# Patient Record
Sex: MALE | Race: WHITE | NOT HISPANIC OR LATINO | Employment: OTHER | URBAN - NONMETROPOLITAN AREA
[De-identification: names, ages, dates, MRNs, and addresses within clinical notes are randomized per-mention and may not be internally consistent; named-entity substitution may affect disease eponyms.]

---

## 2017-01-13 RX ORDER — SERTRALINE HYDROCHLORIDE 25 MG/1
TABLET, FILM COATED ORAL
Qty: 30 TABLET | Refills: 10 | Status: SHIPPED | OUTPATIENT
Start: 2017-01-13 | End: 2017-05-22 | Stop reason: SDUPTHER

## 2017-01-18 RX ORDER — TRAZODONE HYDROCHLORIDE 50 MG/1
TABLET ORAL
Qty: 30 TABLET | Refills: 0 | Status: SHIPPED | OUTPATIENT
Start: 2017-01-18 | End: 2017-03-29 | Stop reason: SDUPTHER

## 2017-03-14 ENCOUNTER — OFFICE VISIT (OUTPATIENT)
Dept: INTERNAL MEDICINE | Facility: CLINIC | Age: 77
End: 2017-03-14

## 2017-03-14 VITALS
RESPIRATION RATE: 14 BRPM | WEIGHT: 206 LBS | BODY MASS INDEX: 30.51 KG/M2 | SYSTOLIC BLOOD PRESSURE: 118 MMHG | DIASTOLIC BLOOD PRESSURE: 70 MMHG | HEIGHT: 69 IN | OXYGEN SATURATION: 91 % | HEART RATE: 68 BPM | TEMPERATURE: 99.1 F

## 2017-03-14 DIAGNOSIS — J06.9 ACUTE URI: Primary | ICD-10-CM

## 2017-03-14 PROCEDURE — 99213 OFFICE O/P EST LOW 20 MIN: CPT | Performed by: INTERNAL MEDICINE

## 2017-03-14 RX ORDER — AZITHROMYCIN 250 MG/1
TABLET, FILM COATED ORAL
Qty: 6 TABLET | Refills: 0 | Status: SHIPPED | OUTPATIENT
Start: 2017-03-14 | End: 2017-05-15

## 2017-03-14 NOTE — PROGRESS NOTES
Subjective   Jairo Mercedes is a 76 y.o. male.     Chief Complaint   Patient presents with   • Nasal Congestion     runny nose    • Generalized Body Aches       URI    This is a new problem. The current episode started in the past 7 days. The problem has been unchanged. There has been no fever. Associated symptoms include congestion, coughing, joint pain, rhinorrhea, sinus pain, sneezing and a sore throat. Pertinent negatives include no chest pain, headaches or wheezing. He has tried NSAIDs and acetaminophen for the symptoms. The treatment provided no relief.          Current Outpatient Prescriptions:   •  ADVAIR DISKUS 250-50 MCG/DOSE DISKUS, INHALE ONE PUFF BY MOUTH EVERY 12 HOURS, Disp: 1 each, Rfl: 4  •  albuterol (PROAIR HFA) 108 (90 BASE) MCG/ACT inhaler, INHALE 1 TO 2 PUFFS EVERY 4 TO 6 HOURS AS NEEDED., Disp: , Rfl:   •  aspirin 81 MG tablet, Take  by mouth., Disp: , Rfl:   •  atorvastatin (LIPITOR) 40 MG tablet, TAKE ONE TABLET BY MOUTH EVERY NIGHT AT BEDTIME, Disp: 30 tablet, Rfl: 5  •  DHA-EPA-Vitamin E (OMEGA-3 COMPLEX PO), Take  by mouth., Disp: , Rfl:   •  esomeprazole (NexIUM) 40 MG capsule, Take 1 capsule by mouth daily., Disp: , Rfl:   •  fluticasone (FLONASE) 50 MCG/ACT nasal spray, , Disp: , Rfl:   •  FLUZONE HIGH-DOSE 0.5 ML suspension prefilled syringe injection, , Disp: , Rfl:   •  HYDROcodone-homatropine (HYCODAN) 5-1.5 MG/5ML syrup, Take 5 mL by mouth Every 6 (Six) Hours As Needed for cough., Disp: 120 mL, Rfl: 0  •  isosorbide mononitrate (IMDUR) 30 MG 24 hr tablet, TAKE ONE TABLET BY MOUTH EVERY DAY AS DIRECTED, Disp: 90 tablet, Rfl: 2  •  meclizine (ANTIVERT) 25 MG tablet, Take 1 tablet by mouth 2 (two) times a day., Disp: 180 tablet, Rfl: 3  •  metoprolol tartrate (LOPRESSOR) 25 MG tablet, TAKE ONE TABLET BY MOUTH TWICE A DAY, Disp: 60 tablet, Rfl: 5  •  nitroglycerin (NITROSTAT) 0.4 MG SL tablet, Place  under the tongue., Disp: , Rfl:   •  sertraline (ZOLOFT) 25 MG tablet, TAKE ONE  TABLET BY MOUTH DAILY, Disp: 30 tablet, Rfl: 10  •  SPIRIVA HANDIHALER 18 MCG per inhalation capsule, INHALE THE ENTIRE CONTENTS OF 1 CAPSULE ONCE A DAY USING HANDIHALER DEVICE, Disp: 90 capsule, Rfl: 3  •  TRAVEL SICKNESS 25 MG chewable tablet chewable tablet, CHEW ONE TABLET BY MOUTH THREE TIMES A DAY AS NEEDED, Disp: 270 tablet, Rfl: 3  •  traZODone (DESYREL) 50 MG tablet, TAKE ONE-HALF TO ONE TABLET BY MOUTH EVERY NIGHT AT BEDTIME AS NEEDED, Disp: 30 tablet, Rfl: 0  •  azithromycin (ZITHROMAX) 250 MG tablet, Take 2 tablets the first day, then 1 tablet daily for 4 days., Disp: 6 tablet, Rfl: 0    The following portions of the patient's history were reviewed and updated as appropriate: allergies, current medications, past family history, past medical history, past social history, past surgical history and problem list.    Review of Systems   Constitutional: Negative.    HENT: Positive for congestion, rhinorrhea, sneezing and sore throat.    Respiratory: Positive for cough. Negative for wheezing.    Cardiovascular: Negative.  Negative for chest pain.   Gastrointestinal: Negative.    Musculoskeletal: Positive for joint pain.   Skin: Negative.    Neurological: Negative for headaches.   Psychiatric/Behavioral: Negative.        Objective   Physical Exam   Constitutional: He is oriented to person, place, and time. He appears well-developed and well-nourished.   HENT:   Head: Normocephalic.   Tm bulge   Neck: Neck supple.   Cardiovascular: Normal rate, regular rhythm and normal heart sounds.    Pulmonary/Chest: Effort normal and breath sounds normal.   Abdominal: Soft. Bowel sounds are normal.   Neurological: He is alert and oriented to person, place, and time.   Psychiatric: He has a normal mood and affect. His behavior is normal.       All tests have been reviewed.    Assessment/Plan   Diagnoses and all orders for this visit:    Acute URI    Other orders  -     azithromycin (ZITHROMAX) 250 MG tablet; Take 2 tablets the  first day, then 1 tablet daily for 4 days.           zyrtec and advil and mucinex,call if no better

## 2017-03-28 RX ORDER — ATORVASTATIN CALCIUM 40 MG/1
TABLET, FILM COATED ORAL
Qty: 30 TABLET | Refills: 4 | Status: SHIPPED | OUTPATIENT
Start: 2017-03-28 | End: 2017-08-23 | Stop reason: SDUPTHER

## 2017-03-29 RX ORDER — TRAZODONE HYDROCHLORIDE 50 MG/1
TABLET ORAL
Qty: 30 TABLET | Refills: 0 | Status: SHIPPED | OUTPATIENT
Start: 2017-03-29 | End: 2017-06-07 | Stop reason: SDUPTHER

## 2017-04-24 RX ORDER — ALBUTEROL SULFATE 90 UG/1
AEROSOL, METERED RESPIRATORY (INHALATION)
Qty: 1 INHALER | Refills: 2 | Status: SHIPPED | OUTPATIENT
Start: 2017-04-24 | End: 2018-07-27 | Stop reason: ALTCHOICE

## 2017-04-26 RX ORDER — ISOSORBIDE MONONITRATE 30 MG/1
TABLET, EXTENDED RELEASE ORAL
Qty: 90 TABLET | Refills: 1 | Status: SHIPPED | OUTPATIENT
Start: 2017-04-26 | End: 2017-10-24 | Stop reason: SDUPTHER

## 2017-05-15 ENCOUNTER — LAB (OUTPATIENT)
Dept: LAB | Facility: HOSPITAL | Age: 77
End: 2017-05-15

## 2017-05-15 ENCOUNTER — OFFICE VISIT (OUTPATIENT)
Dept: INTERNAL MEDICINE | Facility: CLINIC | Age: 77
End: 2017-05-15

## 2017-05-15 VITALS
OXYGEN SATURATION: 94 % | HEIGHT: 69 IN | SYSTOLIC BLOOD PRESSURE: 120 MMHG | DIASTOLIC BLOOD PRESSURE: 78 MMHG | BODY MASS INDEX: 29.62 KG/M2 | TEMPERATURE: 98.3 F | WEIGHT: 200 LBS | RESPIRATION RATE: 14 BRPM | HEART RATE: 104 BPM

## 2017-05-15 DIAGNOSIS — J06.9 ACUTE URI: Primary | ICD-10-CM

## 2017-05-15 DIAGNOSIS — D50.0 BLOOD LOSS ANEMIA: ICD-10-CM

## 2017-05-15 LAB
BASOPHILS # BLD AUTO: 0.03 10*3/MM3 (ref 0–0.2)
BASOPHILS NFR BLD AUTO: 0.2 % (ref 0–1)
DEPRECATED RDW RBC AUTO: 52.4 FL (ref 37–54)
EOSINOPHIL # BLD AUTO: 0.18 10*3/MM3 (ref 0.1–0.3)
EOSINOPHIL NFR BLD AUTO: 1 % (ref 0–3)
ERYTHROCYTE [DISTWIDTH] IN BLOOD BY AUTOMATED COUNT: 15.6 % (ref 11.3–14.5)
FERRITIN SERPL-MCNC: 130 NG/ML (ref 22–322)
FOLATE SERPL-MCNC: 8.83 NG/ML (ref 3.2–20)
HCT VFR BLD AUTO: 44.3 % (ref 38.9–50.9)
HGB BLD-MCNC: 14.2 G/DL (ref 13.1–17.5)
IMM GRANULOCYTES # BLD: 0.07 10*3/MM3 (ref 0–0.03)
IMM GRANULOCYTES NFR BLD: 0.4 % (ref 0–0.6)
IRON 24H UR-MRATE: 19 MCG/DL (ref 50–175)
IRON SATN MFR SERPL: 6 % (ref 20–50)
LYMPHOCYTES # BLD AUTO: 3.73 10*3/MM3 (ref 0.6–4.8)
LYMPHOCYTES NFR BLD AUTO: 20.9 % (ref 24–44)
MCH RBC QN AUTO: 29.2 PG (ref 27–31)
MCHC RBC AUTO-ENTMCNC: 32.1 G/DL (ref 32–36)
MCV RBC AUTO: 91.2 FL (ref 80–99)
MONOCYTES # BLD AUTO: 1.82 10*3/MM3 (ref 0–1)
MONOCYTES NFR BLD AUTO: 10.2 % (ref 0–12)
NEUTROPHILS # BLD AUTO: 12.04 10*3/MM3 (ref 1.5–8.3)
NEUTROPHILS NFR BLD AUTO: 67.3 % (ref 41–71)
PLATELET # BLD AUTO: 431 10*3/MM3 (ref 150–450)
PMV BLD AUTO: 10.9 FL (ref 6–12)
RBC # BLD AUTO: 4.86 10*6/MM3 (ref 4.2–5.76)
TIBC SERPL-MCNC: 300 MCG/DL (ref 250–450)
VIT B12 BLD-MCNC: 446 PG/ML (ref 211–911)
WBC NRBC COR # BLD: 17.87 10*3/MM3 (ref 3.5–10.8)

## 2017-05-15 PROCEDURE — 83550 IRON BINDING TEST: CPT | Performed by: INTERNAL MEDICINE

## 2017-05-15 PROCEDURE — 36415 COLL VENOUS BLD VENIPUNCTURE: CPT

## 2017-05-15 PROCEDURE — 82607 VITAMIN B-12: CPT | Performed by: INTERNAL MEDICINE

## 2017-05-15 PROCEDURE — 99213 OFFICE O/P EST LOW 20 MIN: CPT | Performed by: INTERNAL MEDICINE

## 2017-05-15 PROCEDURE — 82728 ASSAY OF FERRITIN: CPT | Performed by: INTERNAL MEDICINE

## 2017-05-15 PROCEDURE — 85025 COMPLETE CBC W/AUTO DIFF WBC: CPT | Performed by: INTERNAL MEDICINE

## 2017-05-15 PROCEDURE — 83540 ASSAY OF IRON: CPT | Performed by: INTERNAL MEDICINE

## 2017-05-15 PROCEDURE — 82746 ASSAY OF FOLIC ACID SERUM: CPT | Performed by: INTERNAL MEDICINE

## 2017-05-15 RX ORDER — LEVOFLOXACIN 500 MG/1
500 TABLET, FILM COATED ORAL DAILY
Qty: 7 TABLET | Refills: 0 | Status: SHIPPED | OUTPATIENT
Start: 2017-05-15 | End: 2017-12-08

## 2017-05-15 RX ORDER — BENZONATATE 200 MG/1
200 CAPSULE ORAL 3 TIMES DAILY PRN
Qty: 40 CAPSULE | Refills: 0 | Status: SHIPPED | OUTPATIENT
Start: 2017-05-15 | End: 2017-06-09

## 2017-05-17 ENCOUNTER — OFFICE VISIT (OUTPATIENT)
Dept: ONCOLOGY | Facility: CLINIC | Age: 77
End: 2017-05-17

## 2017-05-17 VITALS
RESPIRATION RATE: 13 BRPM | HEART RATE: 65 BPM | SYSTOLIC BLOOD PRESSURE: 164 MMHG | BODY MASS INDEX: 30.13 KG/M2 | WEIGHT: 204 LBS | DIASTOLIC BLOOD PRESSURE: 67 MMHG | TEMPERATURE: 97 F

## 2017-05-17 DIAGNOSIS — E61.1 IRON DEFICIENCY: ICD-10-CM

## 2017-05-17 PROCEDURE — 99214 OFFICE O/P EST MOD 30 MIN: CPT | Performed by: NURSE PRACTITIONER

## 2017-05-17 RX ORDER — FAMOTIDINE 10 MG/ML
20 INJECTION, SOLUTION INTRAVENOUS ONCE
Status: CANCELLED | OUTPATIENT
Start: 2017-05-25

## 2017-05-17 RX ORDER — DIPHENHYDRAMINE HCL 25 MG
25 CAPSULE ORAL ONCE
Status: CANCELLED | OUTPATIENT
Start: 2017-06-03

## 2017-05-17 RX ORDER — DIPHENHYDRAMINE HCL 25 MG
25 CAPSULE ORAL ONCE
Status: CANCELLED | OUTPATIENT
Start: 2017-05-25

## 2017-05-17 RX ORDER — SODIUM CHLORIDE 9 MG/ML
250 INJECTION, SOLUTION INTRAVENOUS ONCE
Status: CANCELLED | OUTPATIENT
Start: 2017-05-25

## 2017-05-17 RX ORDER — FAMOTIDINE 10 MG/ML
20 INJECTION, SOLUTION INTRAVENOUS ONCE
Status: CANCELLED | OUTPATIENT
Start: 2017-06-03

## 2017-05-17 RX ORDER — SODIUM CHLORIDE 9 MG/ML
250 INJECTION, SOLUTION INTRAVENOUS ONCE
Status: CANCELLED | OUTPATIENT
Start: 2017-06-03

## 2017-05-22 RX ORDER — SERTRALINE HYDROCHLORIDE 25 MG/1
25 TABLET, FILM COATED ORAL DAILY
Qty: 90 TABLET | Refills: 1 | Status: SHIPPED | OUTPATIENT
Start: 2017-05-22 | End: 2017-11-13 | Stop reason: SDUPTHER

## 2017-05-26 ENCOUNTER — HOSPITAL ENCOUNTER (OUTPATIENT)
Dept: INFUSION THERAPY | Facility: HOSPITAL | Age: 77
End: 2017-05-26

## 2017-05-26 RX ORDER — ESOMEPRAZOLE MAGNESIUM 40 MG/1
40 CAPSULE, DELAYED RELEASE ORAL DAILY
Qty: 30 CAPSULE | Refills: 5 | Status: SHIPPED | OUTPATIENT
Start: 2017-05-26 | End: 2018-01-18 | Stop reason: SDUPTHER

## 2017-06-02 ENCOUNTER — INFUSION (OUTPATIENT)
Dept: ONCOLOGY | Facility: HOSPITAL | Age: 77
End: 2017-06-02

## 2017-06-02 VITALS
DIASTOLIC BLOOD PRESSURE: 59 MMHG | RESPIRATION RATE: 16 BRPM | TEMPERATURE: 97.8 F | SYSTOLIC BLOOD PRESSURE: 109 MMHG | HEART RATE: 51 BPM

## 2017-06-02 DIAGNOSIS — E61.1 IRON DEFICIENCY: Primary | ICD-10-CM

## 2017-06-02 PROCEDURE — 96365 THER/PROPH/DIAG IV INF INIT: CPT

## 2017-06-02 PROCEDURE — 96375 TX/PRO/DX INJ NEW DRUG ADDON: CPT

## 2017-06-02 PROCEDURE — 63710000001 DIPHENHYDRAMINE PER 50 MG: Performed by: NURSE PRACTITIONER

## 2017-06-02 PROCEDURE — 96374 THER/PROPH/DIAG INJ IV PUSH: CPT

## 2017-06-02 PROCEDURE — 25010000002 FERUMOXYTOL 510 MG/17ML SOLUTION 510 MG VIAL: Performed by: NURSE PRACTITIONER

## 2017-06-02 RX ORDER — DIPHENHYDRAMINE HCL 25 MG
25 CAPSULE ORAL ONCE
Status: COMPLETED | OUTPATIENT
Start: 2017-06-02 | End: 2017-06-02

## 2017-06-02 RX ORDER — FAMOTIDINE 10 MG/ML
20 INJECTION, SOLUTION INTRAVENOUS ONCE
Status: COMPLETED | OUTPATIENT
Start: 2017-06-02 | End: 2017-06-02

## 2017-06-02 RX ORDER — SODIUM CHLORIDE 9 MG/ML
250 INJECTION, SOLUTION INTRAVENOUS ONCE
Status: COMPLETED | OUTPATIENT
Start: 2017-06-02 | End: 2017-06-02

## 2017-06-02 RX ADMIN — FAMOTIDINE 20 MG: 10 INJECTION, SOLUTION INTRAVENOUS at 09:08

## 2017-06-02 RX ADMIN — FERUMOXYTOL 510 MG: 510 INJECTION INTRAVENOUS at 09:12

## 2017-06-02 RX ADMIN — DIPHENHYDRAMINE HYDROCHLORIDE 25 MG: 25 CAPSULE ORAL at 09:08

## 2017-06-02 RX ADMIN — SODIUM CHLORIDE 250 ML: 9 INJECTION, SOLUTION INTRAVENOUS at 09:12

## 2017-06-07 ENCOUNTER — APPOINTMENT (OUTPATIENT)
Dept: LAB | Facility: HOSPITAL | Age: 77
End: 2017-06-07

## 2017-06-07 RX ORDER — TRAZODONE HYDROCHLORIDE 50 MG/1
TABLET ORAL
Qty: 30 TABLET | Refills: 0 | Status: SHIPPED | OUTPATIENT
Start: 2017-06-07 | End: 2017-10-13 | Stop reason: SDUPTHER

## 2017-06-08 LAB
25(OH)D3+25(OH)D2 SERPL-MCNC: 32.8 NG/ML (ref 30–100)
ALBUMIN SERPL-MCNC: 3.9 G/DL (ref 3.5–4.8)
ALBUMIN/GLOB SERPL: 1.5 {RATIO} (ref 1.2–2.2)
ALP SERPL-CCNC: 146 IU/L (ref 39–117)
ALT SERPL-CCNC: 23 IU/L (ref 0–44)
APPEARANCE UR: CLEAR
AST SERPL-CCNC: 25 IU/L (ref 0–40)
BASOPHILS # BLD AUTO: 0 X10E3/UL (ref 0–0.2)
BASOPHILS NFR BLD AUTO: 0 %
BILIRUB SERPL-MCNC: 0.4 MG/DL (ref 0–1.2)
BILIRUB UR QL STRIP: NEGATIVE
BUN SERPL-MCNC: 18 MG/DL (ref 8–27)
BUN/CREAT SERPL: 18 (ref 10–24)
CALCIUM SERPL-MCNC: 9.5 MG/DL (ref 8.6–10.2)
CHLORIDE SERPL-SCNC: 103 MMOL/L (ref 96–106)
CHOLEST SERPL-MCNC: 121 MG/DL (ref 100–199)
CK SERPL-CCNC: 83 U/L (ref 24–204)
CO2 SERPL-SCNC: 19 MMOL/L (ref 18–29)
COLOR UR: YELLOW
CREAT SERPL-MCNC: 1.02 MG/DL (ref 0.76–1.27)
EOSINOPHIL # BLD AUTO: 0.5 X10E3/UL (ref 0–0.4)
EOSINOPHIL NFR BLD AUTO: 4 %
ERYTHROCYTE [DISTWIDTH] IN BLOOD BY AUTOMATED COUNT: 15 % (ref 12.3–15.4)
FERRITIN SERPL-MCNC: 617 NG/ML (ref 30–400)
GLOBULIN SER CALC-MCNC: 2.6 G/DL (ref 1.5–4.5)
GLUCOSE SERPL-MCNC: 106 MG/DL (ref 65–99)
GLUCOSE UR QL: NEGATIVE
HCT VFR BLD AUTO: 41 % (ref 37.5–51)
HDLC SERPL-MCNC: 27 MG/DL
HGB BLD-MCNC: 13.8 G/DL (ref 12.6–17.7)
HGB UR QL STRIP: NEGATIVE
IMM GRANULOCYTES # BLD: 0 X10E3/UL (ref 0–0.1)
IMM GRANULOCYTES NFR BLD: 0 %
IRON SATN MFR SERPL: 31 % (ref 15–55)
IRON SERPL-MCNC: 79 UG/DL (ref 38–169)
KETONES UR QL STRIP: NEGATIVE
LDLC SERPL CALC-MCNC: 61 MG/DL (ref 0–99)
LEUKOCYTE ESTERASE UR QL STRIP: NEGATIVE
LYMPHOCYTES # BLD AUTO: 3.2 X10E3/UL (ref 0.7–3.1)
LYMPHOCYTES NFR BLD AUTO: 23 %
MCH RBC QN AUTO: 28.8 PG (ref 26.6–33)
MCHC RBC AUTO-ENTMCNC: 33.7 G/DL (ref 31.5–35.7)
MCV RBC AUTO: 86 FL (ref 79–97)
MICRO URNS: NORMAL
MONOCYTES # BLD AUTO: 1.4 X10E3/UL (ref 0.1–0.9)
MONOCYTES NFR BLD AUTO: 10 %
NEUTROPHILS # BLD AUTO: 8.7 X10E3/UL (ref 1.4–7)
NEUTROPHILS NFR BLD AUTO: 63 %
NITRITE UR QL STRIP: NEGATIVE
PH UR STRIP: 6 [PH] (ref 5–7.5)
PLATELET # BLD AUTO: 581 X10E3/UL (ref 150–379)
POTASSIUM SERPL-SCNC: 4.7 MMOL/L (ref 3.5–5.2)
PROT SERPL-MCNC: 6.5 G/DL (ref 6–8.5)
PROT UR QL STRIP: NEGATIVE
PSA SERPL-MCNC: 0.6 NG/ML (ref 0–4)
RBC # BLD AUTO: 4.79 X10E6/UL (ref 4.14–5.8)
SODIUM SERPL-SCNC: 142 MMOL/L (ref 134–144)
SP GR UR: 1.01 (ref 1–1.03)
TIBC SERPL-MCNC: 252 UG/DL (ref 250–450)
TRIGL SERPL-MCNC: 164 MG/DL (ref 0–149)
TSH SERPL DL<=0.005 MIU/L-ACNC: 1.72 UIU/ML (ref 0.45–4.5)
UIBC SERPL-MCNC: 173 UG/DL (ref 111–343)
UROBILINOGEN UR STRIP-MCNC: 1 MG/DL (ref 0.2–1)
VLDLC SERPL CALC-MCNC: 33 MG/DL (ref 5–40)
WBC # BLD AUTO: 13.9 X10E3/UL (ref 3.4–10.8)

## 2017-06-09 ENCOUNTER — OFFICE VISIT (OUTPATIENT)
Dept: INTERNAL MEDICINE | Facility: CLINIC | Age: 77
End: 2017-06-09

## 2017-06-09 ENCOUNTER — HOSPITAL ENCOUNTER (OUTPATIENT)
Dept: INFUSION THERAPY | Facility: HOSPITAL | Age: 77
Setting detail: INFUSION SERIES
Discharge: HOME | End: 2017-06-09

## 2017-06-09 VITALS
SYSTOLIC BLOOD PRESSURE: 112 MMHG | RESPIRATION RATE: 18 BRPM | HEART RATE: 57 BPM | DIASTOLIC BLOOD PRESSURE: 61 MMHG | HEIGHT: 69 IN | OXYGEN SATURATION: 93 % | TEMPERATURE: 99.1 F | WEIGHT: 200 LBS | BODY MASS INDEX: 29.62 KG/M2

## 2017-06-09 VITALS
WEIGHT: 200 LBS | SYSTOLIC BLOOD PRESSURE: 90 MMHG | OXYGEN SATURATION: 93 % | HEIGHT: 69 IN | DIASTOLIC BLOOD PRESSURE: 50 MMHG | BODY MASS INDEX: 29.62 KG/M2 | HEART RATE: 64 BPM | RESPIRATION RATE: 14 BRPM | TEMPERATURE: 98.6 F

## 2017-06-09 DIAGNOSIS — E61.1 IRON DEFICIENCY: ICD-10-CM

## 2017-06-09 DIAGNOSIS — N17.9 ACUTE RENAL FAILURE, UNSPECIFIED ACUTE RENAL FAILURE TYPE (HCC): ICD-10-CM

## 2017-06-09 DIAGNOSIS — E78.5 HYPERLIPIDEMIA, UNSPECIFIED HYPERLIPIDEMIA TYPE: ICD-10-CM

## 2017-06-09 DIAGNOSIS — E11.8 TYPE 2 DIABETES MELLITUS WITH COMPLICATION, WITHOUT LONG-TERM CURRENT USE OF INSULIN (HCC): ICD-10-CM

## 2017-06-09 DIAGNOSIS — E61.1 IRON DEFICIENCY: Primary | ICD-10-CM

## 2017-06-09 DIAGNOSIS — I25.10 CORONARY ARTERIOSCLEROSIS IN NATIVE ARTERY: Primary | ICD-10-CM

## 2017-06-09 DIAGNOSIS — J06.9 ACUTE URI: ICD-10-CM

## 2017-06-09 DIAGNOSIS — I10 BENIGN ESSENTIAL HYPERTENSION: ICD-10-CM

## 2017-06-09 DIAGNOSIS — M19.90 ARTHRITIS: ICD-10-CM

## 2017-06-09 DIAGNOSIS — K21.9 GASTROESOPHAGEAL REFLUX DISEASE WITHOUT ESOPHAGITIS: ICD-10-CM

## 2017-06-09 DIAGNOSIS — J43.8 OTHER EMPHYSEMA (HCC): ICD-10-CM

## 2017-06-09 DIAGNOSIS — D50.0 IRON DEFICIENCY ANEMIA DUE TO CHRONIC BLOOD LOSS: ICD-10-CM

## 2017-06-09 PROCEDURE — 63710000001 DIPHENHYDRAMINE PER 50 MG

## 2017-06-09 PROCEDURE — 25010000002 FERUMOXYTOL 510 MG/17ML SOLUTION 510 MG VIAL

## 2017-06-09 PROCEDURE — 99214 OFFICE O/P EST MOD 30 MIN: CPT | Performed by: INTERNAL MEDICINE

## 2017-06-09 RX ORDER — DIPHENHYDRAMINE HCL 25 MG
25 CAPSULE ORAL ONCE
Status: DISCONTINUED | OUTPATIENT
Start: 2017-06-09 | End: 2017-06-09 | Stop reason: HOSPADM

## 2017-06-09 RX ORDER — FAMOTIDINE 10 MG/ML
20 INJECTION, SOLUTION INTRAVENOUS ONCE
Status: DISCONTINUED | OUTPATIENT
Start: 2017-06-09 | End: 2017-06-09 | Stop reason: HOSPADM

## 2017-06-09 RX ORDER — DIPHENHYDRAMINE HCL 25 MG
25 CAPSULE ORAL ONCE
OUTPATIENT
Start: 2017-06-09

## 2017-06-09 RX ORDER — SODIUM CHLORIDE 9 MG/ML
250 INJECTION, SOLUTION INTRAVENOUS ONCE
OUTPATIENT
Start: 2017-06-09

## 2017-06-09 RX ORDER — SODIUM CHLORIDE 9 MG/ML
250 INJECTION, SOLUTION INTRAVENOUS ONCE
Status: DISCONTINUED | OUTPATIENT
Start: 2017-06-09 | End: 2017-06-09 | Stop reason: HOSPADM

## 2017-06-09 RX ORDER — DIPHENHYDRAMINE HCL 25 MG
25 CAPSULE ORAL ONCE
Status: DISCONTINUED | OUTPATIENT
Start: 2017-06-09 | End: 2017-06-11 | Stop reason: HOSPADM

## 2017-06-09 RX ADMIN — DIPHENHYDRAMINE HYDROCHLORIDE 25 MG: 25 CAPSULE ORAL at 13:15

## 2017-06-09 RX ADMIN — FAMOTIDINE 20 MG: 10 INJECTION, SOLUTION INTRAVENOUS at 13:15

## 2017-06-09 RX ADMIN — FERUMOXYTOL 510 MG: 510 INJECTION INTRAVENOUS at 13:45

## 2017-06-09 RX ADMIN — SODIUM CHLORIDE 250 ML: 9 INJECTION, SOLUTION INTRAVENOUS at 13:45

## 2017-06-09 NOTE — ADDENDUM NOTE
Encounter addended by: Ruby Graham [facility] on: 6/9/2017  4:22 PM<BR>     Actions taken: Diagnosis association updated,  activity accessed

## 2017-06-09 NOTE — CODE DOCUMENTATION
Unable to access the mar  To document the meds pepcid 20mg given at 1335 iv push pre med and benadryl 25mg po given at 1330 pre med

## 2017-06-09 NOTE — PROGRESS NOTES
Subjective   Jairo Mercedes is a 76 y.o. male.     No chief complaint on file.      History of Present Illness   Patient here for follow-up.  Acute URI improved denies any significant coughing.  CBC showed a white blood cell elevated platelets elevated ferritin elevated.  Patient does have iron deficiency on iron supplement now.  Hyperlipidemia stable medication.  Prediabetes stable and now.  COPD stable medication.  Aneurysm screening test that showed a 3 cm aneurysm.  GERD stable medication.  Patient had a history of a splenectomy.  Weight is still high.    Current Outpatient Prescriptions:   •  atorvastatin (LIPITOR) 40 MG tablet, TAKE ONE TABLET BY MOUTH EVERY NIGHT AT BEDTIME, Disp: 30 tablet, Rfl: 4  •  esomeprazole (nexIUM) 40 MG capsule, Take 1 capsule by mouth Daily., Disp: 30 capsule, Rfl: 5  •  fluticasone (FLONASE) 50 MCG/ACT nasal spray, , Disp: , Rfl:   •  fluticasone-salmeterol (ADVAIR DISKUS) 250-50 MCG/DOSE DISKUS, Inhale 1 puff 2 (Two) Times a Day., Disp: 3 each, Rfl: 1  •  HYDROcodone-homatropine (HYCODAN) 5-1.5 MG/5ML syrup, Take 5 mL by mouth Every 6 (Six) Hours As Needed for cough., Disp: 120 mL, Rfl: 0  •  isosorbide mononitrate (IMDUR) 30 MG 24 hr tablet, TAKE ONE TABLET BY MOUTH DAILY AS DIRECTED, Disp: 90 tablet, Rfl: 1  •  levoFLOXacin (LEVAQUIN) 500 MG tablet, Take 1 tablet by mouth Daily., Disp: 7 tablet, Rfl: 0  •  metoprolol tartrate (LOPRESSOR) 25 MG tablet, Take 1 tablet by mouth 2 (Two) Times a Day., Disp: 180 tablet, Rfl: 1  •  nitroglycerin (NITROSTAT) 0.4 MG SL tablet, Place  under the tongue., Disp: , Rfl:   •  sertraline (ZOLOFT) 25 MG tablet, Take 1 tablet by mouth Daily., Disp: 90 tablet, Rfl: 1  •  tiotropium (SPIRIVA HANDIHALER) 18 MCG per inhalation capsule, Place 1 capsule into inhaler and inhale Daily., Disp: 90 capsule, Rfl: 3  •  TRAVEL SICKNESS 25 MG chewable tablet chewable tablet, CHEW ONE TABLET BY MOUTH THREE TIMES A DAY AS NEEDED, Disp: 270 tablet, Rfl: 3  •   traZODone (DESYREL) 50 MG tablet, TAKE 1/2 TO 1 TABLET BY MOUTH AT BEDTIME AS NEEDED, Disp: 30 tablet, Rfl: 0  •  VENTOLIN  (90 BASE) MCG/ACT inhaler, INHALE ONE TO TWO PUFFS BY MOUTH EVERY 4 TO 6 HOURS AS NEEDED, Disp: 1 inhaler, Rfl: 2    The following portions of the patient's history were reviewed and updated as appropriate: allergies, current medications, past family history, past medical history, past social history, past surgical history and problem list.    Review of Systems   Constitutional: Negative.    Respiratory: Negative.    Cardiovascular: Negative.    Gastrointestinal: Negative.    Musculoskeletal: Negative.    Skin: Negative.    Neurological: Negative.    Psychiatric/Behavioral: Negative.        Objective   Physical Exam   Constitutional: He is oriented to person, place, and time. He appears well-nourished.   Neck: Neck supple.   Cardiovascular: Normal rate, regular rhythm and normal heart sounds.    Pulmonary/Chest: Effort normal and breath sounds normal.   Abdominal: Bowel sounds are normal.   Neurological: He is alert and oriented to person, place, and time.   Skin: Skin is warm.   Psychiatric: He has a normal mood and affect.       All tests have been reviewed.    Assessment/Plan   There are no diagnoses linked to this encounter.            #1: Adenopathy, intra-abdominal. biopsy, reactive changes only follow up : Hematology/oncology. history of ITP s/p splenectomy  #2: copd, continue medicine  #3: fatty liver, abnormal liver enzymes, s/p cholecystectomy. history of gall stone, follow up with GI, liver enzymes worsened after Lipitor 80 mg. Asked patient to cut down Lipitor 40 mg daily, discontinue acetaminophen, weight loss. repeat liver enzymes AST 42 continue to watch alkaline phosphatase is elevated performed isoenzymes.  #4: Coronary heart disease,blockage unable to stent. chronic stable angina.continue medicine prn NTG,metoprolol 25 bid again since patient has angina with exertion,  no more. Cath nl per pt, f/u cardio  #5: Prediabetes. watch A1c 6.1  #6: Hyperlipidemia, cut down Lipitor from 80 mg to 40 mg bedtime liver enzymes improved   #7: Mild chronic anxiety, continue medicine  #8: GERD continue medicine,   Leukocytosis thrombocytosis lymphocytosis monocytosis history of splenectomy for possible thrombocytopenia. continue to watch  right thumb tendonitis,XR severe arthritis.no help after heat, voltaren gel, splint, rest. steroid shot in the past no help, see PT and rheum  hemorroid continue medicine   Overweight encouraged patient to have good diet  renal cyst 3.3 cm repeat next 4/2014, MRI November 2014 showed a 2 cm.  low iron still on s/p iron transfusion  insomia continue trazodone  Anemia resolved  vitD low vitD3 2000u daily  s/p pneumonia shot, prevnar 10/27, Td to HD , zostavax done  aaa screen 3cm repeat 12/2017  Colon need report from dr gutierrez ?2011, normal per patient  BPH continue PSA   6 mo  we will repeat a CBC in 3 weeks            Office Visit on 12/9/2016              Detailed Report

## 2017-06-12 NOTE — ADDENDUM NOTE
Encounter addended by: Ruby Santos RN on: 6/12/2017 11:23 AM<BR>     Actions taken: MAR administration accepted

## 2017-06-16 ENCOUNTER — RESULTS ENCOUNTER (OUTPATIENT)
Dept: INTERNAL MEDICINE | Facility: CLINIC | Age: 77
End: 2017-06-16

## 2017-06-16 DIAGNOSIS — J06.9 ACUTE URI: ICD-10-CM

## 2017-06-16 DIAGNOSIS — M19.90 ARTHRITIS: ICD-10-CM

## 2017-07-17 RX ORDER — MECLIZINE HCL 25 MG/1
TABLET, CHEWABLE ORAL
Qty: 180 TABLET | Refills: 0 | Status: SHIPPED | OUTPATIENT
Start: 2017-07-17 | End: 2018-04-12

## 2017-07-21 LAB
25(OH)D3+25(OH)D2 SERPL-MCNC: 35.3 NG/ML (ref 30–100)
ALBUMIN SERPL-MCNC: 4.2 G/DL (ref 3.5–4.8)
ALBUMIN/GLOB SERPL: 1.3 {RATIO} (ref 1.2–2.2)
ALP SERPL-CCNC: 147 IU/L (ref 39–117)
ALT SERPL-CCNC: 20 IU/L (ref 0–44)
APPEARANCE UR: CLEAR
AST SERPL-CCNC: 22 IU/L (ref 0–40)
BASOPHILS # BLD AUTO: 0.1 X10E3/UL (ref 0–0.2)
BASOPHILS NFR BLD AUTO: 1 %
BILIRUB SERPL-MCNC: 0.5 MG/DL (ref 0–1.2)
BILIRUB UR QL STRIP: NEGATIVE
BUN SERPL-MCNC: 20 MG/DL (ref 8–27)
BUN/CREAT SERPL: 22 (ref 10–24)
CALCIUM SERPL-MCNC: 9.4 MG/DL (ref 8.6–10.2)
CHLORIDE SERPL-SCNC: 100 MMOL/L (ref 96–106)
CHOLEST SERPL-MCNC: 131 MG/DL (ref 100–199)
CK SERPL-CCNC: 80 U/L (ref 24–204)
CO2 SERPL-SCNC: 19 MMOL/L (ref 18–29)
COLOR UR: YELLOW
CREAT SERPL-MCNC: 0.92 MG/DL (ref 0.76–1.27)
EOSINOPHIL # BLD AUTO: 1.1 X10E3/UL (ref 0–0.4)
EOSINOPHIL NFR BLD AUTO: 8 %
ERYTHROCYTE [DISTWIDTH] IN BLOOD BY AUTOMATED COUNT: 16.9 % (ref 12.3–15.4)
FERRITIN SERPL-MCNC: 632 NG/ML (ref 30–400)
GLOBULIN SER CALC-MCNC: 3.3 G/DL (ref 1.5–4.5)
GLUCOSE SERPL-MCNC: 107 MG/DL (ref 65–99)
GLUCOSE UR QL: NEGATIVE
HCT VFR BLD AUTO: 42.2 % (ref 37.5–51)
HDLC SERPL-MCNC: 33 MG/DL
HGB BLD-MCNC: 14.3 G/DL (ref 12.6–17.7)
HGB UR QL STRIP: NEGATIVE
IMM GRANULOCYTES # BLD: 0 X10E3/UL (ref 0–0.1)
IMM GRANULOCYTES NFR BLD: 0 %
IRON SATN MFR SERPL: 22 % (ref 15–55)
IRON SERPL-MCNC: 53 UG/DL (ref 38–169)
KETONES UR QL STRIP: NEGATIVE
LDLC SERPL CALC-MCNC: 55 MG/DL (ref 0–99)
LEUKOCYTE ESTERASE UR QL STRIP: NEGATIVE
LYMPHOCYTES # BLD AUTO: 3.5 X10E3/UL (ref 0.7–3.1)
LYMPHOCYTES NFR BLD AUTO: 24 %
MCH RBC QN AUTO: 29.2 PG (ref 26.6–33)
MCHC RBC AUTO-ENTMCNC: 33.9 G/DL (ref 31.5–35.7)
MCV RBC AUTO: 86 FL (ref 79–97)
MICRO URNS: NORMAL
MONOCYTES # BLD AUTO: 1.2 X10E3/UL (ref 0.1–0.9)
MONOCYTES NFR BLD AUTO: 8 %
NEUTROPHILS # BLD AUTO: 8.7 X10E3/UL (ref 1.4–7)
NEUTROPHILS NFR BLD AUTO: 59 %
NITRITE UR QL STRIP: NEGATIVE
PH UR STRIP: 6 [PH] (ref 5–7.5)
PLATELET # BLD AUTO: 463 X10E3/UL (ref 150–379)
POTASSIUM SERPL-SCNC: 4.8 MMOL/L (ref 3.5–5.2)
PROT SERPL-MCNC: 7.5 G/DL (ref 6–8.5)
PROT UR QL STRIP: NEGATIVE
PSA SERPL-MCNC: 0.6 NG/ML (ref 0–4)
RBC # BLD AUTO: 4.89 X10E6/UL (ref 4.14–5.8)
SODIUM SERPL-SCNC: 138 MMOL/L (ref 134–144)
SP GR UR: 1.01 (ref 1–1.03)
TIBC SERPL-MCNC: 237 UG/DL (ref 250–450)
TRIGL SERPL-MCNC: 216 MG/DL (ref 0–149)
TSH SERPL DL<=0.005 MIU/L-ACNC: 1.57 UIU/ML (ref 0.45–4.5)
UIBC SERPL-MCNC: 184 UG/DL (ref 111–343)
UROBILINOGEN UR STRIP-MCNC: 0.2 MG/DL (ref 0.2–1)
VLDLC SERPL CALC-MCNC: 43 MG/DL (ref 5–40)
WBC # BLD AUTO: 14.7 X10E3/UL (ref 3.4–10.8)

## 2017-08-23 RX ORDER — ATORVASTATIN CALCIUM 40 MG/1
TABLET, FILM COATED ORAL
Qty: 30 TABLET | Refills: 1 | Status: SHIPPED | OUTPATIENT
Start: 2017-08-23 | End: 2017-09-25 | Stop reason: SDUPTHER

## 2017-09-14 RX ORDER — BENZONATATE 200 MG/1
CAPSULE ORAL
Qty: 40 CAPSULE | Refills: 0 | Status: SHIPPED | OUTPATIENT
Start: 2017-09-14 | End: 2017-11-13 | Stop reason: SDUPTHER

## 2017-09-26 RX ORDER — ATORVASTATIN CALCIUM 40 MG/1
TABLET, FILM COATED ORAL
Qty: 30 TABLET | Refills: 3 | Status: SHIPPED | OUTPATIENT
Start: 2017-09-26 | End: 2018-01-18 | Stop reason: SDUPTHER

## 2017-10-13 RX ORDER — TRAZODONE HYDROCHLORIDE 50 MG/1
TABLET ORAL
Qty: 90 TABLET | Refills: 1 | Status: SHIPPED | OUTPATIENT
Start: 2017-10-13 | End: 2018-02-01 | Stop reason: SDUPTHER

## 2017-10-24 RX ORDER — ISOSORBIDE MONONITRATE 30 MG/1
TABLET, EXTENDED RELEASE ORAL
Qty: 90 TABLET | Refills: 1 | Status: ON HOLD | OUTPATIENT
Start: 2017-10-24 | End: 2018-04-16 | Stop reason: SDUPTHER

## 2017-11-13 RX ORDER — BENZONATATE 200 MG/1
CAPSULE ORAL
Qty: 40 CAPSULE | Refills: 3 | Status: SHIPPED | OUTPATIENT
Start: 2017-11-13 | End: 2017-12-08

## 2017-11-13 RX ORDER — SERTRALINE HYDROCHLORIDE 25 MG/1
TABLET, FILM COATED ORAL
Qty: 90 TABLET | Refills: 3 | Status: SHIPPED | OUTPATIENT
Start: 2017-11-13 | End: 2019-01-22 | Stop reason: SDUPTHER

## 2017-11-15 ENCOUNTER — RESULTS ENCOUNTER (OUTPATIENT)
Dept: ONCOLOGY | Facility: CLINIC | Age: 77
End: 2017-11-15

## 2017-11-15 DIAGNOSIS — E61.1 IRON DEFICIENCY: ICD-10-CM

## 2017-12-04 LAB
BASOPHILS # BLD AUTO: 0.06 10*3/MM3 (ref 0–0.2)
BASOPHILS NFR BLD AUTO: 0.3 % (ref 0–2.5)
EOSINOPHIL # BLD AUTO: 0.29 10*3/MM3 (ref 0–0.7)
EOSINOPHIL NFR BLD AUTO: 1.6 % (ref 0–7)
ERYTHROCYTE [DISTWIDTH] IN BLOOD BY AUTOMATED COUNT: 14.4 % (ref 11.5–14.5)
FERRITIN SERPL-MCNC: 510 NG/ML (ref 17.9–464)
HCT VFR BLD AUTO: 42.1 % (ref 42–52)
HGB BLD-MCNC: 13.2 G/DL (ref 14–18)
IMM GRANULOCYTES # BLD: 0.15 10*3/MM3 (ref 0–0.06)
IMM GRANULOCYTES NFR BLD: 0.8 % (ref 0–0.6)
IRON SATN MFR SERPL: 20 % (ref 11–46)
IRON SERPL-MCNC: 46 MCG/DL (ref 37–181)
LYMPHOCYTES # BLD AUTO: 2.94 10*3/MM3 (ref 0.6–3.4)
LYMPHOCYTES NFR BLD AUTO: 16.3 % (ref 10–50)
MCH RBC QN AUTO: 28.6 PG (ref 27–31)
MCHC RBC AUTO-ENTMCNC: 31.4 G/DL (ref 30–37)
MCV RBC AUTO: 91.1 FL (ref 80–94)
MONOCYTES # BLD AUTO: 1.57 10*3/MM3 (ref 0–0.9)
MONOCYTES NFR BLD AUTO: 8.7 % (ref 0–12)
NEUTROPHILS # BLD AUTO: 13.02 10*3/MM3 (ref 2–6.9)
NEUTROPHILS NFR BLD AUTO: 72.3 % (ref 37–80)
NRBC BLD AUTO-RTO: 0 /100 WBC (ref 0–0)
PLATELET # BLD AUTO: 605 10*3/MM3 (ref 130–400)
RBC # BLD AUTO: 4.62 10*6/MM3 (ref 4.7–6.1)
TIBC SERPL-MCNC: 226 MCG/DL (ref 261–497)
UIBC SERPL-MCNC: 180 MCG/DL
WBC # BLD AUTO: 18.03 10*3/MM3 (ref 4.8–10.8)

## 2017-12-08 ENCOUNTER — OFFICE VISIT (OUTPATIENT)
Dept: INTERNAL MEDICINE | Facility: CLINIC | Age: 77
End: 2017-12-08

## 2017-12-08 VITALS
DIASTOLIC BLOOD PRESSURE: 60 MMHG | HEART RATE: 64 BPM | RESPIRATION RATE: 14 BRPM | OXYGEN SATURATION: 93 % | HEIGHT: 69 IN | SYSTOLIC BLOOD PRESSURE: 100 MMHG | BODY MASS INDEX: 27.4 KG/M2 | WEIGHT: 185 LBS | TEMPERATURE: 99.2 F

## 2017-12-08 DIAGNOSIS — J43.8 OTHER EMPHYSEMA (HCC): ICD-10-CM

## 2017-12-08 DIAGNOSIS — K21.9 GASTROESOPHAGEAL REFLUX DISEASE WITHOUT ESOPHAGITIS: ICD-10-CM

## 2017-12-08 DIAGNOSIS — I71.40 ABDOMINAL AORTIC ANEURYSM (AAA) WITHOUT RUPTURE (HCC): ICD-10-CM

## 2017-12-08 DIAGNOSIS — I10 BENIGN ESSENTIAL HYPERTENSION: ICD-10-CM

## 2017-12-08 DIAGNOSIS — E11.8 TYPE 2 DIABETES MELLITUS WITH COMPLICATION, WITHOUT LONG-TERM CURRENT USE OF INSULIN (HCC): ICD-10-CM

## 2017-12-08 DIAGNOSIS — I71.40 ABDOMINAL AORTIC ANEURYSM (AAA) WITHOUT RUPTURE (HCC): Primary | ICD-10-CM

## 2017-12-08 DIAGNOSIS — E78.5 HYPERLIPIDEMIA, UNSPECIFIED HYPERLIPIDEMIA TYPE: ICD-10-CM

## 2017-12-08 DIAGNOSIS — I25.10 CORONARY ARTERIOSCLEROSIS IN NATIVE ARTERY: Primary | ICD-10-CM

## 2017-12-08 DIAGNOSIS — D75.839 THROMBOCYTHEMIA: ICD-10-CM

## 2017-12-08 PROCEDURE — 99214 OFFICE O/P EST MOD 30 MIN: CPT | Performed by: INTERNAL MEDICINE

## 2017-12-08 RX ORDER — AMOXICILLIN AND CLAVULANATE POTASSIUM 875; 125 MG/1; MG/1
1 TABLET, FILM COATED ORAL 2 TIMES DAILY
Qty: 14 TABLET | Refills: 0 | OUTPATIENT
Start: 2017-12-08 | End: 2018-03-03

## 2017-12-08 NOTE — PROGRESS NOTES
Subjective   Jairo Mercedes is a 77 y.o. male.     Chief Complaint   Patient presents with   • Follow-up   • URI       History of Present Illness   Patient here for follow-up.  Hyperlipidemia on medication need to blood tests.  Prediabetes need to blood tests.  Patient lost 15 pound on purpose.  Leukocytosis thrombocytosis slightly worse she is going to see blood doctor.  RN stable and now.  Aneurysm need a follow-up.  She also complains sinus congestion runny nose coughing yellow drainage sputum no fever chills no short of breath no wheezing.    Current Outpatient Prescriptions:   •  ADVAIR DISKUS 250-50 MCG/DOSE DISKUS, INHALE ONE PUFF TWICE DAILY, Disp: 180 each, Rfl: 1  •  atorvastatin (LIPITOR) 40 MG tablet, TAKE ONE TABLET BY MOUTH AT BEDTIME, Disp: 30 tablet, Rfl: 3  •  esomeprazole (nexIUM) 40 MG capsule, Take 1 capsule by mouth Daily., Disp: 30 capsule, Rfl: 5  •  fluticasone (FLONASE) 50 MCG/ACT nasal spray, , Disp: , Rfl:   •  HYDROcodone-homatropine (HYCODAN) 5-1.5 MG/5ML syrup, Take 5 mL by mouth Every 6 (Six) Hours As Needed for cough., Disp: 120 mL, Rfl: 0  •  isosorbide mononitrate (IMDUR) 30 MG 24 hr tablet, TAKE ONE TABLET BY MOUTH EVERY DAY AS DIRECTED, Disp: 90 tablet, Rfl: 1  •  METHYLPREDNISOLONE PO, Take 4 mg by mouth., Disp: , Rfl:   •  metoprolol tartrate (LOPRESSOR) 25 MG tablet, TAKE ONE TABLET BY MOUTH TWICE DAILY, Disp: 60 tablet, Rfl: 3  •  nitroglycerin (NITROSTAT) 0.4 MG SL tablet, Place  under the tongue., Disp: , Rfl:   •  sertraline (ZOLOFT) 25 MG tablet, TAKE ONE TABLET BY MOUTH EVERY DAY, Disp: 90 tablet, Rfl: 3  •  tiotropium (SPIRIVA HANDIHALER) 18 MCG per inhalation capsule, Place 1 capsule into inhaler and inhale Daily., Disp: 90 capsule, Rfl: 3  •  TRAVEL SICKNESS 25 MG chewable tablet chewable tablet, CHEW ONE TABLET BY MOUTH TWICE DAILY, Disp: 180 tablet, Rfl: 0  •  traZODone (DESYREL) 50 MG tablet, TAKE 1/2 TO 1 TABLET BY MOUTH AT BEDTIME AS NEEDED, Disp: 90 tablet, Rfl:  1  •  VENTOLIN  (90 BASE) MCG/ACT inhaler, INHALE ONE TO TWO PUFFS BY MOUTH EVERY 4 TO 6 HOURS AS NEEDED, Disp: 1 inhaler, Rfl: 2    The following portions of the patient's history were reviewed and updated as appropriate: allergies, current medications, past family history, past medical history, past social history, past surgical history and problem list.    Review of Systems   Constitutional: Negative.    HENT: Positive for congestion, postnasal drip, rhinorrhea, sinus pain and sinus pressure.    Respiratory: Negative.    Cardiovascular: Negative.    Gastrointestinal: Negative.    Musculoskeletal: Negative.    Skin: Negative.    Neurological: Negative.    Psychiatric/Behavioral: Negative.        Objective   Physical Exam   Constitutional: He is oriented to person, place, and time. He appears well-nourished.   HENT:   Head: Normocephalic and atraumatic.   TM bulge   Eyes: Conjunctivae are normal. Pupils are equal, round, and reactive to light.   Neck: Neck supple.   Cardiovascular: Normal rate, regular rhythm and normal heart sounds.    Pulmonary/Chest: Effort normal and breath sounds normal.   Abdominal: Bowel sounds are normal.   Neurological: He is alert and oriented to person, place, and time.   Skin: Skin is warm.   Psychiatric: He has a normal mood and affect.       All tests have been reviewed.    Assessment/Plan   There are no diagnoses linked to this encounter.          #1: Adenopathy, intra-abdominal. biopsy, reactive changes only follow up : Hematology/oncology. history of ITP s/p splenectomy, higher WBC and plts  #2: copd, continue medicine  #3: fatty liver, abnormal liver enzymes, s/p cholecystectomy. history of gall stone, follow up with GI, liver enzymes worsened after Lipitor 80 mg. Asked patient to cut down Lipitor 40 mg daily, discontinue acetaminophen, weight loss. repeat liver enzymes AST 42 continue to watch alkaline phosphatase is elevated performed isoenzymes.  #4: Coronary heart  disease,blockage unable to stent. chronic stable angina, none now.continue medicine prn NTG,metoprolol 25 bid, f/u cardio  #5: Prediabetes.do lab  #6: Hyperlipidemia, cut down Lipitor from 80 mg to 40 mg bedtime liver enzymes improved do lab  #7: Mild chronic anxiety, continue medicine  #8: GERD continue medicine,   Leukocytosis thrombocytosis lymphocytosis monocytosis history of splenectomy for possible thrombocytopenia. continue to watch  right thumb tendonitis,XR severe arthritis.no help after heat, voltaren gel, splint, rest. steroid shot in the past no help, see PT and rheum  hemorroid continue medicine   Overweight encouraged patient to have good diet  renal cyst 3.3 cm repeat next 4/2014, MRI November 2014 showed a 2 cm.  low iron still on s/p iron transfusion,   insomia continue trazodone  vitD low vitD3 2000u daily  s/p pneumonia shot, prevnar 10/27, Td to HD , zostavax done  aaa screen 3cm repeat now--  Colon need report from dr gutierrez ?2011, normal per patient  BPH continue PSA   URI bronchitis start hycodan and augmentin, mucinex, zyrtec and tyelnol  6 mo

## 2017-12-13 ENCOUNTER — OFFICE VISIT (OUTPATIENT)
Dept: ONCOLOGY | Facility: CLINIC | Age: 77
End: 2017-12-13

## 2017-12-13 VITALS
SYSTOLIC BLOOD PRESSURE: 134 MMHG | HEART RATE: 64 BPM | TEMPERATURE: 97.4 F | BODY MASS INDEX: 28.21 KG/M2 | DIASTOLIC BLOOD PRESSURE: 64 MMHG | RESPIRATION RATE: 18 BRPM | WEIGHT: 191 LBS

## 2017-12-13 DIAGNOSIS — E61.1 IRON DEFICIENCY: Primary | ICD-10-CM

## 2017-12-13 PROCEDURE — 99213 OFFICE O/P EST LOW 20 MIN: CPT | Performed by: INTERNAL MEDICINE

## 2017-12-13 NOTE — PROGRESS NOTES
DATE OF VISIT: 12/13/2017    REASON FOR VISIT: Followup for iron def anemia     HISTORY OF PRESENT ILLNESS: The patient is a very pleasant 77 y.o. male with past medical history significant for Iron def anemia with thrombocythemia. The patient is here today for scheduled follow up visit.    SUBJECTIVE: The patient has been doing fairly well. He was able to tolerate  prior treatments without any serious side effects. He denied any fever or  chills, no night sweats, denied any headaches.  He did feel better after    REVIEW OF SYSTEMS: All the other systems are reviewed by me and negative  except what is mentioned in HPI and subjective.     PAST MEDICAL HISTORY/SOCIAL HISTORY/FAMILY HISTORY: Unchanged from my prior documentation.      Current Outpatient Prescriptions:   •  ADVAIR DISKUS 250-50 MCG/DOSE DISKUS, INHALE ONE PUFF TWICE DAILY, Disp: 180 each, Rfl: 1  •  amoxicillin-clavulanate (AUGMENTIN) 875-125 MG per tablet, Take 1 tablet by mouth 2 (Two) Times a Day., Disp: 14 tablet, Rfl: 0  •  atorvastatin (LIPITOR) 40 MG tablet, TAKE ONE TABLET BY MOUTH AT BEDTIME, Disp: 30 tablet, Rfl: 3  •  esomeprazole (nexIUM) 40 MG capsule, Take 1 capsule by mouth Daily., Disp: 30 capsule, Rfl: 5  •  fluticasone (FLONASE) 50 MCG/ACT nasal spray, , Disp: , Rfl:   •  HYDROcodone-homatropine (HYCODAN) 5-1.5 MG/5ML syrup, Take 5 mL by mouth Every 6 (Six) Hours As Needed for Cough., Disp: 120 mL, Rfl: 0  •  isosorbide mononitrate (IMDUR) 30 MG 24 hr tablet, TAKE ONE TABLET BY MOUTH EVERY DAY AS DIRECTED, Disp: 90 tablet, Rfl: 1  •  METHYLPREDNISOLONE PO, Take 4 mg by mouth., Disp: , Rfl:   •  metoprolol tartrate (LOPRESSOR) 25 MG tablet, TAKE ONE TABLET BY MOUTH TWICE DAILY, Disp: 60 tablet, Rfl: 3  •  nitroglycerin (NITROSTAT) 0.4 MG SL tablet, Place  under the tongue., Disp: , Rfl:   •  sertraline (ZOLOFT) 25 MG tablet, TAKE ONE TABLET BY MOUTH EVERY DAY, Disp: 90 tablet, Rfl: 3  •  tiotropium (SPIRIVA HANDIHALER) 18 MCG per  inhalation capsule, Place 1 capsule into inhaler and inhale Daily., Disp: 90 capsule, Rfl: 3  •  TRAVEL SICKNESS 25 MG chewable tablet chewable tablet, CHEW ONE TABLET BY MOUTH TWICE DAILY, Disp: 180 tablet, Rfl: 0  •  traZODone (DESYREL) 50 MG tablet, TAKE 1/2 TO 1 TABLET BY MOUTH AT BEDTIME AS NEEDED, Disp: 90 tablet, Rfl: 1  •  VENTOLIN  (90 BASE) MCG/ACT inhaler, INHALE ONE TO TWO PUFFS BY MOUTH EVERY 4 TO 6 HOURS AS NEEDED, Disp: 1 inhaler, Rfl: 2    PHYSICAL EXAMINATION:   /64  Pulse 64  Temp 97.4 °F (36.3 °C) (Temporal Artery )   Resp 18  Wt 86.6 kg (191 lb)  BMI 28.21 kg/m2  ECOG1  GENERAL: Age appropriate. No acute distress.   HEENT: Head atraumatic, normocephalic.   NECK: Supple. No JVD. No lymphadenopathy.   LUNGS: Clear to auscultation bilaterally. No wheezing. No rhonchi.   HEART: Regular rate and rhythm. S1, S2, no murmurs.   ABDOMEN: Soft, nontender, nondistended. Bowel sounds positive. No  hepatosplenomegaly.   EXTREMITIES: No clubbing, cyanosis, or edema.   SKIN: No rashes. No purpura.   NEUROLOGIC: Awake and oriented x3. Strength 5 out of 5 in all muscle groups.     Orders Only on 12/04/2017   Component Date Value Ref Range Status   • TIBC 12/04/2017 226* 261 - 497 mcg/dL Final   • UIBC 12/04/2017 180  mcg/dL Final   • Iron 12/04/2017 46  37 - 181 mcg/dL Final   • Iron Saturation 12/04/2017 20  11 - 46 % Final   • Ferritin 12/04/2017 510.00* 17.90 - 464.00 ng/mL Final        No results found.    ASSESSMENT: The patient is a very pleasant 77 y.o. male  with Iron deficiency anemia    PROBLEM LIST:  1. 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 Thrombocythemia  3. Leukocytosis  4. S/P splenectomy   5. HTN    PLAN:  1. We reviewed and discussed his CBC and iron studies.  His lab work is currently stable.  2. Follow up in 1 year with a repeated CBC and iron studies.  3. He will continue Imdur for his blood pressure. If it remains elevated he will notify his PCP.         Scribed for Yocasta Harris MD by Betsy Hopper RN. 12/13/2017  3:23 PM  Yocasta RICHEY MD, personally performed the services described in this documentation as scribed by the above named individual in my presence, and it is both accurate and complete.  12/13/2017  3:23 PM  Yocasta Harris MD  12/13/2017   Yocasta RICHEY MD, personally performed the services described in this documentation as scribed by the above named individual in my presence, and it is both accurate and complete.  12/13/2017  3:23 PM

## 2017-12-13 NOTE — PROGRESS NOTES
DATE OF VISIT: 12/13/2017    REASON FOR VISIT: Followup for iron def anemia     HISTORY OF PRESENT ILLNESS: The patient is a very pleasant 77 y.o. male with past medical history significant for Iron def anemia with thrombocythemia. The patient is here today for scheduled follow up visit.    SUBJECTIVE: The patient has been doing fairly well. He is having some fatigue with outside activities. He was able to tolerate prior treatments without any serious side effects. He denied any fever or chills, no night sweats, denied any headaches.  He did feel better after    REVIEW OF SYSTEMS: All the other systems are reviewed by me and negative  except what is mentioned in HPI and subjective.     PAST MEDICAL HISTORY/SOCIAL HISTORY/FAMILY HISTORY: Unchanged from my prior documentation.      Current Outpatient Prescriptions:   •  ADVAIR DISKUS 250-50 MCG/DOSE DISKUS, INHALE ONE PUFF TWICE DAILY, Disp: 180 each, Rfl: 1  •  amoxicillin-clavulanate (AUGMENTIN) 875-125 MG per tablet, Take 1 tablet by mouth 2 (Two) Times a Day., Disp: 14 tablet, Rfl: 0  •  atorvastatin (LIPITOR) 40 MG tablet, TAKE ONE TABLET BY MOUTH AT BEDTIME, Disp: 30 tablet, Rfl: 3  •  esomeprazole (nexIUM) 40 MG capsule, Take 1 capsule by mouth Daily., Disp: 30 capsule, Rfl: 5  •  fluticasone (FLONASE) 50 MCG/ACT nasal spray, , Disp: , Rfl:   •  HYDROcodone-homatropine (HYCODAN) 5-1.5 MG/5ML syrup, Take 5 mL by mouth Every 6 (Six) Hours As Needed for Cough., Disp: 120 mL, Rfl: 0  •  isosorbide mononitrate (IMDUR) 30 MG 24 hr tablet, TAKE ONE TABLET BY MOUTH EVERY DAY AS DIRECTED, Disp: 90 tablet, Rfl: 1  •  METHYLPREDNISOLONE PO, Take 4 mg by mouth., Disp: , Rfl:   •  metoprolol tartrate (LOPRESSOR) 25 MG tablet, TAKE ONE TABLET BY MOUTH TWICE DAILY, Disp: 60 tablet, Rfl: 3  •  nitroglycerin (NITROSTAT) 0.4 MG SL tablet, Place  under the tongue., Disp: , Rfl:   •  sertraline (ZOLOFT) 25 MG tablet, TAKE ONE TABLET BY MOUTH EVERY DAY, Disp: 90 tablet, Rfl: 3  •   tiotropium (SPIRIVA HANDIHALER) 18 MCG per inhalation capsule, Place 1 capsule into inhaler and inhale Daily., Disp: 90 capsule, Rfl: 3  •  TRAVEL SICKNESS 25 MG chewable tablet chewable tablet, CHEW ONE TABLET BY MOUTH TWICE DAILY, Disp: 180 tablet, Rfl: 0  •  traZODone (DESYREL) 50 MG tablet, TAKE 1/2 TO 1 TABLET BY MOUTH AT BEDTIME AS NEEDED, Disp: 90 tablet, Rfl: 1  •  VENTOLIN  (90 BASE) MCG/ACT inhaler, INHALE ONE TO TWO PUFFS BY MOUTH EVERY 4 TO 6 HOURS AS NEEDED, Disp: 1 inhaler, Rfl: 2    PHYSICAL EXAMINATION:   /64  Pulse 64  Temp 97.4 °F (36.3 °C) (Temporal Artery )   Resp 18  Wt 86.6 kg (191 lb)  BMI 28.21 kg/m2  ECOG1  GENERAL: Age appropriate. No acute distress.   HEENT: Head atraumatic, normocephalic.   NECK: Supple. No JVD. No lymphadenopathy.   LUNGS: Clear to auscultation bilaterally. No wheezing. No rhonchi.   HEART: Regular rate and rhythm. S1, S2, no murmurs.   ABDOMEN: Soft, nontender, nondistended. Bowel sounds positive. No  hepatosplenomegaly.   EXTREMITIES: No clubbing, cyanosis, or edema.   SKIN: No rashes. No purpura.   NEUROLOGIC: Awake and oriented x3. Strength 5 out of 5 in all muscle groups.     Orders Only on 12/04/2017   Component Date Value Ref Range Status   • TIBC 12/04/2017 226* 261 - 497 mcg/dL Final   • UIBC 12/04/2017 180  mcg/dL Final   • Iron 12/04/2017 46  37 - 181 mcg/dL Final   • Iron Saturation 12/04/2017 20  11 - 46 % Final   • Ferritin 12/04/2017 510.00* 17.90 - 464.00 ng/mL Final        No results found.    ASSESSMENT: The patient is a very pleasant 77 y.o. male  with Iron deficiency anemia    PROBLEM LIST:  1. 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 Thrombocythemia  3. Leukocytosis  4. S/P splenectomy   5. HTN    PLAN:  1. We reviewed and discussed his CBC and iron studies.  CBC is currently stable.  His ferritin level is high  and Iron saturation is normal.   2. Follow up in 6 month with a repeated CBC and iron studies.  3.  Patient had a total of 4 colonoscopies in the past. All done by Dr. Davis. I reminded him he will need to have a follow up colonoscopy since he has not had one since discussing this at our last visit.  He would like to discuss this further with his PCP Dr. Cardenas.  4. He will continue Imdur for his blood pressure. If it remains elevated he will notify his PCP.        Yocasta Harris M.D.  12/13/2017     Scribed for Yocasta Harris MD by Betsy RINALDI. 12/13/2017  2:08 PM

## 2017-12-15 ENCOUNTER — HOSPITAL ENCOUNTER (OUTPATIENT)
Dept: ULTRASOUND IMAGING | Facility: HOSPITAL | Age: 77
Discharge: HOME | End: 2017-12-15
Attending: INTERNAL MEDICINE | Admitting: INTERNAL MEDICINE

## 2017-12-15 DIAGNOSIS — I71.40 ABDOMINAL AORTIC ANEURYSM (AAA) WITHOUT RUPTURE (HCC): ICD-10-CM

## 2017-12-15 LAB
ALBUMIN SERPL-MCNC: 4 G/DL (ref 3.5–5)
ALBUMIN/GLOB SERPL: 1.3 G/DL (ref 1–2)
ALP SERPL-CCNC: 148 U/L (ref 38–126)
ALT SERPL-CCNC: 41 U/L (ref 13–69)
AST SERPL-CCNC: 36 U/L (ref 15–46)
BILIRUB SERPL-MCNC: 0.7 MG/DL (ref 0.2–1.3)
BUN SERPL-MCNC: 24 MG/DL (ref 7–20)
BUN/CREAT SERPL: 26.7 (ref 6.3–21.9)
CALCIUM SERPL-MCNC: 10.1 MG/DL (ref 8.4–10.2)
CHLORIDE SERPL-SCNC: 103 MMOL/L (ref 98–107)
CHOLEST SERPL-MCNC: 127 MG/DL (ref 0–199)
CK SERPL-CCNC: 63 U/L (ref 30–170)
CO2 SERPL-SCNC: 24 MMOL/L (ref 26–30)
CREAT SERPL-MCNC: 0.9 MG/DL (ref 0.6–1.3)
GFR SERPLBLD CREATININE-BSD FMLA CKD-EPI: 82 ML/MIN/1.73
GFR SERPLBLD CREATININE-BSD FMLA CKD-EPI: 99 ML/MIN/1.73
GLOBULIN SER CALC-MCNC: 3.2 GM/DL
GLUCOSE SERPL-MCNC: 97 MG/DL (ref 74–98)
HBA1C MFR BLD: 6.5 %
HDLC SERPL-MCNC: 32 MG/DL (ref 40–60)
LDLC SERPL CALC-MCNC: 71 MG/DL (ref 0–99)
POTASSIUM SERPL-SCNC: 5.1 MMOL/L (ref 3.5–5.1)
PROT SERPL-MCNC: 7.2 G/DL (ref 6.3–8.2)
SODIUM SERPL-SCNC: 141 MMOL/L (ref 137–145)
TRIGL SERPL-MCNC: 120 MG/DL
VLDLC SERPL CALC-MCNC: 24 MG/DL

## 2017-12-15 PROCEDURE — 76775 US EXAM ABDO BACK WALL LIM: CPT

## 2018-01-09 RX ORDER — MECLIZINE HYDROCHLORIDE 25 MG/1
TABLET ORAL
Qty: 180 TABLET | Refills: 3 | Status: SHIPPED | OUTPATIENT
Start: 2018-01-09 | End: 2018-07-27 | Stop reason: ALTCHOICE

## 2018-01-18 RX ORDER — ESOMEPRAZOLE MAGNESIUM 40 MG/1
CAPSULE, DELAYED RELEASE ORAL
Qty: 30 CAPSULE | Refills: 5 | Status: SHIPPED | OUTPATIENT
Start: 2018-01-18 | End: 2018-03-19 | Stop reason: SDUPTHER

## 2018-01-18 RX ORDER — EMOLLIENT 1 MG/G
CREAM TOPICAL
Qty: 15 G | Refills: 2 | Status: SHIPPED | OUTPATIENT
Start: 2018-01-18 | End: 2018-07-27 | Stop reason: ALTCHOICE

## 2018-01-18 RX ORDER — ATORVASTATIN CALCIUM 40 MG/1
TABLET, FILM COATED ORAL
Qty: 30 TABLET | Refills: 5 | Status: SHIPPED | OUTPATIENT
Start: 2018-01-18 | End: 2018-03-19 | Stop reason: SDUPTHER

## 2018-02-01 RX ORDER — TRAZODONE HYDROCHLORIDE 50 MG/1
TABLET ORAL
Qty: 90 TABLET | Refills: 1 | Status: SHIPPED | OUTPATIENT
Start: 2018-02-01 | End: 2018-07-27 | Stop reason: ALTCHOICE

## 2018-03-02 ENCOUNTER — HOSPITAL ENCOUNTER (OUTPATIENT)
Dept: GENERAL RADIOLOGY | Facility: HOSPITAL | Age: 78
Discharge: HOME | End: 2018-03-02
Admitting: INTERNAL MEDICINE

## 2018-03-02 ENCOUNTER — TRANSCRIBE ORDERS (OUTPATIENT)
Dept: GENERAL RADIOLOGY | Facility: HOSPITAL | Age: 78
End: 2018-03-02

## 2018-03-02 DIAGNOSIS — J43.9 PULMONARY EMPHYSEMA, UNSPECIFIED EMPHYSEMA TYPE (HCC): Primary | ICD-10-CM

## 2018-03-02 DIAGNOSIS — J44.9 CHRONIC OBSTRUCTIVE PULMONARY DISEASE, UNSPECIFIED COPD TYPE (HCC): ICD-10-CM

## 2018-03-02 PROCEDURE — 71046 X-RAY EXAM CHEST 2 VIEWS: CPT

## 2018-03-12 RX ORDER — TIOTROPIUM BROMIDE 18 UG/1
CAPSULE ORAL; RESPIRATORY (INHALATION)
Qty: 90 CAPSULE | Refills: 1 | Status: SHIPPED | OUTPATIENT
Start: 2018-03-12 | End: 2019-03-21 | Stop reason: SDUPTHER

## 2018-03-19 RX ORDER — ESOMEPRAZOLE MAGNESIUM 40 MG/1
CAPSULE, DELAYED RELEASE ORAL
Qty: 30 CAPSULE | Refills: 5 | Status: SHIPPED | OUTPATIENT
Start: 2018-03-19 | End: 2018-12-03 | Stop reason: SDUPTHER

## 2018-03-19 RX ORDER — ATORVASTATIN CALCIUM 40 MG/1
TABLET, FILM COATED ORAL
Qty: 30 TABLET | Refills: 5 | Status: SHIPPED | OUTPATIENT
Start: 2018-03-19 | End: 2018-07-27 | Stop reason: ALTCHOICE

## 2018-04-04 ENCOUNTER — TRANSCRIBE ORDERS (OUTPATIENT)
Dept: CT IMAGING | Facility: HOSPITAL | Age: 78
End: 2018-04-04

## 2018-04-04 DIAGNOSIS — R91.8 PULMONARY INFILTRATE IN RIGHT LUNG ON CHEST X-RAY: Primary | ICD-10-CM

## 2018-04-06 ENCOUNTER — HOSPITAL ENCOUNTER (OUTPATIENT)
Dept: CT IMAGING | Facility: HOSPITAL | Age: 78
Discharge: HOME | End: 2018-04-06
Attending: INTERNAL MEDICINE | Admitting: INTERNAL MEDICINE

## 2018-04-06 DIAGNOSIS — R91.8 PULMONARY INFILTRATE IN RIGHT LUNG ON CHEST X-RAY: ICD-10-CM

## 2018-04-06 PROCEDURE — 71250 CT THORAX DX C-: CPT

## 2018-04-12 RX ORDER — MELATONIN
1000 DAILY
COMMUNITY
End: 2018-07-27 | Stop reason: ALTCHOICE

## 2018-04-12 RX ORDER — ACETAMINOPHEN 500 MG
500-1000 TABLET ORAL EVERY 6 HOURS PRN
COMMUNITY
End: 2018-07-27 | Stop reason: ALTCHOICE

## 2018-04-16 ENCOUNTER — HOSPITAL ENCOUNTER (OUTPATIENT)
Facility: HOSPITAL | Age: 78
Setting detail: HOSPITAL OUTPATIENT SURGERY
Discharge: HOME | End: 2018-04-16
Attending: INTERNAL MEDICINE | Admitting: INTERNAL MEDICINE

## 2018-04-16 ENCOUNTER — ANESTHESIA EVENT (OUTPATIENT)
Dept: PERIOP | Facility: HOSPITAL | Age: 78
End: 2018-04-16

## 2018-04-16 ENCOUNTER — APPOINTMENT (OUTPATIENT)
Dept: GENERAL RADIOLOGY | Facility: HOSPITAL | Age: 78
End: 2018-04-16

## 2018-04-16 ENCOUNTER — PREP FOR SURGERY (OUTPATIENT)
Dept: OTHER | Facility: HOSPITAL | Age: 78
End: 2018-04-16

## 2018-04-16 ENCOUNTER — ANESTHESIA (OUTPATIENT)
Dept: PERIOP | Facility: HOSPITAL | Age: 78
End: 2018-04-16

## 2018-04-16 VITALS
RESPIRATION RATE: 16 BRPM | HEART RATE: 87 BPM | TEMPERATURE: 97.8 F | DIASTOLIC BLOOD PRESSURE: 69 MMHG | HEIGHT: 69 IN | BODY MASS INDEX: 27.4 KG/M2 | SYSTOLIC BLOOD PRESSURE: 109 MMHG | WEIGHT: 185 LBS | OXYGEN SATURATION: 90 %

## 2018-04-16 DIAGNOSIS — R91.8 PULMONARY INFILTRATE PRESENT ON COMPUTED TOMOGRAPHY: Primary | ICD-10-CM

## 2018-04-16 DIAGNOSIS — R91.8 PULMONARY INFILTRATE PRESENT ON COMPUTED TOMOGRAPHY: ICD-10-CM

## 2018-04-16 DIAGNOSIS — R06.9 BREATHING PROBLEM: ICD-10-CM

## 2018-04-16 LAB
ALBUMIN SERPL-MCNC: 4.4 G/DL (ref 3.5–5)
ALBUMIN/GLOB SERPL: 1.2 G/DL (ref 1–2)
ALP SERPL-CCNC: 177 U/L (ref 38–126)
ALT SERPL W P-5'-P-CCNC: 38 U/L (ref 13–69)
ANION GAP SERPL CALCULATED.3IONS-SCNC: 21.3 MMOL/L (ref 10–20)
APTT PPP: 28.4 SECONDS (ref 25–36)
AST SERPL-CCNC: 27 U/L (ref 15–46)
BILIRUB SERPL-MCNC: 0.5 MG/DL (ref 0.2–1.3)
BUN BLD-MCNC: 24 MG/DL (ref 7–20)
BUN/CREAT SERPL: 24 (ref 6.3–21.9)
CALCIUM SPEC-SCNC: 9.6 MG/DL (ref 8.4–10.2)
CHLORIDE SERPL-SCNC: 106 MMOL/L (ref 98–107)
CO2 SERPL-SCNC: 22 MMOL/L (ref 26–30)
CREAT BLD-MCNC: 1 MG/DL (ref 0.6–1.3)
DEPRECATED RDW RBC AUTO: 50 FL (ref 37–54)
ERYTHROCYTE [DISTWIDTH] IN BLOOD BY AUTOMATED COUNT: 15.4 % (ref 11.5–14.5)
GFR SERPL CREATININE-BSD FRML MDRD: 73 ML/MIN/1.73
GLOBULIN UR ELPH-MCNC: 3.7 GM/DL
GLUCOSE BLD-MCNC: 107 MG/DL (ref 74–98)
HCT VFR BLD AUTO: 44.9 % (ref 42–52)
HGB BLD-MCNC: 14.7 G/DL (ref 14–18)
INR PPP: 1.16 (ref 0.9–1.1)
MCH RBC QN AUTO: 29.1 PG (ref 27–31)
MCHC RBC AUTO-ENTMCNC: 32.7 G/DL (ref 30–37)
MCV RBC AUTO: 88.9 FL (ref 80–94)
PLATELET # BLD AUTO: 532 10*3/MM3 (ref 130–400)
PMV BLD AUTO: 9.5 FL (ref 6–12)
POTASSIUM BLD-SCNC: 4.3 MMOL/L (ref 3.5–5.1)
PROT SERPL-MCNC: 8.1 G/DL (ref 6.3–8.2)
PROTHROMBIN TIME: 12.9 SECONDS (ref 9.3–12.1)
RBC # BLD AUTO: 5.05 10*6/MM3 (ref 4.7–6.1)
SODIUM BLD-SCNC: 145 MMOL/L (ref 137–145)
WBC NRBC COR # BLD: 16.19 10*3/MM3 (ref 4.8–10.8)

## 2018-04-16 PROCEDURE — 25010000002 ONDANSETRON PER 1 MG: Performed by: NURSE ANESTHETIST, CERTIFIED REGISTERED

## 2018-04-16 PROCEDURE — 25010000002 MIDAZOLAM PER 1 MG: Performed by: NURSE ANESTHETIST, CERTIFIED REGISTERED

## 2018-04-16 PROCEDURE — 71045 X-RAY EXAM CHEST 1 VIEW: CPT

## 2018-04-16 PROCEDURE — 94799 UNLISTED PULMONARY SVC/PX: CPT

## 2018-04-16 PROCEDURE — 85610 PROTHROMBIN TIME: CPT | Performed by: INTERNAL MEDICINE

## 2018-04-16 PROCEDURE — 87206 SMEAR FLUORESCENT/ACID STAI: CPT | Performed by: INTERNAL MEDICINE

## 2018-04-16 PROCEDURE — 87070 CULTURE OTHR SPECIMN AEROBIC: CPT | Performed by: INTERNAL MEDICINE

## 2018-04-16 PROCEDURE — 87116 MYCOBACTERIA CULTURE: CPT | Performed by: INTERNAL MEDICINE

## 2018-04-16 PROCEDURE — 85027 COMPLETE CBC AUTOMATED: CPT | Performed by: INTERNAL MEDICINE

## 2018-04-16 PROCEDURE — 25010000002 PROPOFOL 1000 MG/ML EMULSION: Performed by: NURSE ANESTHETIST, CERTIFIED REGISTERED

## 2018-04-16 PROCEDURE — 85730 THROMBOPLASTIN TIME PARTIAL: CPT | Performed by: INTERNAL MEDICINE

## 2018-04-16 PROCEDURE — 87102 FUNGUS ISOLATION CULTURE: CPT | Performed by: INTERNAL MEDICINE

## 2018-04-16 PROCEDURE — 94640 AIRWAY INHALATION TREATMENT: CPT

## 2018-04-16 PROCEDURE — 25010000002 PROPOFOL 10 MG/ML EMULSION: Performed by: NURSE ANESTHETIST, CERTIFIED REGISTERED

## 2018-04-16 PROCEDURE — 87205 SMEAR GRAM STAIN: CPT | Performed by: INTERNAL MEDICINE

## 2018-04-16 PROCEDURE — 80053 COMPREHEN METABOLIC PANEL: CPT | Performed by: INTERNAL MEDICINE

## 2018-04-16 RX ORDER — LIDOCAINE HYDROCHLORIDE 40 MG/ML
SOLUTION TOPICAL ONCE
Status: DISCONTINUED | OUTPATIENT
Start: 2018-04-16 | End: 2018-04-16 | Stop reason: HOSPADM

## 2018-04-16 RX ORDER — ONDANSETRON 2 MG/ML
INJECTION INTRAMUSCULAR; INTRAVENOUS AS NEEDED
Status: DISCONTINUED | OUTPATIENT
Start: 2018-04-16 | End: 2018-04-16 | Stop reason: SURG

## 2018-04-16 RX ORDER — GLYCOPYRROLATE 0.2 MG/ML
0.1 INJECTION INTRAMUSCULAR; INTRAVENOUS ONCE
Status: COMPLETED | OUTPATIENT
Start: 2018-04-16 | End: 2018-04-16

## 2018-04-16 RX ORDER — LIDOCAINE HYDROCHLORIDE 20 MG/ML
INJECTION, SOLUTION INFILTRATION; PERINEURAL
Status: DISCONTINUED
Start: 2018-04-16 | End: 2018-04-16 | Stop reason: HOSPADM

## 2018-04-16 RX ORDER — MIDAZOLAM HYDROCHLORIDE 1 MG/ML
INJECTION INTRAMUSCULAR; INTRAVENOUS AS NEEDED
Status: DISCONTINUED | OUTPATIENT
Start: 2018-04-16 | End: 2018-04-16 | Stop reason: SURG

## 2018-04-16 RX ORDER — LIDOCAINE HYDROCHLORIDE 40 MG/ML
SOLUTION TOPICAL
Status: DISCONTINUED
Start: 2018-04-16 | End: 2018-04-16 | Stop reason: HOSPADM

## 2018-04-16 RX ORDER — ALBUTEROL SULFATE 2.5 MG/3ML
2.5 SOLUTION RESPIRATORY (INHALATION)
Status: CANCELLED | OUTPATIENT
Start: 2018-04-16

## 2018-04-16 RX ORDER — LIDOCAINE HYDROCHLORIDE 10 MG/ML
INJECTION, SOLUTION INFILTRATION; PERINEURAL
Status: DISCONTINUED
Start: 2018-04-16 | End: 2018-04-16 | Stop reason: HOSPADM

## 2018-04-16 RX ORDER — KETAMINE HYDROCHLORIDE 50 MG/ML
INJECTION, SOLUTION, CONCENTRATE INTRAMUSCULAR; INTRAVENOUS AS NEEDED
Status: DISCONTINUED | OUTPATIENT
Start: 2018-04-16 | End: 2018-04-16 | Stop reason: SURG

## 2018-04-16 RX ORDER — LIDOCAINE HYDROCHLORIDE 20 MG/ML
INJECTION, SOLUTION INFILTRATION; PERINEURAL AS NEEDED
Status: DISCONTINUED | OUTPATIENT
Start: 2018-04-16 | End: 2018-04-16 | Stop reason: HOSPADM

## 2018-04-16 RX ORDER — GLYCOPYRROLATE 0.2 MG/ML
INJECTION INTRAMUSCULAR; INTRAVENOUS AS NEEDED
Status: DISCONTINUED | OUTPATIENT
Start: 2018-04-16 | End: 2018-04-16 | Stop reason: SURG

## 2018-04-16 RX ORDER — ISOSORBIDE MONONITRATE 30 MG/1
TABLET, EXTENDED RELEASE ORAL
Qty: 90 TABLET | Refills: 0 | Status: SHIPPED | OUTPATIENT
Start: 2018-04-16 | End: 2018-10-15 | Stop reason: SDUPTHER

## 2018-04-16 RX ORDER — SODIUM CHLORIDE 9 MG/ML
75 INJECTION, SOLUTION INTRAVENOUS CONTINUOUS
Status: DISCONTINUED | OUTPATIENT
Start: 2018-04-16 | End: 2018-04-16 | Stop reason: HOSPADM

## 2018-04-16 RX ORDER — PROPOFOL 10 MG/ML
VIAL (ML) INTRAVENOUS AS NEEDED
Status: DISCONTINUED | OUTPATIENT
Start: 2018-04-16 | End: 2018-04-16 | Stop reason: SURG

## 2018-04-16 RX ORDER — MAGNESIUM HYDROXIDE 1200 MG/15ML
LIQUID ORAL AS NEEDED
Status: DISCONTINUED | OUTPATIENT
Start: 2018-04-16 | End: 2018-04-16 | Stop reason: HOSPADM

## 2018-04-16 RX ORDER — SODIUM CHLORIDE 9 MG/ML
75 INJECTION, SOLUTION INTRAVENOUS CONTINUOUS
Status: CANCELLED | OUTPATIENT
Start: 2018-04-16

## 2018-04-16 RX ORDER — ALBUTEROL SULFATE 2.5 MG/3ML
2.5 SOLUTION RESPIRATORY (INHALATION)
Status: COMPLETED | OUTPATIENT
Start: 2018-04-16 | End: 2018-04-16

## 2018-04-16 RX ORDER — LIDOCAINE HYDROCHLORIDE 40 MG/ML
SOLUTION TOPICAL ONCE
Status: CANCELLED | OUTPATIENT
Start: 2018-04-16 | End: 2018-04-16

## 2018-04-16 RX ORDER — GLYCOPYRROLATE 0.2 MG/ML
0.1 INJECTION INTRAMUSCULAR; INTRAVENOUS ONCE
Status: CANCELLED | OUTPATIENT
Start: 2018-04-16

## 2018-04-16 RX ADMIN — SODIUM CHLORIDE: 9 INJECTION, SOLUTION INTRAVENOUS at 13:41

## 2018-04-16 RX ADMIN — MIDAZOLAM HYDROCHLORIDE 1 MG: 1 INJECTION, SOLUTION INTRAMUSCULAR; INTRAVENOUS at 13:42

## 2018-04-16 RX ADMIN — PROPOFOL 100 MCG/KG/MIN: 10 INJECTION, EMULSION INTRAVENOUS at 13:40

## 2018-04-16 RX ADMIN — ALBUTEROL SULFATE 2.5 MG: 2.5 SOLUTION RESPIRATORY (INHALATION) at 13:15

## 2018-04-16 RX ADMIN — ONDANSETRON 4 MG: 2 INJECTION INTRAMUSCULAR; INTRAVENOUS at 13:40

## 2018-04-16 RX ADMIN — KETAMINE HYDROCHLORIDE 25 MG: 50 INJECTION, SOLUTION INTRAMUSCULAR; INTRAVENOUS at 13:40

## 2018-04-16 RX ADMIN — PROPOFOL 40 MG: 10 INJECTION, EMULSION INTRAVENOUS at 13:40

## 2018-04-16 RX ADMIN — SODIUM CHLORIDE 75 ML/HR: 9 INJECTION, SOLUTION INTRAVENOUS at 10:56

## 2018-04-16 RX ADMIN — GLYCOPYRROLATE 0.1 MG: 0.2 INJECTION, SOLUTION INTRAMUSCULAR; INTRAVENOUS at 13:28

## 2018-04-16 RX ADMIN — GLYCOPYRROLATE 0.2 MG: 0.2 INJECTION, SOLUTION INTRAMUSCULAR; INTRAVENOUS at 13:41

## 2018-04-16 NOTE — BRIEF OP NOTE
BRONCHOSCOPY  Progress Note    Jairo Mercedes  4/16/2018    Pre-op Diagnosis:   Persistent right lower lobe posterior basal segment infiltrate and lateral basal segment rounded atelectasis with persistent dysphonia.  Postoperative diagnosis:  No endobronchial lesions noted with small amount of mucoid secretions noted which was easily suctioned.  Bronchial lavage was done.  Extrabronchial pulmonary infiltrate with atelectasis is noted in the right lower lobe posterior basal segment as well as costophrenic angle blunting indicating small amount of associated atelectasis and pleural effusion.               Procedure:BRONCHOSCOPY DIAGNOSTIC WITH WASHINGS, BRUSHINGS AND BIOPSIES    Surgeon(s):  Morgan Frias MD    Anesthesia: Monitor Anesthesia Care IV sedation anesthesia with a mask and continuous oximetry and end-tidal CO2 monitoring was done..    Staff:   Circulator: Yumiko Minor RN  Scrub Person: Sid Chris    Estimated Blood Loss: 10 mL    Urine Voided: 0 mL    Specimens:                ID Type Source Tests Collected by Time   1 : RIGHT LOWER LOBE WASHINGS Lavage Lung, Right Lower Lobe GRAM STAIN, AFB STAIN, AFB CULTURE, RESPIRATORY CULTURE Yumiko Minor RN 4/16/2018 1401   2 : RIGHT LOWER LOBE BRUSHINGS IN INNOCULATED SALINE Brushing Lung, Right Lower Lobe GRAM STAIN, AFB STAIN, AFB CULTURE, RESPIRATORY CULTURE Yumiko Minor RN 4/16/2018 1406   A : RIGHT LOWER LOBE WASHINGS IN CYTOLOGY FIXATIVE Lavage Lung, Right Lower Lobe NON-GYNECOLOGIC CYTOLOGY Yumiko Minor RN 4/16/2018 1405   B : RIGHT LOWER LOBE BRUSHINGS IN CYTOLOGY FIXATIVE Brushing Lung, Right Lower Lobe NON-GYNECOLOGIC CYTOLOGY Yumiko Minor RN 4/16/2018 1407   C : RIGHT LOWER LOBE BIOPSIES Tissue Lung, Right Lower Lobe TISSUE PATHOLOGY EXAM Yumiko Minor RN 4/16/2018 1408         Drains:None      Findings: Normal endobronchial anatomy except small amount of mucoid secretion.  Fluoroscopy-guided transbronchial lung biopsy  done from the right lower lobe posterior basal segment and lateral basal segment.    Complications: None immediately small amount of bleeding noted which stopped within the procedure with no active bleeding noted.  Postprocedure chest x-ray pending.    Morgan Frias MD     Date: 4/16/2018  Time: 2:19 PM

## 2018-04-16 NOTE — DISCHARGE INSTRUCTIONS
BRONCHOSCOPY AFTER CARE:    WHAT TO EXPECT AFTER THE PROCEDURE  It is normal to have these symptoms 24-48 hours following your procedure.  * increased cough  * low grade fever  * sore throat or hoarse voice  * small streaks of blood in your thick spit(sputum)     HOME CARE INSTRUCTIONS  * Do not eat or drink anything for two hours (or as instructed by your physician) after your procedure. Then for the first 4 hours cool liquids as tolerated. If you try to eat or drink before the medicine wears off, food or drink could go into your lungs or you could burn your self.  After the numbness is gone and your cough and gag reflexes have returned, you may eat soft food and drink room temperature liquids slowly.  * The day after the procedure, you can go back to your normal diet.  * You may resume normal activities.  * You make take tylenol 650 mg for low grade temperature.  * Keep all follow up visits as directed by your health care provider.    SEED IMMEDIATE MEDICAL CARE IF:  * You experience increasing shortness of breath.  * You become light-headed or faint.  * You have chest pain.  * You cough up more than a small amount of blood( a cup in 6 hours)  * The amount of blood you cough up increases.    MAKE SURE YOU:  * Understand these instructions.  * Will watch your condition.  * Will get help right away if you are not doing well (if unable to contact your provider, return to the ED)  * No driving , no alcohol, and no major decisions for 24 hours.  * Avoid cigarettes for at least 24 hours.  Plan to stop smoking!      Please follow all post op instructions and follow up appointment time from your physician's office included in your discharge packet    No pushing, pulling, tugging,  heavy lifting, or strenuous activity.  No major decision making, driving, or drinking alcoholic beverages for 24 hours. ( due to the medications you have  received)  Always use good hand hygiene/washing techniques.  NO driving while taking pain  medications.    To assist you in voiding:  Drink plenty of fluids  Listen to running water while attempting to void.    If you are unable to urinate and you have an uncomfortable urge to void or it has been   6 hours since you were discharged, return to the Emergency Room

## 2018-04-16 NOTE — ANESTHESIA PREPROCEDURE EVALUATION
Anesthesia Evaluation     Patient summary reviewed and Nursing notes reviewed   no history of anesthetic complications:  NPO Solid Status: > 8 hours  NPO Liquid Status: > 8 hours           Airway   Mallampati: II  Neck ROM: full  No difficulty expected  Dental      Pulmonary    (+) a smoker ( 105 pack years, quit 1999) Former, COPD moderate, asthma, recent URI resolved,   Cardiovascular   Exercise tolerance: good (4-7 METS)    Patient on routine beta blocker and Beta blocker given within 24 hours of surgery  Rhythm: regular  Rate: normal    (+) hypertension, CAD, hyperlipidemia,       Neuro/Psych  (+) psychiatric history Anxiety and Depression,     GI/Hepatic/Renal/Endo    (+)  GERD,  renal disease ARF, diabetes mellitus (borderline, takes no meds),     Musculoskeletal     Abdominal    Substance History - negative use     OB/GYN negative ob/gyn ROS         Other   (+) arthritis   history of cancer remission    ROS/Med Hx Other: cxray New right lower lobe pulmonary inflammatory process and  right pleural effusion when compared with 05/25/2016.                Anesthesia Plan    ASA 3     MAC   (Pt told that intravenous sedation will be used as the primary anesthetic along with local anesthesia if necessary. Every effort will be made to make sure the patient is comfortable.     The patient was told they may or may not have recall for the procedure. It was further explained that if the MAC was not adequate that a general anesthetic with either an LMA or endotracheal tube would be required.   Will proceed with the plan of care.)  intravenous induction   Anesthetic plan and risks discussed with patient.    Plan discussed with CRNA.

## 2018-04-16 NOTE — ANESTHESIA POSTPROCEDURE EVALUATION
/109/Patient: Jairo Mercedes    Procedure Summary     Date:  04/16/18 Room / Location:  [facility] /  [facility]    Anesthesia Start:  1341 Anesthesia Stop:  1419    Procedure:  BRONCHOSCOPY DIAGNOSTIC WITH WASHINGS, BRUSHINGS AND BIOPSIES (N/A Bronchus) Diagnosis:      Surgeon:  Morgan Frias MD Provider:  Donald Weaver CRNA    Anesthesia Type:  MAC ASA Status:  3          Anesthesia Type: MAC  Last vitals  BP   109/69   Temp   97.8   Pulse   87   Resp 14   SpO2 91%     Post Anesthesia Care and Evaluation    Patient location during evaluation: bedside  Patient participation: complete - patient participated  Level of consciousness: awake and alert  Pain score: 0  Pain management: adequate  Airway patency: patent  Anesthetic complications: No anesthetic complications  PONV Status: none  Cardiovascular status: acceptable  Respiratory status: acceptable  Hydration status: acceptable

## 2018-04-16 NOTE — CONSULTS
Consults    Patient Care Team:  Maicol Cardenas MD as PCP - General (Internal Medicine)  Maicol Cardenas MD as PCP - Family Medicine   Primary pulmonologist: Morgan Frias M.D.        Chief complaint: Persistent hoarseness and persistent right lower lobe posterior basal segment infiltrate associated with small pleural effusion.  Cough persists with no significant sputum.  Dyspnea remain same    Subjective     History of Present Illness  77 years old  male who has long-standing history of COPD status post evidence of pulmonary rehabilitation with improved shortness of air.  He also had left-sided spontaneous pneumothorax several years ago requiring thoracoscopy, pleurodesis and no recurrence since then.  In end of December he was admitted in hospital for right lower lobe pneumonia associated with febrile illness with cough and purulent sputum without hemoptysis nor pleuritic chest pains.  No history of trauma.Patient was seen by me on January 18 as a follow-up since that time we have been following up with a chest x-ray showing still persistence of infiltrate in the right lower lobe.  Most recent CT scan of chest dated 4/6/18 showing persistence of infiltrate in the right lower lobe with some rounded atelectasis associated with this small pleural effusion in the right lower lobe posterior basal and lateral basal segment.  This is clearly seen on a chest x-ray as well.  Left lung appears to be unremarkable.  Patient continues to describe dysphonia much more than his usual voice that is somewhat raspy and hoarse.  He denies any current fever chills or night sweats nor any pleuritic chest pains.  Review of Systems   Patient is alert and oriented ×3.  Cranial O's and speech are normal.  Patient denies any headaches or diplopia.  He denies any dysphagia.  He does have history of GERD.  His dyspnea on exertion as well as cough persists so also dysphonia and at times family has difficulty in understanding what he stays  because of his voice change.  He denies any palpitations nor chest pains.  No history of dizziness nor syncope.  He denies any abdominal pains nausea vomiting or diarrhea.  He has described no recent weight loss and has good appetite.  He denies any lower extremity edema nor any symptoms of pains or swelling.  He has evidence of persistent infiltrate right lower lobe so also combine pulmonary emphysema and fibrosis with left lower lobe post thoracotomy pleurodesis scar.  He developed some fibrotic changes in November 2015 following a bout of pneumonia and adult respiratory distress syndrome at that time.  His spontaneous tension pneumothorax was in July 2015.  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  He also has history of mild iron deficiency anemia in past but currently his hemoglobin is good.  Past Medical History:   Diagnosis Date   • Abdominal pain    • Acute sinusitis    • Anemia    • Anxiety    • Arthritis    • Borderline diabetes     REPORTS HE TAKES NO MEDICATION.    • COPD (chronic obstructive pulmonary disease)    • Emphysema lung    • Emphysema of lung    • Emphysema of lung    • History of ankle fracture     LEFT    • History of echocardiogram     REPORTS HAS HAD 2. DOES NOT REPORT ANY HX OF CHEST PAIN.   • History of pneumonia    • [place] (hard of hearing)     WEARS HEARING AIDS   • Melanoma     2013   • Wears glasses    ,   Past Surgical History:   Procedure Laterality Date   • ALEX HOLE FOR SUBDURAL HEMATOMA     • CARDIAC CATHETERIZATION      REPORTS APPROXIMATELY 3 YEARS AGO AND REPORTS NO STENTS WERE PLACED   • CHOLECYSTECTOMY     • COLONOSCOPY     • ENDOSCOPY     • LIVER BIOPSY     • LYMPH NODE BIOPSY     • MOLE REMOVAL  2005    MELANOMA ON BACK   • SPLENECTOMY     • THORACOTOMY  07/28/2015    Left thoracotomy and stapling of pulmonary blebs. Mechanical pleurodesis.   • TONSILLECTOMY     • WISDOM TOOTH EXTRACTION     ,   Family History   Problem Relation Age of  Onset   • Alzheimer's disease Mother    • Heart disease Father    • Cancer Other    ,   Social History   Substance Use Topics   • Smoking status: Former Smoker     Packs/day: 2.50     Years: 42.00     Types: Cigarettes     Quit date: 1999   • Smokeless tobacco: Never Used   • Alcohol use No   ,   Prescriptions Prior to Admission   Medication Sig Dispense Refill Last Dose   • acetaminophen (TYLENOL) 500 MG tablet Take 500-1,000 mg by mouth Every 6 (Six) Hours As Needed for Mild Pain  or Headache.   4/15/2018 at 1200   • ADVAIR DISKUS 250-50 MCG/DOSE DISKUS INHALE ONE PUFF TWICE DAILY 180 each 1 4/15/2018 at 2000   • atorvastatin (LIPITOR) 40 MG tablet TAKE ONE TABLET BY MOUTH AT BEDTIME 30 tablet 5 4/15/2018 at 2000   • benzonatate (TESSALON) 200 MG capsule Take 1 capsule by mouth 3 (Three) Times a Day As Needed for Cough. 20 capsule 1 4/15/2018 at 2000   • cholecalciferol (VITAMIN D3) 1000 units tablet Take 1,000 Units by mouth Daily.   4/15/2018 at 0800   • esomeprazole (nexIUM) 40 MG capsule TAKE ONE CAPSULE BY MOUTH EVERY DAY 30 capsule 5 4/15/2018 at 0800   • HYDROcodone-homatropine (HYCODAN) 5-1.5 MG/5ML syrup Take 5 mL by mouth Every 6 (Six) Hours As Needed for Cough. 120 mL 0 Past Week at Unknown time   • meclizine (ANTIVERT) 25 MG tablet TAKE ONE TABLET BY MOUTH TWICE DAILY 180 tablet 3 4/15/2018 at 2000   • metoprolol tartrate (LOPRESSOR) 25 MG tablet TAKE ONE TABLET BY MOUTH TWICE DAILY 60 tablet 3 4/15/2018 at 2000   • Prednicarbate 0.1 % cream APPLY SPARINGLY TO AFFECTED AREAS TWO TIMES A DAY (Patient taking differently: Apply 1 application topically 2 (Two) Times a Day. APPLY SPARINGLY TO AFFECTED AREAS TWO TIMES A DAY) 15 g 2 Past Week at Unknown time   • sertraline (ZOLOFT) 25 MG tablet TAKE ONE TABLET BY MOUTH EVERY DAY 90 tablet 3 4/15/2018 at 0800   • SPIRIVA HANDIHALER 18 MCG per inhalation capsule INHALE THE ENTIRE CONTENTS OF 1 CAPSULE ONCE A DAY USING HANDIHALER DEVICE 90 capsule 1 4/15/2018 at  0800   • traZODone (DESYREL) 50 MG tablet TAKE 1/2 TO 1 TABLET BY MOUTH AT BEDTIME AS NEEDED 90 tablet 1 4/15/2018 at 2000   • VENTOLIN  (90 BASE) MCG/ACT inhaler INHALE ONE TO TWO PUFFS BY MOUTH EVERY 4 TO 6 HOURS AS NEEDED 1 inhaler 2 4/15/2018 at 0800   • nitroglycerin (NITROSTAT) 0.4 MG SL tablet Place 0.4 mg under the tongue Every 5 (Five) Minutes As Needed for Chest Pain.   Unknown at Unknown time   , Scheduled Meds:    albuterol 2.5 mg Nebulization 30 Min Pre-Op   glycopyrrolate 0.1 mg Intravenous Once   lidocaine  Nebulization Once   lidocaine      lidocaine      lidocaine      , Continuous Infusions:    sodium chloride 75 mL/hr Last Rate: 75 mL/hr (04/16/18 1056)   , PRN Meds:   and Allergies:  Other    Objective      Vital Signs  Temp:  [97.7 °F (36.5 °C)] 97.7 °F (36.5 °C)  Heart Rate:  [56] 56  Resp:  [18] 18  BP: (161)/(60) 161/60    Physical Exam  Patient is alert oriented ×3.  Cranial nose and speech normal.  No nystagmus.  Nasal passages and mucosa was normal and patent.  Oral cavity reveals no mucosal lesions with own teeth low-lying uvula but normal size MS 3 oropharynx but no erythema nor exudates noted.  Neck exam: Neck veins flat carotid upstrokes normal.  Trachea is central no thyromegaly.  Thoracic exam: Left infraclavicular clavicular area systolic bruit noted from subclavian atherosclerosis.  Decreased breath sounds in the right lower chest posteriorly with no rubs noted.  No crackles no localized wheezes in the right lower lung field.  Both lungs otherwise clear breath sounds.  Cardiovascular system exam: Regular rhythm no murmurs or gallops appreciated.  Left upper chest infraclavicular systolic bruit as described above.  Abdominal exam: No organomegaly, no ascites nor any abdominal tenderness.  Extremities: No clubbing no peripheral edema with peripheral pulses well palpable.  Results Review:    I reviewed the patient's new clinical results.  I reviewed the patient's new imaging  results and agree with the interpretation.  I reviewed the patient's other test results and agree with the interpretation        Assessment/Plan     Active Problems:    * No active hospital problems. *      Assessment & Plan  1.  Persistent infiltrate in right lower lobe posterior basal segment associated with small pleural effusion with rounded atelectasis also noted.  No occluding mass nor any mediastinal lymphadenopathy as review of CT scan of chest done by me from 4/6/18.  2.  Persistent dysphonia which is slightly worse than usual which needs to be evaluated by endoscopy as well.  3.  Combined pulmonary emphysema and fibrosis.  4.  History of GERD but no recent complaints or symptoms.  5.  Essential hypertension under good control with drug therapy.  6.  Arteriosclerotic heart disease but stable disease with no recent angina nor any signs of congestive heart failure.  7.  Mild leukocytosis possibly post splenectomy with no current signs of sepsis nor ongoing bronchopulmonary infection however that needs to be ruled out as well.  Plan and suggest:  1.  Bronchoscopy with upper airway exam and larynx exam with bronchial washing brushing and transbronchial lung biopsy from the right lower lobe posterior basal segment will be done for cultures as well as for histopathology and cytology.  I have discussed this options with his office visit with me on 4/7/18 but also today he was educated about the procedure itself.  2.  Follow-up will be arranged after getting the results and plan of care.  I discussed the patients findings and my recommendations with patient and family    Morgan Frias MD  04/16/18  1:06 PM

## 2018-04-16 NOTE — NURSING NOTE
Spoke with MD regarding patients CXR results. MD states that patient can now drink and may be discharged if tolerates fluids.

## 2018-04-18 LAB
BACTERIA SPEC RESP CULT: NORMAL
BACTERIA SPEC RESP CULT: NORMAL
GRAM STN SPEC: NORMAL

## 2018-04-20 LAB
BACTERIA SPEC RESP CULT: NO GROWTH
GRAM STN SPEC: NORMAL
LAB AP CASE REPORT: NORMAL
LAB AP CASE REPORT: NORMAL
LAB AP DIAGNOSIS COMMENT: NORMAL
Lab: NORMAL
Lab: NORMAL
PATH REPORT.FINAL DX SPEC: NORMAL

## 2018-04-25 ENCOUNTER — TRANSCRIBE ORDERS (OUTPATIENT)
Dept: ADMINISTRATIVE | Facility: HOSPITAL | Age: 78
End: 2018-04-25

## 2018-04-25 DIAGNOSIS — R49.8 NEUROLOGIC VOICE DISORDER: Primary | ICD-10-CM

## 2018-05-02 ENCOUNTER — HOSPITAL ENCOUNTER (OUTPATIENT)
Dept: CT IMAGING | Facility: HOSPITAL | Age: 78
Discharge: HOME | End: 2018-05-02
Attending: OTOLARYNGOLOGY | Admitting: OTOLARYNGOLOGY

## 2018-05-02 ENCOUNTER — HOSPITAL ENCOUNTER (OUTPATIENT)
Dept: CT IMAGING | Facility: HOSPITAL | Age: 78
Discharge: HOME | End: 2018-05-02
Attending: OTOLARYNGOLOGY

## 2018-05-02 DIAGNOSIS — R49.8 NEUROLOGIC VOICE DISORDER: ICD-10-CM

## 2018-05-02 PROCEDURE — 70491 CT SOFT TISSUE NECK W/DYE: CPT

## 2018-05-02 PROCEDURE — 70470 CT HEAD/BRAIN W/O & W/DYE: CPT

## 2018-05-02 PROCEDURE — 25010000002 IOPAMIDOL 61 % SOLUTION: Performed by: OTOLARYNGOLOGY

## 2018-05-02 RX ADMIN — IOPAMIDOL 50 ML: 612 INJECTION, SOLUTION INTRAVENOUS at 11:15

## 2018-05-02 RX ADMIN — IOPAMIDOL 100 ML: 612 INJECTION, SOLUTION INTRAVENOUS at 11:15

## 2018-05-16 LAB
BACTERIA SPEC AEROBE CULT: NORMAL
FUNGUS SPEC CULT: NORMAL
FUNGUS SPEC CULT: NORMAL
FUNGUS SPEC FUNGUS STN: NORMAL

## 2018-05-30 LAB
ACID FAST STN SPEC: NEGATIVE
ACID FAST STN SPEC: NEGATIVE
BACTERIA SPEC AEROBE CULT: NEGATIVE
BACTERIA SPEC AEROBE CULT: NEGATIVE
SPECIMEN PREPARATION: NORMAL
SPECIMEN PREPARATION: NORMAL

## 2018-06-01 ENCOUNTER — TRANSCRIBE ORDERS (OUTPATIENT)
Dept: CARDIOLOGY | Facility: HOSPITAL | Age: 78
End: 2018-06-01

## 2018-06-01 ENCOUNTER — HOSPITAL ENCOUNTER (OUTPATIENT)
Dept: CARDIOLOGY | Facility: HOSPITAL | Age: 78
Discharge: HOME | End: 2018-06-01
Attending: OTOLARYNGOLOGY | Admitting: OTOLARYNGOLOGY

## 2018-06-01 DIAGNOSIS — Z01.812 PRE-OPERATIVE LABORATORY EXAMINATION: Primary | ICD-10-CM

## 2018-06-01 PROCEDURE — 93005 ELECTROCARDIOGRAM TRACING: CPT

## 2018-06-01 PROCEDURE — 93010 ELECTROCARDIOGRAM REPORT: CPT | Performed by: INTERNAL MEDICINE

## 2018-06-15 ENCOUNTER — OFFICE VISIT (OUTPATIENT)
Dept: INTERNAL MEDICINE | Facility: CLINIC | Age: 78
End: 2018-06-15

## 2018-06-15 VITALS
HEIGHT: 69 IN | HEART RATE: 58 BPM | OXYGEN SATURATION: 94 % | WEIGHT: 189 LBS | BODY MASS INDEX: 27.99 KG/M2 | SYSTOLIC BLOOD PRESSURE: 120 MMHG | TEMPERATURE: 98 F | DIASTOLIC BLOOD PRESSURE: 64 MMHG | RESPIRATION RATE: 14 BRPM

## 2018-06-15 DIAGNOSIS — K21.9 GASTROESOPHAGEAL REFLUX DISEASE WITHOUT ESOPHAGITIS: ICD-10-CM

## 2018-06-15 DIAGNOSIS — E11.8 TYPE 2 DIABETES MELLITUS WITH COMPLICATION, WITHOUT LONG-TERM CURRENT USE OF INSULIN (HCC): ICD-10-CM

## 2018-06-15 DIAGNOSIS — E78.5 HYPERLIPIDEMIA, UNSPECIFIED HYPERLIPIDEMIA TYPE: ICD-10-CM

## 2018-06-15 DIAGNOSIS — D50.0 IRON DEFICIENCY ANEMIA DUE TO CHRONIC BLOOD LOSS: ICD-10-CM

## 2018-06-15 DIAGNOSIS — H81.10 BENIGN PAROXYSMAL POSITIONAL VERTIGO, UNSPECIFIED LATERALITY: ICD-10-CM

## 2018-06-15 DIAGNOSIS — I10 BENIGN ESSENTIAL HYPERTENSION: ICD-10-CM

## 2018-06-15 DIAGNOSIS — J43.8 OTHER EMPHYSEMA (HCC): ICD-10-CM

## 2018-06-15 DIAGNOSIS — N28.1 RENAL CYST: Primary | ICD-10-CM

## 2018-06-15 DIAGNOSIS — I25.10 CORONARY ARTERIOSCLEROSIS IN NATIVE ARTERY: ICD-10-CM

## 2018-06-15 DIAGNOSIS — E55.9 VITAMIN D DEFICIENCY: ICD-10-CM

## 2018-06-15 DIAGNOSIS — N17.9 ACUTE RENAL FAILURE, UNSPECIFIED ACUTE RENAL FAILURE TYPE (HCC): ICD-10-CM

## 2018-06-15 DIAGNOSIS — G47.09 OTHER INSOMNIA: ICD-10-CM

## 2018-06-15 DIAGNOSIS — Z12.11 COLON CANCER SCREENING: ICD-10-CM

## 2018-06-15 PROBLEM — J06.9 ACUTE URI: Status: RESOLVED | Noted: 2017-03-14 | Resolved: 2018-06-15

## 2018-06-15 PROCEDURE — 99214 OFFICE O/P EST MOD 30 MIN: CPT | Performed by: INTERNAL MEDICINE

## 2018-06-15 NOTE — PROGRESS NOTES
Subjective   Jairo Mercedes is a 77 y.o. male.     Chief Complaint   Patient presents with   • Follow-up     6 month follow up        History of Present Illness   Patient here for follow-up.  Recently vocal cord paralysis is treated by ENT doctor.  Voice still hoarse but improved.  Pre-diabetes last A1c was 6.5.  Hyperlipidemia on medication stable.  Need to blood tests.  GERD stable medication.  Leukocytosis and thrombocytosis is still.  Aneurysm follow up showed a note significant improvement increasing.  Renal cyst in the past and needs to be followed up.  CT scan of the chest that showed 2 lung nodules patient is seeing lung doctor.  Colon cancer screening test is due.    Current Outpatient Prescriptions:   •  acetaminophen (TYLENOL) 500 MG tablet, Take 500-1,000 mg by mouth Every 6 (Six) Hours As Needed for Mild Pain  or Headache., Disp: , Rfl:   •  ADVAIR DISKUS 250-50 MCG/DOSE DISKUS, INHALE ONE PUFF TWICE DAILY, Disp: 1 each, Rfl: 3  •  atorvastatin (LIPITOR) 40 MG tablet, TAKE ONE TABLET BY MOUTH AT BEDTIME, Disp: 30 tablet, Rfl: 5  •  cholecalciferol (VITAMIN D3) 1000 units tablet, Take 1,000 Units by mouth Daily., Disp: , Rfl:   •  esomeprazole (nexIUM) 40 MG capsule, TAKE ONE CAPSULE BY MOUTH EVERY DAY, Disp: 30 capsule, Rfl: 5  •  isosorbide mononitrate (IMDUR) 30 MG 24 hr tablet, TAKE ONE TABLET BY MOUTH EVERY DAY AS DIRECTED, Disp: 90 tablet, Rfl: 0  •  meclizine (ANTIVERT) 25 MG tablet, TAKE ONE TABLET BY MOUTH TWICE DAILY, Disp: 180 tablet, Rfl: 3  •  metoprolol tartrate (LOPRESSOR) 25 MG tablet, TAKE ONE TABLET BY MOUTH TWICE DAILY, Disp: 60 tablet, Rfl: 5  •  nitroglycerin (NITROSTAT) 0.4 MG SL tablet, Place 0.4 mg under the tongue Every 5 (Five) Minutes As Needed for Chest Pain., Disp: , Rfl:   •  Prednicarbate 0.1 % cream, APPLY SPARINGLY TO AFFECTED AREAS TWO TIMES A DAY (Patient taking differently: Apply 1 application topically 2 (Two) Times a Day. APPLY SPARINGLY TO AFFECTED AREAS TWO TIMES A  DAY), Disp: 15 g, Rfl: 2  •  sertraline (ZOLOFT) 25 MG tablet, TAKE ONE TABLET BY MOUTH EVERY DAY, Disp: 90 tablet, Rfl: 3  •  SPIRIVA HANDIHALER 18 MCG per inhalation capsule, INHALE THE ENTIRE CONTENTS OF 1 CAPSULE ONCE A DAY USING HANDIHALER DEVICE, Disp: 90 capsule, Rfl: 1  •  traZODone (DESYREL) 50 MG tablet, TAKE 1/2 TO 1 TABLET BY MOUTH AT BEDTIME AS NEEDED, Disp: 90 tablet, Rfl: 1  •  VENTOLIN  (90 BASE) MCG/ACT inhaler, INHALE ONE TO TWO PUFFS BY MOUTH EVERY 4 TO 6 HOURS AS NEEDED, Disp: 1 inhaler, Rfl: 2    The following portions of the patient's history were reviewed and updated as appropriate: allergies, current medications, past family history, past medical history, past social history, past surgical history and problem list.    Review of Systems   Constitutional: Negative.    Respiratory: Negative.    Cardiovascular: Negative.    Gastrointestinal: Negative.    Musculoskeletal: Negative.    Skin: Negative.    Neurological: Negative.    Psychiatric/Behavioral: Negative.        Objective   Physical Exam   Constitutional: He is oriented to person, place, and time. He appears well-nourished.   Neck: Neck supple.   Cardiovascular: Normal rate, regular rhythm and normal heart sounds.    Pulmonary/Chest: Effort normal and breath sounds normal.   Abdominal: Bowel sounds are normal.   Neurological: He is alert and oriented to person, place, and time.   Skin: Skin is warm.   Psychiatric: He has a normal mood and affect.       All tests have been reviewed.    Assessment/Plan   There are no diagnoses linked to this encounter.           #1: Adenopathy, intra-abdominal. biopsy, reactive changes only follow up : Hematology/oncology. history of ITP s/p splenectomy, higher WBC and plts  #2: copd, continue medicine  #3: fatty liver, abnormal liver enzymes, s/p cholecystectomy. history of gall stone, follow up with GI, liver enzymes worsened after Lipitor 80 mg. Asked patient to cut down Lipitor 40 mg daily,  discontinue acetaminophen, weight loss.alkaline phosphatase is elevated performed isoenzymes.  #4: Coronary heart disease,blockage unable to stent. chronic stable angina, none now.continue medicine prn NTG,metoprolol 25 bid, f/u cardio  #5: Prediabetes.last a1c6.5  #6: Hyperlipidemia, cut down Lipitor from 80 mg to 40 mg do lab  #7: Mild chronic anxiety, continue medicine  #8: GERD continue medicine,   Leukocytosis thrombocytosis lymphocytosis monocytosis history of splenectomy for possible thrombocytopenia. continue to watch  Vocal cord paralysis follow up with ENT  right thumb tendonitis,resolved after tx. XR severe arthritis.  hemorroid continue medicine   Overweight encouraged patient to have good diet  renal cyst 3.3 cm, MRI November 2014 showed a 2 cm. Repeat   low iron still on s/p iron transfusion,   insomia continue trazodone  vitD low vitD3 2000u daily  s/p pneumonia shot, prevnar 10/27, Td to HD , zostavax done, shingrix informed  aaa screen 3cm repeated 12/2017 smaller now  Lung nodule, repeat in 7/2018by lung doctor.   Colon need report from dr gutierrez ?2011, normal per patient. cologuard.  BPH continue PSA   3 week after labs

## 2018-06-19 ENCOUNTER — HOSPITAL ENCOUNTER (OUTPATIENT)
Dept: ULTRASOUND IMAGING | Facility: HOSPITAL | Age: 78
Discharge: HOME | End: 2018-06-19
Admitting: INTERNAL MEDICINE

## 2018-06-19 PROCEDURE — 76775 US EXAM ABDO BACK WALL LIM: CPT

## 2018-07-05 ENCOUNTER — HOSPITAL ENCOUNTER (OUTPATIENT)
Dept: GENERAL RADIOLOGY | Facility: HOSPITAL | Age: 78
Discharge: HOME | End: 2018-07-05
Attending: INTERNAL MEDICINE | Admitting: INTERNAL MEDICINE

## 2018-07-05 ENCOUNTER — TRANSCRIBE ORDERS (OUTPATIENT)
Dept: GENERAL RADIOLOGY | Facility: HOSPITAL | Age: 78
End: 2018-07-05

## 2018-07-05 DIAGNOSIS — J18.9 PNEUMONIA DUE TO INFECTIOUS ORGANISM, UNSPECIFIED LATERALITY, UNSPECIFIED PART OF LUNG: Primary | ICD-10-CM

## 2018-07-05 DIAGNOSIS — J18.9 PNEUMONIA DUE TO INFECTIOUS ORGANISM, UNSPECIFIED LATERALITY, UNSPECIFIED PART OF LUNG: ICD-10-CM

## 2018-07-05 PROCEDURE — 71046 X-RAY EXAM CHEST 2 VIEWS: CPT

## 2018-07-10 LAB
25(OH)D3+25(OH)D2 SERPL-MCNC: 50.2 NG/ML
ALBUMIN SERPL-MCNC: 3.9 G/DL (ref 3.5–5)
ALBUMIN/GLOB SERPL: 1.3 G/DL (ref 1–2)
ALP SERPL-CCNC: 162 U/L (ref 38–126)
ALT SERPL-CCNC: 31 U/L (ref 13–69)
AST SERPL-CCNC: 28 U/L (ref 15–46)
BASOPHILS # BLD AUTO: 0.07 10*3/MM3 (ref 0–0.2)
BASOPHILS NFR BLD AUTO: 0.5 % (ref 0–2.5)
BILIRUB SERPL-MCNC: 0.6 MG/DL (ref 0.2–1.3)
BUN SERPL-MCNC: 24 MG/DL (ref 7–20)
BUN/CREAT SERPL: 26.7 (ref 6.3–21.9)
CALCIUM SERPL-MCNC: 9.6 MG/DL (ref 8.4–10.2)
CHLORIDE SERPL-SCNC: 106 MMOL/L (ref 98–107)
CHOLEST SERPL-MCNC: 130 MG/DL (ref 0–199)
CK SERPL-CCNC: 83 U/L (ref 30–170)
CO2 SERPL-SCNC: 24 MMOL/L (ref 26–30)
CREAT SERPL-MCNC: 0.9 MG/DL (ref 0.6–1.3)
EOSINOPHIL # BLD AUTO: 0.34 10*3/MM3 (ref 0–0.7)
EOSINOPHIL NFR BLD AUTO: 2.6 % (ref 0–7)
ERYTHROCYTE [DISTWIDTH] IN BLOOD BY AUTOMATED COUNT: 16.4 % (ref 11.5–14.5)
FERRITIN SERPL-MCNC: 199 NG/ML (ref 17.9–464)
GLOBULIN SER CALC-MCNC: 2.9 GM/DL
GLUCOSE SERPL-MCNC: 99 MG/DL (ref 74–98)
HBA1C MFR BLD: 6.5 %
HCT VFR BLD AUTO: 41.9 % (ref 42–52)
HDLC SERPL-MCNC: 31 MG/DL (ref 40–60)
HGB BLD-MCNC: 13.4 G/DL (ref 14–18)
IMM GRANULOCYTES # BLD: 0.09 10*3/MM3 (ref 0–0.06)
IMM GRANULOCYTES NFR BLD: 0.7 % (ref 0–0.6)
IRON SATN MFR SERPL: 23 % (ref 11–46)
IRON SERPL-MCNC: 66 MCG/DL (ref 37–181)
LDLC SERPL CALC-MCNC: 67 MG/DL (ref 0–99)
LYMPHOCYTES # BLD AUTO: 3.28 10*3/MM3 (ref 0.6–3.4)
LYMPHOCYTES NFR BLD AUTO: 25.3 % (ref 10–50)
MCH RBC QN AUTO: 28.7 PG (ref 27–31)
MCHC RBC AUTO-ENTMCNC: 32 G/DL (ref 30–37)
MCV RBC AUTO: 89.7 FL (ref 80–94)
MONOCYTES # BLD AUTO: 1.39 10*3/MM3 (ref 0–0.9)
MONOCYTES NFR BLD AUTO: 10.7 % (ref 0–12)
NEUTROPHILS # BLD AUTO: 7.82 10*3/MM3 (ref 2–6.9)
NEUTROPHILS NFR BLD AUTO: 60.2 % (ref 37–80)
NRBC BLD AUTO-RTO: 0 /100 WBC (ref 0–0)
PLATELET # BLD AUTO: 485 10*3/MM3 (ref 130–400)
POTASSIUM SERPL-SCNC: 4.8 MMOL/L (ref 3.5–5.1)
PROT SERPL-MCNC: 6.8 G/DL (ref 6.3–8.2)
RBC # BLD AUTO: 4.67 10*6/MM3 (ref 4.7–6.1)
SODIUM SERPL-SCNC: 141 MMOL/L (ref 137–145)
TIBC SERPL-MCNC: 284 MCG/DL (ref 261–497)
TRIGL SERPL-MCNC: 158 MG/DL
UIBC SERPL-MCNC: 218 MCG/DL
VLDLC SERPL CALC-MCNC: 31.6 MG/DL
WBC # BLD AUTO: 12.99 10*3/MM3 (ref 4.8–10.8)

## 2018-07-11 ENCOUNTER — OFFICE VISIT (OUTPATIENT)
Dept: INTERNAL MEDICINE | Facility: CLINIC | Age: 78
End: 2018-07-11

## 2018-07-11 VITALS
RESPIRATION RATE: 14 BRPM | TEMPERATURE: 98.2 F | SYSTOLIC BLOOD PRESSURE: 110 MMHG | BODY MASS INDEX: 27.85 KG/M2 | WEIGHT: 188 LBS | HEART RATE: 60 BPM | DIASTOLIC BLOOD PRESSURE: 56 MMHG | OXYGEN SATURATION: 92 % | HEIGHT: 69 IN

## 2018-07-11 DIAGNOSIS — Z12.11 COLON CANCER SCREENING: Primary | ICD-10-CM

## 2018-07-11 PROCEDURE — 99214 OFFICE O/P EST MOD 30 MIN: CPT | Performed by: INTERNAL MEDICINE

## 2018-07-11 NOTE — PROGRESS NOTES
Subjective   Jairo Mercedes is a 77 y.o. male.     No chief complaint on file.      History of Present Illness   Patient here for follow-up of.  A1c again showed a 6.5.  Hyperlipidemia stable medication.  COPD stable.  Renal ultrasound showed the no cysts.  The GERD stable medication.  Weight is still elevated.  Lung nodule showed only a poor pleural effusion.  Stool tested positive for genetic changes for colon cancer or precancerous lesion.    Current Outpatient Prescriptions:   •  acetaminophen (TYLENOL) 500 MG tablet, Take 500-1,000 mg by mouth Every 6 (Six) Hours As Needed for Mild Pain  or Headache., Disp: , Rfl:   •  ADVAIR DISKUS 250-50 MCG/DOSE DISKUS, INHALE ONE PUFF TWICE DAILY, Disp: 1 each, Rfl: 3  •  atorvastatin (LIPITOR) 40 MG tablet, TAKE ONE TABLET BY MOUTH AT BEDTIME, Disp: 30 tablet, Rfl: 5  •  cholecalciferol (VITAMIN D3) 1000 units tablet, Take 1,000 Units by mouth Daily., Disp: , Rfl:   •  esomeprazole (nexIUM) 40 MG capsule, TAKE ONE CAPSULE BY MOUTH EVERY DAY, Disp: 30 capsule, Rfl: 5  •  isosorbide mononitrate (IMDUR) 30 MG 24 hr tablet, TAKE ONE TABLET BY MOUTH EVERY DAY AS DIRECTED, Disp: 90 tablet, Rfl: 0  •  meclizine (ANTIVERT) 25 MG tablet, TAKE ONE TABLET BY MOUTH TWICE DAILY, Disp: 180 tablet, Rfl: 3  •  metoprolol tartrate (LOPRESSOR) 25 MG tablet, TAKE ONE TABLET BY MOUTH TWICE DAILY, Disp: 60 tablet, Rfl: 5  •  nitroglycerin (NITROSTAT) 0.4 MG SL tablet, Place 0.4 mg under the tongue Every 5 (Five) Minutes As Needed for Chest Pain., Disp: , Rfl:   •  Prednicarbate 0.1 % cream, APPLY SPARINGLY TO AFFECTED AREAS TWO TIMES A DAY (Patient taking differently: Apply 1 application topically 2 (Two) Times a Day. APPLY SPARINGLY TO AFFECTED AREAS TWO TIMES A DAY), Disp: 15 g, Rfl: 2  •  sertraline (ZOLOFT) 25 MG tablet, TAKE ONE TABLET BY MOUTH EVERY DAY, Disp: 90 tablet, Rfl: 3  •  SPIRIVA HANDIHALER 18 MCG per inhalation capsule, INHALE THE ENTIRE CONTENTS OF 1 CAPSULE ONCE A DAY USING  HANDIHALER DEVICE, Disp: 90 capsule, Rfl: 1  •  traZODone (DESYREL) 50 MG tablet, TAKE 1/2 TO 1 TABLET BY MOUTH AT BEDTIME AS NEEDED, Disp: 90 tablet, Rfl: 1  •  VENTOLIN  (90 BASE) MCG/ACT inhaler, INHALE ONE TO TWO PUFFS BY MOUTH EVERY 4 TO 6 HOURS AS NEEDED, Disp: 1 inhaler, Rfl: 2    The following portions of the patient's history were reviewed and updated as appropriate: allergies, current medications, past family history, past medical history, past social history, past surgical history and problem list.    Review of Systems   Constitutional: Negative.    Respiratory: Negative.    Cardiovascular: Negative.    Gastrointestinal: Negative.    Musculoskeletal: Negative.    Skin: Negative.    Neurological: Negative.    Psychiatric/Behavioral: Negative.        Objective   Physical Exam   Constitutional: He is oriented to person, place, and time. He appears well-nourished.   Neck: Neck supple.   Cardiovascular: Normal rate, regular rhythm and normal heart sounds.    Pulmonary/Chest: Effort normal and breath sounds normal.   Abdominal: Bowel sounds are normal.   Neurological: He is alert and oriented to person, place, and time.   Skin: Skin is warm.   Psychiatric: He has a normal mood and affect.       All tests have been reviewed.    Assessment/Plan   There are no diagnoses linked to this encounter.          #1: Adenopathy, intra-abdominal. biopsy, reactive changes only follow up : Hematology/oncology. history of ITP s/p splenectomy, higher WBC and plts, now RBC low , watch--  #2: copd, continue medicine  #3: fatty liver, abnormal liver enzymes, s/p cholecystectomy. history of gall stone, follow up with GI, liver enzymes worsened after Lipitor 80 mg. Asked patient to cut down Lipitor 40 mg daily, discontinue acetaminophen, weight loss.alkaline phosphatase is elevated performed isoenzymes.  #4: Coronary heart disease,blockage unable to stent. chronic stable angina, none now.continue medicine prn NTG,metoprolol  25 bid, f/u cardio  #5: Prediabetes.last a1c6.5 x2, patient is diabetic, watch for now hold med, patient refuse meds--  #6: Hyperlipidemia, cut down Lipitor from 80 mg to 40 mg  #7: Mild chronic anxiety, continue medicine  #8: GERD continue medicine,   Leukocytosis thrombocytosis lymphocytosis monocytosis history of splenectomy for possible thrombocytopenia. continue to watch  Vocal cord paralysis follow up with ENT  right thumb tendonitis,resolved after tx. XR severe arthritis.  hemorroid continue medicine   Overweight encouraged patient to have good diet  renal cyst 3.3 cm, MRI November 2014 showed a 2 cm. Repeat normal  low iron normal  s/p iron transfusion, --  insomia continue trazodone  vitD low vitD3 2000u daily  s/p pneumonia shot, prevnar 10/27, Td to HD , zostavax done, shingrix informed  aaa screen 3cm repeated 12/2017 smaller now--  Lung nodule, repeat in 7/2018 showed locular effusion, by lung doctor. --  Colon need report from dr gutierrez ?2011, normal per patient. Cologuard+ refer to [facility] for scopy  BPH continue PSA       6 mo

## 2018-07-24 DIAGNOSIS — Z12.11 COLON CANCER SCREENING: ICD-10-CM

## 2018-07-27 ENCOUNTER — OFFICE VISIT (OUTPATIENT)
Dept: SURGERY | Facility: CLINIC | Age: 78
End: 2018-07-27

## 2018-07-27 VITALS
RESPIRATION RATE: 18 BRPM | SYSTOLIC BLOOD PRESSURE: 120 MMHG | HEIGHT: 69 IN | DIASTOLIC BLOOD PRESSURE: 66 MMHG | TEMPERATURE: 98 F | HEART RATE: 84 BPM | WEIGHT: 192 LBS | BODY MASS INDEX: 28.44 KG/M2 | OXYGEN SATURATION: 94 %

## 2018-07-27 DIAGNOSIS — R19.5 HEME POSITIVE STOOL: Primary | ICD-10-CM

## 2018-07-27 DIAGNOSIS — K21.00 GASTROESOPHAGEAL REFLUX DISEASE WITH ESOPHAGITIS: ICD-10-CM

## 2018-07-27 PROCEDURE — 99203 OFFICE O/P NEW LOW 30 MIN: CPT | Performed by: SURGERY

## 2018-07-27 RX ORDER — POLYETHYLENE GLYCOL 3350 17 G/17G
255 POWDER, FOR SOLUTION ORAL ONCE
Qty: 15 PACKET | Refills: 0 | Status: SHIPPED | OUTPATIENT
Start: 2018-07-27 | End: 2018-07-27

## 2018-07-27 RX ORDER — BISACODYL 5 MG/1
TABLET, DELAYED RELEASE ORAL
Qty: 4 TABLET | Refills: 0 | Status: SHIPPED | OUTPATIENT
Start: 2018-07-27 | End: 2018-11-27

## 2018-07-27 RX ORDER — CHLORAL HYDRATE 500 MG
1000 CAPSULE ORAL
COMMUNITY

## 2018-07-27 NOTE — PROGRESS NOTES
Patient: Jairo Mercedes    YOB: 1940    Date: 07/27/2018    Primary Care Provider: Maicol Cardenas MD    Chief Complaint   Patient presents with   • Colon Cancer Screening       Subjective .     History of present illness:  Pt has a history of emphysema, he also has a history of colon polyps, I saw the patient in the office today as a consultation for evaluation for a screening colonoscopy.  Pt denies change in bowel habits including dark colored stool and/or visible rectal bleeding, pt also denies family history of colon cancer.  Pt stated that his last colonoscopy was performed @ 10 years ago, he did have benign colon polyps removed at that time.Patient had an EGD 2 months ago that was unremarkable except for mild esophagitis.    The following portions of the patient's history were reviewed and updated as appropriate: allergies, current medications, past family history, past medical history, past social history, past surgical history and problem list.      Review of Systems   Constitutional: Negative for chills, fever and unexpected weight change.   HENT: Negative for trouble swallowing and voice change.    Eyes: Negative for visual disturbance.   Respiratory: Negative for apnea, cough, chest tightness, shortness of breath and wheezing.    Cardiovascular: Negative for chest pain, palpitations and leg swelling.   Gastrointestinal: Negative for abdominal distention, abdominal pain, anal bleeding, blood in stool, constipation, diarrhea, nausea, rectal pain and vomiting.   Endocrine: Negative for cold intolerance and heat intolerance.   Genitourinary: Negative for difficulty urinating, dysuria, flank pain, scrotal swelling and testicular pain.   Musculoskeletal: Negative for back pain, gait problem and joint swelling.   Skin: Negative for color change, rash and wound.   Neurological: Negative for dizziness, syncope, speech difficulty, weakness, numbness and headaches.   Hematological: Negative for  adenopathy. Does not bruise/bleed easily.   Psychiatric/Behavioral: Negative for confusion. The patient is not nervous/anxious.        History:  Past Medical History:   Diagnosis Date   • Abdominal pain    • Acute sinusitis    • Allergic N/A   • Anemia    • Anxiety    • Arthritis    • Asthma N/A   • Borderline diabetes     REPORTS HE TAKES NO MEDICATION.    • COPD (chronic obstructive pulmonary disease) (CMS/HCC)    • Coronary artery disease N/A    Some Blockage   • Emphysema lung (CMS/HCC)    • Emphysema lung (CMS/HCC)    • Emphysema of lung (CMS/HCC)    • Emphysema of lung (CMS/HCC)    • History of ankle fracture     LEFT    • History of echocardiogram     REPORTS HAS HAD 2. DOES NOT REPORT ANY HX OF CHEST PAIN.   • History of pneumonia    • [place] (hard of hearing)     WEARS HEARING AIDS   • Melanoma (CMS/HCC)     2013   • Pneumonia 2015   • Wears glasses        Past Surgical History:   Procedure Laterality Date   • BRAIN SURGERY  1999   • BRONCHOSCOPY N/A 4/16/2018    Procedure: BRONCHOSCOPY DIAGNOSTIC WITH WASHINGS, BRUSHINGS AND BIOPSIES;  Surgeon: Morgan Frias MD;  Location: [facility];  Service: Pulmonary   • ALEX HOLE FOR SUBDURAL HEMATOMA     • CARDIAC CATHETERIZATION      REPORTS APPROXIMATELY 3 YEARS AGO AND REPORTS NO STENTS WERE PLACED   • CHOLECYSTECTOMY     • COLONOSCOPY     • ENDOSCOPY     • LIVER BIOPSY     • LYMPH NODE BIOPSY     • MOLE REMOVAL  2005    MELANOMA ON BACK   • SPLENECTOMY     • THORACOTOMY  07/28/2015    Left thoracotomy and stapling of pulmonary blebs. Mechanical pleurodesis.   • TONSILLECTOMY     • WISDOM TOOTH EXTRACTION         Family History   Problem Relation Age of Onset   • Alzheimer's disease Mother    • Arthritis Mother    • Heart disease Father    • Hyperlipidemia Father    • Cancer Other    • Cancer Maternal Grandmother        Social History   Substance Use Topics   • Smoking status: Former Smoker     Packs/day: 3.00     Years: 42.00     Types: Cigarettes     Start date:  "1/1/1957     Quit date: 1999   • Smokeless tobacco: Never Used   • Alcohol use No       Allergies:  Allergies   Allergen Reactions   • Other Rash     REPORTS ALLERGY TO \"PARABENS\" - REPORTS THEY CAUSE HIM TO BREAK OUT        Medications:    Current Outpatient Prescriptions:   •  ADVAIR DISKUS 250-50 MCG/DOSE DISKUS, INHALE ONE PUFF TWICE DAILY, Disp: 1 each, Rfl: 3  •  esomeprazole (nexIUM) 40 MG capsule, TAKE ONE CAPSULE BY MOUTH EVERY DAY, Disp: 30 capsule, Rfl: 5  •  isosorbide mononitrate (IMDUR) 30 MG 24 hr tablet, TAKE ONE TABLET BY MOUTH EVERY DAY AS DIRECTED, Disp: 90 tablet, Rfl: 0  •  metoprolol tartrate (LOPRESSOR) 25 MG tablet, TAKE ONE TABLET BY MOUTH TWICE DAILY, Disp: 60 tablet, Rfl: 5  •  nitroglycerin (NITROSTAT) 0.4 MG SL tablet, Place 0.4 mg under the tongue Every 5 (Five) Minutes As Needed for Chest Pain., Disp: , Rfl:   •  Omega-3 Fatty Acids (FISH OIL) 1000 MG capsule capsule, Take  by mouth Daily With Breakfast., Disp: , Rfl:   •  sertraline (ZOLOFT) 25 MG tablet, TAKE ONE TABLET BY MOUTH EVERY DAY, Disp: 90 tablet, Rfl: 3  •  SPIRIVA HANDIHALER 18 MCG per inhalation capsule, INHALE THE ENTIRE CONTENTS OF 1 CAPSULE ONCE A DAY USING HANDIHALER DEVICE, Disp: 90 capsule, Rfl: 1    Objective     Vital Signs:   Vitals:    07/27/18 1337   BP: 120/66   Pulse: 84   Resp: 18   Temp: 98 °F (36.7 °C)   TempSrc: Temporal Artery    SpO2: 94%   Weight: 87.1 kg (192 lb)   Height: 175.3 cm (69\")       Physical Exam:   General Appearance:    Alert, cooperative, in no acute distress   Head:    Normocephalic, without obvious abnormality, atraumatic   Eyes:            Lids and lashes normal, conjunctivae and sclerae normal, no   icterus, no pallor, corneas clear, PERRL   Ears:    Ears appear intact with no abnormalities noted   Throat:   No oral lesions, no thrush, oral mucosa moist   Neck:   No adenopathy, supple, trachea midline, no thyromegaly,  no JVD   Lungs:     Clear to auscultation,respirations regular, " even and                  unlabored    Heart:    Regular rhythm and normal rate, normal S1 and S2, no            murmur   Abdomen:     no masses, no organomegaly, soft non-tender, non-distended, no guarding, there is no evidence of tenderness   Extremities:   Moves all extremities well, no edema, no cyanosis, no             redness   Pulses:   Pulses palpable and equal bilaterally   Skin:   No bleeding, bruising or rash   Lymph nodes:   No palpable adenopathy   Neurologic:   Cranial nerves 2 - 12 grossly intact, sensation intact      Results Review:   I reviewed the patient's new clinical results.    Review of Systems was reviewed and confirmed as accurate today.    Assessment/Plan :    1. Heme positive stool    2. Gastroesophageal reflux disease with esophagitis        I recommend a colonoscopy for further evaluation. The procedure was explained as well as the risks which include but are not limited to bleeding, infection, perforation, abdominal pain etc. The patient understands these risks and the procedure and wishes to proceed.     Electronically signed by Sara Reynoso MD  07/27/18 1:42 P      Portions of this note have been scribed for Sara Reynoso MD by Wilda De Leon. 7/27/2018  2:00 PM

## 2018-07-30 PROBLEM — R19.5 HEME POSITIVE STOOL: Status: ACTIVE | Noted: 2018-07-30

## 2018-07-30 RX ORDER — MECLIZINE HYDROCHLORIDE 25 MG/1
25 TABLET ORAL 2 TIMES DAILY
COMMUNITY
End: 2019-03-11

## 2018-07-30 RX ORDER — ASPIRIN 81 MG/1
81 TABLET ORAL DAILY
COMMUNITY

## 2018-07-30 RX ORDER — MELATONIN
1000 2 TIMES DAILY
COMMUNITY
End: 2020-05-20

## 2018-08-01 ENCOUNTER — TELEPHONE (OUTPATIENT)
Dept: SURGERY | Facility: CLINIC | Age: 78
End: 2018-08-01

## 2018-08-02 ENCOUNTER — ANESTHESIA EVENT (OUTPATIENT)
Dept: GASTROENTEROLOGY | Facility: HOSPITAL | Age: 78
End: 2018-08-02

## 2018-08-02 ENCOUNTER — HOSPITAL ENCOUNTER (OUTPATIENT)
Facility: HOSPITAL | Age: 78
Setting detail: HOSPITAL OUTPATIENT SURGERY
Discharge: HOME | End: 2018-08-02
Attending: SURGERY | Admitting: SURGERY

## 2018-08-02 ENCOUNTER — ANESTHESIA (OUTPATIENT)
Dept: GASTROENTEROLOGY | Facility: HOSPITAL | Age: 78
End: 2018-08-02

## 2018-08-02 VITALS
WEIGHT: 189 LBS | HEART RATE: 49 BPM | HEIGHT: 69 IN | TEMPERATURE: 97.2 F | RESPIRATION RATE: 16 BRPM | OXYGEN SATURATION: 94 % | BODY MASS INDEX: 27.99 KG/M2 | SYSTOLIC BLOOD PRESSURE: 148 MMHG | DIASTOLIC BLOOD PRESSURE: 62 MMHG

## 2018-08-02 DIAGNOSIS — R19.5 HEME POSITIVE STOOL: ICD-10-CM

## 2018-08-02 PROCEDURE — 25010000002 PROPOFOL 1000 MG/100ML EMULSION: Performed by: NURSE ANESTHETIST, CERTIFIED REGISTERED

## 2018-08-02 RX ORDER — PROPOFOL 10 MG/ML
INJECTION, EMULSION INTRAVENOUS AS NEEDED
Status: DISCONTINUED | OUTPATIENT
Start: 2018-08-02 | End: 2018-08-02 | Stop reason: SURG

## 2018-08-02 RX ORDER — SODIUM CHLORIDE, SODIUM LACTATE, POTASSIUM CHLORIDE, CALCIUM CHLORIDE 600; 310; 30; 20 MG/100ML; MG/100ML; MG/100ML; MG/100ML
1000 INJECTION, SOLUTION INTRAVENOUS CONTINUOUS
Status: DISCONTINUED | OUTPATIENT
Start: 2018-08-02 | End: 2018-08-02 | Stop reason: HOSPADM

## 2018-08-02 RX ORDER — ONDANSETRON 2 MG/ML
4 INJECTION INTRAMUSCULAR; INTRAVENOUS ONCE AS NEEDED
Status: DISCONTINUED | OUTPATIENT
Start: 2018-08-02 | End: 2018-08-02 | Stop reason: HOSPADM

## 2018-08-02 RX ADMIN — SODIUM CHLORIDE, POTASSIUM CHLORIDE, SODIUM LACTATE AND CALCIUM CHLORIDE 1000 ML: 600; 310; 30; 20 INJECTION, SOLUTION INTRAVENOUS at 06:26

## 2018-08-02 RX ADMIN — PROPOFOL 50 MG: 10 INJECTION, EMULSION INTRAVENOUS at 07:08

## 2018-08-02 RX ADMIN — PROPOFOL 70 MG: 10 INJECTION, EMULSION INTRAVENOUS at 07:00

## 2018-08-02 RX ADMIN — PROPOFOL 50 MG: 10 INJECTION, EMULSION INTRAVENOUS at 07:05

## 2018-08-02 RX ADMIN — PROPOFOL 30 MG: 10 INJECTION, EMULSION INTRAVENOUS at 07:02

## 2018-08-02 RX ADMIN — LIDOCAINE HYDROCHLORIDE 60 MG: 20 INJECTION, SOLUTION INTRAVENOUS at 07:00

## 2018-08-02 NOTE — H&P (VIEW-ONLY)
Patient: Jairo Mercedes    YOB: 1940    Date: 07/27/2018    Primary Care Provider: Maicol Cardenas MD    Chief Complaint   Patient presents with   • Colon Cancer Screening       Subjective .     History of present illness:  Pt has a history of emphysema, he also has a history of colon polyps, I saw the patient in the office today as a consultation for evaluation for a screening colonoscopy.  Pt denies change in bowel habits including dark colored stool and/or visible rectal bleeding, pt also denies family history of colon cancer.  Pt stated that his last colonoscopy was performed @ 10 years ago, he did have benign colon polyps removed at that time.Patient had an EGD 2 months ago that was unremarkable except for mild esophagitis.    The following portions of the patient's history were reviewed and updated as appropriate: allergies, current medications, past family history, past medical history, past social history, past surgical history and problem list.      Review of Systems   Constitutional: Negative for chills, fever and unexpected weight change.   HENT: Negative for trouble swallowing and voice change.    Eyes: Negative for visual disturbance.   Respiratory: Negative for apnea, cough, chest tightness, shortness of breath and wheezing.    Cardiovascular: Negative for chest pain, palpitations and leg swelling.   Gastrointestinal: Negative for abdominal distention, abdominal pain, anal bleeding, blood in stool, constipation, diarrhea, nausea, rectal pain and vomiting.   Endocrine: Negative for cold intolerance and heat intolerance.   Genitourinary: Negative for difficulty urinating, dysuria, flank pain, scrotal swelling and testicular pain.   Musculoskeletal: Negative for back pain, gait problem and joint swelling.   Skin: Negative for color change, rash and wound.   Neurological: Negative for dizziness, syncope, speech difficulty, weakness, numbness and headaches.   Hematological: Negative for  adenopathy. Does not bruise/bleed easily.   Psychiatric/Behavioral: Negative for confusion. The patient is not nervous/anxious.        History:  Past Medical History:   Diagnosis Date   • Abdominal pain    • Acute sinusitis    • Allergic N/A   • Anemia    • Anxiety    • Arthritis    • Asthma N/A   • Borderline diabetes     REPORTS HE TAKES NO MEDICATION.    • COPD (chronic obstructive pulmonary disease) (CMS/HCC)    • Coronary artery disease N/A    Some Blockage   • Emphysema lung (CMS/HCC)    • Emphysema lung (CMS/HCC)    • Emphysema of lung (CMS/HCC)    • Emphysema of lung (CMS/HCC)    • History of ankle fracture     LEFT    • History of echocardiogram     REPORTS HAS HAD 2. DOES NOT REPORT ANY HX OF CHEST PAIN.   • History of pneumonia    • [place] (hard of hearing)     WEARS HEARING AIDS   • Melanoma (CMS/HCC)     2013   • Pneumonia 2015   • Wears glasses        Past Surgical History:   Procedure Laterality Date   • BRAIN SURGERY  1999   • BRONCHOSCOPY N/A 4/16/2018    Procedure: BRONCHOSCOPY DIAGNOSTIC WITH WASHINGS, BRUSHINGS AND BIOPSIES;  Surgeon: Morgan Frias MD;  Location: [facility];  Service: Pulmonary   • ALEX HOLE FOR SUBDURAL HEMATOMA     • CARDIAC CATHETERIZATION      REPORTS APPROXIMATELY 3 YEARS AGO AND REPORTS NO STENTS WERE PLACED   • CHOLECYSTECTOMY     • COLONOSCOPY     • ENDOSCOPY     • LIVER BIOPSY     • LYMPH NODE BIOPSY     • MOLE REMOVAL  2005    MELANOMA ON BACK   • SPLENECTOMY     • THORACOTOMY  07/28/2015    Left thoracotomy and stapling of pulmonary blebs. Mechanical pleurodesis.   • TONSILLECTOMY     • WISDOM TOOTH EXTRACTION         Family History   Problem Relation Age of Onset   • Alzheimer's disease Mother    • Arthritis Mother    • Heart disease Father    • Hyperlipidemia Father    • Cancer Other    • Cancer Maternal Grandmother        Social History   Substance Use Topics   • Smoking status: Former Smoker     Packs/day: 3.00     Years: 42.00     Types: Cigarettes     Start date:  "1/1/1957     Quit date: 1999   • Smokeless tobacco: Never Used   • Alcohol use No       Allergies:  Allergies   Allergen Reactions   • Other Rash     REPORTS ALLERGY TO \"PARABENS\" - REPORTS THEY CAUSE HIM TO BREAK OUT        Medications:    Current Outpatient Prescriptions:   •  ADVAIR DISKUS 250-50 MCG/DOSE DISKUS, INHALE ONE PUFF TWICE DAILY, Disp: 1 each, Rfl: 3  •  esomeprazole (nexIUM) 40 MG capsule, TAKE ONE CAPSULE BY MOUTH EVERY DAY, Disp: 30 capsule, Rfl: 5  •  isosorbide mononitrate (IMDUR) 30 MG 24 hr tablet, TAKE ONE TABLET BY MOUTH EVERY DAY AS DIRECTED, Disp: 90 tablet, Rfl: 0  •  metoprolol tartrate (LOPRESSOR) 25 MG tablet, TAKE ONE TABLET BY MOUTH TWICE DAILY, Disp: 60 tablet, Rfl: 5  •  nitroglycerin (NITROSTAT) 0.4 MG SL tablet, Place 0.4 mg under the tongue Every 5 (Five) Minutes As Needed for Chest Pain., Disp: , Rfl:   •  Omega-3 Fatty Acids (FISH OIL) 1000 MG capsule capsule, Take  by mouth Daily With Breakfast., Disp: , Rfl:   •  sertraline (ZOLOFT) 25 MG tablet, TAKE ONE TABLET BY MOUTH EVERY DAY, Disp: 90 tablet, Rfl: 3  •  SPIRIVA HANDIHALER 18 MCG per inhalation capsule, INHALE THE ENTIRE CONTENTS OF 1 CAPSULE ONCE A DAY USING HANDIHALER DEVICE, Disp: 90 capsule, Rfl: 1    Objective     Vital Signs:   Vitals:    07/27/18 1337   BP: 120/66   Pulse: 84   Resp: 18   Temp: 98 °F (36.7 °C)   TempSrc: Temporal Artery    SpO2: 94%   Weight: 87.1 kg (192 lb)   Height: 175.3 cm (69\")       Physical Exam:   General Appearance:    Alert, cooperative, in no acute distress   Head:    Normocephalic, without obvious abnormality, atraumatic   Eyes:            Lids and lashes normal, conjunctivae and sclerae normal, no   icterus, no pallor, corneas clear, PERRL   Ears:    Ears appear intact with no abnormalities noted   Throat:   No oral lesions, no thrush, oral mucosa moist   Neck:   No adenopathy, supple, trachea midline, no thyromegaly,  no JVD   Lungs:     Clear to auscultation,respirations regular, " even and                  unlabored    Heart:    Regular rhythm and normal rate, normal S1 and S2, no            murmur   Abdomen:     no masses, no organomegaly, soft non-tender, non-distended, no guarding, there is no evidence of tenderness   Extremities:   Moves all extremities well, no edema, no cyanosis, no             redness   Pulses:   Pulses palpable and equal bilaterally   Skin:   No bleeding, bruising or rash   Lymph nodes:   No palpable adenopathy   Neurologic:   Cranial nerves 2 - 12 grossly intact, sensation intact      Results Review:   I reviewed the patient's new clinical results.    Review of Systems was reviewed and confirmed as accurate today.    Assessment/Plan :    1. Heme positive stool    2. Gastroesophageal reflux disease with esophagitis        I recommend a colonoscopy for further evaluation. The procedure was explained as well as the risks which include but are not limited to bleeding, infection, perforation, abdominal pain etc. The patient understands these risks and the procedure and wishes to proceed.     Electronically signed by Sara Reynoso MD  07/27/18 1:42 P      Portions of this note have been scribed for Sara Reynoso MD by Wilda De Leon. 7/27/2018  2:00 PM

## 2018-08-02 NOTE — ANESTHESIA PREPROCEDURE EVALUATION
Anesthesia Evaluation     Patient summary reviewed and Nursing notes reviewed   no history of anesthetic complications:  NPO Solid Status: > 8 hours  NPO Liquid Status: > 8 hours           Airway   Mallampati: II  TM distance: >3 FB  Neck ROM: full  No difficulty expected  Dental - normal exam     Pulmonary - normal exam   (+) a smoker Former, COPD moderate, asthma,   Cardiovascular - normal exam  Exercise tolerance: good (4-7 METS)    ECG reviewed  PT is on anticoagulation therapy  Patient on routine beta blocker and Beta blocker given within 24 hours of surgery    (+) hypertension well controlled less than 2 medications, CAD, hyperlipidemia,     ROS comment: ASA 81 mg     Pt has 3 cm infrarenal AAA according to report.     Neuro/Psych  (+) psychiatric history Anxiety,     GI/Hepatic/Renal/Endo    (+)  GERD well controlled,  liver disease, diabetes mellitus well controlled,     Musculoskeletal     Abdominal  - normal exam   Substance History - negative use     OB/GYN negative ob/gyn ROS         Other   (+) arthritis   history of cancer      Other Comment: Melanoma 2013      Phys Exam Other: [place], wears hearing aides                Anesthesia Plan    ASA 3     MAC   (Patient advised that intravenous sedation would be utilized as primary anesthetic technique. Every effort will be made to make sure patient is comfortable. Patient advised that they may experience recall of events of the procedure. Patient verbalized understanding and agreed to plan. )  intravenous induction   Anesthetic plan and risks discussed with patient.

## 2018-08-07 LAB
LAB AP CASE REPORT: NORMAL
PATH REPORT.FINAL DX SPEC: NORMAL

## 2018-08-09 ENCOUNTER — TELEPHONE (OUTPATIENT)
Dept: INTERNAL MEDICINE | Facility: CLINIC | Age: 78
End: 2018-08-09

## 2018-08-09 NOTE — TELEPHONE ENCOUNTER
Patient called and states he went to [facility] urgent care last night and has a kidney infection. They told him to contact us today to see if he needs to get a referral for a urologist or if there is anything dr fisher would recommend.

## 2018-08-10 ENCOUNTER — OFFICE VISIT (OUTPATIENT)
Dept: INTERNAL MEDICINE | Facility: CLINIC | Age: 78
End: 2018-08-10

## 2018-08-10 VITALS
DIASTOLIC BLOOD PRESSURE: 60 MMHG | RESPIRATION RATE: 16 BRPM | HEIGHT: 69 IN | BODY MASS INDEX: 27.7 KG/M2 | WEIGHT: 187 LBS | TEMPERATURE: 98 F | OXYGEN SATURATION: 95 % | SYSTOLIC BLOOD PRESSURE: 136 MMHG | HEART RATE: 52 BPM

## 2018-08-10 DIAGNOSIS — R31.0 GROSS HEMATURIA: Primary | ICD-10-CM

## 2018-08-10 DIAGNOSIS — N39.0 URINARY TRACT INFECTION WITH HEMATURIA, SITE UNSPECIFIED: ICD-10-CM

## 2018-08-10 DIAGNOSIS — R31.9 URINARY TRACT INFECTION WITH HEMATURIA, SITE UNSPECIFIED: ICD-10-CM

## 2018-08-10 PROCEDURE — 99214 OFFICE O/P EST MOD 30 MIN: CPT | Performed by: INTERNAL MEDICINE

## 2018-08-10 NOTE — PROGRESS NOTES
Subjective   Jairo Mercedes is a 77 y.o. male.     Chief Complaint   Patient presents with   • urinary issues     patient states he has been passing blood in his urine x 2 days, patient went to Urgent Care on 8/8/18, patient is currently taking an antibiotic.       History of Present Illness   Wednesday 2 days ago patient developed gross hematuria.  Denies any dysuria or urinary frequency and urgency.  Denies any fever chills denies any ear bladder pain or kidney pain.  Patient went to urgent care center.  Patient was diagnosed UTI and the placed on Bactrim.  Gross hematuria improved.  Patient states urinary incontinence is pinkish urine.  Denies any dizziness or short of breath.  Patient does have history of BPH.    Current Outpatient Prescriptions:   •  ADVAIR DISKUS 250-50 MCG/DOSE DISKUS, INHALE ONE PUFF TWICE DAILY, Disp: 1 each, Rfl: 3  •  aspirin 81 MG EC tablet, Take 81 mg by mouth Daily., Disp: , Rfl:   •  bisacodyl (DULCOLAX) 5 MG EC tablet, As directed/colon prep (Patient taking differently: Take  by mouth Take As Directed. As directed/colon prep), Disp: 4 tablet, Rfl: 0  •  cholecalciferol (VITAMIN D3) 1000 units tablet, Take 1,000 Units by mouth 2 (Two) Times a Day., Disp: , Rfl:   •  esomeprazole (nexIUM) 40 MG capsule, TAKE ONE CAPSULE BY MOUTH EVERY DAY, Disp: 30 capsule, Rfl: 5  •  isosorbide mononitrate (IMDUR) 30 MG 24 hr tablet, TAKE ONE TABLET BY MOUTH EVERY DAY AS DIRECTED, Disp: 90 tablet, Rfl: 0  •  meclizine (ANTIVERT) 25 MG tablet, Take 25 mg by mouth Daily., Disp: , Rfl:   •  metoprolol tartrate (LOPRESSOR) 25 MG tablet, TAKE ONE TABLET BY MOUTH TWICE DAILY, Disp: 60 tablet, Rfl: 5  •  nitroglycerin (NITROSTAT) 0.4 MG SL tablet, Place 0.4 mg under the tongue Every 5 (Five) Minutes As Needed for Chest Pain., Disp: , Rfl:   •  Omega-3 Fatty Acids (FISH OIL) 1000 MG capsule capsule, Take 1,000 mg by mouth Daily With Breakfast., Disp: , Rfl:   •  sertraline (ZOLOFT) 25 MG tablet, TAKE ONE  TABLET BY MOUTH EVERY DAY, Disp: 90 tablet, Rfl: 3  •  SPIRIVA HANDIHALER 18 MCG per inhalation capsule, INHALE THE ENTIRE CONTENTS OF 1 CAPSULE ONCE A DAY USING HANDIHALER DEVICE, Disp: 90 capsule, Rfl: 1  •  sulfamethoxazole-trimethoprim (BACTRIM DS,SEPTRA DS) 800-160 MG per tablet, Take 1 tablet by mouth 2 (Two) Times a Day for 5 days., Disp: 10 tablet, Rfl: 0    The following portions of the patient's history were reviewed and updated as appropriate: allergies, current medications, past family history, past medical history, past social history, past surgical history and problem list.    Review of Systems   Constitutional: Negative.    Respiratory: Negative.    Cardiovascular: Negative.    Gastrointestinal: Negative.    Genitourinary: Positive for frequency and hematuria.   Skin: Negative.    Psychiatric/Behavioral: Negative.        Objective   Physical Exam   Constitutional: He is oriented to person, place, and time. He appears well-developed and well-nourished.   Neck: Neck supple.   Cardiovascular: Normal rate, regular rhythm and normal heart sounds.    Pulmonary/Chest: Effort normal and breath sounds normal.   Abdominal: Soft. Bowel sounds are normal.   Neurological: He is alert and oriented to person, place, and time.   Psychiatric: He has a normal mood and affect. His behavior is normal.       All tests have been reviewed.    Assessment/Plan   Diagnoses and all orders for this visit:    Gross hematuria  -     Ambulatory Referral to Urology stat, hold ASA for now, to ER if worse.     Urinary tract infection with hematuria, site unspecified, continue bactrim    BPH hx .     As scheduled

## 2018-08-12 ENCOUNTER — TELEPHONE (OUTPATIENT)
Dept: URGENT CARE | Facility: CLINIC | Age: 78
End: 2018-08-12

## 2018-08-13 ENCOUNTER — OFFICE VISIT (OUTPATIENT)
Dept: UROLOGY | Facility: CLINIC | Age: 78
End: 2018-08-13

## 2018-08-13 VITALS
SYSTOLIC BLOOD PRESSURE: 130 MMHG | DIASTOLIC BLOOD PRESSURE: 78 MMHG | TEMPERATURE: 97.8 F | HEIGHT: 69 IN | HEART RATE: 55 BPM | WEIGHT: 187 LBS | BODY MASS INDEX: 27.7 KG/M2 | OXYGEN SATURATION: 93 %

## 2018-08-13 DIAGNOSIS — R31.0 GROSS HEMATURIA: Primary | ICD-10-CM

## 2018-08-13 LAB
BILIRUB BLD-MCNC: NEGATIVE MG/DL
CLARITY, POC: CLEAR
COLOR UR: YELLOW
GLUCOSE UR STRIP-MCNC: NEGATIVE MG/DL
KETONES UR QL: NEGATIVE
LEUKOCYTE EST, POC: NEGATIVE
NITRITE UR-MCNC: NEGATIVE MG/ML
PH UR: 6 [PH] (ref 5–8)
PROT UR STRIP-MCNC: ABNORMAL MG/DL
RBC # UR STRIP: ABNORMAL /UL
SP GR UR: 1.02 (ref 1–1.03)
UROBILINOGEN UR QL: NORMAL

## 2018-08-13 PROCEDURE — 99204 OFFICE O/P NEW MOD 45 MIN: CPT | Performed by: UROLOGY

## 2018-08-13 PROCEDURE — 81002 URINALYSIS NONAUTO W/O SCOPE: CPT | Performed by: UROLOGY

## 2018-08-13 NOTE — PROGRESS NOTES
Chief Complaint  Gross hematuria    HPI  Mr. Mercedes is a 77 y.o. male with history of emphysema who presents with gross hematuria.    He started urinating blood 8/8/18 early evening, wento urgent care, +UA, UCx ultimately negative. No dysuria. No other associated Sx  He strained and 2 blood clots came out. Felt that he emptied his bladder.     He c/o intermittent LUTS x 6 mo. Main Sx are frequency. He says sometimes he has urgency.   He is overall very pleased with his urination. Feels that he empties well now.   Used to follow with Dr. Arango for yearly PSA checks and prostate exams.   IPSS - 8    History of smoking?    yes  History of second-hand smoking exposure? no  History of chemotherapy?   no  History of radiation?    no  History of kidney or bladder stones?  no  History of frequent urinary tract infections? no First UTI he has ever had.     He currently denies fevers, chills, nausea, vomiting, constipation or flank pain.    Past Medical History  Past Medical History:   Diagnosis Date   • Abdominal pain    • Acute sinusitis    • Allergic N/A   • Anemia    • Anxiety    • Arthritis    • Asthma N/A   • COPD (chronic obstructive pulmonary disease) (CMS/[facility])    • Coronary artery disease N/A    Some Blockage   • Diabetes mellitus (CMS/[facility])     REPORTS DIET CONTROL ONLY, TAKES NO MEDICATION   • Emphysema lung (CMS/[facility])    • Emphysema lung (CMS/[facility])    • Emphysema of lung (CMS/[facility])    • Emphysema of lung (CMS/[facility])    • Heme positive stool    • History of ankle fracture     LEFT    • History of cardiovascular stress test     REPORTS IN HIS LATE 50'S AND THAT ALL WAS WNL'S AT THAT TIME   • History of echocardiogram     REPORTS HAS HAD 2. DOES NOT REPORT ANY HX OF CHEST PAIN.   • History of pneumonia    • [place] (hard of hearing)     WEARS HEARING AIDS   • Hypertension    • Melanoma (CMS/[facility])     2013   • Seasonal allergies    • Wears glasses      He has moderate emphysema he says, and follows with Dr. Frias.    Past  Surgical History  Past Surgical History:   Procedure Laterality Date   • BRAIN SURGERY  1999    REPORTS FOR SUBDURAL HEMATOMA    • BRONCHOSCOPY N/A 4/16/2018    Procedure: BRONCHOSCOPY DIAGNOSTIC WITH WASHINGS, BRUSHINGS AND BIOPSIES;  Surgeon: Morgan Frias MD;  Location: [facility] OR;  Service: Pulmonary   • ALEX HOLE FOR SUBDURAL HEMATOMA     • CARDIAC CATHETERIZATION      REPORTS APPROXIMATELY 2015 AND REPORTS NO STENTS WERE PLACED   • CHOLECYSTECTOMY     • COLONOSCOPY     • COLONOSCOPY N/A 8/2/2018    Procedure: COLONOSCOPY WITH HOT FORCEP POLYPECTOMY X2  ;  Surgeon: Sara Reynoso MD;  Location: [facility] ENDOSCOPY;  Service: Gastroenterology   • ENDOSCOPY     • LIVER BIOPSY     • LYMPH NODE BIOPSY     • MOLE REMOVAL  2005    MELANOMA ON BACK   • SPLENECTOMY     • THORACOTOMY  07/28/2015    Left thoracotomy and stapling of pulmonary blebs. Mechanical pleurodesis.   • TONSILLECTOMY     • WISDOM TOOTH EXTRACTION         Medications    Current Outpatient Prescriptions:   •  ADVAIR DISKUS 250-50 MCG/DOSE DISKUS, INHALE ONE PUFF TWICE DAILY, Disp: 1 each, Rfl: 3  •  aspirin 81 MG EC tablet, Take 81 mg by mouth Daily., Disp: , Rfl:   •  bisacodyl (DULCOLAX) 5 MG EC tablet, As directed/colon prep (Patient taking differently: Take  by mouth Take As Directed. As directed/colon prep), Disp: 4 tablet, Rfl: 0  •  cholecalciferol (VITAMIN D3) 1000 units tablet, Take 1,000 Units by mouth 2 (Two) Times a Day., Disp: , Rfl:   •  esomeprazole (nexIUM) 40 MG capsule, TAKE ONE CAPSULE BY MOUTH EVERY DAY, Disp: 30 capsule, Rfl: 5  •  isosorbide mononitrate (IMDUR) 30 MG 24 hr tablet, TAKE ONE TABLET BY MOUTH EVERY DAY AS DIRECTED, Disp: 90 tablet, Rfl: 0  •  meclizine (ANTIVERT) 25 MG tablet, Take 25 mg by mouth Daily., Disp: , Rfl:   •  metoprolol tartrate (LOPRESSOR) 25 MG tablet, TAKE ONE TABLET BY MOUTH TWICE DAILY, Disp: 60 tablet, Rfl: 5  •  nitroglycerin (NITROSTAT) 0.4 MG SL tablet, Place 0.4 mg under the tongue Every 5  "(Five) Minutes As Needed for Chest Pain., Disp: , Rfl:   •  Omega-3 Fatty Acids (FISH OIL) 1000 MG capsule capsule, Take 1,000 mg by mouth Daily With Breakfast., Disp: , Rfl:   •  sertraline (ZOLOFT) 25 MG tablet, TAKE ONE TABLET BY MOUTH EVERY DAY, Disp: 90 tablet, Rfl: 3  •  SPIRIVA HANDIHALER 18 MCG per inhalation capsule, INHALE THE ENTIRE CONTENTS OF 1 CAPSULE ONCE A DAY USING HANDIHALER DEVICE, Disp: 90 capsule, Rfl: 1  •  sulfamethoxazole-trimethoprim (BACTRIM DS,SEPTRA DS) 800-160 MG per tablet, Take 1 tablet by mouth 2 (Two) Times a Day for 5 days., Disp: 10 tablet, Rfl: 0    Allergies  Allergies   Allergen Reactions   • Other Rash     REPORTS ALLERGY TO \"PARABENS\" - REPORTS THEY CAUSE HIM TO BREAK OUT (REPORTS THIS IS A PRESERVATIVE THAT IS PUT IN CREAMS)       Social History  Social History     Social History   • Marital status:      Spouse name: N/A   • Number of children: N/A   • Years of education: N/A     Occupational History   • Not on file.     Social History Main Topics   • Smoking status: Former Smoker     Packs/day: 3.00     Years: 42.00     Types: Cigarettes     Start date: 1/1/1957     Quit date: 1999   • Smokeless tobacco: Never Used   • Alcohol use No   • Drug use: No   • Sexual activity: No     Other Topics Concern   • Not on file     Social History Narrative   • No narrative on file       Family History  He has no family history of prostate, bladder or kidney cancer    Review of Systems  Constitutional: No fevers or chills  Skin: Negative for rash  Endocrine: No heat/cold intolerance   Cardiovascular: Negative for chest pain or dyspnea on exertion  Respiratory: Negative for shortness of breath or wheezing  Gastrointestinal: No constipation, nausea or vomiting  Genitourinary: Negative for current symptoms or dysuria.  Musculoskeletal: No flank pain  Neurological:  Negative for frequent headaches or dizziness; + [place]  Lymph/Heme: Negative for leg swelling or calf pain.    Physical " "Exam  Visit Vitals  /78   Pulse 55   Temp 97.8 °F (36.6 °C)   Ht 175.3 cm (69.02\")   Wt 84.8 kg (187 lb)   SpO2 93%   BMI 27.60 kg/m²     Constitutional: NAD, WDWN.   HEENT: NCAT. Conjunctivae normal.  MMM.    Cardiovascular: Regular rate.  Pulmonary/Chest: Respirations are even and non-labored bilaterally. + hoarseness  Abdominal: Soft. No distension, tenderness, masses or guarding. No CVA tenderness.  Neurological: A + O x 3.  Cranial Nerves II-XII grossly intact.  Extremities: WARREN x 4, Warm. No clubbing.  No cyanosis.    Skin: Pink, warm and dry.  No rashes noted.  Psychiatric:  Normal mood and affect    Genitourinary  Penis: circumcised penis, glans normal, no penile discharge.  No rashes/lesions.    Testes: descended bilaterally, no masses, nontender to palpation. Remainder of scrotal contents normal. No hernia appreciated.      Labs    Chemistry        Component Value Date/Time     07/10/2018 0753     04/16/2018 1041    K 4.8 07/10/2018 0753    K 4.3 04/16/2018 1041     07/10/2018 0753     04/16/2018 1041    CO2 24.0 (L) 07/10/2018 0753    CO2 22.0 (L) 04/16/2018 1041    BUN 24 (H) 07/10/2018 0753    BUN 24 (H) 04/16/2018 1041    CREATININE 0.90 07/10/2018 0753    CREATININE 1.00 04/16/2018 1041    GLU 99 (H) 07/10/2018 0753        Component Value Date/Time    CALCIUM 9.6 07/10/2018 0753    CALCIUM 9.6 04/16/2018 1041    ALKPHOS 162 (H) 07/10/2018 0753    ALKPHOS 177 (H) 04/16/2018 1041    AST 28 07/10/2018 0753    AST 27 04/16/2018 1041    ALT 31 07/10/2018 0753    ALT 38 04/16/2018 1041    BILITOT 0.6 07/10/2018 0753    BILITOT 0.5 04/16/2018 1041        Brief Urine Lab Results  (Last result in the past 365 days)      Color   Clarity   Blood   Leuk Est   Nitrite   Protein   CREAT   Urine HCG        08/13/18 1057 Yellow Clear Trace(A) Negative Negative Trace(A)               Radiologic Studies  PROCEDURE: US RENAL BILATERAL-  HISTORY: renal cyst; N28.1-Cyst of kidney, " acquired  PROCEDURE: Ultrasound images of the kidneys were obtained.  FINDINGS:.    The kidneys are normal in size and echogenicity with the right kidney  measuring 10.3 cm and the left kidney measuring 11.5 cm. There is no  cystic or solid mass. There is no hydronephrosis.  IMPRESSION:  Normal renal ultrasound with no evidence of a cystic or  solid mass.   This report was finalized on 6/19/2018    The labs and images were reviewed by me personally.     Assessment  77 y.o. male who presents with microscopic gross hematuria.  Risk factors include estensive smoking history.  In order to complete the hematuria work up we need to perform a flexible cystoscopy. He has upper tract imaging.     We discussed that this may likely be just due to a urinary tract infection.  However, with his smoking history and the fact that his urinalysis still shows trace blood, he would like to pursue further investigation to rule out any sort of malignancy.  In his case I feel that renal ultrasound is adequate upper tract imaging, therefore we will evaluate his bladder endoscopically.     Plan  1.  We discussed the indications for diagnostic flexible cystoscopy to be performed at the next clinic visit in 1-2 weeks  2.  PSA and prostate exam no longer indicated per AUA guidelines      Osmani Francois MD

## 2018-08-14 ENCOUNTER — TELEPHONE (OUTPATIENT)
Dept: SURGERY | Facility: CLINIC | Age: 78
End: 2018-08-14

## 2018-08-14 NOTE — TELEPHONE ENCOUNTER
Patient called stating he had not had a bowel movement since his colon.  I told the patient to try a scoop of Miralax bid and a tablespoon of mineral oil once a day with a increase in his water.  After his bowels started moving he could cut back on the Miralax and mineral oil .  He will call after a few days if no relief.

## 2018-08-27 ENCOUNTER — PROCEDURE VISIT (OUTPATIENT)
Dept: UROLOGY | Facility: CLINIC | Age: 78
End: 2018-08-27

## 2018-08-27 VITALS
HEART RATE: 69 BPM | DIASTOLIC BLOOD PRESSURE: 76 MMHG | TEMPERATURE: 98.2 F | RESPIRATION RATE: 16 BRPM | OXYGEN SATURATION: 98 % | SYSTOLIC BLOOD PRESSURE: 118 MMHG

## 2018-08-27 DIAGNOSIS — R31.0 GROSS HEMATURIA: Primary | ICD-10-CM

## 2018-08-27 DIAGNOSIS — N39.0 RECURRENT UTI: ICD-10-CM

## 2018-08-27 LAB
BILIRUB BLD-MCNC: NEGATIVE MG/DL
CLARITY, POC: CLEAR
COLOR UR: YELLOW
GLUCOSE UR STRIP-MCNC: NEGATIVE MG/DL
KETONES UR QL: NEGATIVE
LEUKOCYTE EST, POC: NEGATIVE
NITRITE UR-MCNC: NEGATIVE MG/ML
PH UR: 6 [PH] (ref 5–8)
PROT UR STRIP-MCNC: ABNORMAL MG/DL
RBC # UR STRIP: NEGATIVE /UL
SP GR UR: 1.02 (ref 1–1.03)
UROBILINOGEN UR QL: NORMAL

## 2018-08-27 PROCEDURE — 99214 OFFICE O/P EST MOD 30 MIN: CPT | Performed by: UROLOGY

## 2018-08-27 PROCEDURE — 52000 CYSTOURETHROSCOPY: CPT | Performed by: UROLOGY

## 2018-08-27 PROCEDURE — 81003 URINALYSIS AUTO W/O SCOPE: CPT | Performed by: UROLOGY

## 2018-08-27 RX ORDER — SULFAMETHOXAZOLE AND TRIMETHOPRIM 800; 160 MG/1; MG/1
1 TABLET ORAL 2 TIMES DAILY
Qty: 7 TABLET | Refills: 0 | Status: SHIPPED | OUTPATIENT
Start: 2018-08-27 | End: 2018-11-27

## 2018-08-27 NOTE — PROGRESS NOTES
Chief Complaint  Gross hematuria follow up    HPI  Mr. Mercedes is a 77 y.o. male with history of emphysema who presents for follow up of gross hematuria.    He says since our last visit on 8/22 that he has had another UTI and his PCP has started him on Keflex for a 7 day total.     To review,  He started urinating blood 8/8/18 early evening, wento urgent care, +UA, UCx ultimately negative. No dysuria. No other associated Sx  He strained and 2 blood clots came out. Felt that he emptied his bladder.   He c/o intermittent LUTS x 6 mo. Main Sx are frequency. He says sometimes he has urgency.   He is overall very pleased with his urination. Feels that he empties well now.   Used to follow with Dr. Arango for yearly PSA checks and prostate exams.   IPSS - 8  Main risk factor recently was a UTI    He currently denies fevers, chills, nausea, vomiting, constipation or flank pain.    Past Medical History  Past Medical History:   Diagnosis Date   • Abdominal pain    • Acute sinusitis    • Allergic N/A   • Anemia    • Anxiety    • Arthritis    • Asthma N/A   • COPD (chronic obstructive pulmonary disease) (CMS/[facility])    • Coronary artery disease N/A    Some Blockage   • Diabetes mellitus (CMS/[facility])     REPORTS DIET CONTROL ONLY, TAKES NO MEDICATION   • Emphysema lung (CMS/[facility])    • Emphysema lung (CMS/[facility])    • Emphysema of lung (CMS/[facility])    • Emphysema of lung (CMS/[facility])    • Heme positive stool    • History of ankle fracture     LEFT    • History of cardiovascular stress test     REPORTS IN HIS LATE 50'S AND THAT ALL WAS WNL'S AT THAT TIME   • History of echocardiogram     REPORTS HAS HAD 2. DOES NOT REPORT ANY HX OF CHEST PAIN.   • History of pneumonia    • [place] (hard of hearing)     WEARS HEARING AIDS   • Hypertension    • Melanoma (CMS/[facility])     2013   • Seasonal allergies    • Wears glasses      He has moderate emphysema he says, and follows with Dr. Frias.    Past Surgical History  Past Surgical History:   Procedure Laterality  Date   • BRAIN SURGERY  1999    REPORTS FOR SUBDURAL HEMATOMA    • BRONCHOSCOPY N/A 4/16/2018    Procedure: BRONCHOSCOPY DIAGNOSTIC WITH WASHINGS, BRUSHINGS AND BIOPSIES;  Surgeon: Morgan Frias MD;  Location: [facility] OR;  Service: Pulmonary   • ALEX HOLE FOR SUBDURAL HEMATOMA     • CARDIAC CATHETERIZATION      REPORTS APPROXIMATELY 2015 AND REPORTS NO STENTS WERE PLACED   • CHOLECYSTECTOMY     • COLONOSCOPY     • COLONOSCOPY N/A 8/2/2018    Procedure: COLONOSCOPY WITH HOT FORCEP POLYPECTOMY X2  ;  Surgeon: Sara Reynoso MD;  Location: [facility] ENDOSCOPY;  Service: Gastroenterology   • ENDOSCOPY     • LIVER BIOPSY     • LYMPH NODE BIOPSY     • MOLE REMOVAL  2005    MELANOMA ON BACK   • SPLENECTOMY     • THORACOTOMY  07/28/2015    Left thoracotomy and stapling of pulmonary blebs. Mechanical pleurodesis.   • TONSILLECTOMY     • WISDOM TOOTH EXTRACTION         Medications    Current Outpatient Prescriptions:   •  ADVAIR DISKUS 250-50 MCG/DOSE DISKUS, INHALE ONE PUFF TWICE DAILY, Disp: 1 each, Rfl: 3  •  aspirin 81 MG EC tablet, Take 81 mg by mouth Daily., Disp: , Rfl:   •  bisacodyl (DULCOLAX) 5 MG EC tablet, As directed/colon prep (Patient taking differently: Take  by mouth Take As Directed. As directed/colon prep), Disp: 4 tablet, Rfl: 0  •  cephalexin (KEFLEX) 500 MG capsule, Take 1 capsule by mouth 3 (Three) Times a Day., Disp: 21 capsule, Rfl: 0  •  cholecalciferol (VITAMIN D3) 1000 units tablet, Take 1,000 Units by mouth 2 (Two) Times a Day., Disp: , Rfl:   •  esomeprazole (nexIUM) 40 MG capsule, TAKE ONE CAPSULE BY MOUTH EVERY DAY, Disp: 30 capsule, Rfl: 5  •  isosorbide mononitrate (IMDUR) 30 MG 24 hr tablet, TAKE ONE TABLET BY MOUTH EVERY DAY AS DIRECTED, Disp: 90 tablet, Rfl: 0  •  meclizine (ANTIVERT) 25 MG tablet, Take 25 mg by mouth Daily., Disp: , Rfl:   •  metoprolol tartrate (LOPRESSOR) 25 MG tablet, TAKE ONE TABLET BY MOUTH TWICE DAILY, Disp: 60 tablet, Rfl: 5  •  nitroglycerin (NITROSTAT) 0.4 MG SL  "tablet, Place 0.4 mg under the tongue Every 5 (Five) Minutes As Needed for Chest Pain., Disp: , Rfl:   •  Omega-3 Fatty Acids (FISH OIL) 1000 MG capsule capsule, Take 1,000 mg by mouth Daily With Breakfast., Disp: , Rfl:   •  sertraline (ZOLOFT) 25 MG tablet, TAKE ONE TABLET BY MOUTH EVERY DAY, Disp: 90 tablet, Rfl: 3  •  SPIRIVA HANDIHALER 18 MCG per inhalation capsule, INHALE THE ENTIRE CONTENTS OF 1 CAPSULE ONCE A DAY USING HANDIHALER DEVICE, Disp: 90 capsule, Rfl: 1    Allergies  Allergies   Allergen Reactions   • Other Rash     REPORTS ALLERGY TO \"PARABENS\" - REPORTS THEY CAUSE HIM TO BREAK OUT (REPORTS THIS IS A PRESERVATIVE THAT IS PUT IN CREAMS)       Social History  Social History     Social History   • Marital status:      Spouse name: N/A   • Number of children: N/A   • Years of education: N/A     Occupational History   • Not on file.     Social History Main Topics   • Smoking status: Former Smoker     Packs/day: 3.00     Years: 42.00     Types: Cigarettes     Start date: 1/1/1957     Quit date: 1999   • Smokeless tobacco: Never Used   • Alcohol use No   • Drug use: No   • Sexual activity: No     Other Topics Concern   • Not on file     Social History Narrative   • No narrative on file       Family History  He has no family history of prostate, bladder or kidney cancer    Review of Systems  Constitutional: No fevers or chills  Skin: Negative for rash  Endocrine: No heat/cold intolerance   Cardiovascular: Negative for chest pain or dyspnea on exertion  Respiratory: Negative for shortness of breath or wheezing  Gastrointestinal: No constipation, nausea or vomiting  Genitourinary: Negative for current symptoms or dysuria.  Musculoskeletal: No flank pain  Neurological:  Negative for frequent headaches or dizziness; + [place]  Lymph/Heme: Negative for leg swelling or calf pain.    Physical Exam  There were no vitals taken for this visit.  Constitutional: NAD, WDWN.   HEENT: NCAT. Conjunctivae normal.  MMM.  "   Cardiovascular: Regular rate.  Pulmonary/Chest: Respirations are even and non-labored bilaterally. + hoarseness  Abdominal: Soft. No distension, tenderness, masses or guarding. No CVA tenderness.  Neurological: A + O x 3.  Cranial Nerves II-XII grossly intact.  Extremities: WARREN x 4, Warm. No clubbing.  No cyanosis.    Skin: Pink, warm and dry.  No rashes noted.  Psychiatric:  Normal mood and affect    Labs    Chemistry        Component Value Date/Time     07/10/2018 0753     04/16/2018 1041    K 4.8 07/10/2018 0753    K 4.3 04/16/2018 1041     07/10/2018 0753     04/16/2018 1041    CO2 24.0 (L) 07/10/2018 0753    CO2 22.0 (L) 04/16/2018 1041    BUN 24 (H) 07/10/2018 0753    BUN 24 (H) 04/16/2018 1041    CREATININE 0.90 07/10/2018 0753    CREATININE 1.00 04/16/2018 1041    GLU 99 (H) 07/10/2018 0753        Component Value Date/Time    CALCIUM 9.6 07/10/2018 0753    CALCIUM 9.6 04/16/2018 1041    ALKPHOS 162 (H) 07/10/2018 0753    ALKPHOS 177 (H) 04/16/2018 1041    AST 28 07/10/2018 0753    AST 27 04/16/2018 1041    ALT 31 07/10/2018 0753    ALT 38 04/16/2018 1041    BILITOT 0.6 07/10/2018 0753    BILITOT 0.5 04/16/2018 1041        Brief Urine Lab Results  (Last result in the past 365 days)      Color   Clarity   Blood   Leuk Est   Nitrite   Protein   CREAT   Urine HCG        08/22/18 1410 Yellow Hazy(A) 3+(A) Small (1+)(A) Negative Trace(A)               Radiologic Studies  PROCEDURE: US RENAL BILATERAL-  HISTORY: renal cyst; N28.1-Cyst of kidney, acquired  PROCEDURE: Ultrasound images of the kidneys were obtained.  FINDINGS:.    The kidneys are normal in size and echogenicity with the right kidney  measuring 10.3 cm and the left kidney measuring 11.5 cm. There is no  cystic or solid mass. There is no hydronephrosis.  IMPRESSION:  Normal renal ultrasound with no evidence of a cystic or  solid mass.   This report was finalized on 6/19/2018    The labs and images were reviewed by me  personally.       Preprocedure diagnosis  Gross hematuria    Postprocedure diagnosis  No gross abnormalities identifies    Procedure  Flexible Cystourethroscopy    Attending surgeon  Osmani Francois MD    Anesthesia  2% lidocaine jelly intraurethrally    Complications  None    Indications  77 y.o. male undergoing a flexible cystoscopy for the above mentioned indications.  Informed consent was obtained.      Findings  Cystoscopic findings included one right and left ureteral orifice in the normal anatomic position with normal bladder mucosa and no tumors, masses or stones. The urethral urothelium was within normal limits with no strictures.  There was not a prominent median lobe.  The lateral lobes were not obstructive in appearance.      Procedure  The patient was placed in supine position and prepped and draped in sterile fashion with lidocaine jelly per urethra for anesthesia.  A timeout was performed and Abx given.  The 14F flexible cystoscope was lubricated and gently placed through the penile urethra and into the bladder.  The bladder was completely visualized.  The cystoscope was retroflexed and the bladder neck and prostate visualized.  The cystoscope was slowly withdrawn while visualizing the urethra and the procedure terminated.  The patient tolerated the procedure well.      PVR performed by my staff - 0    Assessment  77 y.o. male who presented with gross hematuria and UTI.    He has no clear genitourinary pathology identified on renal ultrasound imaging and cystoscopic evaluation.  His prostate is not very enlarged visually and he is emptying his bladder well.     We discussed that this may likely be just due to a urinary tract infection.  However, with his smoking history and the fact that his urinalysis still shows trace blood, he would like to pursue further investigation to rule out any sort of malignancy.  In his case I feel that renal ultrasound is adequate upper tract imaging, therefore we  will evaluate his bladder endoscopically.     Plan  1.  And 7 days of Bactrim to the Keflex to make a 14 day total of coverage for complicated UTIs  2.  Follow-up PRN if more infections    Osmani Francois MD

## 2018-09-20 RX ORDER — ALBUTEROL SULFATE 90 UG/1
AEROSOL, METERED RESPIRATORY (INHALATION)
Qty: 18 G | Refills: 2 | Status: SHIPPED | OUTPATIENT
Start: 2018-09-20 | End: 2019-10-13 | Stop reason: SDUPTHER

## 2018-10-10 ENCOUNTER — TELEPHONE (OUTPATIENT)
Dept: INTERNAL MEDICINE | Facility: CLINIC | Age: 78
End: 2018-10-10

## 2018-10-12 RX ORDER — TRIAMCINOLONE ACETONIDE 0.25 MG/G
OINTMENT TOPICAL
Qty: 80 G | Refills: 1 | Status: ON HOLD | OUTPATIENT
Start: 2018-10-12 | End: 2018-11-27

## 2018-10-12 NOTE — TELEPHONE ENCOUNTER
I found out It was his previous MD , dr Gonzalez who prescribed. Please let patient know. I sent a med for patient also

## 2018-10-15 RX ORDER — ISOSORBIDE MONONITRATE 30 MG/1
TABLET, EXTENDED RELEASE ORAL
Qty: 90 TABLET | Refills: 3 | Status: SHIPPED | OUTPATIENT
Start: 2018-10-15 | End: 2019-01-17 | Stop reason: SDUPTHER

## 2018-11-27 ENCOUNTER — OFFICE VISIT (OUTPATIENT)
Dept: INTERNAL MEDICINE | Facility: CLINIC | Age: 78
End: 2018-11-27

## 2018-11-27 ENCOUNTER — APPOINTMENT (OUTPATIENT)
Dept: CT IMAGING | Facility: HOSPITAL | Age: 78
End: 2018-11-27

## 2018-11-27 ENCOUNTER — HOSPITAL ENCOUNTER (INPATIENT)
Facility: HOSPITAL | Age: 78
LOS: 3 days | Discharge: HOME | End: 2018-11-30
Attending: EMERGENCY MEDICINE | Admitting: INTERNAL MEDICINE

## 2018-11-27 ENCOUNTER — APPOINTMENT (OUTPATIENT)
Dept: GENERAL RADIOLOGY | Facility: HOSPITAL | Age: 78
End: 2018-11-27

## 2018-11-27 VITALS
HEIGHT: 69 IN | SYSTOLIC BLOOD PRESSURE: 110 MMHG | HEART RATE: 67 BPM | DIASTOLIC BLOOD PRESSURE: 58 MMHG | WEIGHT: 185 LBS | OXYGEN SATURATION: 89 % | BODY MASS INDEX: 27.4 KG/M2

## 2018-11-27 DIAGNOSIS — J43.2 CENTRILOBULAR EMPHYSEMA (HCC): ICD-10-CM

## 2018-11-27 DIAGNOSIS — J18.9 PNEUMONIA DUE TO INFECTIOUS ORGANISM, UNSPECIFIED LATERALITY, UNSPECIFIED PART OF LUNG: ICD-10-CM

## 2018-11-27 DIAGNOSIS — D72.829 LEUKOCYTOSIS, UNSPECIFIED TYPE: ICD-10-CM

## 2018-11-27 DIAGNOSIS — J43.8 OTHER EMPHYSEMA (HCC): ICD-10-CM

## 2018-11-27 DIAGNOSIS — J18.9 PNEUMONIA OF BOTH LUNGS DUE TO INFECTIOUS ORGANISM, UNSPECIFIED PART OF LUNG: ICD-10-CM

## 2018-11-27 DIAGNOSIS — I21.4 NSTEMI (NON-ST ELEVATED MYOCARDIAL INFARCTION) (HCC): Primary | ICD-10-CM

## 2018-11-27 DIAGNOSIS — J43.8 OTHER EMPHYSEMA (HCC): Primary | ICD-10-CM

## 2018-11-27 LAB
ALBUMIN SERPL-MCNC: 4.3 G/DL (ref 3.5–5)
ALBUMIN/GLOB SERPL: 1.4 G/DL (ref 1–2)
ALP SERPL-CCNC: 134 U/L (ref 38–126)
ALT SERPL W P-5'-P-CCNC: 21 U/L (ref 13–69)
ANION GAP SERPL CALCULATED.3IONS-SCNC: 13.4 MMOL/L (ref 10–20)
ANISOCYTOSIS BLD QL: NORMAL
AST SERPL-CCNC: 26 U/L (ref 15–46)
BASOPHILS # BLD AUTO: 0.1 10*3/MM3 (ref 0–0.2)
BASOPHILS NFR BLD AUTO: 0.3 % (ref 0–2.5)
BILIRUB SERPL-MCNC: 1.2 MG/DL (ref 0.2–1.3)
BUN BLD-MCNC: 31 MG/DL (ref 7–20)
BUN/CREAT SERPL: 23.8 (ref 6.3–21.9)
CALCIUM SPEC-SCNC: 9.3 MG/DL (ref 8.4–10.2)
CHLORIDE SERPL-SCNC: 102 MMOL/L (ref 98–107)
CO2 SERPL-SCNC: 25 MMOL/L (ref 26–30)
CREAT BLD-MCNC: 1.3 MG/DL (ref 0.6–1.3)
D-LACTATE SERPL-SCNC: 1.3 MMOL/L (ref 0.5–2)
DEPRECATED RDW RBC AUTO: 49.8 FL (ref 37–54)
EOSINOPHIL # BLD AUTO: 0.03 10*3/MM3 (ref 0–0.7)
EOSINOPHIL NFR BLD AUTO: 0.1 % (ref 0–7)
ERYTHROCYTE [DISTWIDTH] IN BLOOD BY AUTOMATED COUNT: 15.3 % (ref 11.5–14.5)
FLUAV AG NPH QL: NEGATIVE
FLUBV AG NPH QL IA: NEGATIVE
GFR SERPL CREATININE-BSD FRML MDRD: 54 ML/MIN/1.73
GLOBULIN UR ELPH-MCNC: 3.1 GM/DL
GLUCOSE BLD-MCNC: 111 MG/DL (ref 74–98)
HCT VFR BLD AUTO: 40.9 % (ref 42–52)
HGB BLD-MCNC: 13.4 G/DL (ref 14–18)
HOLD SPECIMEN: NORMAL
HOLD SPECIMEN: NORMAL
IMM GRANULOCYTES # BLD: 0.28 10*3/MM3 (ref 0–0.06)
IMM GRANULOCYTES NFR BLD: 0.8 % (ref 0–0.6)
LYMPHOCYTES # BLD AUTO: 3.76 10*3/MM3 (ref 0.6–3.4)
LYMPHOCYTES NFR BLD AUTO: 11.2 % (ref 10–50)
MCH RBC QN AUTO: 29.3 PG (ref 27–31)
MCHC RBC AUTO-ENTMCNC: 32.8 G/DL (ref 30–37)
MCV RBC AUTO: 89.3 FL (ref 80–94)
MONOCYTES # BLD AUTO: 3.14 10*3/MM3 (ref 0–0.9)
MONOCYTES NFR BLD AUTO: 9.4 % (ref 0–12)
NEUTROPHILS # BLD AUTO: 26.25 10*3/MM3 (ref 2–6.9)
NEUTROPHILS NFR BLD AUTO: 78.2 % (ref 37–80)
NRBC BLD MANUAL-RTO: 0 /100 WBC (ref 0–0)
NT-PROBNP SERPL-MCNC: 1560 PG/ML (ref 0–450)
PLATELET # BLD AUTO: 420 10*3/MM3 (ref 130–400)
PMV BLD AUTO: 9.8 FL (ref 6–12)
POTASSIUM BLD-SCNC: 4.4 MMOL/L (ref 3.5–5.1)
PROCALCITONIN SERPL-MCNC: 0.38 NG/ML
PROT SERPL-MCNC: 7.4 G/DL (ref 6.3–8.2)
RBC # BLD AUTO: 4.58 10*6/MM3 (ref 4.7–6.1)
SMALL PLATELETS BLD QL SMEAR: NORMAL
SODIUM BLD-SCNC: 136 MMOL/L (ref 137–145)
TROPONIN I SERPL-MCNC: 0.07 NG/ML (ref 0–0.03)
WBC MORPH BLD: NORMAL
WBC NRBC COR # BLD: 33.56 10*3/MM3 (ref 4.8–10.8)
WHOLE BLOOD HOLD SPECIMEN: NORMAL
WHOLE BLOOD HOLD SPECIMEN: NORMAL

## 2018-11-27 PROCEDURE — 25010000002 AZITHROMYCIN: Performed by: EMERGENCY MEDICINE

## 2018-11-27 PROCEDURE — 80053 COMPREHEN METABOLIC PANEL: CPT | Performed by: EMERGENCY MEDICINE

## 2018-11-27 PROCEDURE — 84484 ASSAY OF TROPONIN QUANT: CPT | Performed by: INTERNAL MEDICINE

## 2018-11-27 PROCEDURE — 99222 1ST HOSP IP/OBS MODERATE 55: CPT | Performed by: INTERNAL MEDICINE

## 2018-11-27 PROCEDURE — 99285 EMERGENCY DEPT VISIT HI MDM: CPT

## 2018-11-27 PROCEDURE — 84484 ASSAY OF TROPONIN QUANT: CPT | Performed by: EMERGENCY MEDICINE

## 2018-11-27 PROCEDURE — 85025 COMPLETE CBC W/AUTO DIFF WBC: CPT | Performed by: EMERGENCY MEDICINE

## 2018-11-27 PROCEDURE — 71046 X-RAY EXAM CHEST 2 VIEWS: CPT

## 2018-11-27 PROCEDURE — 83605 ASSAY OF LACTIC ACID: CPT | Performed by: EMERGENCY MEDICINE

## 2018-11-27 PROCEDURE — 71250 CT THORAX DX C-: CPT

## 2018-11-27 PROCEDURE — 99214 OFFICE O/P EST MOD 30 MIN: CPT | Performed by: INTERNAL MEDICINE

## 2018-11-27 PROCEDURE — 87040 BLOOD CULTURE FOR BACTERIA: CPT | Performed by: EMERGENCY MEDICINE

## 2018-11-27 PROCEDURE — 85007 BL SMEAR W/DIFF WBC COUNT: CPT | Performed by: EMERGENCY MEDICINE

## 2018-11-27 PROCEDURE — 84145 PROCALCITONIN (PCT): CPT | Performed by: EMERGENCY MEDICINE

## 2018-11-27 PROCEDURE — 94762 N-INVAS EAR/PLS OXIMTRY CONT: CPT

## 2018-11-27 PROCEDURE — 87804 INFLUENZA ASSAY W/OPTIC: CPT | Performed by: EMERGENCY MEDICINE

## 2018-11-27 PROCEDURE — 25010000002 CEFTRIAXONE SODIUM-DEXTROSE 1-3.74 GM-%(50ML) RECONSTITUTED SOLUTION: Performed by: EMERGENCY MEDICINE

## 2018-11-27 PROCEDURE — 25010000002 ENOXAPARIN PER 10 MG: Performed by: INTERNAL MEDICINE

## 2018-11-27 PROCEDURE — 93005 ELECTROCARDIOGRAM TRACING: CPT | Performed by: EMERGENCY MEDICINE

## 2018-11-27 PROCEDURE — 83880 ASSAY OF NATRIURETIC PEPTIDE: CPT | Performed by: EMERGENCY MEDICINE

## 2018-11-27 PROCEDURE — 94640 AIRWAY INHALATION TREATMENT: CPT

## 2018-11-27 PROCEDURE — 94799 UNLISTED PULMONARY SVC/PX: CPT

## 2018-11-27 RX ORDER — IPRATROPIUM BROMIDE AND ALBUTEROL SULFATE 2.5; .5 MG/3ML; MG/3ML
3 SOLUTION RESPIRATORY (INHALATION)
Status: DISCONTINUED | OUTPATIENT
Start: 2018-11-28 | End: 2018-11-30 | Stop reason: HOSPADM

## 2018-11-27 RX ORDER — EMOLLIENT 1 MG/G
1 CREAM TOPICAL 2 TIMES DAILY
Status: ON HOLD | COMMUNITY
Start: 2018-09-20 | End: 2018-11-28

## 2018-11-27 RX ORDER — CEFTRIAXONE 1 G/50ML
1 INJECTION, SOLUTION INTRAVENOUS ONCE
Status: COMPLETED | OUTPATIENT
Start: 2018-11-27 | End: 2018-11-27

## 2018-11-27 RX ORDER — BENZONATATE 200 MG/1
CAPSULE ORAL
Status: ON HOLD | COMMUNITY
End: 2018-11-27

## 2018-11-27 RX ORDER — TRAZODONE HYDROCHLORIDE 50 MG/1
TABLET ORAL
COMMUNITY
End: 2020-02-24

## 2018-11-27 RX ORDER — ASPIRIN 81 MG/1
324 TABLET, CHEWABLE ORAL ONCE
Status: COMPLETED | OUTPATIENT
Start: 2018-11-27 | End: 2018-11-27

## 2018-11-27 RX ORDER — SODIUM CHLORIDE 0.9 % (FLUSH) 0.9 %
10 SYRINGE (ML) INJECTION AS NEEDED
Status: DISCONTINUED | OUTPATIENT
Start: 2018-11-27 | End: 2018-11-30 | Stop reason: HOSPADM

## 2018-11-27 RX ORDER — SODIUM CHLORIDE FOR INHALATION 3 %
4 VIAL, NEBULIZER (ML) INHALATION ONCE
Status: COMPLETED | OUTPATIENT
Start: 2018-11-28 | End: 2018-11-28

## 2018-11-27 RX ORDER — ACETAMINOPHEN 325 MG/1
650 TABLET ORAL EVERY 4 HOURS PRN
Status: DISCONTINUED | OUTPATIENT
Start: 2018-11-27 | End: 2018-11-30 | Stop reason: HOSPADM

## 2018-11-27 RX ORDER — SODIUM CHLORIDE 0.9 % (FLUSH) 0.9 %
3-10 SYRINGE (ML) INJECTION AS NEEDED
Status: DISCONTINUED | OUTPATIENT
Start: 2018-11-27 | End: 2018-11-30 | Stop reason: HOSPADM

## 2018-11-27 RX ORDER — CEFTRIAXONE 1 G/50ML
1 INJECTION, SOLUTION INTRAVENOUS DAILY
Status: DISCONTINUED | OUTPATIENT
Start: 2018-11-28 | End: 2018-11-30 | Stop reason: HOSPADM

## 2018-11-27 RX ORDER — NITROGLYCERIN 0.4 MG/1
0.4 TABLET SUBLINGUAL
Status: DISCONTINUED | OUTPATIENT
Start: 2018-11-27 | End: 2018-11-30 | Stop reason: HOSPADM

## 2018-11-27 RX ORDER — IPRATROPIUM BROMIDE AND ALBUTEROL SULFATE 2.5; .5 MG/3ML; MG/3ML
3 SOLUTION RESPIRATORY (INHALATION) ONCE
Status: COMPLETED | OUTPATIENT
Start: 2018-11-27 | End: 2018-11-27

## 2018-11-27 RX ORDER — PROMETHAZINE HYDROCHLORIDE 12.5 MG/1
12.5 TABLET ORAL EVERY 6 HOURS PRN
Status: DISCONTINUED | OUTPATIENT
Start: 2018-11-27 | End: 2018-11-30 | Stop reason: HOSPADM

## 2018-11-27 RX ORDER — ASPIRIN 81 MG/1
81 TABLET ORAL DAILY
Status: DISCONTINUED | OUTPATIENT
Start: 2018-11-28 | End: 2018-11-30 | Stop reason: HOSPADM

## 2018-11-27 RX ORDER — ISOSORBIDE MONONITRATE 30 MG/1
30 TABLET, EXTENDED RELEASE ORAL DAILY
Status: DISCONTINUED | OUTPATIENT
Start: 2018-11-28 | End: 2018-11-30 | Stop reason: HOSPADM

## 2018-11-27 RX ORDER — BUDESONIDE 0.5 MG/2ML
0.5 INHALANT ORAL
Status: DISCONTINUED | OUTPATIENT
Start: 2018-11-27 | End: 2018-11-28

## 2018-11-27 RX ORDER — MECLIZINE HYDROCHLORIDE 25 MG/1
25 TABLET ORAL 2 TIMES DAILY
Status: DISCONTINUED | OUTPATIENT
Start: 2018-11-27 | End: 2018-11-30 | Stop reason: HOSPADM

## 2018-11-27 RX ORDER — ATORVASTATIN CALCIUM 40 MG/1
40 TABLET, FILM COATED ORAL
Refills: 5 | COMMUNITY
Start: 2018-09-11 | End: 2019-03-11

## 2018-11-27 RX ORDER — SODIUM CHLORIDE 9 MG/ML
100 INJECTION, SOLUTION INTRAVENOUS CONTINUOUS
Status: DISCONTINUED | OUTPATIENT
Start: 2018-11-27 | End: 2018-11-30 | Stop reason: HOSPADM

## 2018-11-27 RX ORDER — TRAZODONE HYDROCHLORIDE 50 MG/1
50 TABLET ORAL NIGHTLY
Status: DISCONTINUED | OUTPATIENT
Start: 2018-11-27 | End: 2018-11-30 | Stop reason: HOSPADM

## 2018-11-27 RX ORDER — PANTOPRAZOLE SODIUM 40 MG/1
40 TABLET, DELAYED RELEASE ORAL EVERY MORNING
Status: DISCONTINUED | OUTPATIENT
Start: 2018-11-28 | End: 2018-11-30 | Stop reason: HOSPADM

## 2018-11-27 RX ORDER — SODIUM CHLORIDE 0.9 % (FLUSH) 0.9 %
3 SYRINGE (ML) INJECTION EVERY 12 HOURS SCHEDULED
Status: DISCONTINUED | OUTPATIENT
Start: 2018-11-27 | End: 2018-11-30 | Stop reason: HOSPADM

## 2018-11-27 RX ORDER — IPRATROPIUM BROMIDE AND ALBUTEROL SULFATE 2.5; .5 MG/3ML; MG/3ML
3 SOLUTION RESPIRATORY (INHALATION) EVERY 4 HOURS PRN
Status: DISCONTINUED | OUTPATIENT
Start: 2018-11-27 | End: 2018-11-30 | Stop reason: HOSPADM

## 2018-11-27 RX ORDER — GUAIFENESIN 600 MG/1
600 TABLET, EXTENDED RELEASE ORAL EVERY 12 HOURS SCHEDULED
Status: DISCONTINUED | OUTPATIENT
Start: 2018-11-27 | End: 2018-11-30 | Stop reason: HOSPADM

## 2018-11-27 RX ORDER — ATORVASTATIN CALCIUM 40 MG/1
40 TABLET, FILM COATED ORAL NIGHTLY
Status: DISCONTINUED | OUTPATIENT
Start: 2018-11-27 | End: 2018-11-30 | Stop reason: HOSPADM

## 2018-11-27 RX ADMIN — SODIUM CHLORIDE 100 ML/HR: 9 INJECTION, SOLUTION INTRAVENOUS at 22:47

## 2018-11-27 RX ADMIN — IPRATROPIUM BROMIDE AND ALBUTEROL SULFATE 3 ML: .5; 3 SOLUTION RESPIRATORY (INHALATION) at 18:35

## 2018-11-27 RX ADMIN — MECLIZINE HYDROCHLORIDE 25 MG: 25 TABLET ORAL at 22:44

## 2018-11-27 RX ADMIN — TRAZODONE HYDROCHLORIDE 50 MG: 50 TABLET ORAL at 22:44

## 2018-11-27 RX ADMIN — METOPROLOL TARTRATE 25 MG: 25 TABLET ORAL at 22:43

## 2018-11-27 RX ADMIN — VITAMIN D, TAB 1000IU (100/BT) 1000 UNITS: 25 TAB at 22:44

## 2018-11-27 RX ADMIN — AZITHROMYCIN 500 MG: 500 INJECTION, POWDER, LYOPHILIZED, FOR SOLUTION INTRAVENOUS at 20:12

## 2018-11-27 RX ADMIN — ATORVASTATIN CALCIUM 40 MG: 40 TABLET, FILM COATED ORAL at 22:43

## 2018-11-27 RX ADMIN — SODIUM CHLORIDE 1000 ML: 9 INJECTION, SOLUTION INTRAVENOUS at 18:15

## 2018-11-27 RX ADMIN — SODIUM CHLORIDE, PRESERVATIVE FREE 3 ML: 5 INJECTION INTRAVENOUS at 22:47

## 2018-11-27 RX ADMIN — GUAIFENESIN 600 MG: 600 TABLET, EXTENDED RELEASE ORAL at 22:44

## 2018-11-27 RX ADMIN — CEFTRIAXONE 1 G: 1 INJECTION, SOLUTION INTRAVENOUS at 19:36

## 2018-11-27 RX ADMIN — ENOXAPARIN SODIUM 40 MG: 40 INJECTION SUBCUTANEOUS at 22:45

## 2018-11-27 RX ADMIN — ASPIRIN 81 MG 243 MG: 81 TABLET ORAL at 19:47

## 2018-11-27 NOTE — PROGRESS NOTES
Subjective   Jairo Mercedes is a 78 y.o. male.     Chief Complaint   Patient presents with   • Dizziness   • URI       History of Present Illness   5 day dizzy brown mucous, sinus congestion running nose, chill without fever. +soa no wheeze, cough yellow sputum. No sore throat . otc med zyrtec helps some. Turning head dizzy worse, standing up dizzy worse. No chest pain or passing out    Current Outpatient Medications:   •  ADVAIR DISKUS 250-50 MCG/DOSE DISKUS, INHALE ONE PUFF TWICE DAILY, Disp: 1 each, Rfl: 3  •  aspirin 81 MG EC tablet, Take 81 mg by mouth Daily., Disp: , Rfl:   •  atorvastatin (LIPITOR) 40 MG tablet, Take 40 mg by mouth every night at bedtime., Disp: , Rfl: 5  •  benzonatate (TESSALON) 200 MG capsule, benzonatate 200 mg capsule  as needed, Disp: , Rfl:   •  cholecalciferol (VITAMIN D3) 1000 units tablet, Take 1,000 Units by mouth 2 (Two) Times a Day., Disp: , Rfl:   •  esomeprazole (nexIUM) 40 MG capsule, TAKE ONE CAPSULE BY MOUTH EVERY DAY, Disp: 30 capsule, Rfl: 5  •  isosorbide mononitrate (IMDUR) 30 MG 24 hr tablet, TAKE ONE TABLET BY MOUTH EVERY DAY AS DIRECTED, Disp: 90 tablet, Rfl: 3  •  meclizine (ANTIVERT) 25 MG tablet, Take 25 mg by mouth Daily., Disp: , Rfl:   •  metoprolol tartrate (LOPRESSOR) 25 MG tablet, TAKE ONE TABLET BY MOUTH TWICE DAILY, Disp: 60 tablet, Rfl: 5  •  nitroglycerin (NITROSTAT) 0.4 MG SL tablet, Place 0.4 mg under the tongue Every 5 (Five) Minutes As Needed for Chest Pain., Disp: , Rfl:   •  Omega-3 Fatty Acids (FISH OIL) 1000 MG capsule capsule, Take 1,000 mg by mouth Daily With Breakfast., Disp: , Rfl:   •  Prednicarbate 0.1 % cream, , Disp: , Rfl:   •  sertraline (ZOLOFT) 25 MG tablet, TAKE ONE TABLET BY MOUTH EVERY DAY, Disp: 90 tablet, Rfl: 3  •  SPIRIVA HANDIHALER 18 MCG per inhalation capsule, INHALE THE ENTIRE CONTENTS OF 1 CAPSULE ONCE A DAY USING HANDIHALER DEVICE, Disp: 90 capsule, Rfl: 1  •  traZODone (DESYREL) 50 MG tablet, trazodone 50 mg tablet  one  tablet at bedtime, Disp: , Rfl:   •  triamcinolone (KENALOG) 0.025 % ointment, 1 bid topical, Disp: 80 g, Rfl: 1  •  VENTOLIN  (90 Base) MCG/ACT inhaler, INHALE 1-2 PUFFS BY MOUTH EVERY 4 TO 6 HOURS AS NEEDED, Disp: 18 g, Rfl: 2    The following portions of the patient's history were reviewed and updated as appropriate: allergies, current medications, past family history, past medical history, past social history, past surgical history and problem list.    Review of Systems   Constitutional: Negative.    Respiratory: Positive for cough, shortness of breath and wheezing.    Cardiovascular: Negative.    Gastrointestinal: Negative.    Musculoskeletal: Negative.    Skin: Negative.    Neurological: Negative.    Psychiatric/Behavioral: Negative.        Objective   Physical Exam   Constitutional: He is oriented to person, place, and time. He appears well-developed and well-nourished.   Neck: Neck supple.   Cardiovascular: Normal rate, regular rhythm and normal heart sounds.   Pulmonary/Chest: He has wheezes. He has rales.   Abdominal: Bowel sounds are normal.   Neurological: He is alert and oriented to person, place, and time.   Skin: Skin is warm.   Psychiatric: He has a normal mood and affect.       All tests have been reviewed.    Assessment/Plan   Diagnoses and all orders for this visit:    Other emphysema (CMS/HCC)    Low O2 sat and cough and rales , hx of resp failure, pneumothorax, refer to ER

## 2018-11-28 PROBLEM — J96.01 ACUTE RESPIRATORY FAILURE WITH HYPOXIA: Status: ACTIVE | Noted: 2018-11-28

## 2018-11-28 LAB
ANION GAP SERPL CALCULATED.3IONS-SCNC: 11.3 MMOL/L (ref 10–20)
BASOPHILS # BLD AUTO: 0.06 10*3/MM3 (ref 0–0.2)
BASOPHILS NFR BLD AUTO: 0.2 % (ref 0–2.5)
BUN BLD-MCNC: 26 MG/DL (ref 7–20)
BUN/CREAT SERPL: 26 (ref 6.3–21.9)
CALCIUM SPEC-SCNC: 8.9 MG/DL (ref 8.4–10.2)
CHLORIDE SERPL-SCNC: 105 MMOL/L (ref 98–107)
CO2 SERPL-SCNC: 26 MMOL/L (ref 26–30)
CREAT BLD-MCNC: 1 MG/DL (ref 0.6–1.3)
DEPRECATED RDW RBC AUTO: 50.1 FL (ref 37–54)
EOSINOPHIL # BLD AUTO: 0.11 10*3/MM3 (ref 0–0.7)
EOSINOPHIL NFR BLD AUTO: 0.4 % (ref 0–7)
ERYTHROCYTE [DISTWIDTH] IN BLOOD BY AUTOMATED COUNT: 15.3 % (ref 11.5–14.5)
GFR SERPL CREATININE-BSD FRML MDRD: 72 ML/MIN/1.73
GLUCOSE BLD-MCNC: 107 MG/DL (ref 74–98)
HCT VFR BLD AUTO: 38.7 % (ref 42–52)
HGB BLD-MCNC: 12.6 G/DL (ref 14–18)
IMM GRANULOCYTES # BLD: 0.24 10*3/MM3 (ref 0–0.06)
IMM GRANULOCYTES NFR BLD: 0.8 % (ref 0–0.6)
LYMPHOCYTES # BLD AUTO: 2.84 10*3/MM3 (ref 0.6–3.4)
LYMPHOCYTES NFR BLD AUTO: 9.8 % (ref 10–50)
MCH RBC QN AUTO: 29.4 PG (ref 27–31)
MCHC RBC AUTO-ENTMCNC: 32.6 G/DL (ref 30–37)
MCV RBC AUTO: 90.4 FL (ref 80–94)
MONOCYTES # BLD AUTO: 2.47 10*3/MM3 (ref 0–0.9)
MONOCYTES NFR BLD AUTO: 8.5 % (ref 0–12)
NEUTROPHILS # BLD AUTO: 23.35 10*3/MM3 (ref 2–6.9)
NEUTROPHILS NFR BLD AUTO: 80.3 % (ref 37–80)
NRBC BLD MANUAL-RTO: 0 /100 WBC (ref 0–0)
PLATELET # BLD AUTO: 400 10*3/MM3 (ref 130–400)
PMV BLD AUTO: 10.4 FL (ref 6–12)
POTASSIUM BLD-SCNC: 4.3 MMOL/L (ref 3.5–5.1)
PROCALCITONIN SERPL-MCNC: 0.27 NG/ML
RBC # BLD AUTO: 4.28 10*6/MM3 (ref 4.7–6.1)
SODIUM BLD-SCNC: 138 MMOL/L (ref 137–145)
TROPONIN I SERPL-MCNC: 0.03 NG/ML (ref 0–0.03)
TROPONIN I SERPL-MCNC: 0.03 NG/ML (ref 0–0.03)
TROPONIN I SERPL-MCNC: 0.04 NG/ML (ref 0–0.03)
WBC NRBC COR # BLD: 29.07 10*3/MM3 (ref 4.8–10.8)

## 2018-11-28 PROCEDURE — 87449 NOS EACH ORGANISM AG IA: CPT | Performed by: INTERNAL MEDICINE

## 2018-11-28 PROCEDURE — 84484 ASSAY OF TROPONIN QUANT: CPT | Performed by: INTERNAL MEDICINE

## 2018-11-28 PROCEDURE — 87070 CULTURE OTHR SPECIMN AEROBIC: CPT | Performed by: INTERNAL MEDICINE

## 2018-11-28 PROCEDURE — 25010000002 ENOXAPARIN PER 10 MG: Performed by: INTERNAL MEDICINE

## 2018-11-28 PROCEDURE — 94799 UNLISTED PULMONARY SVC/PX: CPT

## 2018-11-28 PROCEDURE — 99232 SBSQ HOSP IP/OBS MODERATE 35: CPT | Performed by: NURSE PRACTITIONER

## 2018-11-28 PROCEDURE — 84145 PROCALCITONIN (PCT): CPT | Performed by: INTERNAL MEDICINE

## 2018-11-28 PROCEDURE — 87899 AGENT NOS ASSAY W/OPTIC: CPT | Performed by: INTERNAL MEDICINE

## 2018-11-28 PROCEDURE — 87205 SMEAR GRAM STAIN: CPT | Performed by: INTERNAL MEDICINE

## 2018-11-28 PROCEDURE — 25010000002 CEFTRIAXONE SODIUM-DEXTROSE 1-3.74 GM-%(50ML) RECONSTITUTED SOLUTION: Performed by: INTERNAL MEDICINE

## 2018-11-28 PROCEDURE — 80048 BASIC METABOLIC PNL TOTAL CA: CPT | Performed by: INTERNAL MEDICINE

## 2018-11-28 PROCEDURE — 25010000002 AZITHROMYCIN: Performed by: INTERNAL MEDICINE

## 2018-11-28 PROCEDURE — 85025 COMPLETE CBC W/AUTO DIFF WBC: CPT | Performed by: INTERNAL MEDICINE

## 2018-11-28 PROCEDURE — 25010000002 METHYLPREDNISOLONE PER 125 MG: Performed by: NURSE PRACTITIONER

## 2018-11-28 RX ORDER — METHYLPREDNISOLONE SODIUM SUCCINATE 125 MG/2ML
60 INJECTION, POWDER, LYOPHILIZED, FOR SOLUTION INTRAMUSCULAR; INTRAVENOUS EVERY 8 HOURS
Status: DISCONTINUED | OUTPATIENT
Start: 2018-11-28 | End: 2018-11-30 | Stop reason: HOSPADM

## 2018-11-28 RX ORDER — BUDESONIDE 0.5 MG/2ML
1 INHALANT ORAL
Status: DISCONTINUED | OUTPATIENT
Start: 2018-11-28 | End: 2018-11-30 | Stop reason: HOSPADM

## 2018-11-28 RX ORDER — CETIRIZINE HYDROCHLORIDE 10 MG/1
10 TABLET ORAL DAILY PRN
COMMUNITY
End: 2020-05-20

## 2018-11-28 RX ADMIN — SERTRALINE HYDROCHLORIDE 25 MG: 50 TABLET ORAL at 08:06

## 2018-11-28 RX ADMIN — VITAMIN D, TAB 1000IU (100/BT) 1000 UNITS: 25 TAB at 08:07

## 2018-11-28 RX ADMIN — AZITHROMYCIN 500 MG: 500 INJECTION, POWDER, LYOPHILIZED, FOR SOLUTION INTRAVENOUS at 20:36

## 2018-11-28 RX ADMIN — PANTOPRAZOLE SODIUM 40 MG: 40 TABLET, DELAYED RELEASE ORAL at 06:11

## 2018-11-28 RX ADMIN — MECLIZINE HYDROCHLORIDE 25 MG: 25 TABLET ORAL at 08:06

## 2018-11-28 RX ADMIN — BUDESONIDE 0.5 MG: 0.5 INHALANT RESPIRATORY (INHALATION) at 07:10

## 2018-11-28 RX ADMIN — SODIUM CHLORIDE 100 ML/HR: 9 INJECTION, SOLUTION INTRAVENOUS at 08:07

## 2018-11-28 RX ADMIN — ASPIRIN 81 MG: 81 TABLET, COATED ORAL at 08:06

## 2018-11-28 RX ADMIN — BUDESONIDE 1 MG: 0.5 INHALANT RESPIRATORY (INHALATION) at 19:41

## 2018-11-28 RX ADMIN — IPRATROPIUM BROMIDE AND ALBUTEROL SULFATE 3 ML: .5; 3 SOLUTION RESPIRATORY (INHALATION) at 13:46

## 2018-11-28 RX ADMIN — METHYLPREDNISOLONE SODIUM SUCCINATE 60 MG: 125 INJECTION, POWDER, FOR SOLUTION INTRAMUSCULAR; INTRAVENOUS at 14:00

## 2018-11-28 RX ADMIN — GUAIFENESIN 600 MG: 600 TABLET, EXTENDED RELEASE ORAL at 08:06

## 2018-11-28 RX ADMIN — VITAMIN D, TAB 1000IU (100/BT) 1000 UNITS: 25 TAB at 20:36

## 2018-11-28 RX ADMIN — ENOXAPARIN SODIUM 40 MG: 40 INJECTION SUBCUTANEOUS at 22:23

## 2018-11-28 RX ADMIN — SODIUM CHLORIDE 100 ML/HR: 9 INJECTION, SOLUTION INTRAVENOUS at 17:56

## 2018-11-28 RX ADMIN — IPRATROPIUM BROMIDE AND ALBUTEROL SULFATE 3 ML: .5; 3 SOLUTION RESPIRATORY (INHALATION) at 07:10

## 2018-11-28 RX ADMIN — METHYLPREDNISOLONE SODIUM SUCCINATE 60 MG: 125 INJECTION, POWDER, FOR SOLUTION INTRAMUSCULAR; INTRAVENOUS at 20:36

## 2018-11-28 RX ADMIN — METOPROLOL TARTRATE 25 MG: 25 TABLET ORAL at 08:06

## 2018-11-28 RX ADMIN — SODIUM CHLORIDE SOLN NEBU 3% 4 ML: 3 NEBU SOLN at 00:07

## 2018-11-28 RX ADMIN — SODIUM CHLORIDE, PRESERVATIVE FREE 3 ML: 5 INJECTION INTRAVENOUS at 20:37

## 2018-11-28 RX ADMIN — TRAZODONE HYDROCHLORIDE 50 MG: 50 TABLET ORAL at 20:36

## 2018-11-28 RX ADMIN — ATORVASTATIN CALCIUM 40 MG: 40 TABLET, FILM COATED ORAL at 20:36

## 2018-11-28 RX ADMIN — IPRATROPIUM BROMIDE AND ALBUTEROL SULFATE 3 ML: .5; 3 SOLUTION RESPIRATORY (INHALATION) at 19:41

## 2018-11-28 RX ADMIN — CEFTRIAXONE 1 G: 1 INJECTION, SOLUTION INTRAVENOUS at 18:11

## 2018-11-28 RX ADMIN — METOPROLOL TARTRATE 25 MG: 25 TABLET ORAL at 20:36

## 2018-11-28 RX ADMIN — GUAIFENESIN 600 MG: 600 TABLET, EXTENDED RELEASE ORAL at 20:36

## 2018-11-28 RX ADMIN — SODIUM CHLORIDE, PRESERVATIVE FREE 3 ML: 5 INJECTION INTRAVENOUS at 08:07

## 2018-11-28 RX ADMIN — ISOSORBIDE MONONITRATE 30 MG: 30 TABLET, EXTENDED RELEASE ORAL at 08:06

## 2018-11-28 RX ADMIN — IPRATROPIUM BROMIDE AND ALBUTEROL SULFATE 3 ML: .5; 3 SOLUTION RESPIRATORY (INHALATION) at 00:07

## 2018-11-29 ENCOUNTER — APPOINTMENT (OUTPATIENT)
Dept: GENERAL RADIOLOGY | Facility: HOSPITAL | Age: 78
End: 2018-11-29

## 2018-11-29 LAB
ALBUMIN SERPL-MCNC: 3.6 G/DL (ref 3.5–5)
ALBUMIN/GLOB SERPL: 1.2 G/DL (ref 1–2)
ALP SERPL-CCNC: 128 U/L (ref 38–126)
ALT SERPL W P-5'-P-CCNC: 29 U/L (ref 13–69)
ANION GAP SERPL CALCULATED.3IONS-SCNC: 11.4 MMOL/L (ref 10–20)
AST SERPL-CCNC: 24 U/L (ref 15–46)
BASOPHILS # BLD AUTO: 0.03 10*3/MM3 (ref 0–0.2)
BASOPHILS NFR BLD AUTO: 0.1 % (ref 0–2.5)
BILIRUB SERPL-MCNC: 0.4 MG/DL (ref 0.2–1.3)
BUN BLD-MCNC: 23 MG/DL (ref 7–20)
BUN/CREAT SERPL: 25.6 (ref 6.3–21.9)
CALCIUM SPEC-SCNC: 9.2 MG/DL (ref 8.4–10.2)
CHLORIDE SERPL-SCNC: 109 MMOL/L (ref 98–107)
CO2 SERPL-SCNC: 23 MMOL/L (ref 26–30)
CREAT BLD-MCNC: 0.9 MG/DL (ref 0.6–1.3)
CRP SERPL-MCNC: 17.4 MG/DL (ref 0–1)
DEPRECATED RDW RBC AUTO: 50.6 FL (ref 37–54)
EOSINOPHIL # BLD AUTO: 0 10*3/MM3 (ref 0–0.7)
EOSINOPHIL NFR BLD AUTO: 0 % (ref 0–7)
ERYTHROCYTE [DISTWIDTH] IN BLOOD BY AUTOMATED COUNT: 15.3 % (ref 11.5–14.5)
GFR SERPL CREATININE-BSD FRML MDRD: 82 ML/MIN/1.73
GLOBULIN UR ELPH-MCNC: 2.9 GM/DL
GLUCOSE BLD-MCNC: 146 MG/DL (ref 74–98)
HCT VFR BLD AUTO: 36.6 % (ref 42–52)
HGB BLD-MCNC: 11.9 G/DL (ref 14–18)
IMM GRANULOCYTES # BLD: 0.15 10*3/MM3 (ref 0–0.06)
IMM GRANULOCYTES NFR BLD: 0.6 % (ref 0–0.6)
LYMPHOCYTES # BLD AUTO: 1.97 10*3/MM3 (ref 0.6–3.4)
LYMPHOCYTES NFR BLD AUTO: 8.3 % (ref 10–50)
MCH RBC QN AUTO: 29.4 PG (ref 27–31)
MCHC RBC AUTO-ENTMCNC: 32.5 G/DL (ref 30–37)
MCV RBC AUTO: 90.4 FL (ref 80–94)
MONOCYTES # BLD AUTO: 0.58 10*3/MM3 (ref 0–0.9)
MONOCYTES NFR BLD AUTO: 2.5 % (ref 0–12)
NEUTROPHILS # BLD AUTO: 20.91 10*3/MM3 (ref 2–6.9)
NEUTROPHILS NFR BLD AUTO: 88.5 % (ref 37–80)
NRBC BLD MANUAL-RTO: 0 /100 WBC (ref 0–0)
PLATELET # BLD AUTO: 424 10*3/MM3 (ref 130–400)
PMV BLD AUTO: 10.8 FL (ref 6–12)
POTASSIUM BLD-SCNC: 4.4 MMOL/L (ref 3.5–5.1)
PROCALCITONIN SERPL-MCNC: 0.21 NG/ML
PROT SERPL-MCNC: 6.5 G/DL (ref 6.3–8.2)
RBC # BLD AUTO: 4.05 10*6/MM3 (ref 4.7–6.1)
SODIUM BLD-SCNC: 139 MMOL/L (ref 137–145)
WBC NRBC COR # BLD: 23.64 10*3/MM3 (ref 4.8–10.8)

## 2018-11-29 PROCEDURE — 94762 N-INVAS EAR/PLS OXIMTRY CONT: CPT

## 2018-11-29 PROCEDURE — 25010000002 AZITHROMYCIN: Performed by: INTERNAL MEDICINE

## 2018-11-29 PROCEDURE — 85025 COMPLETE CBC W/AUTO DIFF WBC: CPT | Performed by: NURSE PRACTITIONER

## 2018-11-29 PROCEDURE — 25010000002 ENOXAPARIN PER 10 MG: Performed by: INTERNAL MEDICINE

## 2018-11-29 PROCEDURE — 71046 X-RAY EXAM CHEST 2 VIEWS: CPT

## 2018-11-29 PROCEDURE — 84145 PROCALCITONIN (PCT): CPT | Performed by: NURSE PRACTITIONER

## 2018-11-29 PROCEDURE — 99232 SBSQ HOSP IP/OBS MODERATE 35: CPT | Performed by: NURSE PRACTITIONER

## 2018-11-29 PROCEDURE — 94799 UNLISTED PULMONARY SVC/PX: CPT

## 2018-11-29 PROCEDURE — 25010000002 CEFTRIAXONE SODIUM-DEXTROSE 1-3.74 GM-%(50ML) RECONSTITUTED SOLUTION: Performed by: INTERNAL MEDICINE

## 2018-11-29 PROCEDURE — 80053 COMPREHEN METABOLIC PANEL: CPT | Performed by: NURSE PRACTITIONER

## 2018-11-29 PROCEDURE — 86140 C-REACTIVE PROTEIN: CPT | Performed by: NURSE PRACTITIONER

## 2018-11-29 PROCEDURE — 25010000002 METHYLPREDNISOLONE PER 125 MG: Performed by: NURSE PRACTITIONER

## 2018-11-29 RX ORDER — SIMETHICONE 80 MG
80 TABLET,CHEWABLE ORAL 4 TIMES DAILY PRN
Status: DISCONTINUED | OUTPATIENT
Start: 2018-11-29 | End: 2018-11-30 | Stop reason: HOSPADM

## 2018-11-29 RX ADMIN — SIMETHICONE CHEW TAB 80 MG 80 MG: 80 TABLET ORAL at 21:25

## 2018-11-29 RX ADMIN — GUAIFENESIN 600 MG: 600 TABLET, EXTENDED RELEASE ORAL at 21:38

## 2018-11-29 RX ADMIN — ASPIRIN 81 MG: 81 TABLET, COATED ORAL at 09:27

## 2018-11-29 RX ADMIN — METHYLPREDNISOLONE SODIUM SUCCINATE 60 MG: 125 INJECTION, POWDER, FOR SOLUTION INTRAMUSCULAR; INTRAVENOUS at 21:38

## 2018-11-29 RX ADMIN — BUDESONIDE 1 MG: 0.5 INHALANT RESPIRATORY (INHALATION) at 07:06

## 2018-11-29 RX ADMIN — METHYLPREDNISOLONE SODIUM SUCCINATE 60 MG: 125 INJECTION, POWDER, FOR SOLUTION INTRAMUSCULAR; INTRAVENOUS at 13:48

## 2018-11-29 RX ADMIN — VITAMIN D, TAB 1000IU (100/BT) 1000 UNITS: 25 TAB at 09:27

## 2018-11-29 RX ADMIN — CEFTRIAXONE 1 G: 1 INJECTION, SOLUTION INTRAVENOUS at 21:45

## 2018-11-29 RX ADMIN — SODIUM CHLORIDE 100 ML/HR: 9 INJECTION, SOLUTION INTRAVENOUS at 05:24

## 2018-11-29 RX ADMIN — SODIUM CHLORIDE, PRESERVATIVE FREE 3 ML: 5 INJECTION INTRAVENOUS at 21:26

## 2018-11-29 RX ADMIN — IPRATROPIUM BROMIDE AND ALBUTEROL SULFATE 3 ML: .5; 3 SOLUTION RESPIRATORY (INHALATION) at 07:06

## 2018-11-29 RX ADMIN — ENOXAPARIN SODIUM 40 MG: 40 INJECTION SUBCUTANEOUS at 23:40

## 2018-11-29 RX ADMIN — METOPROLOL TARTRATE 25 MG: 25 TABLET ORAL at 21:25

## 2018-11-29 RX ADMIN — ATORVASTATIN CALCIUM 40 MG: 40 TABLET, FILM COATED ORAL at 21:26

## 2018-11-29 RX ADMIN — MECLIZINE HYDROCHLORIDE 25 MG: 25 TABLET ORAL at 21:25

## 2018-11-29 RX ADMIN — METHYLPREDNISOLONE SODIUM SUCCINATE 60 MG: 125 INJECTION, POWDER, FOR SOLUTION INTRAMUSCULAR; INTRAVENOUS at 05:24

## 2018-11-29 RX ADMIN — IPRATROPIUM BROMIDE AND ALBUTEROL SULFATE 3 ML: .5; 3 SOLUTION RESPIRATORY (INHALATION) at 19:21

## 2018-11-29 RX ADMIN — METOPROLOL TARTRATE 25 MG: 25 TABLET ORAL at 09:27

## 2018-11-29 RX ADMIN — AZITHROMYCIN 500 MG: 500 INJECTION, POWDER, LYOPHILIZED, FOR SOLUTION INTRAVENOUS at 23:40

## 2018-11-29 RX ADMIN — IPRATROPIUM BROMIDE AND ALBUTEROL SULFATE 3 ML: .5; 3 SOLUTION RESPIRATORY (INHALATION) at 13:13

## 2018-11-29 RX ADMIN — VITAMIN D, TAB 1000IU (100/BT) 1000 UNITS: 25 TAB at 21:26

## 2018-11-29 RX ADMIN — IPRATROPIUM BROMIDE AND ALBUTEROL SULFATE 3 ML: .5; 3 SOLUTION RESPIRATORY (INHALATION) at 01:04

## 2018-11-29 RX ADMIN — GUAIFENESIN 600 MG: 600 TABLET, EXTENDED RELEASE ORAL at 09:27

## 2018-11-29 RX ADMIN — MECLIZINE HYDROCHLORIDE 25 MG: 25 TABLET ORAL at 09:27

## 2018-11-29 RX ADMIN — SERTRALINE HYDROCHLORIDE 25 MG: 50 TABLET ORAL at 09:27

## 2018-11-29 RX ADMIN — PANTOPRAZOLE SODIUM 40 MG: 40 TABLET, DELAYED RELEASE ORAL at 06:30

## 2018-11-29 RX ADMIN — ISOSORBIDE MONONITRATE 30 MG: 30 TABLET, EXTENDED RELEASE ORAL at 09:27

## 2018-11-29 RX ADMIN — SODIUM CHLORIDE 100 ML/HR: 9 INJECTION, SOLUTION INTRAVENOUS at 18:21

## 2018-11-29 RX ADMIN — BUDESONIDE 1 MG: 0.5 INHALANT RESPIRATORY (INHALATION) at 19:21

## 2018-11-29 RX ADMIN — TRAZODONE HYDROCHLORIDE 50 MG: 50 TABLET ORAL at 21:26

## 2018-11-30 VITALS
TEMPERATURE: 97.2 F | SYSTOLIC BLOOD PRESSURE: 142 MMHG | OXYGEN SATURATION: 98 % | HEIGHT: 69 IN | BODY MASS INDEX: 27.59 KG/M2 | DIASTOLIC BLOOD PRESSURE: 64 MMHG | HEART RATE: 86 BPM | RESPIRATION RATE: 18 BRPM | WEIGHT: 186.29 LBS

## 2018-11-30 LAB
ANION GAP SERPL CALCULATED.3IONS-SCNC: 10.4 MMOL/L (ref 10–20)
ANISOCYTOSIS BLD QL: ABNORMAL
BACTERIA SPEC RESP CULT: NORMAL
BUN BLD-MCNC: 25 MG/DL (ref 7–20)
BUN/CREAT SERPL: 31.3 (ref 6.3–21.9)
CALCIUM SPEC-SCNC: 9 MG/DL (ref 8.4–10.2)
CHLORIDE SERPL-SCNC: 111 MMOL/L (ref 98–107)
CO2 SERPL-SCNC: 22 MMOL/L (ref 26–30)
CREAT BLD-MCNC: 0.8 MG/DL (ref 0.6–1.3)
DEPRECATED RDW RBC AUTO: 50.4 FL (ref 37–54)
ERYTHROCYTE [DISTWIDTH] IN BLOOD BY AUTOMATED COUNT: 15.4 % (ref 11.5–14.5)
GFR SERPL CREATININE-BSD FRML MDRD: 94 ML/MIN/1.73
GLUCOSE BLD-MCNC: 133 MG/DL (ref 74–98)
GRAM STN SPEC: NORMAL
HCT VFR BLD AUTO: 35.5 % (ref 42–52)
HGB BLD-MCNC: 11.6 G/DL (ref 14–18)
LYMPHOCYTES # BLD MANUAL: 1.22 10*3/MM3 (ref 0.6–3.4)
LYMPHOCYTES NFR BLD MANUAL: 2 % (ref 0–12)
LYMPHOCYTES NFR BLD MANUAL: 4 % (ref 10–50)
MCH RBC QN AUTO: 29.2 PG (ref 27–31)
MCHC RBC AUTO-ENTMCNC: 32.7 G/DL (ref 30–37)
MCV RBC AUTO: 89.4 FL (ref 80–94)
MONOCYTES # BLD AUTO: 0.61 10*3/MM3 (ref 0–0.9)
NEUTROPHILS # BLD AUTO: 28.67 10*3/MM3 (ref 2–6.9)
NEUTROPHILS NFR BLD MANUAL: 86 % (ref 37–80)
NEUTS BAND NFR BLD MANUAL: 8 % (ref 0–6)
PLATELET # BLD AUTO: 442 10*3/MM3 (ref 130–400)
PMV BLD AUTO: 10.8 FL (ref 6–12)
POIKILOCYTOSIS BLD QL SMEAR: ABNORMAL
POTASSIUM BLD-SCNC: 4.4 MMOL/L (ref 3.5–5.1)
RBC # BLD AUTO: 3.97 10*6/MM3 (ref 4.7–6.1)
SCAN SLIDE: NORMAL
SMALL PLATELETS BLD QL SMEAR: ABNORMAL
SODIUM BLD-SCNC: 139 MMOL/L (ref 137–145)
WBC MORPH BLD: NORMAL
WBC NRBC COR # BLD: 30.5 10*3/MM3 (ref 4.8–10.8)

## 2018-11-30 PROCEDURE — 25010000002 AZITHROMYCIN: Performed by: INTERNAL MEDICINE

## 2018-11-30 PROCEDURE — 94799 UNLISTED PULMONARY SVC/PX: CPT

## 2018-11-30 PROCEDURE — 94618 PULMONARY STRESS TESTING: CPT

## 2018-11-30 PROCEDURE — 99239 HOSP IP/OBS DSCHRG MGMT >30: CPT | Performed by: NURSE PRACTITIONER

## 2018-11-30 PROCEDURE — 80048 BASIC METABOLIC PNL TOTAL CA: CPT | Performed by: NURSE PRACTITIONER

## 2018-11-30 PROCEDURE — 85025 COMPLETE CBC W/AUTO DIFF WBC: CPT | Performed by: NURSE PRACTITIONER

## 2018-11-30 PROCEDURE — 25010000002 METHYLPREDNISOLONE PER 125 MG: Performed by: NURSE PRACTITIONER

## 2018-11-30 PROCEDURE — 85007 BL SMEAR W/DIFF WBC COUNT: CPT | Performed by: NURSE PRACTITIONER

## 2018-11-30 RX ORDER — CEFDINIR 300 MG/1
300 CAPSULE ORAL 2 TIMES DAILY
Qty: 14 CAPSULE | Refills: 0 | Status: SHIPPED | OUTPATIENT
Start: 2018-11-30 | End: 2018-12-07

## 2018-11-30 RX ORDER — GUAIFENESIN 600 MG/1
600 TABLET, EXTENDED RELEASE ORAL EVERY 12 HOURS SCHEDULED
Qty: 20 TABLET | Refills: 0 | Status: SHIPPED | OUTPATIENT
Start: 2018-11-30 | End: 2019-06-28

## 2018-11-30 RX ORDER — IPRATROPIUM BROMIDE AND ALBUTEROL SULFATE 2.5; .5 MG/3ML; MG/3ML
3 SOLUTION RESPIRATORY (INHALATION)
Qty: 360 ML | Refills: 0 | Status: SHIPPED | OUTPATIENT
Start: 2018-11-30 | End: 2018-12-30

## 2018-11-30 RX ORDER — PREDNISONE 10 MG/1
TABLET ORAL
Qty: 14 TABLET | Refills: 0 | Status: SHIPPED | OUTPATIENT
Start: 2018-11-30 | End: 2019-03-11

## 2018-11-30 RX ADMIN — PANTOPRAZOLE SODIUM 40 MG: 40 TABLET, DELAYED RELEASE ORAL at 06:22

## 2018-11-30 RX ADMIN — GUAIFENESIN 600 MG: 600 TABLET, EXTENDED RELEASE ORAL at 08:23

## 2018-11-30 RX ADMIN — MECLIZINE HYDROCHLORIDE 25 MG: 25 TABLET ORAL at 08:23

## 2018-11-30 RX ADMIN — SERTRALINE HYDROCHLORIDE 25 MG: 50 TABLET ORAL at 08:23

## 2018-11-30 RX ADMIN — IPRATROPIUM BROMIDE AND ALBUTEROL SULFATE 3 ML: .5; 3 SOLUTION RESPIRATORY (INHALATION) at 00:42

## 2018-11-30 RX ADMIN — AZITHROMYCIN 500 MG: 500 INJECTION, POWDER, LYOPHILIZED, FOR SOLUTION INTRAVENOUS at 13:09

## 2018-11-30 RX ADMIN — ASPIRIN 81 MG: 81 TABLET, COATED ORAL at 08:23

## 2018-11-30 RX ADMIN — VITAMIN D, TAB 1000IU (100/BT) 1000 UNITS: 25 TAB at 08:23

## 2018-11-30 RX ADMIN — SODIUM CHLORIDE, PRESERVATIVE FREE 3 ML: 5 INJECTION INTRAVENOUS at 08:24

## 2018-11-30 RX ADMIN — ISOSORBIDE MONONITRATE 30 MG: 30 TABLET, EXTENDED RELEASE ORAL at 08:23

## 2018-11-30 RX ADMIN — METHYLPREDNISOLONE SODIUM SUCCINATE 60 MG: 125 INJECTION, POWDER, FOR SOLUTION INTRAMUSCULAR; INTRAVENOUS at 06:22

## 2018-11-30 RX ADMIN — METOPROLOL TARTRATE 25 MG: 25 TABLET ORAL at 08:23

## 2018-11-30 RX ADMIN — IPRATROPIUM BROMIDE AND ALBUTEROL SULFATE 3 ML: .5; 3 SOLUTION RESPIRATORY (INHALATION) at 06:39

## 2018-11-30 RX ADMIN — BUDESONIDE 1 MG: 0.5 INHALANT RESPIRATORY (INHALATION) at 06:38

## 2018-12-01 ENCOUNTER — READMISSION MANAGEMENT (OUTPATIENT)
Dept: CALL CENTER | Facility: HOSPITAL | Age: 78
End: 2018-12-01

## 2018-12-01 LAB
BACTERIA FLD CULT: NORMAL
L PNEUMO1 AG UR QL IA: NEGATIVE
Lab: NORMAL
ORGANISM ID: NORMAL
S PNEUM AG SPEC QL LA: NEGATIVE
SPECIMEN SOURCE: NORMAL

## 2018-12-01 NOTE — OUTREACH NOTE
Prep Survey      Responses   Facility patient discharged from?  [facility]   Is patient eligible?  Yes   Discharge diagnosis  Pneumonia, benign essential HTN, COPD, acute resp. failure with hypoxia   Does the patient have one of the following disease processes/diagnoses(primary or secondary)?  COPD/Pneumonia   Does the patient have Home health ordered?  No   Is there a DME ordered?  Yes   What DME was ordered?  [employer] for home O2   Comments regarding appointments  See AVS   Prep survey completed?  Yes          Fariba Rawls RN

## 2018-12-02 LAB
BACTERIA SPEC AEROBE CULT: NORMAL
BACTERIA SPEC AEROBE CULT: NORMAL

## 2018-12-03 ENCOUNTER — READMISSION MANAGEMENT (OUTPATIENT)
Dept: CALL CENTER | Facility: HOSPITAL | Age: 78
End: 2018-12-03

## 2018-12-03 ENCOUNTER — TRANSITIONAL CARE MANAGEMENT TELEPHONE ENCOUNTER (OUTPATIENT)
Dept: INTERNAL MEDICINE | Facility: CLINIC | Age: 78
End: 2018-12-03

## 2018-12-03 RX ORDER — ESOMEPRAZOLE MAGNESIUM 40 MG/1
CAPSULE, DELAYED RELEASE ORAL
Qty: 30 CAPSULE | Refills: 5 | Status: SHIPPED | OUTPATIENT
Start: 2018-12-03 | End: 2019-06-09 | Stop reason: SDUPTHER

## 2018-12-03 RX ORDER — ATORVASTATIN CALCIUM 40 MG/1
TABLET, FILM COATED ORAL
Qty: 30 TABLET | Refills: 5 | Status: SHIPPED | OUTPATIENT
Start: 2018-12-03 | End: 2019-06-09 | Stop reason: SDUPTHER

## 2018-12-03 NOTE — OUTREACH NOTE
BESSIE call completed.  Please refer to TCM call flowsheet for call documentation.  Patient reports that he is well.  He denies n/v/d/c, pain, SOA, and questions regarding meds and d/c instructions from hospital.  Appointment confirmed.

## 2018-12-03 NOTE — OUTREACH NOTE
COPD/PN Week 1 Survey      Responses   Facility patient discharged from?  [facility]   Does the patient have one of the following disease processes/diagnoses(primary or secondary)?  COPD/Pneumonia   Is there a successful TCM telephone encounter documented?  No   Was the primary reason for admission:  Pneumonia   Week 1 attempt successful?  No   Unsuccessful attempts  Attempt 1          Cristal Castillo RN

## 2018-12-04 ENCOUNTER — READMISSION MANAGEMENT (OUTPATIENT)
Dept: CALL CENTER | Facility: HOSPITAL | Age: 78
End: 2018-12-04

## 2018-12-04 DIAGNOSIS — J18.9 PNEUMONIA OF BOTH LUNGS DUE TO INFECTIOUS ORGANISM, UNSPECIFIED PART OF LUNG: ICD-10-CM

## 2018-12-04 LAB
BASOPHILS # BLD MANUAL: 0 10*3/MM3 (ref 0–0.2)
BASOPHILS NFR BLD MANUAL: 0 % (ref 0–2.5)
DIFFERENTIAL COMMENT: ABNORMAL
EOSINOPHIL # BLD MANUAL: 0.68 10*3/MM3 (ref 0–0.7)
EOSINOPHIL NFR BLD MANUAL: 3 % (ref 0–7)
ERYTHROCYTE [DISTWIDTH] IN BLOOD BY AUTOMATED COUNT: 15.9 % (ref 11.5–14.5)
HCT VFR BLD AUTO: 40.6 % (ref 42–52)
HGB BLD-MCNC: 12.9 G/DL (ref 14–18)
LYMPHOCYTES # BLD MANUAL: 5.67 10*3/MM3 (ref 0.6–3.4)
LYMPHOCYTES NFR BLD MANUAL: 25 % (ref 10–50)
MCH RBC QN AUTO: 29.3 PG (ref 27–31)
MCHC RBC AUTO-ENTMCNC: 31.8 G/DL (ref 30–37)
MCV RBC AUTO: 92.1 FL (ref 80–94)
MONOCYTES # BLD MANUAL: 2.04 10*3/MM3 (ref 0–0.9)
MONOCYTES NFR BLD MANUAL: 9 % (ref 0–12)
NEUTROPHILS # BLD MANUAL: 13.39 10*3/MM3 (ref 2–6.9)
NEUTROPHILS NFR BLD MANUAL: 56 % (ref 37–80)
PLATELET # BLD AUTO: 509 10*3/MM3 (ref 130–400)
PLATELET BLD QL SMEAR: ABNORMAL
RBC # BLD AUTO: 4.41 10*6/MM3 (ref 4.7–6.1)
RBC MORPH BLD: ABNORMAL
WBC # BLD AUTO: 22.69 10*3/MM3 (ref 4.8–10.8)

## 2018-12-04 NOTE — OUTREACH NOTE
COPD/PN Week 1 Survey      Responses   Facility patient discharged from?  [facility]   Does the patient have one of the following disease processes/diagnoses(primary or secondary)?  COPD/Pneumonia   Is there a successful TCM telephone encounter documented?  No   Was the primary reason for admission:  Pneumonia   Week 1 attempt successful?  No   Unsuccessful attempts  Attempt 2          Ivelisse Becker RN

## 2018-12-06 ENCOUNTER — OFFICE VISIT (OUTPATIENT)
Dept: INTERNAL MEDICINE | Facility: CLINIC | Age: 78
End: 2018-12-06

## 2018-12-06 VITALS
HEIGHT: 69 IN | DIASTOLIC BLOOD PRESSURE: 50 MMHG | OXYGEN SATURATION: 94 % | WEIGHT: 193 LBS | BODY MASS INDEX: 28.58 KG/M2 | SYSTOLIC BLOOD PRESSURE: 102 MMHG | HEART RATE: 52 BPM

## 2018-12-06 DIAGNOSIS — G47.30 SLEEP APNEA, UNSPECIFIED TYPE: ICD-10-CM

## 2018-12-06 DIAGNOSIS — J43.8 OTHER EMPHYSEMA (HCC): Primary | ICD-10-CM

## 2018-12-06 DIAGNOSIS — J18.9 PNEUMONIA OF BOTH LUNGS DUE TO INFECTIOUS ORGANISM, UNSPECIFIED PART OF LUNG: ICD-10-CM

## 2018-12-06 DIAGNOSIS — J96.01 ACUTE RESPIRATORY FAILURE WITH HYPOXIA (HCC): ICD-10-CM

## 2018-12-06 PROCEDURE — 99496 TRANSJ CARE MGMT HIGH F2F 7D: CPT | Performed by: INTERNAL MEDICINE

## 2018-12-06 NOTE — PROGRESS NOTES
Transitional Care Follow Up Visit  Subjective     Jairo Mercedes is a 78 y.o. male who presents for a transitional care management visit.    Within 48 business hours after discharge our office contacted him via telephone to coordinate his care and needs.      I reviewed and discussed the details of that call along with the discharge summary, hospital problems, inpatient lab results, inpatient diagnostic studies, and consultation reports with Jairo.     Current outpatient and discharge medications have been reconciled for the patient.    Date of TCM Phone Call 12/3/2018   [facility]   Date of Admission 11/27/2018   Date of Discharge 11/30/2018   Discharge Disposition Home or Self Care     Risk for Readmission (LACE) Score: 12 (11/30/2018  6:00 AM)      History of Present Illness   Course During Hospital Stay:  Patient recently diagnosed pneumonia respiratory failure and hospitalized.  While in the hospital patient was treated with antibiotics and steroid.  Patient's condition gradually getting better.  Patient finished antibiotics and steroid.  Patient denies any short of breath with 2 L of oxygen.  Oxygen saturation 94%.  Patient only has a mild cough now.  Patient complains lightheadedness when standing up.  Patient still has some vertigo symptoms when turning in bed.  Patient has abnormal labs in the hospital which needs to be repeated.  Family also complains patient snores.     The following portions of the patient's history were reviewed and updated as appropriate: allergies, current medications, past family history, past medical history, past social history, past surgical history and problem list.    Review of Systems   Constitutional: Negative.    Respiratory: Positive for cough. Negative for shortness of breath and wheezing.    Cardiovascular: Negative.    Gastrointestinal: Negative.    Musculoskeletal: Negative.    Skin: Negative.    Neurological: Negative.    Psychiatric/Behavioral:  Negative.        Objective   Physical Exam   Constitutional: He is oriented to person, place, and time. He appears well-developed and well-nourished.   Neck: Neck supple.   Cardiovascular: Normal rate, regular rhythm and normal heart sounds.   Pulmonary/Chest: Effort normal and breath sounds normal.   Abdominal: Bowel sounds are normal.   Neurological: He is alert and oriented to person, place, and time.   Skin: Skin is warm.   Psychiatric: He has a normal mood and affect.       Assessment/Plan   Jairo was seen today for transitional care management.    Diagnoses and all orders for this visit:    Other emphysema (CMS/HCC)    Pneumonia of both lungs due to infectious organism, unspecified part of lung    Acute respiratory failure with hypoxia (CMS/HCC)    Continue medication for pneumonia continue oxygen use repeat the blood tests and a chest x-ray in a week    Vertigo were addressed when patient gets better.  Lightheadedness probably due to low blood pressure continue to watch for now.  Sleep apnea refer to pulm     2 weeks follow-up

## 2018-12-09 ENCOUNTER — READMISSION MANAGEMENT (OUTPATIENT)
Dept: CALL CENTER | Facility: HOSPITAL | Age: 78
End: 2018-12-09

## 2018-12-09 NOTE — OUTREACH NOTE
COPD/PN Week 2 Survey      Responses   Facility patient discharged from?  [facility]   Does the patient have one of the following disease processes/diagnoses(primary or secondary)?  COPD/Pneumonia   Was the primary reason for admission:  Pneumonia   Week 2 attempt successful?  No   Unsuccessful attempts  Attempt 1          Luiza Castanon, RN

## 2018-12-11 ENCOUNTER — READMISSION MANAGEMENT (OUTPATIENT)
Dept: CALL CENTER | Facility: HOSPITAL | Age: 78
End: 2018-12-11

## 2018-12-11 NOTE — OUTREACH NOTE
COPD/PN Week 2 Survey      Responses   Facility patient discharged from?  [facility]   Does the patient have one of the following disease processes/diagnoses(primary or secondary)?  COPD/Pneumonia   Was the primary reason for admission:  Pneumonia   Week 2 attempt successful?  Yes   Call start time  1142   Rescheduled  Revoked   Revoke  Decline to participate   Call end time  1144   Discharge diagnosis  Pneumonia, benign essential HTN, COPD, acute resp. failure with hypoxia   Is patient permission given to speak with other caregiver?  Yes   List who call center can speak with  wife   Person spoke with today (if not patient) and relationship  Fariba Silva RN

## 2018-12-12 ENCOUNTER — HOSPITAL ENCOUNTER (OUTPATIENT)
Dept: GENERAL RADIOLOGY | Facility: HOSPITAL | Age: 78
Discharge: HOME | End: 2018-12-12
Attending: INTERNAL MEDICINE | Admitting: INTERNAL MEDICINE

## 2018-12-12 ENCOUNTER — TRANSCRIBE ORDERS (OUTPATIENT)
Dept: ADMINISTRATIVE | Facility: HOSPITAL | Age: 78
End: 2018-12-12

## 2018-12-12 DIAGNOSIS — J90 PLEURAL EFFUSION: ICD-10-CM

## 2018-12-12 DIAGNOSIS — J44.9 CHRONIC OBSTRUCTIVE PULMONARY DISEASE, UNSPECIFIED COPD TYPE (HCC): ICD-10-CM

## 2018-12-12 DIAGNOSIS — J90 PLEURAL EFFUSION: Primary | ICD-10-CM

## 2018-12-12 PROCEDURE — 71046 X-RAY EXAM CHEST 2 VIEWS: CPT

## 2018-12-17 ENCOUNTER — APPOINTMENT (OUTPATIENT)
Dept: LAB | Facility: HOSPITAL | Age: 78
End: 2018-12-17

## 2018-12-17 ENCOUNTER — HOSPITAL ENCOUNTER (OUTPATIENT)
Dept: GENERAL RADIOLOGY | Facility: HOSPITAL | Age: 78
Discharge: HOME | End: 2018-12-17
Admitting: INTERNAL MEDICINE

## 2018-12-17 LAB
ALBUMIN SERPL-MCNC: 3.9 G/DL (ref 3.5–5)
ALBUMIN/GLOB SERPL: 1.3 G/DL (ref 1–2)
ALP SERPL-CCNC: 137 U/L (ref 38–126)
ALT SERPL W P-5'-P-CCNC: 26 U/L (ref 13–69)
ANION GAP SERPL CALCULATED.3IONS-SCNC: 10.6 MMOL/L (ref 10–20)
AST SERPL-CCNC: 21 U/L (ref 15–46)
BASOPHILS # BLD AUTO: 0.07 10*3/MM3 (ref 0–0.2)
BASOPHILS NFR BLD AUTO: 0.4 % (ref 0–2.5)
BILIRUB SERPL-MCNC: 0.6 MG/DL (ref 0.2–1.3)
BUN BLD-MCNC: 18 MG/DL (ref 7–20)
BUN/CREAT SERPL: 18 (ref 6.3–21.9)
CALCIUM SPEC-SCNC: 9 MG/DL (ref 8.4–10.2)
CHLORIDE SERPL-SCNC: 104 MMOL/L (ref 98–107)
CO2 SERPL-SCNC: 29 MMOL/L (ref 26–30)
CREAT BLD-MCNC: 1 MG/DL (ref 0.6–1.3)
DEPRECATED RDW RBC AUTO: 51.1 FL (ref 37–54)
EOSINOPHIL # BLD AUTO: 0.29 10*3/MM3 (ref 0–0.7)
EOSINOPHIL NFR BLD AUTO: 1.8 % (ref 0–7)
ERYTHROCYTE [DISTWIDTH] IN BLOOD BY AUTOMATED COUNT: 15.2 % (ref 11.5–14.5)
GFR SERPL CREATININE-BSD FRML MDRD: 72 ML/MIN/1.73
GLOBULIN UR ELPH-MCNC: 2.9 GM/DL
GLUCOSE BLD-MCNC: 99 MG/DL (ref 74–98)
HCT VFR BLD AUTO: 37.3 % (ref 42–52)
HGB BLD-MCNC: 11.8 G/DL (ref 14–18)
IMM GRANULOCYTES # BLD: 0.11 10*3/MM3 (ref 0–0.06)
IMM GRANULOCYTES NFR BLD: 0.7 % (ref 0–0.6)
LYMPHOCYTES # BLD AUTO: 3.21 10*3/MM3 (ref 0.6–3.4)
LYMPHOCYTES NFR BLD AUTO: 20.4 % (ref 10–50)
MCH RBC QN AUTO: 29.1 PG (ref 27–31)
MCHC RBC AUTO-ENTMCNC: 31.6 G/DL (ref 30–37)
MCV RBC AUTO: 92.1 FL (ref 80–94)
MONOCYTES # BLD AUTO: 1.88 10*3/MM3 (ref 0–0.9)
MONOCYTES NFR BLD AUTO: 11.9 % (ref 0–12)
NEUTROPHILS # BLD AUTO: 10.19 10*3/MM3 (ref 2–6.9)
NEUTROPHILS NFR BLD AUTO: 64.8 % (ref 37–80)
NRBC BLD MANUAL-RTO: 0 /100 WBC (ref 0–0)
PLATELET # BLD AUTO: 436 10*3/MM3 (ref 130–400)
PMV BLD AUTO: 10.8 FL (ref 6–12)
POTASSIUM BLD-SCNC: 4.6 MMOL/L (ref 3.5–5.1)
PROT SERPL-MCNC: 6.8 G/DL (ref 6.3–8.2)
RBC # BLD AUTO: 4.05 10*6/MM3 (ref 4.7–6.1)
SODIUM BLD-SCNC: 139 MMOL/L (ref 137–145)
WBC NRBC COR # BLD: 15.75 10*3/MM3 (ref 4.8–10.8)

## 2018-12-17 PROCEDURE — 80053 COMPREHEN METABOLIC PANEL: CPT | Performed by: INTERNAL MEDICINE

## 2018-12-17 PROCEDURE — 36415 COLL VENOUS BLD VENIPUNCTURE: CPT | Performed by: INTERNAL MEDICINE

## 2018-12-17 PROCEDURE — 71046 X-RAY EXAM CHEST 2 VIEWS: CPT

## 2018-12-17 PROCEDURE — 85025 COMPLETE CBC W/AUTO DIFF WBC: CPT | Performed by: INTERNAL MEDICINE

## 2018-12-19 ENCOUNTER — LAB (OUTPATIENT)
Dept: LAB | Facility: HOSPITAL | Age: 78
End: 2018-12-19

## 2018-12-19 ENCOUNTER — OFFICE VISIT (OUTPATIENT)
Dept: ONCOLOGY | Facility: CLINIC | Age: 78
End: 2018-12-19

## 2018-12-19 ENCOUNTER — TELEPHONE (OUTPATIENT)
Dept: ONCOLOGY | Facility: CLINIC | Age: 78
End: 2018-12-19

## 2018-12-19 VITALS
HEART RATE: 59 BPM | BODY MASS INDEX: 28.29 KG/M2 | HEIGHT: 69 IN | TEMPERATURE: 97.2 F | WEIGHT: 191 LBS | DIASTOLIC BLOOD PRESSURE: 56 MMHG | RESPIRATION RATE: 14 BRPM | SYSTOLIC BLOOD PRESSURE: 121 MMHG

## 2018-12-19 DIAGNOSIS — E61.1 IRON DEFICIENCY: Primary | ICD-10-CM

## 2018-12-19 DIAGNOSIS — E61.1 IRON DEFICIENCY: ICD-10-CM

## 2018-12-19 DIAGNOSIS — J44.9 CHRONIC OBSTRUCTIVE PULMONARY DISEASE, UNSPECIFIED COPD TYPE (HCC): Primary | ICD-10-CM

## 2018-12-19 LAB
BASOPHILS # BLD AUTO: 0.05 10*3/MM3 (ref 0–0.2)
BASOPHILS NFR BLD AUTO: 0.4 % (ref 0–2.5)
DEPRECATED RDW RBC AUTO: 49.8 FL (ref 37–54)
EOSINOPHIL # BLD AUTO: 0.29 10*3/MM3 (ref 0–0.7)
EOSINOPHIL NFR BLD AUTO: 2.1 % (ref 0–7)
ERYTHROCYTE [DISTWIDTH] IN BLOOD BY AUTOMATED COUNT: 15.1 % (ref 11.5–14.5)
FERRITIN SERPL-MCNC: 178 NG/ML (ref 17.9–464)
HCT VFR BLD AUTO: 37.5 % (ref 42–52)
HGB BLD-MCNC: 12.2 G/DL (ref 14–18)
IMM GRANULOCYTES # BLD: 0.11 10*3/MM3 (ref 0–0.06)
IMM GRANULOCYTES NFR BLD: 0.8 % (ref 0–0.6)
IRON 24H UR-MRATE: 48 MCG/DL (ref 37–181)
IRON SATN MFR SERPL: 17 % (ref 11–46)
LYMPHOCYTES # BLD AUTO: 2.73 10*3/MM3 (ref 0.6–3.4)
LYMPHOCYTES NFR BLD AUTO: 19.8 % (ref 10–50)
MCH RBC QN AUTO: 29.4 PG (ref 27–31)
MCHC RBC AUTO-ENTMCNC: 32.5 G/DL (ref 30–37)
MCV RBC AUTO: 90.4 FL (ref 80–94)
MONOCYTES # BLD AUTO: 1.51 10*3/MM3 (ref 0–0.9)
MONOCYTES NFR BLD AUTO: 10.9 % (ref 0–12)
NEUTROPHILS # BLD AUTO: 9.11 10*3/MM3 (ref 2–6.9)
NEUTROPHILS NFR BLD AUTO: 66 % (ref 37–80)
NRBC BLD MANUAL-RTO: 0 /100 WBC (ref 0–0)
PLATELET # BLD AUTO: 464 10*3/MM3 (ref 130–400)
PMV BLD AUTO: 10.5 FL (ref 6–12)
RBC # BLD AUTO: 4.15 10*6/MM3 (ref 4.7–6.1)
TIBC SERPL-MCNC: 277 MCG/DL (ref 261–497)
WBC NRBC COR # BLD: 13.8 10*3/MM3 (ref 4.8–10.8)

## 2018-12-19 PROCEDURE — 82728 ASSAY OF FERRITIN: CPT

## 2018-12-19 PROCEDURE — 83550 IRON BINDING TEST: CPT

## 2018-12-19 PROCEDURE — 36415 COLL VENOUS BLD VENIPUNCTURE: CPT

## 2018-12-19 PROCEDURE — 85025 COMPLETE CBC W/AUTO DIFF WBC: CPT

## 2018-12-19 PROCEDURE — 83540 ASSAY OF IRON: CPT

## 2018-12-19 PROCEDURE — 99214 OFFICE O/P EST MOD 30 MIN: CPT | Performed by: INTERNAL MEDICINE

## 2018-12-19 NOTE — PROGRESS NOTES
DATE OF VISIT: 12/19/2018    REASON FOR VISIT: Followup for iron def anemia     HISTORY OF PRESENT ILLNESS: The patient is a very pleasant 78 y.o. male with past medical history significant for Iron def anemia with thrombocythemia. The patient is here today for scheduled follow up visit.    SUBJECTIVE: The patient is here today by himself.  He was recently admitted to the hospital for pneumonia.  He is feeling better.  He completed his antibiotic course.  He is complaining of mild fatigue.    REVIEW OF SYSTEMS: All the other systems are reviewed by me and negative  except what is mentioned in HPI and subjective.     PAST MEDICAL HISTORY/SOCIAL HISTORY/FAMILY HISTORY: Unchanged from my prior documentation.      Current Outpatient Medications:   •  ADVAIR DISKUS 250-50 MCG/DOSE DISKUS, INHALE ONE PUFF TWICE DAILY, Disp: 1 each, Rfl: 3  •  aspirin 81 MG EC tablet, Take 81 mg by mouth Daily., Disp: , Rfl:   •  atorvastatin (LIPITOR) 40 MG tablet, Take 40 mg by mouth every night at bedtime., Disp: , Rfl: 5  •  atorvastatin (LIPITOR) 40 MG tablet, TAKE ONE TABLET BY MOUTH AT BEDTIME, Disp: 30 tablet, Rfl: 5  •  cetirizine (zyrTEC) 10 MG tablet, Take 10 mg by mouth Daily As Needed for Allergies., Disp: , Rfl:   •  cholecalciferol (VITAMIN D3) 1000 units tablet, Take 1,000 Units by mouth 2 (Two) Times a Day., Disp: , Rfl:   •  esomeprazole (nexIUM) 40 MG capsule, TAKE ONE CAPSULE BY MOUTH EVERY DAY, Disp: 30 capsule, Rfl: 5  •  guaiFENesin (MUCINEX) 600 MG 12 hr tablet, Take 1 tablet by mouth Every 12 (Twelve) Hours., Disp: 20 tablet, Rfl: 0  •  ipratropium-albuterol (DUO-NEB) 0.5-2.5 mg/3 ml nebulizer, Inhale contents of 1 vial (3 mL) by nebulization Every 6 (Six) Hours for 30 days., Disp: 360 mL, Rfl: 0  •  isosorbide mononitrate (IMDUR) 30 MG 24 hr tablet, TAKE ONE TABLET BY MOUTH EVERY DAY AS DIRECTED, Disp: 90 tablet, Rfl: 3  •  meclizine (ANTIVERT) 25 MG tablet, Take 25 mg by mouth 2 (Two) Times a Day., Disp: , Rfl:  "  •  metoprolol tartrate (LOPRESSOR) 25 MG tablet, TAKE ONE TABLET BY MOUTH TWICE DAILY (Patient taking differently: twice daily), Disp: 60 tablet, Rfl: 5  •  nitroglycerin (NITROSTAT) 0.4 MG SL tablet, Place 0.4 mg under the tongue Every 5 (Five) Minutes As Needed for Chest Pain., Disp: , Rfl:   •  Omega-3 Fatty Acids (FISH OIL) 1000 MG capsule capsule, Take 1,000 mg by mouth Daily With Breakfast., Disp: , Rfl:   •  predniSONE (DELTASONE) 10 MG tablet, Take 4 tablets by mouth once daily for 2 days, then 2 tablets daily for 2 days, then 1 tablet daily for 2 days, then stop., Disp: 14 tablet, Rfl: 0  •  sertraline (ZOLOFT) 25 MG tablet, TAKE ONE TABLET BY MOUTH EVERY DAY, Disp: 90 tablet, Rfl: 3  •  SPIRIVA HANDIHALER 18 MCG per inhalation capsule, INHALE THE ENTIRE CONTENTS OF 1 CAPSULE ONCE A DAY USING HANDIHALER DEVICE, Disp: 90 capsule, Rfl: 1  •  traZODone (DESYREL) 50 MG tablet, trazodone 50 mg tablet  one tablet at bedtime, Disp: , Rfl:   •  VENTOLIN  (90 Base) MCG/ACT inhaler, INHALE 1-2 PUFFS BY MOUTH EVERY 4 TO 6 HOURS AS NEEDED, Disp: 18 g, Rfl: 2    PHYSICAL EXAMINATION:   /56   Pulse 59   Temp 97.2 °F (36.2 °C) (Temporal)   Resp 14   Ht 175.3 cm (69\")   Wt 86.6 kg (191 lb)   BMI 28.21 kg/m²   ECOG1  GENERAL: Age appropriate. No acute distress.   HEENT: Head atraumatic, normocephalic.   NECK: Supple. No JVD. No lymphadenopathy.   LUNGS: Clear to auscultation bilaterally. No wheezing. No rhonchi.   HEART: Regular rate and rhythm. S1, S2, no murmurs.   ABDOMEN: Soft, nontender, nondistended. Bowel sounds positive. No  hepatosplenomegaly.   EXTREMITIES: No clubbing, cyanosis, or edema.   SKIN: No rashes. No purpura.   NEUROLOGIC: Awake and oriented x3. Strength 5 out of 5 in all muscle groups.     Office Visit on 12/06/2018   Component Date Value Ref Range Status   • Glucose 12/17/2018 99* 74 - 98 mg/dL Final   • BUN 12/17/2018 18  7 - 20 mg/dL Final   • Creatinine 12/17/2018 1.00  0.60 - " 1.30 mg/dL Final   • Sodium 12/17/2018 139  137 - 145 mmol/L Final   • Potassium 12/17/2018 4.6  3.5 - 5.1 mmol/L Final   • Chloride 12/17/2018 104  98 - 107 mmol/L Final   • CO2 12/17/2018 29.0  26.0 - 30.0 mmol/L Final   • Calcium 12/17/2018 9.0  8.4 - 10.2 mg/dL Final   • Total Protein 12/17/2018 6.8  6.3 - 8.2 g/dL Final   • Albumin 12/17/2018 3.90  3.50 - 5.00 g/dL Final   • ALT (SGPT) 12/17/2018 26  13 - 69 U/L Final   • AST (SGOT) 12/17/2018 21  15 - 46 U/L Final   • Alkaline Phosphatase 12/17/2018 137* 38 - 126 U/L Final   • Total Bilirubin 12/17/2018 0.6  0.2 - 1.3 mg/dL Final   • eGFR Non  Amer 12/17/2018 72  >60 mL/min/1.73 Final   • Globulin 12/17/2018 2.9  gm/dL Final   • A/G Ratio 12/17/2018 1.3  1.0 - 2.0 g/dL Final   • BUN/Creatinine Ratio 12/17/2018 18.0  6.3 - 21.9 Final   • Anion Gap 12/17/2018 10.6  10.0 - 20.0 mmol/L Final   • WBC 12/17/2018 15.75* 4.80 - 10.80 10*3/mm3 Final   • RBC 12/17/2018 4.05* 4.70 - 6.10 10*6/mm3 Final   • Hemoglobin 12/17/2018 11.8* 14.0 - 18.0 g/dL Final   • Hematocrit 12/17/2018 37.3* 42.0 - 52.0 % Final   • MCV 12/17/2018 92.1  80.0 - 94.0 fL Final   • MCH 12/17/2018 29.1  27.0 - 31.0 pg Final   • MCHC 12/17/2018 31.6  30.0 - 37.0 g/dL Final   • RDW 12/17/2018 15.2* 11.5 - 14.5 % Final   • RDW-SD 12/17/2018 51.1  37.0 - 54.0 fl Final   • MPV 12/17/2018 10.8  6.0 - 12.0 fL Final   • Platelets 12/17/2018 436* 130 - 400 10*3/mm3 Final   • Neutrophil % 12/17/2018 64.8  37.0 - 80.0 % Final   • Lymphocyte % 12/17/2018 20.4  10.0 - 50.0 % Final   • Monocyte % 12/17/2018 11.9  0.0 - 12.0 % Final   • Eosinophil % 12/17/2018 1.8  0.0 - 7.0 % Final   • Basophil % 12/17/2018 0.4  0.0 - 2.5 % Final   • Immature Grans % 12/17/2018 0.7* 0.0 - 0.6 % Final   • Neutrophils, Absolute 12/17/2018 10.19* 2.00 - 6.90 10*3/mm3 Final   • Lymphocytes, Absolute 12/17/2018 3.21  0.60 - 3.40 10*3/mm3 Final   • Monocytes, Absolute 12/17/2018 1.88* 0.00 - 0.90 10*3/mm3 Final   •  Eosinophils, Absolute 12/17/2018 0.29  0.00 - 0.70 10*3/mm3 Final   • Basophils, Absolute 12/17/2018 0.07  0.00 - 0.20 10*3/mm3 Final   • Immature Grans, Absolute 12/17/2018 0.11* 0.00 - 0.06 10*3/mm3 Final   • nRBC 12/17/2018 0.0  0.0 - 0.0 /100 WBC Final        No results found.    ASSESSMENT: The patient is a very pleasant 78 y.o. male  with Iron deficiency anemia    PROBLEM LIST:  1. 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 Thrombocythemia  3. Leukocytosis  4. S/P splenectomy   5. HTN    PLAN:  1. We reviewed and discussed his CBC, he is anemic however this was done at the hospital in setting of pneumonia.  I'll repeat his labs today including CBC and iron profile.  2. Follow up in 6 month with my NP with a repeated CBC and iron studies.  3. He will continue Imdur for his blood pressure. If it remains elevated he will notify his PCP.    4.  His leukocytosis did get worse but that possibly secondary to acute infection.    Yocasta Harris MD,  12/19/2018   9:56 AM

## 2018-12-20 ENCOUNTER — OFFICE VISIT (OUTPATIENT)
Dept: INTERNAL MEDICINE | Facility: CLINIC | Age: 78
End: 2018-12-20

## 2018-12-20 VITALS
SYSTOLIC BLOOD PRESSURE: 128 MMHG | OXYGEN SATURATION: 93 % | HEIGHT: 69 IN | HEART RATE: 88 BPM | BODY MASS INDEX: 28.29 KG/M2 | DIASTOLIC BLOOD PRESSURE: 70 MMHG | WEIGHT: 191 LBS

## 2018-12-20 DIAGNOSIS — G47.30 SLEEP APNEA, UNSPECIFIED TYPE: ICD-10-CM

## 2018-12-20 DIAGNOSIS — H81.10 BENIGN PAROXYSMAL POSITIONAL VERTIGO, UNSPECIFIED LATERALITY: ICD-10-CM

## 2018-12-20 DIAGNOSIS — D50.0 IRON DEFICIENCY ANEMIA DUE TO CHRONIC BLOOD LOSS: ICD-10-CM

## 2018-12-20 DIAGNOSIS — R31.0 GROSS HEMATURIA: ICD-10-CM

## 2018-12-20 DIAGNOSIS — E61.1 IRON DEFICIENCY: ICD-10-CM

## 2018-12-20 DIAGNOSIS — J43.8 OTHER EMPHYSEMA (HCC): Primary | ICD-10-CM

## 2018-12-20 DIAGNOSIS — J18.9 PNEUMONIA OF BOTH LUNGS DUE TO INFECTIOUS ORGANISM, UNSPECIFIED PART OF LUNG: ICD-10-CM

## 2018-12-20 PROBLEM — J96.01 ACUTE RESPIRATORY FAILURE WITH HYPOXIA: Status: RESOLVED | Noted: 2018-11-28 | Resolved: 2018-12-20

## 2018-12-20 PROCEDURE — 99214 OFFICE O/P EST MOD 30 MIN: CPT | Performed by: INTERNAL MEDICINE

## 2018-12-20 NOTE — PROGRESS NOTES
Subjective   Jairo Mercedes is a 78 y.o. male.     Chief Complaint   Patient presents with   • Follow-up     2 month f/u       History of Present Illness   Patient here for follow-up.  Pneumonia improved chest x-ray unremarkable.  White blood cell still elevated.  Vertical improved hematuria patient was seen by urologist.  Sleep apnea see sleep doctor.  Overall patient feels much better now.    Current Outpatient Medications:   •  ADVAIR DISKUS 250-50 MCG/DOSE DISKUS, INHALE ONE PUFF TWICE DAILY, Disp: 1 each, Rfl: 3  •  aspirin 81 MG EC tablet, Take 81 mg by mouth Daily., Disp: , Rfl:   •  atorvastatin (LIPITOR) 40 MG tablet, Take 40 mg by mouth every night at bedtime., Disp: , Rfl: 5  •  atorvastatin (LIPITOR) 40 MG tablet, TAKE ONE TABLET BY MOUTH AT BEDTIME, Disp: 30 tablet, Rfl: 5  •  cetirizine (zyrTEC) 10 MG tablet, Take 10 mg by mouth Daily As Needed for Allergies., Disp: , Rfl:   •  cholecalciferol (VITAMIN D3) 1000 units tablet, Take 1,000 Units by mouth 2 (Two) Times a Day., Disp: , Rfl:   •  esomeprazole (nexIUM) 40 MG capsule, TAKE ONE CAPSULE BY MOUTH EVERY DAY, Disp: 30 capsule, Rfl: 5  •  guaiFENesin (MUCINEX) 600 MG 12 hr tablet, Take 1 tablet by mouth Every 12 (Twelve) Hours., Disp: 20 tablet, Rfl: 0  •  ipratropium-albuterol (DUO-NEB) 0.5-2.5 mg/3 ml nebulizer, Inhale contents of 1 vial (3 mL) by nebulization Every 6 (Six) Hours for 30 days., Disp: 360 mL, Rfl: 0  •  isosorbide mononitrate (IMDUR) 30 MG 24 hr tablet, TAKE ONE TABLET BY MOUTH EVERY DAY AS DIRECTED, Disp: 90 tablet, Rfl: 3  •  meclizine (ANTIVERT) 25 MG tablet, Take 25 mg by mouth 2 (Two) Times a Day., Disp: , Rfl:   •  metoprolol tartrate (LOPRESSOR) 25 MG tablet, TAKE ONE TABLET BY MOUTH TWICE DAILY (Patient taking differently: twice daily), Disp: 60 tablet, Rfl: 5  •  nitroglycerin (NITROSTAT) 0.4 MG SL tablet, Place 0.4 mg under the tongue Every 5 (Five) Minutes As Needed for Chest Pain., Disp: , Rfl:   •  Omega-3 Fatty Acids  (FISH OIL) 1000 MG capsule capsule, Take 1,000 mg by mouth Daily With Breakfast., Disp: , Rfl:   •  predniSONE (DELTASONE) 10 MG tablet, Take 4 tablets by mouth once daily for 2 days, then 2 tablets daily for 2 days, then 1 tablet daily for 2 days, then stop., Disp: 14 tablet, Rfl: 0  •  sertraline (ZOLOFT) 25 MG tablet, TAKE ONE TABLET BY MOUTH EVERY DAY, Disp: 90 tablet, Rfl: 3  •  SPIRIVA HANDIHALER 18 MCG per inhalation capsule, INHALE THE ENTIRE CONTENTS OF 1 CAPSULE ONCE A DAY USING HANDIHALER DEVICE, Disp: 90 capsule, Rfl: 1  •  traZODone (DESYREL) 50 MG tablet, trazodone 50 mg tablet  one tablet at bedtime, Disp: , Rfl:   •  VENTOLIN  (90 Base) MCG/ACT inhaler, INHALE 1-2 PUFFS BY MOUTH EVERY 4 TO 6 HOURS AS NEEDED, Disp: 18 g, Rfl: 2    The following portions of the patient's history were reviewed and updated as appropriate: allergies, current medications, past family history, past medical history, past social history, past surgical history and problem list.    Review of Systems   Constitutional: Negative.    Respiratory: Negative.    Cardiovascular: Negative.    Gastrointestinal: Negative.    Musculoskeletal: Negative.    Skin: Negative.    Neurological: Negative.    Psychiatric/Behavioral: Negative.        Objective   Physical Exam   Constitutional: He is oriented to person, place, and time. He appears well-nourished.   Neck: Neck supple.   Cardiovascular: Normal rate, regular rhythm and normal heart sounds.   Pulmonary/Chest: Effort normal and breath sounds normal.   Abdominal: Bowel sounds are normal.   Neurological: He is alert and oriented to person, place, and time.   Skin: Skin is warm.   Psychiatric: He has a normal mood and affect.       All tests have been reviewed.    Assessment/Plan   There are no diagnoses linked to this encounter.          Other emphysema (CMS/HCC) continue med     Pneumonia of both lungs resolved after med CXR normal now    Leukocytosis still though better , repeat in  a month     Vertigo resolved    Hematuria resolved and seen by uro    Sleep apnea follow up with  pulm    Iron deficiency resolved        4 weeks follow-up with others, 7/2018

## 2019-01-10 LAB
ALBUMIN SERPL-MCNC: 4 G/DL (ref 3.5–5)
ALBUMIN/GLOB SERPL: 1.4 G/DL (ref 1–2)
ALP SERPL-CCNC: 159 U/L (ref 38–126)
ALT SERPL-CCNC: 29 U/L (ref 13–69)
AST SERPL-CCNC: 23 U/L (ref 15–46)
BASOPHILS # BLD AUTO: 0.07 10*3/MM3 (ref 0–0.2)
BASOPHILS NFR BLD AUTO: 0.5 % (ref 0–2.5)
BILIRUB SERPL-MCNC: 0.5 MG/DL (ref 0.2–1.3)
BUN SERPL-MCNC: 17 MG/DL (ref 7–20)
BUN/CREAT SERPL: 21.3 (ref 6.3–21.9)
CALCIUM SERPL-MCNC: 9.5 MG/DL (ref 8.4–10.2)
CHLORIDE SERPL-SCNC: 103 MMOL/L (ref 98–107)
CO2 SERPL-SCNC: 25 MMOL/L (ref 26–30)
CREAT SERPL-MCNC: 0.8 MG/DL (ref 0.6–1.3)
EOSINOPHIL # BLD AUTO: 0.15 10*3/MM3 (ref 0–0.7)
EOSINOPHIL NFR BLD AUTO: 1 % (ref 0–7)
ERYTHROCYTE [DISTWIDTH] IN BLOOD BY AUTOMATED COUNT: 16 % (ref 11.5–14.5)
GLOBULIN SER CALC-MCNC: 2.9 GM/DL
GLUCOSE SERPL-MCNC: 109 MG/DL (ref 74–98)
HCT VFR BLD AUTO: 40.6 % (ref 42–52)
HGB BLD-MCNC: 12.8 G/DL (ref 14–18)
IMM GRANULOCYTES # BLD AUTO: 0.09 10*3/MM3 (ref 0–0.06)
IMM GRANULOCYTES NFR BLD AUTO: 0.6 % (ref 0–0.6)
LYMPHOCYTES # BLD AUTO: 3.28 10*3/MM3 (ref 0.6–3.4)
LYMPHOCYTES NFR BLD AUTO: 22.3 % (ref 10–50)
MCH RBC QN AUTO: 28.8 PG (ref 27–31)
MCHC RBC AUTO-ENTMCNC: 31.5 G/DL (ref 30–37)
MCV RBC AUTO: 91.2 FL (ref 80–94)
MONOCYTES # BLD AUTO: 1.33 10*3/MM3 (ref 0–0.9)
MONOCYTES NFR BLD AUTO: 9.1 % (ref 0–12)
NEUTROPHILS # BLD AUTO: 9.76 10*3/MM3 (ref 2–6.9)
NEUTROPHILS NFR BLD AUTO: 66.5 % (ref 37–80)
NRBC BLD AUTO-RTO: 0 /100 WBC (ref 0–0)
PLATELET # BLD AUTO: 639 10*3/MM3 (ref 130–400)
POTASSIUM SERPL-SCNC: 4.7 MMOL/L (ref 3.5–5.1)
PROT SERPL-MCNC: 6.9 G/DL (ref 6.3–8.2)
RBC # BLD AUTO: 4.45 10*6/MM3 (ref 4.7–6.1)
SODIUM SERPL-SCNC: 138 MMOL/L (ref 137–145)
WBC # BLD AUTO: 14.68 10*3/MM3 (ref 4.8–10.8)

## 2019-01-11 ENCOUNTER — OFFICE VISIT (OUTPATIENT)
Dept: INTERNAL MEDICINE | Facility: CLINIC | Age: 79
End: 2019-01-11

## 2019-01-11 VITALS
DIASTOLIC BLOOD PRESSURE: 60 MMHG | OXYGEN SATURATION: 95 % | WEIGHT: 186 LBS | HEART RATE: 62 BPM | BODY MASS INDEX: 27.55 KG/M2 | HEIGHT: 69 IN | SYSTOLIC BLOOD PRESSURE: 102 MMHG

## 2019-01-11 DIAGNOSIS — D50.0 IRON DEFICIENCY ANEMIA DUE TO CHRONIC BLOOD LOSS: ICD-10-CM

## 2019-01-11 DIAGNOSIS — E78.5 HYPERLIPIDEMIA, UNSPECIFIED HYPERLIPIDEMIA TYPE: ICD-10-CM

## 2019-01-11 DIAGNOSIS — M19.90 ARTHRITIS: ICD-10-CM

## 2019-01-11 DIAGNOSIS — I10 BENIGN ESSENTIAL HYPERTENSION: ICD-10-CM

## 2019-01-11 DIAGNOSIS — I25.10 CORONARY ARTERIOSCLEROSIS IN NATIVE ARTERY: Primary | ICD-10-CM

## 2019-01-11 DIAGNOSIS — D75.839 THROMBOCYTHEMIA: ICD-10-CM

## 2019-01-11 DIAGNOSIS — G47.30 SLEEP APNEA, UNSPECIFIED TYPE: ICD-10-CM

## 2019-01-11 DIAGNOSIS — J43.8 OTHER EMPHYSEMA (HCC): ICD-10-CM

## 2019-01-11 DIAGNOSIS — H81.10 BENIGN PAROXYSMAL POSITIONAL VERTIGO, UNSPECIFIED LATERALITY: ICD-10-CM

## 2019-01-11 DIAGNOSIS — E11.8 TYPE 2 DIABETES MELLITUS WITH COMPLICATION, WITHOUT LONG-TERM CURRENT USE OF INSULIN (HCC): ICD-10-CM

## 2019-01-11 DIAGNOSIS — E55.9 VITAMIN D DEFICIENCY: ICD-10-CM

## 2019-01-11 DIAGNOSIS — R39.15 URGENCY OF URINATION: ICD-10-CM

## 2019-01-11 DIAGNOSIS — K21.9 GASTROESOPHAGEAL REFLUX DISEASE WITHOUT ESOPHAGITIS: ICD-10-CM

## 2019-01-11 DIAGNOSIS — E61.1 IRON DEFICIENCY: ICD-10-CM

## 2019-01-11 DIAGNOSIS — F41.9 ANXIETY: ICD-10-CM

## 2019-01-11 PROBLEM — J18.9 PNEUMONIA: Status: RESOLVED | Noted: 2018-11-27 | Resolved: 2019-01-11

## 2019-01-11 PROCEDURE — 99214 OFFICE O/P EST MOD 30 MIN: CPT | Performed by: INTERNAL MEDICINE

## 2019-01-11 NOTE — PROGRESS NOTES
Subjective   Jairo Mercedes is a 78 y.o. male.     Chief Complaint   Patient presents with   • Follow-up     6 month follow up        History of Present Illness   Patient here for follow-up.  Pneumonia much improved.  Y blood cell still high 14.9.  Sleep apnea on CPAP stable now.  Patient had a colonoscopy: Polyp removed.  Hyperlipidemia stable on medication.  The diabetes is stable on diet 2.  Iron deficient anemia resolved.  COPD on medication stable.    Current Outpatient Medications:   •  ADVAIR DISKUS 250-50 MCG/DOSE DISKUS, INHALE ONE PUFF TWICE DAILY, Disp: 1 each, Rfl: 3  •  aspirin 81 MG EC tablet, Take 81 mg by mouth Daily., Disp: , Rfl:   •  atorvastatin (LIPITOR) 40 MG tablet, Take 40 mg by mouth every night at bedtime., Disp: , Rfl: 5  •  atorvastatin (LIPITOR) 40 MG tablet, TAKE ONE TABLET BY MOUTH AT BEDTIME, Disp: 30 tablet, Rfl: 5  •  cetirizine (zyrTEC) 10 MG tablet, Take 10 mg by mouth Daily As Needed for Allergies., Disp: , Rfl:   •  cholecalciferol (VITAMIN D3) 1000 units tablet, Take 1,000 Units by mouth 2 (Two) Times a Day., Disp: , Rfl:   •  esomeprazole (nexIUM) 40 MG capsule, TAKE ONE CAPSULE BY MOUTH EVERY DAY, Disp: 30 capsule, Rfl: 5  •  guaiFENesin (MUCINEX) 600 MG 12 hr tablet, Take 1 tablet by mouth Every 12 (Twelve) Hours., Disp: 20 tablet, Rfl: 0  •  isosorbide mononitrate (IMDUR) 30 MG 24 hr tablet, TAKE ONE TABLET BY MOUTH EVERY DAY AS DIRECTED, Disp: 90 tablet, Rfl: 3  •  meclizine (ANTIVERT) 25 MG tablet, Take 25 mg by mouth 2 (Two) Times a Day., Disp: , Rfl:   •  metoprolol tartrate (LOPRESSOR) 25 MG tablet, TAKE ONE TABLET BY MOUTH TWICE DAILY (Patient taking differently: twice daily), Disp: 60 tablet, Rfl: 5  •  nitroglycerin (NITROSTAT) 0.4 MG SL tablet, Place 0.4 mg under the tongue Every 5 (Five) Minutes As Needed for Chest Pain., Disp: , Rfl:   •  Omega-3 Fatty Acids (FISH OIL) 1000 MG capsule capsule, Take 1,000 mg by mouth Daily With Breakfast., Disp: , Rfl:   •   predniSONE (DELTASONE) 10 MG tablet, Take 4 tablets by mouth once daily for 2 days, then 2 tablets daily for 2 days, then 1 tablet daily for 2 days, then stop., Disp: 14 tablet, Rfl: 0  •  sertraline (ZOLOFT) 25 MG tablet, TAKE ONE TABLET BY MOUTH EVERY DAY, Disp: 90 tablet, Rfl: 3  •  SPIRIVA HANDIHALER 18 MCG per inhalation capsule, INHALE THE ENTIRE CONTENTS OF 1 CAPSULE ONCE A DAY USING HANDIHALER DEVICE, Disp: 90 capsule, Rfl: 1  •  traZODone (DESYREL) 50 MG tablet, trazodone 50 mg tablet  one tablet at bedtime, Disp: , Rfl:   •  VENTOLIN  (90 Base) MCG/ACT inhaler, INHALE 1-2 PUFFS BY MOUTH EVERY 4 TO 6 HOURS AS NEEDED, Disp: 18 g, Rfl: 2    The following portions of the patient's history were reviewed and updated as appropriate: allergies, current medications, past family history, past medical history, past social history, past surgical history and problem list.    Review of Systems   Constitutional: Negative.    Respiratory: Negative.    Cardiovascular: Negative.    Gastrointestinal: Negative.    Musculoskeletal: Negative.    Skin: Negative.    Neurological: Negative.    Psychiatric/Behavioral: Negative.        Objective   Physical Exam   Constitutional: He is oriented to person, place, and time. He appears well-nourished.   Neck: Neck supple.   Cardiovascular: Normal rate, regular rhythm and normal heart sounds.   Pulmonary/Chest: Effort normal and breath sounds normal.   Abdominal: Bowel sounds are normal.   Neurological: He is alert and oriented to person, place, and time.   Skin: Skin is warm.   Psychiatric: He has a normal mood and affect.       All tests have been reviewed.    Assessment/Plan   There are no diagnoses linked to this encounter.              Pneumonia of both lungs resolved after med CXR normal now     Leukocytosis still though better , repeat in a month     Sleep apnea follow up with  pulm       #1: Adenopathy, intra-abdominal. biopsy, reactive changes only follow up :  Hematology/oncology. history of ITP s/p splenectomy, higher WBC and plts, now RBC low , watch--  #2: copd, continue medicine  #3: fatty liver, abnormal liver enzymes, s/p cholecystectomy. history of gall stone, follow up with GI, liver enzymes worsened after Lipitor 80 mg. Asked patient to cut down Lipitor 40 mg daily, discontinue acetaminophen, weight loss.alkaline phosphatase is elevated performed isoenzymes.  #4: Coronary heart disease,blockage unable to stent. chronic stable angina, none now.continue medicine prn NTG,metoprolol 25 bid, f/u cardio  #5: Prediabetes.last a1c6.5 x2, patient is diabetic, watch for now hold med, patient refuse meds--  #6: Hyperlipidemia, cut down Lipitor from 80 mg to 40 mg  #7: Mild chronic anxiety, continue medicine  #8: GERD continue medicine,   Leukocytosis thrombocytosis lymphocytosis monocytosis history of splenectomy for possible thrombocytopenia. continue to watch  Vocal cord paralysis follow up with ENT  right thumb tendonitis,resolved after tx. XR severe arthritis.  hemorroid continue medicine   Overweight encouraged patient to have good diet  renal cyst 3.3 cm, MRI November 2014 showed a 2 cm. Repeat normal  low iron normal  s/p iron transfusion, --  insomia continue trazodone  vitD low vitD3 2000u daily  s/p pneumonia shot, prevnar 10/27, Td to HD , zostavax done, shingrix informed  aaa screen 3cm repeated 12/2017 smaller now, repeat 12/2019  Lung nodule, repeat in 7/2018 showed locular effusion, by lung doctor. --  Colon 8/2/18 colon polyp , repeat 5 years  BPH continue PSA       2 mo wellness              4 weeks follow-up with others, 7/2018

## 2019-01-16 RX ORDER — MECLIZINE HYDROCHLORIDE 25 MG/1
TABLET ORAL
Qty: 180 TABLET | Refills: 3 | Status: SHIPPED | OUTPATIENT
Start: 2019-01-16 | End: 2020-01-08

## 2019-01-17 RX ORDER — ISOSORBIDE MONONITRATE 30 MG/1
30 TABLET, EXTENDED RELEASE ORAL DAILY
Qty: 90 TABLET | Refills: 3 | Status: SHIPPED | OUTPATIENT
Start: 2019-01-17 | End: 2020-01-02

## 2019-01-22 RX ORDER — SERTRALINE HYDROCHLORIDE 25 MG/1
TABLET, FILM COATED ORAL
Qty: 90 TABLET | Refills: 3 | Status: SHIPPED | OUTPATIENT
Start: 2019-01-22 | End: 2020-01-20 | Stop reason: SDUPTHER

## 2019-03-06 LAB
25(OH)D3+25(OH)D2 SERPL-MCNC: 43.2 NG/ML (ref 30–100)
ALBUMIN SERPL-MCNC: 4 G/DL (ref 3.5–4.8)
ALBUMIN/GLOB SERPL: 1.4 {RATIO} (ref 1.2–2.2)
ALP SERPL-CCNC: 145 IU/L (ref 39–117)
ALT SERPL-CCNC: 16 IU/L (ref 0–44)
APPEARANCE UR: CLEAR
AST SERPL-CCNC: 20 IU/L (ref 0–40)
BASOPHILS # BLD AUTO: 0.1 X10E3/UL (ref 0–0.2)
BASOPHILS NFR BLD AUTO: 1 %
BILIRUB SERPL-MCNC: 0.3 MG/DL (ref 0–1.2)
BILIRUB UR QL STRIP: NEGATIVE
BUN SERPL-MCNC: 18 MG/DL (ref 8–27)
BUN/CREAT SERPL: 19 (ref 10–24)
CALCIUM SERPL-MCNC: 9.4 MG/DL (ref 8.6–10.2)
CHLORIDE SERPL-SCNC: 103 MMOL/L (ref 96–106)
CHOLEST SERPL-MCNC: 137 MG/DL (ref 100–199)
CK SERPL-CCNC: 58 U/L (ref 24–204)
CO2 SERPL-SCNC: 21 MMOL/L (ref 20–29)
COLOR UR: YELLOW
CREAT SERPL-MCNC: 0.96 MG/DL (ref 0.76–1.27)
EOSINOPHIL # BLD AUTO: 0.6 X10E3/UL (ref 0–0.4)
EOSINOPHIL NFR BLD AUTO: 5 %
ERYTHROCYTE [DISTWIDTH] IN BLOOD BY AUTOMATED COUNT: 16 % (ref 12.3–15.4)
GLOBULIN SER CALC-MCNC: 2.9 G/DL (ref 1.5–4.5)
GLUCOSE SERPL-MCNC: 108 MG/DL (ref 65–99)
GLUCOSE UR QL: NEGATIVE
HBA1C MFR BLD: 6.1 % (ref 4.8–5.6)
HCT VFR BLD AUTO: 39.6 % (ref 37.5–51)
HDLC SERPL-MCNC: 30 MG/DL
HGB BLD-MCNC: 12.9 G/DL (ref 13–17.7)
HGB UR QL STRIP: NEGATIVE
IMM GRANULOCYTES # BLD AUTO: 0 X10E3/UL (ref 0–0.1)
IMM GRANULOCYTES NFR BLD AUTO: 0 %
IRON SATN MFR SERPL: 16 % (ref 15–55)
IRON SERPL-MCNC: 38 UG/DL (ref 38–169)
KETONES UR QL STRIP: NEGATIVE
LDLC SERPL CALC-MCNC: 67 MG/DL (ref 0–99)
LEUKOCYTE ESTERASE UR QL STRIP: NEGATIVE
LYMPHOCYTES # BLD AUTO: 2.9 X10E3/UL (ref 0.7–3.1)
LYMPHOCYTES NFR BLD AUTO: 23 %
MCH RBC QN AUTO: 28.5 PG (ref 26.6–33)
MCHC RBC AUTO-ENTMCNC: 32.6 G/DL (ref 31.5–35.7)
MCV RBC AUTO: 88 FL (ref 79–97)
MICRO URNS: NORMAL
MONOCYTES # BLD AUTO: 1.5 X10E3/UL (ref 0.1–0.9)
MONOCYTES NFR BLD AUTO: 12 %
NEUTROPHILS # BLD AUTO: 7.5 X10E3/UL (ref 1.4–7)
NEUTROPHILS NFR BLD AUTO: 59 %
NITRITE UR QL STRIP: NEGATIVE
PH UR STRIP: 6 [PH] (ref 5–7.5)
PLATELET # BLD AUTO: 584 X10E3/UL (ref 150–379)
POTASSIUM SERPL-SCNC: 4.8 MMOL/L (ref 3.5–5.2)
PROT SERPL-MCNC: 6.9 G/DL (ref 6–8.5)
PROT UR QL STRIP: NEGATIVE
PSA SERPL-MCNC: 0.6 NG/ML (ref 0–4)
RBC # BLD AUTO: 4.52 X10E6/UL (ref 4.14–5.8)
SODIUM SERPL-SCNC: 141 MMOL/L (ref 134–144)
SP GR UR: 1.02 (ref 1–1.03)
TIBC SERPL-MCNC: 240 UG/DL (ref 250–450)
TRIGL SERPL-MCNC: 202 MG/DL (ref 0–149)
TSH SERPL DL<=0.005 MIU/L-ACNC: 2.16 UIU/ML (ref 0.45–4.5)
UIBC SERPL-MCNC: 202 UG/DL (ref 111–343)
UROBILINOGEN UR STRIP-MCNC: 0.2 MG/DL (ref 0.2–1)
VLDLC SERPL CALC-MCNC: 40 MG/DL (ref 5–40)
WBC # BLD AUTO: 12.7 X10E3/UL (ref 3.4–10.8)

## 2019-03-11 ENCOUNTER — OFFICE VISIT (OUTPATIENT)
Dept: INTERNAL MEDICINE | Facility: CLINIC | Age: 79
End: 2019-03-11

## 2019-03-11 VITALS
OXYGEN SATURATION: 94 % | HEIGHT: 69 IN | WEIGHT: 186.8 LBS | DIASTOLIC BLOOD PRESSURE: 66 MMHG | HEART RATE: 60 BPM | BODY MASS INDEX: 27.67 KG/M2 | SYSTOLIC BLOOD PRESSURE: 110 MMHG | TEMPERATURE: 97.7 F

## 2019-03-11 DIAGNOSIS — K76.0 STEATOSIS OF LIVER: ICD-10-CM

## 2019-03-11 DIAGNOSIS — D50.0 IRON DEFICIENCY ANEMIA DUE TO CHRONIC BLOOD LOSS: ICD-10-CM

## 2019-03-11 DIAGNOSIS — M19.90 ARTHRITIS: ICD-10-CM

## 2019-03-11 DIAGNOSIS — K82.9 DISORDER OF GALLBLADDER: ICD-10-CM

## 2019-03-11 DIAGNOSIS — E78.5 HYPERLIPIDEMIA, UNSPECIFIED HYPERLIPIDEMIA TYPE: ICD-10-CM

## 2019-03-11 DIAGNOSIS — G47.09 OTHER INSOMNIA: ICD-10-CM

## 2019-03-11 DIAGNOSIS — B35.1 ONYCHOMYCOSIS: ICD-10-CM

## 2019-03-11 DIAGNOSIS — E11.8 TYPE 2 DIABETES MELLITUS WITH COMPLICATION, WITHOUT LONG-TERM CURRENT USE OF INSULIN (HCC): ICD-10-CM

## 2019-03-11 DIAGNOSIS — K21.9 GASTROESOPHAGEAL REFLUX DISEASE WITHOUT ESOPHAGITIS: ICD-10-CM

## 2019-03-11 DIAGNOSIS — K63.5 BENIGN COLONIC POLYP: ICD-10-CM

## 2019-03-11 DIAGNOSIS — I10 BENIGN ESSENTIAL HYPERTENSION: ICD-10-CM

## 2019-03-11 DIAGNOSIS — G47.30 SLEEP APNEA, UNSPECIFIED TYPE: ICD-10-CM

## 2019-03-11 DIAGNOSIS — F41.9 ANXIETY: ICD-10-CM

## 2019-03-11 DIAGNOSIS — K64.9 HEMORRHOIDS, UNSPECIFIED HEMORRHOID TYPE: ICD-10-CM

## 2019-03-11 DIAGNOSIS — D75.839 THROMBOCYTHEMIA: ICD-10-CM

## 2019-03-11 DIAGNOSIS — I25.10 CORONARY ARTERIOSCLEROSIS IN NATIVE ARTERY: Primary | ICD-10-CM

## 2019-03-11 DIAGNOSIS — H91.90 HEARING LOSS, UNSPECIFIED HEARING LOSS TYPE, UNSPECIFIED LATERALITY: ICD-10-CM

## 2019-03-11 DIAGNOSIS — E61.1 IRON DEFICIENCY: ICD-10-CM

## 2019-03-11 DIAGNOSIS — H81.10 BENIGN PAROXYSMAL POSITIONAL VERTIGO, UNSPECIFIED LATERALITY: ICD-10-CM

## 2019-03-11 DIAGNOSIS — J43.8 OTHER EMPHYSEMA (HCC): ICD-10-CM

## 2019-03-11 PROBLEM — N39.0 URINARY TRACT INFECTION WITH HEMATURIA: Status: RESOLVED | Noted: 2018-08-10 | Resolved: 2019-03-11

## 2019-03-11 PROBLEM — R31.0 GROSS HEMATURIA: Status: RESOLVED | Noted: 2018-08-10 | Resolved: 2019-03-11

## 2019-03-11 PROBLEM — R19.5 HEME POSITIVE STOOL: Status: RESOLVED | Noted: 2018-07-30 | Resolved: 2019-03-11

## 2019-03-11 PROBLEM — R31.9 URINARY TRACT INFECTION WITH HEMATURIA: Status: RESOLVED | Noted: 2018-08-10 | Resolved: 2019-03-11

## 2019-03-11 PROCEDURE — 96160 PT-FOCUSED HLTH RISK ASSMT: CPT | Performed by: INTERNAL MEDICINE

## 2019-03-11 PROCEDURE — 99397 PER PM REEVAL EST PAT 65+ YR: CPT | Performed by: INTERNAL MEDICINE

## 2019-03-11 PROCEDURE — 99213 OFFICE O/P EST LOW 20 MIN: CPT | Performed by: INTERNAL MEDICINE

## 2019-03-11 PROCEDURE — G0439 PPPS, SUBSEQ VISIT: HCPCS | Performed by: INTERNAL MEDICINE

## 2019-03-11 RX ORDER — TRAZODONE HYDROCHLORIDE 50 MG/1
TABLET ORAL
Qty: 90 TABLET | Refills: 1 | Status: SHIPPED | OUTPATIENT
Start: 2019-03-11 | End: 2019-03-11

## 2019-03-11 RX ORDER — TERBINAFINE HYDROCHLORIDE 250 MG/1
250 TABLET ORAL DAILY
Qty: 30 TABLET | Refills: 2 | Status: SHIPPED | OUTPATIENT
Start: 2019-03-11 | End: 2019-04-10 | Stop reason: SDUPTHER

## 2019-03-11 NOTE — PROGRESS NOTES
QUICK REFERENCE INFORMATION:  The ABCs of the Annual Wellness Visit    Subsequent Medicare Wellness Visit    HEALTH RISK ASSESSMENT    1940    Recent Hospitalizations:  No hospitalization(s) within the last year..        Current Medical Providers:  Patient Care Team:  Maicol Cardenas MD as PCP - General (Internal Medicine)  Maicol Cardenas MD as PCP - Family Medicine  Sara Reynoso MD as Consulting Physician (General Surgery)        Smoking Status:  Social History     Tobacco Use   Smoking Status Former Smoker   • Packs/day: 3.00   • Years: 42.00   • Pack years: 126.00   • Types: Cigarettes   • Start date: 1957   • Last attempt to quit:    • Years since quittin.0   Smokeless Tobacco Never Used       Alcohol Consumption:  Social History     Substance and Sexual Activity   Alcohol Use No       Depression Screen:   PHQ-2/PHQ-9 Depression Screening 10/27/2016   Little interest or pleasure in doing things 0   Feeling down, depressed, or hopeless 0   Trouble falling or staying asleep, or sleeping too much 0   Feeling tired or having little energy 0   Poor appetite or overeating 0   Feeling bad about yourself - or that you are a failure or have let yourself or your family down 0   Trouble concentrating on things, such as reading the newspaper or watching television 0   Moving or speaking so slowly that other people could have noticed. Or the opposite - being so fidgety or restless that you have been moving around a lot more than usual 0   Thoughts that you would be better off dead, or of hurting yourself in some way 0   Total Score 0   If you checked off any problems, how difficult have these problems made it for you to do your work, take care of things at home, or get along with other people? Not difficult at all       Health Habits and Functional and Cognitive Screening:  Functional & Cognitive Status 10/27/2016   Do you have difficulty preparing food and eating? No   Do you have difficulty bathing  yourself, getting dressed or grooming yourself? No   Do you have difficulty using the toilet? No   Do you have difficulty moving around from place to place? No   In the past year have you fallen or experienced a near fall? No   Do you need help using the phone?  No   Are you deaf or do you have serious difficulty hearing?  No   Do you need help with transportation? No   Do you need help shopping? No   Do you need help preparing meals?  No   Do you need help with housework?  No   Do you need help with laundry? No   Do you need help taking your medications? No   Do you need help managing money? No   Do you have difficulty concentrating, remembering or making decisions? No           Does the patient have evidence of cognitive impairment? No    Aspirin use counseling: Taking ASA appropriately as indicated      Recent Lab Results:  CMP:  Lab Results   Component Value Date     (H) 03/05/2019    BUN 18 03/05/2019    CREATININE 0.96 03/05/2019    EGFRIFNONA 76 03/05/2019    EGFRIFAFRI 88 03/05/2019    BCR 19 03/05/2019     03/05/2019    K 4.8 03/05/2019    CO2 21 03/05/2019    CALCIUM 9.4 03/05/2019    PROTENTOTREF 6.9 03/05/2019    ALBUMIN 4.0 03/05/2019    LABGLOBREF 2.9 03/05/2019    LABIL2 1.4 03/05/2019    BILITOT 0.3 03/05/2019    ALKPHOS 145 (H) 03/05/2019    AST 20 03/05/2019    ALT 16 03/05/2019     Lipid Panel:  Lab Results   Component Value Date    CHOL 134 10/31/2016    TRIG 202 (H) 03/05/2019    HDL 30 (L) 03/05/2019    VLDL 40 03/05/2019     HbA1c:  Lab Results   Component Value Date    HGBA1C 6.1 (H) 03/05/2019       Visual Acuity:  No exam data present    Age-appropriate Screening Schedule:  Refer to the list below for future screening recommendations based on patient's age, sex and/or medical conditions. Orders for these recommended tests are listed in the plan section. The patient has been provided with a written plan.    Health Maintenance   Topic Date Due   • URINE MICROALBUMIN  1940    • TDAP/TD VACCINES (1 - Tdap) 10/12/1960   • ZOSTER VACCINE (2 of 2) 11/16/2005   • HEMOGLOBIN A1C  09/05/2019   • LIPID PANEL  03/05/2020   • COLONOSCOPY  08/02/2028   • INFLUENZA VACCINE  Completed   • PNEUMOCOCCAL VACCINES (65+ LOW/MEDIUM RISK)  Completed        Subjective   History of Present Illness  Patient here for follow-up of.  History of a fatty liver normalized after we cut down Lipitor.  Coronary artery disease is stable without chest pain.  Prediabetes is stable now.  GERD stable on medication.  Leukocytosis thrombocytosis anemia patient is seeing hematologist due to splenectomy in the past.  Also patient has onychomycosis seeking treatment patient has toenail infection for many years history of medication patient did not remember how long he took.  COPD stable on medication.  Vitamin D stable on supplement.  Lung nodule stable.  Colonoscopy showed a colon polyps.  Vocal cord paralysis in the past.  Weight is still elevated and a renal cyst 3.3 cm.  Iron normal now.  Insomnia stable on medication.  Jairo Mercedes is a 78 y.o. male who presents for an Subsequent Wellness Visit.    The following portions of the patient's history were reviewed and updated as appropriate: allergies, current medications, past family history, past medical history, past social history, past surgical history and problem list.    Outpatient Medications Prior to Visit   Medication Sig Dispense Refill   • ADVAIR DISKUS 250-50 MCG/DOSE DISKUS INHALE ONE PUFF TWICE DAILY 1 each 3   • aspirin 81 MG EC tablet Take 81 mg by mouth Daily.     • atorvastatin (LIPITOR) 40 MG tablet Take 40 mg by mouth every night at bedtime.  5   • atorvastatin (LIPITOR) 40 MG tablet TAKE ONE TABLET BY MOUTH AT BEDTIME 30 tablet 5   • cetirizine (zyrTEC) 10 MG tablet Take 10 mg by mouth Daily As Needed for Allergies.     • cholecalciferol (VITAMIN D3) 1000 units tablet Take 1,000 Units by mouth 2 (Two) Times a Day.     • esomeprazole (nexIUM) 40 MG  capsule TAKE ONE CAPSULE BY MOUTH EVERY DAY 30 capsule 5   • guaiFENesin (MUCINEX) 600 MG 12 hr tablet Take 1 tablet by mouth Every 12 (Twelve) Hours. 20 tablet 0   • isosorbide mononitrate (IMDUR) 30 MG 24 hr tablet Take 1 tablet by mouth Daily. as directed 90 tablet 3   • meclizine (ANTIVERT) 25 MG tablet Take 25 mg by mouth 2 (Two) Times a Day.     • meclizine (ANTIVERT) 25 MG tablet TAKE ONE TABLET BY MOUTH TWICE DAILY 180 tablet 3   • metoprolol tartrate (LOPRESSOR) 25 MG tablet TAKE ONE TABLET BY MOUTH TWICE DAILY (Patient taking differently: twice daily) 60 tablet 5   • nitroglycerin (NITROSTAT) 0.4 MG SL tablet Place 0.4 mg under the tongue Every 5 (Five) Minutes As Needed for Chest Pain.     • Omega-3 Fatty Acids (FISH OIL) 1000 MG capsule capsule Take 1,000 mg by mouth Daily With Breakfast.     • predniSONE (DELTASONE) 10 MG tablet Take 4 tablets by mouth once daily for 2 days, then 2 tablets daily for 2 days, then 1 tablet daily for 2 days, then stop. 14 tablet 0   • sertraline (ZOLOFT) 25 MG tablet TAKE ONE TABLET BY MOUTH EVERY DAY 90 tablet 3   • SPIRIVA HANDIHALER 18 MCG per inhalation capsule INHALE THE ENTIRE CONTENTS OF 1 CAPSULE ONCE A DAY USING HANDIHALER DEVICE 90 capsule 1   • traZODone (DESYREL) 50 MG tablet trazodone 50 mg tablet   one tablet at bedtime     • VENTOLIN  (90 Base) MCG/ACT inhaler INHALE 1-2 PUFFS BY MOUTH EVERY 4 TO 6 HOURS AS NEEDED 18 g 2     No facility-administered medications prior to visit.        Patient Active Problem List   Diagnosis   • Atopic rhinitis   • Anxiety   • Arthritis   • Coronary arteriosclerosis in native artery   • Benign colonic polyp   • Benign essential hypertension   • Benign paroxysmal positional vertigo   • Chronic obstructive pulmonary disease (CMS/HCC)   • Diabetes mellitus (CMS/HCC)   • Eczema   • Steatosis of liver   • Disorder of gallbladder   • Gastroesophageal reflux disease without esophagitis   • Hearing loss   • Hemorrhoids   •  Herpes zoster   • Hyperlipidemia   • Insomnia   • Iron deficiency   • Malignant melanoma of skin (CMS/HCC)   • Thrombocythemia (CMS/HCC)   • Pruritus ani   • Iron deficiency anemia due to chronic blood loss   • Heme positive stool   • Gross hematuria   • Urinary tract infection with hematuria   • Sleep apnea       Advance Care Planning:  has an advance directive - a copy HAS NOT been provided    Identification of Risk Factors:  Risk factors include: weight .    Review of Systems   Constitutional: Negative.    HENT: Negative.    Eyes: Negative.    Respiratory: Negative.    Cardiovascular: Negative.    Gastrointestinal: Negative.    Endocrine: Negative.    Genitourinary: Negative.    Musculoskeletal: Negative.    Skin: Positive for color change.        Toenail fungal infection   Allergic/Immunologic: Negative.    Neurological: Negative.    Hematological: Negative.    Psychiatric/Behavioral: Negative.    All other systems reviewed and are negative.      Compared to one year ago, the patient feels his physical health is the same.  Compared to one year ago, the patient feels his mental health is the same.    Objective     Physical Exam   Constitutional: He is oriented to person, place, and time. He appears well-developed and well-nourished.   HENT:   Head: Normocephalic and atraumatic.   Right Ear: External ear normal.   Left Ear: External ear normal.   Nose: Nose normal.   Mouth/Throat: Oropharynx is clear and moist.   Eyes: Conjunctivae and EOM are normal. Pupils are equal, round, and reactive to light.   Neck: Normal range of motion. Neck supple. No thyromegaly present.   Cardiovascular: Normal rate, regular rhythm, normal heart sounds and intact distal pulses.   Pulmonary/Chest: Effort normal and breath sounds normal.   Abdominal: Soft. Bowel sounds are normal.   Genitourinary: Rectum normal, prostate normal and penis normal.   Musculoskeletal: Normal range of motion.   Neurological: He is alert and oriented to  person, place, and time. He has normal reflexes.   Skin: Skin is warm and dry.   Onychomycosis   Psychiatric: He has a normal mood and affect. His behavior is normal. Judgment and thought content normal.   Nursing note and vitals reviewed.      There were no vitals filed for this visit.    Patient's There is no height or weight on file to calculate BMI. BMI is above normal parameters. Recommendations include: nutrition counseling.      Assessment/Plan   Patient Self-Management and Personalized Health Advice  The patient has been provided with information about: diet, exercise and weight management and preventive services including:   · Nutrition counseling provided.    Visit Diagnoses:  No diagnosis found.    No orders of the defined types were placed in this encounter.      Outpatient Encounter Medications as of 3/11/2019   Medication Sig Dispense Refill   • ADVAIR DISKUS 250-50 MCG/DOSE DISKUS INHALE ONE PUFF TWICE DAILY 1 each 3   • aspirin 81 MG EC tablet Take 81 mg by mouth Daily.     • atorvastatin (LIPITOR) 40 MG tablet Take 40 mg by mouth every night at bedtime.  5   • atorvastatin (LIPITOR) 40 MG tablet TAKE ONE TABLET BY MOUTH AT BEDTIME 30 tablet 5   • cetirizine (zyrTEC) 10 MG tablet Take 10 mg by mouth Daily As Needed for Allergies.     • cholecalciferol (VITAMIN D3) 1000 units tablet Take 1,000 Units by mouth 2 (Two) Times a Day.     • esomeprazole (nexIUM) 40 MG capsule TAKE ONE CAPSULE BY MOUTH EVERY DAY 30 capsule 5   • guaiFENesin (MUCINEX) 600 MG 12 hr tablet Take 1 tablet by mouth Every 12 (Twelve) Hours. 20 tablet 0   • isosorbide mononitrate (IMDUR) 30 MG 24 hr tablet Take 1 tablet by mouth Daily. as directed 90 tablet 3   • meclizine (ANTIVERT) 25 MG tablet Take 25 mg by mouth 2 (Two) Times a Day.     • meclizine (ANTIVERT) 25 MG tablet TAKE ONE TABLET BY MOUTH TWICE DAILY 180 tablet 3   • metoprolol tartrate (LOPRESSOR) 25 MG tablet TAKE ONE TABLET BY MOUTH TWICE DAILY (Patient taking  differently: twice daily) 60 tablet 5   • nitroglycerin (NITROSTAT) 0.4 MG SL tablet Place 0.4 mg under the tongue Every 5 (Five) Minutes As Needed for Chest Pain.     • Omega-3 Fatty Acids (FISH OIL) 1000 MG capsule capsule Take 1,000 mg by mouth Daily With Breakfast.     • predniSONE (DELTASONE) 10 MG tablet Take 4 tablets by mouth once daily for 2 days, then 2 tablets daily for 2 days, then 1 tablet daily for 2 days, then stop. 14 tablet 0   • sertraline (ZOLOFT) 25 MG tablet TAKE ONE TABLET BY MOUTH EVERY DAY 90 tablet 3   • SPIRIVA HANDIHALER 18 MCG per inhalation capsule INHALE THE ENTIRE CONTENTS OF 1 CAPSULE ONCE A DAY USING HANDIHALER DEVICE 90 capsule 1   • traZODone (DESYREL) 50 MG tablet trazodone 50 mg tablet   one tablet at bedtime     • VENTOLIN  (90 Base) MCG/ACT inhaler INHALE 1-2 PUFFS BY MOUTH EVERY 4 TO 6 HOURS AS NEEDED 18 g 2     No facility-administered encounter medications on file as of 3/11/2019.        Reviewed use of high risk medication in the elderly: yes  Reviewed for potential of harmful drug interactions in the elderly: no    Follow Up:  No Follow-up on file.     An After Visit Summary and PPPS with all of these plans were given to the patient.         #1: Adenopathy, intra-abdominal. biopsy, reactive changes only follow up : Hematology/oncology. history of ITP s/p splenectomy, higher WBC and plts, now RBC low , watch--  #2: copd, continue medicine  #3: fatty liver, s/p cholecystectomy. history of gall stone, follow up with GI, liver enzymes worsened after Lipitor 80 mg. Asked patient to cut down Lipitor 40 mg daily, discontinue acetaminophen, weight loss.alkaline phosphatase is elevated performed isoenzymes.  #4: Coronary heart disease,blockage unable to stent. 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 continue to watch  Vocal cord paralysis follow up with ENT  right thumb tendonitis,resolved after tx. XR severe arthritis.  hemorroid continue medicine   Overweight encouraged patient to have good diet  renal cyst 3.3 cm, MRI November 2014 showed a 2 cm. Repeat normal  low iron normal  s/p iron transfusion, --  insomia continue trazodone  vitD low vitD3 2000u daily  s/p pneumonia shot, prevnar 10/27, Td to HD , zostavax done, shingrix informed  aaa screen 3cm repeated 12/2017 smaller now, repeat 12/2019  Lung nodule, repeat in 7/2018 showed locular effusion, by lung doctor. --  Colon 8/2/18 colon polyp , repeat 5 years  Onychomycosis initiate Lamisil patient understand the risk of the medication will do liver enzyme check in 1 month  BPH continue PSA       1 month follow-up after blood test

## 2019-03-21 RX ORDER — TIOTROPIUM BROMIDE 18 UG/1
CAPSULE ORAL; RESPIRATORY (INHALATION)
Qty: 90 CAPSULE | Refills: 3 | Status: SHIPPED | OUTPATIENT
Start: 2019-03-21 | End: 2020-04-06

## 2019-04-05 LAB
ALBUMIN SERPL-MCNC: 3.9 G/DL (ref 3.5–5)
ALBUMIN/GLOB SERPL: 1.4 G/DL (ref 1–2)
ALP SERPL-CCNC: 142 U/L (ref 38–126)
ALT SERPL-CCNC: 23 U/L (ref 13–69)
AST SERPL-CCNC: 25 U/L (ref 15–46)
BASOPHILS # BLD AUTO: 0.08 10*3/MM3 (ref 0–0.2)
BASOPHILS NFR BLD AUTO: 0.6 % (ref 0–1.5)
BILIRUB SERPL-MCNC: 0.4 MG/DL (ref 0.2–1.3)
BUN SERPL-MCNC: 22 MG/DL (ref 7–20)
BUN/CREAT SERPL: 27.5 (ref 6.3–21.9)
CALCIUM SERPL-MCNC: 9.6 MG/DL (ref 8.4–10.2)
CHLORIDE SERPL-SCNC: 102 MMOL/L (ref 98–107)
CO2 SERPL-SCNC: 27 MMOL/L (ref 26–30)
CREAT SERPL-MCNC: 0.8 MG/DL (ref 0.6–1.3)
EOSINOPHIL # BLD AUTO: 0.35 10*3/MM3 (ref 0–0.4)
EOSINOPHIL NFR BLD AUTO: 2.7 % (ref 0.3–6.2)
ERYTHROCYTE [DISTWIDTH] IN BLOOD BY AUTOMATED COUNT: 17.1 % (ref 12.3–15.4)
GLOBULIN SER CALC-MCNC: 2.8 GM/DL
GLUCOSE SERPL-MCNC: 94 MG/DL (ref 74–98)
HCT VFR BLD AUTO: 40.2 % (ref 37.5–51)
HGB BLD-MCNC: 12.8 G/DL (ref 13–17.7)
IMM GRANULOCYTES # BLD AUTO: 0.08 10*3/MM3 (ref 0–0.05)
IMM GRANULOCYTES NFR BLD AUTO: 0.6 % (ref 0–0.5)
LYMPHOCYTES # BLD AUTO: 3.07 10*3/MM3 (ref 0.7–3.1)
LYMPHOCYTES NFR BLD AUTO: 23.8 % (ref 19.6–45.3)
MCH RBC QN AUTO: 28.4 PG (ref 26.6–33)
MCHC RBC AUTO-ENTMCNC: 31.8 G/DL (ref 31.5–35.7)
MCV RBC AUTO: 89.3 FL (ref 79–97)
MONOCYTES # BLD AUTO: 1.34 10*3/MM3 (ref 0.1–0.9)
MONOCYTES NFR BLD AUTO: 10.4 % (ref 5–12)
NEUTROPHILS # BLD AUTO: 7.97 10*3/MM3 (ref 1.4–7)
NEUTROPHILS NFR BLD AUTO: 61.9 % (ref 42.7–76)
NRBC BLD AUTO-RTO: 0 /100 WBC (ref 0–0)
PLATELET # BLD AUTO: 478 10*3/MM3 (ref 140–450)
POTASSIUM SERPL-SCNC: 4.4 MMOL/L (ref 3.5–5.1)
PROT SERPL-MCNC: 6.7 G/DL (ref 6.3–8.2)
RBC # BLD AUTO: 4.5 10*6/MM3 (ref 4.14–5.8)
SODIUM SERPL-SCNC: 140 MMOL/L (ref 137–145)
WBC # BLD AUTO: 12.89 10*3/MM3 (ref 3.4–10.8)

## 2019-04-10 ENCOUNTER — OFFICE VISIT (OUTPATIENT)
Dept: INTERNAL MEDICINE | Facility: CLINIC | Age: 79
End: 2019-04-10

## 2019-04-10 VITALS
TEMPERATURE: 97.6 F | HEIGHT: 69 IN | DIASTOLIC BLOOD PRESSURE: 66 MMHG | HEART RATE: 55 BPM | BODY MASS INDEX: 28.16 KG/M2 | SYSTOLIC BLOOD PRESSURE: 130 MMHG | WEIGHT: 190.12 LBS | OXYGEN SATURATION: 94 %

## 2019-04-10 DIAGNOSIS — F41.9 ANXIETY: ICD-10-CM

## 2019-04-10 DIAGNOSIS — J43.8 OTHER EMPHYSEMA (HCC): ICD-10-CM

## 2019-04-10 DIAGNOSIS — K63.5 BENIGN COLONIC POLYP: ICD-10-CM

## 2019-04-10 DIAGNOSIS — M19.90 ARTHRITIS: ICD-10-CM

## 2019-04-10 DIAGNOSIS — D50.0 IRON DEFICIENCY ANEMIA DUE TO CHRONIC BLOOD LOSS: ICD-10-CM

## 2019-04-10 DIAGNOSIS — G47.30 SLEEP APNEA, UNSPECIFIED TYPE: ICD-10-CM

## 2019-04-10 DIAGNOSIS — K76.0 STEATOSIS OF LIVER: ICD-10-CM

## 2019-04-10 DIAGNOSIS — I25.10 CORONARY ARTERIOSCLEROSIS IN NATIVE ARTERY: Primary | ICD-10-CM

## 2019-04-10 DIAGNOSIS — E11.8 TYPE 2 DIABETES MELLITUS WITH COMPLICATION, WITHOUT LONG-TERM CURRENT USE OF INSULIN (HCC): ICD-10-CM

## 2019-04-10 DIAGNOSIS — G47.09 OTHER INSOMNIA: ICD-10-CM

## 2019-04-10 DIAGNOSIS — E78.5 HYPERLIPIDEMIA, UNSPECIFIED HYPERLIPIDEMIA TYPE: ICD-10-CM

## 2019-04-10 DIAGNOSIS — I10 BENIGN ESSENTIAL HYPERTENSION: ICD-10-CM

## 2019-04-10 DIAGNOSIS — K21.9 GASTROESOPHAGEAL REFLUX DISEASE WITHOUT ESOPHAGITIS: ICD-10-CM

## 2019-04-10 DIAGNOSIS — B35.1 ONYCHOMYCOSIS: ICD-10-CM

## 2019-04-10 PROCEDURE — 99214 OFFICE O/P EST MOD 30 MIN: CPT | Performed by: INTERNAL MEDICINE

## 2019-04-10 RX ORDER — TERBINAFINE HYDROCHLORIDE 250 MG/1
250 TABLET ORAL DAILY
Qty: 30 TABLET | Refills: 2 | Status: SHIPPED | OUTPATIENT
Start: 2019-04-10 | End: 2019-07-01 | Stop reason: SDUPTHER

## 2019-04-10 NOTE — PROGRESS NOTES
Subjective   Jairo Mercedes is a 78 y.o. male.     Chief Complaint   Patient presents with   • Follow-up       History of Present Illness   Patient here for follow-up of.  COPD stable on nighttime oxygen.  Fatty liver stable.  Liver enzymes repeat normal now.  Onychomycosis improved on Lamisil no apparent side effect towards the liver.  Prediabetes is stable.  Hyperlipidemia stable on medication.  Hemoglobin still low.  Patient is seeing hematologist.    Current Outpatient Medications:   •  ADVAIR DISKUS 250-50 MCG/DOSE DISKUS, INHALE ONE PUFF TWICE DAILY, Disp: 1 each, Rfl: 3  •  aspirin 81 MG EC tablet, Take 81 mg by mouth Daily., Disp: , Rfl:   •  atorvastatin (LIPITOR) 40 MG tablet, TAKE ONE TABLET BY MOUTH AT BEDTIME, Disp: 30 tablet, Rfl: 5  •  cetirizine (zyrTEC) 10 MG tablet, Take 10 mg by mouth Daily As Needed for Allergies., Disp: , Rfl:   •  cholecalciferol (VITAMIN D3) 1000 units tablet, Take 1,000 Units by mouth 2 (Two) Times a Day., Disp: , Rfl:   •  esomeprazole (nexIUM) 40 MG capsule, TAKE ONE CAPSULE BY MOUTH EVERY DAY, Disp: 30 capsule, Rfl: 5  •  guaiFENesin (MUCINEX) 600 MG 12 hr tablet, Take 1 tablet by mouth Every 12 (Twelve) Hours., Disp: 20 tablet, Rfl: 0  •  isosorbide mononitrate (IMDUR) 30 MG 24 hr tablet, Take 1 tablet by mouth Daily. as directed, Disp: 90 tablet, Rfl: 3  •  meclizine (ANTIVERT) 25 MG tablet, TAKE ONE TABLET BY MOUTH TWICE DAILY, Disp: 180 tablet, Rfl: 3  •  metoprolol tartrate (LOPRESSOR) 25 MG tablet, TAKE ONE TABLET BY MOUTH TWICE DAILY (Patient taking differently: twice daily), Disp: 60 tablet, Rfl: 5  •  nitroglycerin (NITROSTAT) 0.4 MG SL tablet, Place 0.4 mg under the tongue Every 5 (Five) Minutes As Needed for Chest Pain., Disp: , Rfl:   •  Omega-3 Fatty Acids (FISH OIL) 1000 MG capsule capsule, Take 1,000 mg by mouth Daily With Breakfast., Disp: , Rfl:   •  sertraline (ZOLOFT) 25 MG tablet, TAKE ONE TABLET BY MOUTH EVERY DAY, Disp: 90 tablet, Rfl: 3  •  SPIRIVA  HANDIHALER 18 MCG per inhalation capsule, INHALE THE ENTIRE CONTENTS OF 1 CAPSULE ONCE A DAY USING HANDIHALER DEVICE, Disp: 90 capsule, Rfl: 3  •  terbinafine (LAMISIL) 250 MG tablet, Take 1 tablet by mouth Daily., Disp: 30 tablet, Rfl: 2  •  traZODone (DESYREL) 50 MG tablet, trazodone 50 mg tablet  one tablet at bedtime, Disp: , Rfl:   •  VENTOLIN  (90 Base) MCG/ACT inhaler, INHALE 1-2 PUFFS BY MOUTH EVERY 4 TO 6 HOURS AS NEEDED, Disp: 18 g, Rfl: 2    The following portions of the patient's history were reviewed and updated as appropriate: allergies, current medications, past family history, past medical history, past social history, past surgical history and problem list.    Review of Systems   Constitutional: Negative.    Respiratory: Negative.    Cardiovascular: Negative.    Gastrointestinal: Negative.    Musculoskeletal: Negative.    Skin: Negative.    Neurological: Negative.    Psychiatric/Behavioral: Negative.        Objective   Physical Exam   Constitutional: He is oriented to person, place, and time. He appears well-nourished.   Neck: Neck supple.   Cardiovascular: Normal rate, regular rhythm and normal heart sounds.   Pulmonary/Chest: Effort normal and breath sounds normal.   Abdominal: Bowel sounds are normal.   Neurological: He is alert and oriented to person, place, and time.   Skin: Skin is warm.   Psychiatric: He has a normal mood and affect.       All tests have been reviewed.    Assessment/Plan   There are no diagnoses linked to this encounter.             #1: Adenopathy, intra-abdominal. biopsy, reactive changes only follow up : Hematology/oncology. history of ITP s/p splenectomy, higher WBC and plts, now Hb low , watch--  #2: copd, continue medicine night time O2  #3: fatty liver, s/p cholecystectomy. history of gall stone, follow up with GI, liver enzymes normal now after cutting Lipitor 80 mg. Asked patient to cut down Lipitor 40 mg daily, discontinue acetaminophen, weight loss.alkaline  phosphatase is elevated watch for now  #4: Coronary heart disease,blockage unable to stent. continue medicine prn NTG,metoprolol 25 bid, f/u cardio  #5: Prediabetes.last a1c 6.1, patient is diabetic, patient refuses medicines  #6: Hyperlipidemia, cont [id number] mg--  #7: Mild chronic anxiety, continue medicine  #8: GERD continue medicine,   Leukocytosis thrombocytosis lymphocytosis monocytosis history of splenectomy for possible thrombocytopenia. continue to watch--  Vocal cord paralysis follow up with ENT  right thumb tendonitis,resolved after tx. XR severe arthritis.  hemorroid continue medicine   Overweight encouraged patient to have good diet  renal cyst 3.3 cm, MRI November 2014 showed a 2 cm. Repeat normal  low iron normal  s/p iron transfusion, follow hematologist  blanca continue trazodone  vitD low vitD3 2000u daily  s/p pneumonia shot, prevnar 10/27, Td to HD , zostavax done, shingrix informed  aaa screen 3cm repeated 12/2017 smaller now, repeat 12/2019  Lung nodule, repeat in 7/2018 showed locular effusion, by lung doctor.   Colon 8/2/18 colon polyp , repeat 5 years  Onychomycosis on Lamisil ,liver enzyme normal after 4 weeks recheck in 1 month--  BPH continue PSA       1 month follow-up after blood test

## 2019-05-20 LAB
ALBUMIN SERPL-MCNC: 4 G/DL (ref 3.5–5)
ALBUMIN/GLOB SERPL: 1.5 G/DL (ref 1–2)
ALP SERPL-CCNC: 149 U/L (ref 38–126)
ALT SERPL-CCNC: 32 U/L (ref 13–69)
AST SERPL-CCNC: 27 U/L (ref 15–46)
BASOPHILS # BLD AUTO: 0.06 10*3/MM3 (ref 0–0.2)
BASOPHILS NFR BLD AUTO: 0.5 % (ref 0–1.5)
BILIRUB SERPL-MCNC: 0.4 MG/DL (ref 0.2–1.3)
BUN SERPL-MCNC: 18 MG/DL (ref 7–20)
BUN/CREAT SERPL: 20 (ref 6.3–21.9)
CALCIUM SERPL-MCNC: 9.4 MG/DL (ref 8.4–10.2)
CHLORIDE SERPL-SCNC: 102 MMOL/L (ref 98–107)
CO2 SERPL-SCNC: 26 MMOL/L (ref 26–30)
CREAT SERPL-MCNC: 0.9 MG/DL (ref 0.6–1.3)
EOSINOPHIL # BLD AUTO: 0.2 10*3/MM3 (ref 0–0.4)
EOSINOPHIL NFR BLD AUTO: 1.8 % (ref 0.3–6.2)
ERYTHROCYTE [DISTWIDTH] IN BLOOD BY AUTOMATED COUNT: 16.8 % (ref 12.3–15.4)
GLOBULIN SER CALC-MCNC: 2.7 GM/DL
GLUCOSE SERPL-MCNC: 101 MG/DL (ref 74–98)
HCT VFR BLD AUTO: 40.1 % (ref 37.5–51)
HGB BLD-MCNC: 12.9 G/DL (ref 13–17.7)
IMM GRANULOCYTES # BLD AUTO: 0.06 10*3/MM3 (ref 0–0.05)
IMM GRANULOCYTES NFR BLD AUTO: 0.5 % (ref 0–0.5)
LYMPHOCYTES # BLD AUTO: 2.34 10*3/MM3 (ref 0.7–3.1)
LYMPHOCYTES NFR BLD AUTO: 21.4 % (ref 19.6–45.3)
MCH RBC QN AUTO: 28.8 PG (ref 26.6–33)
MCHC RBC AUTO-ENTMCNC: 32.2 G/DL (ref 31.5–35.7)
MCV RBC AUTO: 89.5 FL (ref 79–97)
MONOCYTES # BLD AUTO: 1.55 10*3/MM3 (ref 0.1–0.9)
MONOCYTES NFR BLD AUTO: 14.2 % (ref 5–12)
NEUTROPHILS # BLD AUTO: 6.72 10*3/MM3 (ref 1.7–7)
NEUTROPHILS NFR BLD AUTO: 61.6 % (ref 42.7–76)
NRBC BLD AUTO-RTO: 0 /100 WBC (ref 0–0.2)
PLATELET # BLD AUTO: 465 10*3/MM3 (ref 140–450)
POTASSIUM SERPL-SCNC: 4.7 MMOL/L (ref 3.5–5.1)
PROT SERPL-MCNC: 6.7 G/DL (ref 6.3–8.2)
RBC # BLD AUTO: 4.48 10*6/MM3 (ref 4.14–5.8)
SODIUM SERPL-SCNC: 140 MMOL/L (ref 137–145)
WBC # BLD AUTO: 10.93 10*3/MM3 (ref 3.4–10.8)

## 2019-05-21 ENCOUNTER — OFFICE VISIT (OUTPATIENT)
Dept: INTERNAL MEDICINE | Facility: CLINIC | Age: 79
End: 2019-05-21

## 2019-05-21 VITALS
DIASTOLIC BLOOD PRESSURE: 66 MMHG | HEART RATE: 56 BPM | TEMPERATURE: 97.7 F | BODY MASS INDEX: 27.96 KG/M2 | HEIGHT: 69 IN | OXYGEN SATURATION: 90 % | WEIGHT: 188.8 LBS | SYSTOLIC BLOOD PRESSURE: 144 MMHG

## 2019-05-21 DIAGNOSIS — E11.8 TYPE 2 DIABETES MELLITUS WITH COMPLICATION, WITHOUT LONG-TERM CURRENT USE OF INSULIN (HCC): ICD-10-CM

## 2019-05-21 DIAGNOSIS — I10 BENIGN ESSENTIAL HYPERTENSION: Primary | ICD-10-CM

## 2019-05-21 DIAGNOSIS — J43.8 OTHER EMPHYSEMA (HCC): ICD-10-CM

## 2019-05-21 DIAGNOSIS — E78.5 HYPERLIPIDEMIA, UNSPECIFIED HYPERLIPIDEMIA TYPE: ICD-10-CM

## 2019-05-21 DIAGNOSIS — B35.1 ONYCHOMYCOSIS: ICD-10-CM

## 2019-05-21 DIAGNOSIS — K76.0 STEATOSIS OF LIVER: ICD-10-CM

## 2019-05-21 PROCEDURE — 99213 OFFICE O/P EST LOW 20 MIN: CPT | Performed by: INTERNAL MEDICINE

## 2019-05-21 NOTE — PROGRESS NOTES
Subjective   Jairo Mercedes is a 78 y.o. male.     Chief Complaint   Patient presents with   • Follow-up     1 month f/u r/t lab work       History of Present Illness   Patient presents today for follow up on labs. Patient stated he is taking lamisil for onychomycosis. Liver enzyme stable. Patient stated he has not noticed any side effects from medication. He is seeing some improvement. Hypertension elevated today at 144/66. Patient is on medication for blood pressure. Patient denies chest pain, shortness of breath, or palpitations. COPD controlled with O2 at night. SpO2 90% on room air in clinic today. DM controlled with diet. Last A1C 6.1. Hyperlipidemia controlled on med. Patient denies abdominal pain, diarrhea, fevers or chills.     Current Outpatient Medications:   •  ADVAIR DISKUS 250-50 MCG/DOSE DISKUS, INHALE ONE PUFF TWICE DAILY, Disp: 1 each, Rfl: 3  •  aspirin 81 MG EC tablet, Take 81 mg by mouth Daily., Disp: , Rfl:   •  atorvastatin (LIPITOR) 40 MG tablet, TAKE ONE TABLET BY MOUTH AT BEDTIME, Disp: 30 tablet, Rfl: 5  •  cetirizine (zyrTEC) 10 MG tablet, Take 10 mg by mouth Daily As Needed for Allergies., Disp: , Rfl:   •  cholecalciferol (VITAMIN D3) 1000 units tablet, Take 1,000 Units by mouth 2 (Two) Times a Day., Disp: , Rfl:   •  esomeprazole (nexIUM) 40 MG capsule, TAKE ONE CAPSULE BY MOUTH EVERY DAY, Disp: 30 capsule, Rfl: 5  •  guaiFENesin (MUCINEX) 600 MG 12 hr tablet, Take 1 tablet by mouth Every 12 (Twelve) Hours., Disp: 20 tablet, Rfl: 0  •  isosorbide mononitrate (IMDUR) 30 MG 24 hr tablet, Take 1 tablet by mouth Daily. as directed, Disp: 90 tablet, Rfl: 3  •  meclizine (ANTIVERT) 25 MG tablet, TAKE ONE TABLET BY MOUTH TWICE DAILY, Disp: 180 tablet, Rfl: 3  •  metoprolol tartrate (LOPRESSOR) 25 MG tablet, TAKE ONE TABLET BY MOUTH TWICE DAILY (Patient taking differently: twice daily), Disp: 60 tablet, Rfl: 5  •  nitroglycerin (NITROSTAT) 0.4 MG SL tablet, Place 0.4 mg under the tongue Every 5  (Five) Minutes As Needed for Chest Pain., Disp: , Rfl:   •  Omega-3 Fatty Acids (FISH OIL) 1000 MG capsule capsule, Take 1,000 mg by mouth Daily With Breakfast., Disp: , Rfl:   •  sertraline (ZOLOFT) 25 MG tablet, TAKE ONE TABLET BY MOUTH EVERY DAY, Disp: 90 tablet, Rfl: 3  •  SPIRIVA HANDIHALER 18 MCG per inhalation capsule, INHALE THE ENTIRE CONTENTS OF 1 CAPSULE ONCE A DAY USING HANDIHALER DEVICE, Disp: 90 capsule, Rfl: 3  •  terbinafine (LAMISIL) 250 MG tablet, Take 1 tablet by mouth Daily., Disp: 30 tablet, Rfl: 2  •  traZODone (DESYREL) 50 MG tablet, trazodone 50 mg tablet  one tablet at bedtime, Disp: , Rfl:   •  VENTOLIN  (90 Base) MCG/ACT inhaler, INHALE 1-2 PUFFS BY MOUTH EVERY 4 TO 6 HOURS AS NEEDED, Disp: 18 g, Rfl: 2    The following portions of the patient's history were reviewed and updated as appropriate: allergies, current medications, past family history, past medical history, past social history, past surgical history and problem list.    Review of Systems   Constitutional: Negative for chills and fever.   HENT: Negative for congestion, nosebleeds, postnasal drip, rhinorrhea, sinus pressure, sinus pain, sneezing and sore throat.    Respiratory: Negative for cough, chest tightness, shortness of breath and wheezing.    Cardiovascular: Negative for chest pain and palpitations.   Gastrointestinal: Negative for abdominal pain, blood in stool, constipation, diarrhea and rectal pain.   Genitourinary: Negative for dysuria, flank pain, hematuria and urgency.   Neurological: Negative for dizziness, syncope, light-headedness, numbness and headaches.       Objective   Physical Exam   Constitutional: He is oriented to person, place, and time. He appears well-developed and well-nourished. No distress.   HENT:   Head: Normocephalic.   Eyes: Conjunctivae and EOM are normal.   Neck: Normal range of motion.   Cardiovascular: Normal rate, regular rhythm and normal heart sounds. Exam reveals no gallop and no  friction rub.   No murmur heard.  Pulmonary/Chest: Effort normal and breath sounds normal. No stridor. No respiratory distress. He has no wheezes. He has no rales.   Abdominal: Soft. He exhibits no distension. There is no tenderness.   Musculoskeletal: He exhibits no edema.   Neurological: He is alert and oriented to person, place, and time.   Skin: Skin is warm and dry.   Psychiatric: He has a normal mood and affect. His behavior is normal.   Nursing note and vitals reviewed.      All tests have been reviewed.    Assessment/Plan   Diagnoses and all orders for this visit:    Benign essential hypertension diet lower salt    Other emphysema (CMS/HCC) continue med    Type 2 diabetes mellitus with complication, without long-term current use of insulin (CMS/HCC) continue med    Steatosis of liver  -     Hepatic Function Panel    Hyperlipidemia, unspecified hyperlipidemia type    Onychomycosis on lamisil follow up liver function repeat in a month    As scheduled              historical record only   #1: Adenopathy, intra-abdominal. biopsy, reactive changes only follow up : Hematology/oncology. history of ITP s/p splenectomy, higher WBC and plts, now Hb low , watch--  #2: copd, continue medicine night time O2  #3: fatty liver, s/p cholecystectomy. history of gall stone, follow up with GI, liver enzymes normal now after cutting Lipitor 80 mg. Asked patient to cut down Lipitor 40 mg daily, discontinue acetaminophen, weight loss.alkaline phosphatase is elevated watch for now  #4: Coronary heart disease,blockage unable to stent. continue medicine prn NTG,metoprolol 25 bid, f/u cardio  #5: Prediabetes.last a1c 6.1, patient is diabetic, patient refuses medicines  #6: Hyperlipidemia, cont [id number] mg--  #7: Mild chronic anxiety, continue medicine  #8: GERD continue medicine,   Leukocytosis thrombocytosis lymphocytosis monocytosis history of splenectomy for possible thrombocytopenia. continue to watch--  Vocal cord paralysis  follow up with ENT  right thumb tendonitis,resolved after tx. XR severe arthritis.  hemorroid continue medicine   Overweight encouraged patient to have good diet  renal cyst 3.3 cm, MRI November 2014 showed a 2 cm. Repeat normal  low iron normal  s/p iron transfusion, follow hematologist  blanca continue trazodone  vitD low vitD3 2000u daily  s/p pneumonia shot, prevnar 10/27, Td to HD , zostavax done, shingrix informed  aaa screen 3cm repeated 12/2017 smaller now, repeat 12/2019  Lung nodule, repeat in 7/2018 showed locular effusion, by lung doctor.   Colon 8/2/18 colon polyp , repeat 5 years  Onychomycosis on Lamisil ,liver enzyme normal after 4 weeks recheck in 1 month--  BPH continue PSA       1 month follow-up after blood test

## 2019-06-04 RX ORDER — BENZONATATE 200 MG/1
CAPSULE ORAL
COMMUNITY
Start: 2019-05-04 | End: 2019-06-28

## 2019-06-10 RX ORDER — ATORVASTATIN CALCIUM 40 MG/1
TABLET, FILM COATED ORAL
Qty: 30 TABLET | Refills: 11 | Status: SHIPPED | OUTPATIENT
Start: 2019-06-10 | End: 2020-04-30

## 2019-06-10 RX ORDER — ESOMEPRAZOLE MAGNESIUM 40 MG/1
CAPSULE, DELAYED RELEASE ORAL
Qty: 30 CAPSULE | Refills: 11 | Status: SHIPPED | OUTPATIENT
Start: 2019-06-10 | End: 2020-06-05

## 2019-06-17 ENCOUNTER — LAB (OUTPATIENT)
Dept: LAB | Facility: HOSPITAL | Age: 79
End: 2019-06-17

## 2019-06-17 DIAGNOSIS — E61.1 IRON DEFICIENCY: Primary | ICD-10-CM

## 2019-06-17 DIAGNOSIS — E61.1 IRON DEFICIENCY: ICD-10-CM

## 2019-06-17 LAB
ALBUMIN SERPL-MCNC: 3.7 G/DL (ref 3.5–5)
ALBUMIN/GLOB SERPL: 1.2 G/DL (ref 1–2)
ALP SERPL-CCNC: 143 U/L (ref 38–126)
ALT SERPL W P-5'-P-CCNC: 40 U/L (ref 13–69)
ANION GAP SERPL CALCULATED.3IONS-SCNC: 13.2 MMOL/L (ref 10–20)
AST SERPL-CCNC: 28 U/L (ref 15–46)
BASOPHILS # BLD AUTO: 0.04 10*3/MM3 (ref 0–0.2)
BASOPHILS NFR BLD AUTO: 0.2 % (ref 0–1.5)
BILIRUB SERPL-MCNC: 0.3 MG/DL (ref 0.2–1.3)
BUN BLD-MCNC: 22 MG/DL (ref 7–20)
BUN/CREAT SERPL: 27.5 (ref 6.3–21.9)
CALCIUM SPEC-SCNC: 8.8 MG/DL (ref 8.4–10.2)
CHLORIDE SERPL-SCNC: 101 MMOL/L (ref 98–107)
CO2 SERPL-SCNC: 28 MMOL/L (ref 26–30)
CREAT BLD-MCNC: 0.8 MG/DL (ref 0.6–1.3)
DEPRECATED RDW RBC AUTO: 50.9 FL (ref 37–54)
EOSINOPHIL # BLD AUTO: 0.22 10*3/MM3 (ref 0–0.4)
EOSINOPHIL NFR BLD AUTO: 1.3 % (ref 0.3–6.2)
ERYTHROCYTE [DISTWIDTH] IN BLOOD BY AUTOMATED COUNT: 15.9 % (ref 12.3–15.4)
FERRITIN SERPL-MCNC: 218 NG/ML (ref 17.9–464)
GFR SERPL CREATININE-BSD FRML MDRD: 94 ML/MIN/1.73
GLOBULIN UR ELPH-MCNC: 3.2 GM/DL
GLUCOSE BLD-MCNC: 94 MG/DL (ref 74–98)
HCT VFR BLD AUTO: 37.8 % (ref 37.5–51)
HGB BLD-MCNC: 12.4 G/DL (ref 13–17.7)
IMM GRANULOCYTES # BLD AUTO: 0.24 10*3/MM3 (ref 0–0.05)
IMM GRANULOCYTES NFR BLD AUTO: 1.4 % (ref 0–0.5)
IRON 24H UR-MRATE: 23 MCG/DL (ref 37–181)
IRON SATN MFR SERPL: 9 % (ref 11–46)
LYMPHOCYTES # BLD AUTO: 3.8 10*3/MM3 (ref 0.7–3.1)
LYMPHOCYTES NFR BLD AUTO: 22 % (ref 19.6–45.3)
MCH RBC QN AUTO: 28.6 PG (ref 26.6–33)
MCHC RBC AUTO-ENTMCNC: 32.8 G/DL (ref 31.5–35.7)
MCV RBC AUTO: 87.3 FL (ref 79–97)
MONOCYTES # BLD AUTO: 1.91 10*3/MM3 (ref 0.1–0.9)
MONOCYTES NFR BLD AUTO: 11.1 % (ref 5–12)
NEUTROPHILS # BLD AUTO: 11.05 10*3/MM3 (ref 1.7–7)
NEUTROPHILS NFR BLD AUTO: 64 % (ref 42.7–76)
NRBC BLD AUTO-RTO: 0 /100 WBC (ref 0–0.2)
PLATELET # BLD AUTO: 562 10*3/MM3 (ref 140–450)
PMV BLD AUTO: 10.6 FL (ref 6–12)
POTASSIUM BLD-SCNC: 4.2 MMOL/L (ref 3.5–5.1)
PROT SERPL-MCNC: 6.9 G/DL (ref 6.3–8.2)
RBC # BLD AUTO: 4.33 10*6/MM3 (ref 4.14–5.8)
SODIUM BLD-SCNC: 138 MMOL/L (ref 137–145)
TIBC SERPL-MCNC: 246 MCG/DL (ref 261–497)
WBC NRBC COR # BLD: 17.26 10*3/MM3 (ref 3.4–10.8)

## 2019-06-17 PROCEDURE — 80053 COMPREHEN METABOLIC PANEL: CPT

## 2019-06-17 PROCEDURE — 83550 IRON BINDING TEST: CPT

## 2019-06-17 PROCEDURE — 83540 ASSAY OF IRON: CPT

## 2019-06-17 PROCEDURE — 82728 ASSAY OF FERRITIN: CPT

## 2019-06-17 PROCEDURE — 85025 COMPLETE CBC W/AUTO DIFF WBC: CPT

## 2019-06-17 PROCEDURE — 36415 COLL VENOUS BLD VENIPUNCTURE: CPT

## 2019-06-17 NOTE — PROGRESS NOTES
DATE OF VISIT: 6/19/2019    REASON FOR VISIT: Followup for iron def anemia     HISTORY OF PRESENT ILLNESS: The patient is a very pleasant 78 y.o. male with past medical history significant for Iron def anemia with thrombocythemia. The patient is here today for scheduled follow up visit.    SUBJECTIVE: The patient is here today by himself. He continues to complain of mild fatigue. He reports that he is getting over a URI.  He completed Zithromax but he is not better.      REVIEW OF SYSTEMS: All the other systems are reviewed by me and negative  except what is mentioned in HPI and subjective.     PAST MEDICAL HISTORY/SOCIAL HISTORY/FAMILY HISTORY: Unchanged from my prior documentation.      Current Outpatient Medications:   •  ADVAIR DISKUS 250-50 MCG/DOSE DISKUS, INHALE ONE PUFF TWICE DAILY, Disp: 1 each, Rfl: 3  •  aspirin 81 MG EC tablet, Take 81 mg by mouth Daily., Disp: , Rfl:   •  atorvastatin (LIPITOR) 40 MG tablet, TAKE ONE TABLET BY MOUTH AT BEDTIME, Disp: 30 tablet, Rfl: 11  •  azithromycin (ZITHROMAX) 250 MG tablet, Take 2 tablets the first day, then 1 tablet daily for 4 days., Disp: 6 tablet, Rfl: 0  •  benzonatate (TESSALON) 200 MG capsule, , Disp: , Rfl:   •  cetirizine (zyrTEC) 10 MG tablet, Take 10 mg by mouth Daily As Needed for Allergies., Disp: , Rfl:   •  cholecalciferol (VITAMIN D3) 1000 units tablet, Take 1,000 Units by mouth 2 (Two) Times a Day., Disp: , Rfl:   •  esomeprazole (nexIUM) 40 MG capsule, TAKE ONE CAPSULE BY MOUTH EVERY DAY, Disp: 30 capsule, Rfl: 11  •  guaiFENesin (MUCINEX) 600 MG 12 hr tablet, Take 1 tablet by mouth Every 12 (Twelve) Hours., Disp: 20 tablet, Rfl: 0  •  isosorbide mononitrate (IMDUR) 30 MG 24 hr tablet, Take 1 tablet by mouth Daily. as directed, Disp: 90 tablet, Rfl: 3  •  meclizine (ANTIVERT) 25 MG tablet, TAKE ONE TABLET BY MOUTH TWICE DAILY, Disp: 180 tablet, Rfl: 3  •  metoprolol tartrate (LOPRESSOR) 25 MG tablet, Take 1 tablet by mouth 2 (Two) Times a Day.,  "Disp: 60 tablet, Rfl: 5  •  nitroglycerin (NITROSTAT) 0.4 MG SL tablet, Place 0.4 mg under the tongue Every 5 (Five) Minutes As Needed for Chest Pain., Disp: , Rfl:   •  Omega-3 Fatty Acids (FISH OIL) 1000 MG capsule capsule, Take 1,000 mg by mouth Daily With Breakfast., Disp: , Rfl:   •  sertraline (ZOLOFT) 25 MG tablet, TAKE ONE TABLET BY MOUTH EVERY DAY, Disp: 90 tablet, Rfl: 3  •  SPIRIVA HANDIHALER 18 MCG per inhalation capsule, INHALE THE ENTIRE CONTENTS OF 1 CAPSULE ONCE A DAY USING HANDIHALER DEVICE, Disp: 90 capsule, Rfl: 3  •  terbinafine (LAMISIL) 250 MG tablet, Take 1 tablet by mouth Daily., Disp: 30 tablet, Rfl: 2  •  traZODone (DESYREL) 50 MG tablet, trazodone 50 mg tablet  one tablet at bedtime, Disp: , Rfl:   •  VENTOLIN  (90 Base) MCG/ACT inhaler, INHALE 1-2 PUFFS BY MOUTH EVERY 4 TO 6 HOURS AS NEEDED, Disp: 18 g, Rfl: 2    PHYSICAL EXAMINATION:   /69   Pulse 52   Temp 97.6 °F (36.4 °C) (Temporal)   Resp 16   Ht 175.3 cm (69\")   Wt 83.9 kg (185 lb)   BMI 27.32 kg/m²   ECOG1  GENERAL: Age appropriate. No acute distress.   HEENT: Head atraumatic, normocephalic.   NECK: Supple. No JVD. No lymphadenopathy.   LUNGS: Clear to auscultation bilaterally. No wheezing. No rhonchi.   HEART: Regular rate and rhythm. S1, S2, no murmurs.   ABDOMEN: Soft, nontender, nondistended. Bowel sounds positive. No  hepatosplenomegaly.   EXTREMITIES: No clubbing, cyanosis, or edema.   SKIN: No rashes. No purpura.   NEUROLOGIC: Awake and oriented x3. Strength 5 out of 5 in all muscle groups.     Lab on 06/17/2019   Component Date Value Ref Range Status   • Glucose 06/17/2019 94  74 - 98 mg/dL Final   • BUN 06/17/2019 22* 7 - 20 mg/dL Final   • Creatinine 06/17/2019 0.80  0.60 - 1.30 mg/dL Final   • Sodium 06/17/2019 138  137 - 145 mmol/L Final   • Potassium 06/17/2019 4.2  3.5 - 5.1 mmol/L Final   • Chloride 06/17/2019 101  98 - 107 mmol/L Final   • CO2 06/17/2019 28.0  26.0 - 30.0 mmol/L Final   • Calcium " 06/17/2019 8.8  8.4 - 10.2 mg/dL Final   • Total Protein 06/17/2019 6.9  6.3 - 8.2 g/dL Final   • Albumin 06/17/2019 3.70  3.50 - 5.00 g/dL Final   • ALT (SGPT) 06/17/2019 40  13 - 69 U/L Final   • AST (SGOT) 06/17/2019 28  15 - 46 U/L Final   • Alkaline Phosphatase 06/17/2019 143* 38 - 126 U/L Final   • Total Bilirubin 06/17/2019 0.3  0.2 - 1.3 mg/dL Final   • eGFR Non African Amer 06/17/2019 94  >60 mL/min/1.73 Final   • Globulin 06/17/2019 3.2  gm/dL Final   • A/G Ratio 06/17/2019 1.2  1.0 - 2.0 g/dL Final   • BUN/Creatinine Ratio 06/17/2019 27.5* 6.3 - 21.9 Final   • Anion Gap 06/17/2019 13.2  10.0 - 20.0 mmol/L Final   • Ferritin 06/17/2019 218.00  17.90 - 464.00 ng/mL Final   • Iron 06/17/2019 23* 37 - 181 mcg/dL Final   • TIBC 06/17/2019 246* 261 - 497 mcg/dL Final   • Iron Saturation 06/17/2019 9* 11 - 46 % Final   • WBC 06/17/2019 17.26* 3.40 - 10.80 10*3/mm3 Final   • RBC 06/17/2019 4.33  4.14 - 5.80 10*6/mm3 Final   • Hemoglobin 06/17/2019 12.4* 13.0 - 17.7 g/dL Final   • Hematocrit 06/17/2019 37.8  37.5 - 51.0 % Final   • MCV 06/17/2019 87.3  79.0 - 97.0 fL Final   • MCH 06/17/2019 28.6  26.6 - 33.0 pg Final   • MCHC 06/17/2019 32.8  31.5 - 35.7 g/dL Final   • RDW 06/17/2019 15.9* 12.3 - 15.4 % Final   • RDW-SD 06/17/2019 50.9  37.0 - 54.0 fl Final   • MPV 06/17/2019 10.6  6.0 - 12.0 fL Final   • Platelets 06/17/2019 562* 140 - 450 10*3/mm3 Final   • Neutrophil % 06/17/2019 64.0  42.7 - 76.0 % Final   • Lymphocyte % 06/17/2019 22.0  19.6 - 45.3 % Final   • Monocyte % 06/17/2019 11.1  5.0 - 12.0 % Final   • Eosinophil % 06/17/2019 1.3  0.3 - 6.2 % Final   • Basophil % 06/17/2019 0.2  0.0 - 1.5 % Final   • Immature Grans % 06/17/2019 1.4* 0.0 - 0.5 % Final   • Neutrophils, Absolute 06/17/2019 11.05* 1.70 - 7.00 10*3/mm3 Final   • Lymphocytes, Absolute 06/17/2019 3.80* 0.70 - 3.10 10*3/mm3 Final   • Monocytes, Absolute 06/17/2019 1.91* 0.10 - 0.90 10*3/mm3 Final   • Eosinophils, Absolute 06/17/2019 0.22   0.00 - 0.40 10*3/mm3 Final   • Basophils, Absolute 06/17/2019 0.04  0.00 - 0.20 10*3/mm3 Final   • Immature Grans, Absolute 06/17/2019 0.24* 0.00 - 0.05 10*3/mm3 Final   • nRBC 06/17/2019 0.0  0.0 - 0.2 /100 WBC Final        No results found.    ASSESSMENT: The patient is a very pleasant 78 y.o. male  with Iron deficiency anemia    PROBLEM LIST:  1. 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 Thrombocythemia  3. Leukocytosis  4. S/P splenectomy   5. HTN      PLAN:  1. We reviewed and discussed his CBC, he remains anemic. Iron saturation is lower though ferritin is stable. We will plan for iron infusion next week.   2. Follow up in 6 month with a repeated CBC and iron studies.  3. He will continue Imdur for his blood pressure. If it remains elevated he will notify his PCP.    4.  His leukocytosis did get worse but that possibly secondary to acute infection diagnosed last week when his labs were drawn.      Betsy Hopper, APRN,  6/19/2019

## 2019-06-18 DIAGNOSIS — K90.9 IRON MALABSORPTION: ICD-10-CM

## 2019-06-18 DIAGNOSIS — E61.1 IRON DEFICIENCY: ICD-10-CM

## 2019-06-18 RX ORDER — SODIUM CHLORIDE 9 MG/ML
250 INJECTION, SOLUTION INTRAVENOUS ONCE
Status: CANCELLED | OUTPATIENT
Start: 2019-07-02

## 2019-06-18 RX ORDER — SODIUM CHLORIDE 9 MG/ML
250 INJECTION, SOLUTION INTRAVENOUS ONCE
Status: CANCELLED | OUTPATIENT
Start: 2019-06-25

## 2019-06-19 ENCOUNTER — OFFICE VISIT (OUTPATIENT)
Dept: ONCOLOGY | Facility: CLINIC | Age: 79
End: 2019-06-19

## 2019-06-19 VITALS
DIASTOLIC BLOOD PRESSURE: 69 MMHG | RESPIRATION RATE: 16 BRPM | TEMPERATURE: 97.6 F | SYSTOLIC BLOOD PRESSURE: 163 MMHG | WEIGHT: 185 LBS | HEART RATE: 52 BPM | HEIGHT: 69 IN | BODY MASS INDEX: 27.4 KG/M2

## 2019-06-19 DIAGNOSIS — K90.9 IRON MALABSORPTION: Primary | ICD-10-CM

## 2019-06-19 DIAGNOSIS — E61.1 IRON DEFICIENCY: Primary | ICD-10-CM

## 2019-06-19 DIAGNOSIS — D50.0 IRON DEFICIENCY ANEMIA DUE TO CHRONIC BLOOD LOSS: ICD-10-CM

## 2019-06-19 DIAGNOSIS — D72.829 LEUKOCYTOSIS, UNSPECIFIED TYPE: ICD-10-CM

## 2019-06-19 DIAGNOSIS — D75.839 THROMBOCYTHEMIA: ICD-10-CM

## 2019-06-19 PROCEDURE — 99214 OFFICE O/P EST MOD 30 MIN: CPT | Performed by: NURSE PRACTITIONER

## 2019-06-24 ENCOUNTER — INFUSION (OUTPATIENT)
Dept: ONCOLOGY | Facility: HOSPITAL | Age: 79
End: 2019-06-24

## 2019-06-24 VITALS
WEIGHT: 184 LBS | TEMPERATURE: 98.7 F | SYSTOLIC BLOOD PRESSURE: 177 MMHG | HEART RATE: 55 BPM | BODY MASS INDEX: 27.17 KG/M2 | DIASTOLIC BLOOD PRESSURE: 74 MMHG

## 2019-06-24 DIAGNOSIS — K90.9 IRON MALABSORPTION: Primary | ICD-10-CM

## 2019-06-24 DIAGNOSIS — E61.1 IRON DEFICIENCY: ICD-10-CM

## 2019-06-24 PROCEDURE — 96365 THER/PROPH/DIAG IV INF INIT: CPT

## 2019-06-24 PROCEDURE — 96374 THER/PROPH/DIAG INJ IV PUSH: CPT

## 2019-06-24 PROCEDURE — 25010000002 FERRIC CARBOXYMALTOSE 750 MG/15ML SOLUTION 15 ML VIAL: Performed by: INTERNAL MEDICINE

## 2019-06-24 RX ORDER — SODIUM CHLORIDE 9 MG/ML
250 INJECTION, SOLUTION INTRAVENOUS ONCE
Status: DISCONTINUED | OUTPATIENT
Start: 2019-06-24 | End: 2019-06-24 | Stop reason: HOSPADM

## 2019-06-24 RX ADMIN — FERRIC CARBOXYMALTOSE INJECTION 750 MG: 50 INJECTION, SOLUTION INTRAVENOUS at 13:46

## 2019-06-26 ENCOUNTER — LAB (OUTPATIENT)
Dept: LAB | Facility: HOSPITAL | Age: 79
End: 2019-06-26

## 2019-06-26 DIAGNOSIS — D72.829 LEUKOCYTOSIS, UNSPECIFIED TYPE: ICD-10-CM

## 2019-06-26 DIAGNOSIS — D50.0 IRON DEFICIENCY ANEMIA DUE TO CHRONIC BLOOD LOSS: ICD-10-CM

## 2019-06-26 DIAGNOSIS — E61.1 IRON DEFICIENCY: ICD-10-CM

## 2019-06-26 DIAGNOSIS — K90.9 IRON MALABSORPTION: ICD-10-CM

## 2019-06-26 LAB
ALBUMIN SERPL-MCNC: 4 G/DL (ref 3.5–5)
ALBUMIN/GLOB SERPL: 1.3 G/DL (ref 1–2)
ALP SERPL-CCNC: 158 U/L (ref 38–126)
ALT SERPL W P-5'-P-CCNC: 33 U/L (ref 13–69)
ANION GAP SERPL CALCULATED.3IONS-SCNC: 14.2 MMOL/L (ref 10–20)
AST SERPL-CCNC: 28 U/L (ref 15–46)
BASOPHILS # BLD AUTO: 0.03 10*3/MM3 (ref 0–0.2)
BASOPHILS NFR BLD AUTO: 0.2 % (ref 0–1.5)
BILIRUB CONJ SERPL-MCNC: 0.3 MG/DL (ref 0–0.4)
BILIRUB SERPL-MCNC: 0.5 MG/DL (ref 0.2–1.3)
BUN BLD-MCNC: 17 MG/DL (ref 7–20)
BUN/CREAT SERPL: 21.3 (ref 6.3–21.9)
CALCIUM SPEC-SCNC: 8.9 MG/DL (ref 8.4–10.2)
CHLORIDE SERPL-SCNC: 101 MMOL/L (ref 98–107)
CO2 SERPL-SCNC: 28 MMOL/L (ref 26–30)
CREAT BLD-MCNC: 0.8 MG/DL (ref 0.6–1.3)
DEPRECATED RDW RBC AUTO: 49.5 FL (ref 37–54)
EOSINOPHIL # BLD AUTO: 0.22 10*3/MM3 (ref 0–0.4)
EOSINOPHIL NFR BLD AUTO: 1.5 % (ref 0.3–6.2)
ERYTHROCYTE [DISTWIDTH] IN BLOOD BY AUTOMATED COUNT: 15.7 % (ref 12.3–15.4)
FERRITIN SERPL-MCNC: 331 NG/ML (ref 17.9–464)
GFR SERPL CREATININE-BSD FRML MDRD: 94 ML/MIN/1.73
GLOBULIN UR ELPH-MCNC: 3.2 GM/DL
GLUCOSE BLD-MCNC: 99 MG/DL (ref 74–98)
HCT VFR BLD AUTO: 39.3 % (ref 37.5–51)
HGB BLD-MCNC: 12.9 G/DL (ref 13–17.7)
IMM GRANULOCYTES # BLD AUTO: 0.06 10*3/MM3 (ref 0–0.05)
IMM GRANULOCYTES NFR BLD AUTO: 0.4 % (ref 0–0.5)
IRON 24H UR-MRATE: 158 MCG/DL (ref 37–181)
IRON SATN MFR SERPL: 62 % (ref 11–46)
LYMPHOCYTES # BLD AUTO: 3.01 10*3/MM3 (ref 0.7–3.1)
LYMPHOCYTES NFR BLD AUTO: 20.4 % (ref 19.6–45.3)
MCH RBC QN AUTO: 28.6 PG (ref 26.6–33)
MCHC RBC AUTO-ENTMCNC: 32.8 G/DL (ref 31.5–35.7)
MCV RBC AUTO: 87.1 FL (ref 79–97)
MONOCYTES # BLD AUTO: 1.46 10*3/MM3 (ref 0.1–0.9)
MONOCYTES NFR BLD AUTO: 9.9 % (ref 5–12)
NEUTROPHILS # BLD AUTO: 10 10*3/MM3 (ref 1.7–7)
NEUTROPHILS NFR BLD AUTO: 67.6 % (ref 42.7–76)
NRBC BLD AUTO-RTO: 0 /100 WBC (ref 0–0.2)
PLATELET # BLD AUTO: 543 10*3/MM3 (ref 140–450)
PMV BLD AUTO: 10.5 FL (ref 6–12)
POTASSIUM BLD-SCNC: 4.2 MMOL/L (ref 3.5–5.1)
PROT SERPL-MCNC: 7.2 G/DL (ref 6.3–8.2)
RBC # BLD AUTO: 4.51 10*6/MM3 (ref 4.14–5.8)
SODIUM BLD-SCNC: 139 MMOL/L (ref 137–145)
TIBC SERPL-MCNC: 256 MCG/DL (ref 261–497)
WBC NRBC COR # BLD: 14.78 10*3/MM3 (ref 3.4–10.8)

## 2019-06-26 PROCEDURE — 80053 COMPREHEN METABOLIC PANEL: CPT

## 2019-06-26 PROCEDURE — 82728 ASSAY OF FERRITIN: CPT

## 2019-06-26 PROCEDURE — 83540 ASSAY OF IRON: CPT

## 2019-06-26 PROCEDURE — 85025 COMPLETE CBC W/AUTO DIFF WBC: CPT

## 2019-06-26 PROCEDURE — 82248 BILIRUBIN DIRECT: CPT | Performed by: INTERNAL MEDICINE

## 2019-06-26 PROCEDURE — 36415 COLL VENOUS BLD VENIPUNCTURE: CPT | Performed by: INTERNAL MEDICINE

## 2019-06-26 PROCEDURE — 83550 IRON BINDING TEST: CPT

## 2019-06-28 ENCOUNTER — OFFICE VISIT (OUTPATIENT)
Dept: INTERNAL MEDICINE | Facility: CLINIC | Age: 79
End: 2019-06-28

## 2019-06-28 VITALS
DIASTOLIC BLOOD PRESSURE: 66 MMHG | HEIGHT: 69 IN | SYSTOLIC BLOOD PRESSURE: 124 MMHG | OXYGEN SATURATION: 94 % | HEART RATE: 50 BPM | BODY MASS INDEX: 27.08 KG/M2 | TEMPERATURE: 96.9 F | WEIGHT: 182.8 LBS

## 2019-06-28 DIAGNOSIS — E61.1 IRON DEFICIENCY: ICD-10-CM

## 2019-06-28 DIAGNOSIS — E11.8 TYPE 2 DIABETES MELLITUS WITH COMPLICATION, WITHOUT LONG-TERM CURRENT USE OF INSULIN (HCC): Primary | ICD-10-CM

## 2019-06-28 DIAGNOSIS — E78.5 HYPERLIPIDEMIA, UNSPECIFIED HYPERLIPIDEMIA TYPE: ICD-10-CM

## 2019-06-28 DIAGNOSIS — B35.1 ONYCHOMYCOSIS: ICD-10-CM

## 2019-06-28 DIAGNOSIS — I25.10 CORONARY ARTERIOSCLEROSIS IN NATIVE ARTERY: ICD-10-CM

## 2019-06-28 DIAGNOSIS — J43.8 OTHER EMPHYSEMA (HCC): ICD-10-CM

## 2019-06-28 DIAGNOSIS — I10 ESSENTIAL HYPERTENSION: ICD-10-CM

## 2019-06-28 DIAGNOSIS — G47.30 SLEEP APNEA, UNSPECIFIED TYPE: ICD-10-CM

## 2019-06-28 DIAGNOSIS — K90.9 IRON MALABSORPTION: ICD-10-CM

## 2019-06-28 PROCEDURE — 99214 OFFICE O/P EST MOD 30 MIN: CPT | Performed by: INTERNAL MEDICINE

## 2019-06-28 NOTE — PROGRESS NOTES
Subjective   Jairo Mercedes is a 78 y.o. male.     Chief Complaint   Patient presents with   • Follow-up     1 month f/u.        History of Present Illness   .  Patient here for follow-up of.  Diabetes stable on good diet.  Hyperlipidemia stable on medication.  Anemia stable now.  Coronary artery disease is stable now.  Sleep apnea stable.  Hypertension stable on medication.    Current Outpatient Medications:   •  ADVAIR DISKUS 250-50 MCG/DOSE DISKUS, INHALE ONE PUFF TWICE DAILY, Disp: 1 each, Rfl: 3  •  aspirin 81 MG EC tablet, Take 81 mg by mouth Daily., Disp: , Rfl:   •  atorvastatin (LIPITOR) 40 MG tablet, TAKE ONE TABLET BY MOUTH AT BEDTIME, Disp: 30 tablet, Rfl: 11  •  cetirizine (zyrTEC) 10 MG tablet, Take 10 mg by mouth Daily As Needed for Allergies., Disp: , Rfl:   •  cholecalciferol (VITAMIN D3) 1000 units tablet, Take 1,000 Units by mouth 2 (Two) Times a Day., Disp: , Rfl:   •  esomeprazole (nexIUM) 40 MG capsule, TAKE ONE CAPSULE BY MOUTH EVERY DAY, Disp: 30 capsule, Rfl: 11  •  isosorbide mononitrate (IMDUR) 30 MG 24 hr tablet, Take 1 tablet by mouth Daily. as directed, Disp: 90 tablet, Rfl: 3  •  meclizine (ANTIVERT) 25 MG tablet, TAKE ONE TABLET BY MOUTH TWICE DAILY, Disp: 180 tablet, Rfl: 3  •  metoprolol tartrate (LOPRESSOR) 25 MG tablet, Take 1 tablet by mouth 2 (Two) Times a Day., Disp: 60 tablet, Rfl: 5  •  nitroglycerin (NITROSTAT) 0.4 MG SL tablet, Place 0.4 mg under the tongue Every 5 (Five) Minutes As Needed for Chest Pain., Disp: , Rfl:   •  Omega-3 Fatty Acids (FISH OIL) 1000 MG capsule capsule, Take 1,000 mg by mouth Daily With Breakfast., Disp: , Rfl:   •  sertraline (ZOLOFT) 25 MG tablet, TAKE ONE TABLET BY MOUTH EVERY DAY, Disp: 90 tablet, Rfl: 3  •  SPIRIVA HANDIHALER 18 MCG per inhalation capsule, INHALE THE ENTIRE CONTENTS OF 1 CAPSULE ONCE A DAY USING HANDIHALER DEVICE, Disp: 90 capsule, Rfl: 3  •  terbinafine (LAMISIL) 250 MG tablet, Take 1 tablet by mouth Daily., Disp: 30 tablet,  Rfl: 2  •  traZODone (DESYREL) 50 MG tablet, trazodone 50 mg tablet  one tablet at bedtime, Disp: , Rfl:   •  VENTOLIN  (90 Base) MCG/ACT inhaler, INHALE 1-2 PUFFS BY MOUTH EVERY 4 TO 6 HOURS AS NEEDED, Disp: 18 g, Rfl: 2    The following portions of the patient's history were reviewed and updated as appropriate: allergies, current medications, past family history, past medical history, past social history, past surgical history and problem list.    Review of Systems   Constitutional: Negative.    Respiratory: Negative.    Cardiovascular: Negative.    Gastrointestinal: Negative.    Musculoskeletal: Negative.    Skin: Negative.    Neurological: Negative.    Psychiatric/Behavioral: Negative.        Objective   Physical Exam   Constitutional: He is oriented to person, place, and time. He appears well-nourished.   Neck: Neck supple.   Cardiovascular: Normal rate, regular rhythm and normal heart sounds.   Pulmonary/Chest: Effort normal and breath sounds normal.   Abdominal: Bowel sounds are normal.   Neurological: He is alert and oriented to person, place, and time.   Skin: Skin is warm.   Psychiatric: He has a normal mood and affect.       All tests have been reviewed.    Assessment/Plan   Diagnoses and all orders for this visit:    Type 2 diabetes mellitus with complication, without long-term current use of insulin (CMS/[facility]) continue good diet    Hyperlipidemia, unspecified hyperlipidemia type continue medication    Iron deficiency anemia continue to follow-up with hematologist status post on transfusion again a week ago    Coronary arteriosclerosis in native artery continue to watch    Other emphysema (CMS/[facility]) continue medication    Sleep apnea, unspecified type continue present management    Essential hypertension blood pressures normal now    Onychomycosis continue medicine much improved    Follow-up in 6 months

## 2019-07-01 ENCOUNTER — INFUSION (OUTPATIENT)
Dept: ONCOLOGY | Facility: HOSPITAL | Age: 79
End: 2019-07-01

## 2019-07-01 VITALS — TEMPERATURE: 98.3 F | HEART RATE: 51 BPM | SYSTOLIC BLOOD PRESSURE: 120 MMHG | DIASTOLIC BLOOD PRESSURE: 58 MMHG

## 2019-07-01 DIAGNOSIS — E61.1 IRON DEFICIENCY: ICD-10-CM

## 2019-07-01 DIAGNOSIS — K90.9 IRON MALABSORPTION: Primary | ICD-10-CM

## 2019-07-01 PROCEDURE — 96374 THER/PROPH/DIAG INJ IV PUSH: CPT

## 2019-07-01 PROCEDURE — 25010000002 FERRIC CARBOXYMALTOSE 750 MG/15ML SOLUTION 15 ML VIAL: Performed by: INTERNAL MEDICINE

## 2019-07-01 RX ORDER — TERBINAFINE HYDROCHLORIDE 250 MG/1
TABLET ORAL
Qty: 30 TABLET | Refills: 2 | Status: SHIPPED | OUTPATIENT
Start: 2019-07-01 | End: 2019-12-19

## 2019-07-01 RX ORDER — SODIUM CHLORIDE 9 MG/ML
250 INJECTION, SOLUTION INTRAVENOUS ONCE
Status: DISCONTINUED | OUTPATIENT
Start: 2019-07-01 | End: 2019-07-01 | Stop reason: HOSPADM

## 2019-07-01 RX ADMIN — FERRIC CARBOXYMALTOSE INJECTION 750 MG: 50 INJECTION, SOLUTION INTRAVENOUS at 10:53

## 2019-08-19 RX ORDER — TRIAMCINOLONE ACETONIDE 0.25 MG/G
OINTMENT TOPICAL
Qty: 80 G | Refills: 1 | Status: SHIPPED | OUTPATIENT
Start: 2019-08-19 | End: 2021-06-05

## 2019-09-10 ENCOUNTER — OFFICE VISIT (OUTPATIENT)
Dept: INTERNAL MEDICINE | Facility: CLINIC | Age: 79
End: 2019-09-10

## 2019-09-10 VITALS
TEMPERATURE: 97.8 F | OXYGEN SATURATION: 93 % | WEIGHT: 186.4 LBS | DIASTOLIC BLOOD PRESSURE: 68 MMHG | BODY MASS INDEX: 27.61 KG/M2 | HEART RATE: 62 BPM | HEIGHT: 69 IN | SYSTOLIC BLOOD PRESSURE: 142 MMHG

## 2019-09-10 DIAGNOSIS — G47.30 SLEEP APNEA, UNSPECIFIED TYPE: ICD-10-CM

## 2019-09-10 DIAGNOSIS — D50.9 IRON DEFICIENCY ANEMIA, UNSPECIFIED IRON DEFICIENCY ANEMIA TYPE: ICD-10-CM

## 2019-09-10 DIAGNOSIS — E11.8 TYPE 2 DIABETES MELLITUS WITH COMPLICATION, WITHOUT LONG-TERM CURRENT USE OF INSULIN (HCC): ICD-10-CM

## 2019-09-10 DIAGNOSIS — J43.8 OTHER EMPHYSEMA (HCC): ICD-10-CM

## 2019-09-10 DIAGNOSIS — E78.5 HYPERLIPIDEMIA, UNSPECIFIED HYPERLIPIDEMIA TYPE: Primary | ICD-10-CM

## 2019-09-10 DIAGNOSIS — E78.49 FAMILIAL COMBINED HYPERLIPIDEMIA: ICD-10-CM

## 2019-09-10 DIAGNOSIS — K76.0 STEATOSIS OF LIVER: ICD-10-CM

## 2019-09-10 DIAGNOSIS — K21.9 GASTROESOPHAGEAL REFLUX DISEASE WITHOUT ESOPHAGITIS: ICD-10-CM

## 2019-09-10 LAB — BILIRUB DIRECT SERPL-MCNC: <0.2 MG/DL (ref 0.2–0.3)

## 2019-09-10 PROCEDURE — 99214 OFFICE O/P EST MOD 30 MIN: CPT | Performed by: INTERNAL MEDICINE

## 2019-09-10 NOTE — PROGRESS NOTES
Subjective   Jairo Mercedes is a 78 y.o. male.     No chief complaint on file.      History of Present Illness   Patient here for follow-up of.  Diabetes stable on medication.  Hyperlipidemia stable on medication.  Iron deficient anemia stable now.  Coronary artery disease is stable without chest pain or short of breath.  Emphysema stable.  Sleep apnea stable.  Hypertension stable on medication.  Iron deficient anemia stable now.    Current Outpatient Medications:   •  ADVAIR DISKUS 250-50 MCG/DOSE DISKUS, INHALE ONE PUFF TWICE DAILY, Disp: 1 each, Rfl: 3  •  aspirin 81 MG EC tablet, Take 81 mg by mouth Daily., Disp: , Rfl:   •  atorvastatin (LIPITOR) 40 MG tablet, TAKE ONE TABLET BY MOUTH AT BEDTIME, Disp: 30 tablet, Rfl: 11  •  cetirizine (zyrTEC) 10 MG tablet, Take 10 mg by mouth Daily As Needed for Allergies., Disp: , Rfl:   •  cholecalciferol (VITAMIN D3) 1000 units tablet, Take 1,000 Units by mouth 2 (Two) Times a Day., Disp: , Rfl:   •  esomeprazole (nexIUM) 40 MG capsule, TAKE ONE CAPSULE BY MOUTH EVERY DAY, Disp: 30 capsule, Rfl: 11  •  isosorbide mononitrate (IMDUR) 30 MG 24 hr tablet, Take 1 tablet by mouth Daily. as directed, Disp: 90 tablet, Rfl: 3  •  meclizine (ANTIVERT) 25 MG tablet, TAKE ONE TABLET BY MOUTH TWICE DAILY, Disp: 180 tablet, Rfl: 3  •  metoprolol tartrate (LOPRESSOR) 25 MG tablet, Take 1 tablet by mouth 2 (Two) Times a Day., Disp: 60 tablet, Rfl: 5  •  nitroglycerin (NITROSTAT) 0.4 MG SL tablet, Place 0.4 mg under the tongue Every 5 (Five) Minutes As Needed for Chest Pain., Disp: , Rfl:   •  Omega-3 Fatty Acids (FISH OIL) 1000 MG capsule capsule, Take 1,000 mg by mouth Daily With Breakfast., Disp: , Rfl:   •  sertraline (ZOLOFT) 25 MG tablet, TAKE ONE TABLET BY MOUTH EVERY DAY, Disp: 90 tablet, Rfl: 3  •  SPIRIVA HANDIHALER 18 MCG per inhalation capsule, INHALE THE ENTIRE CONTENTS OF 1 CAPSULE ONCE A DAY USING HANDIHALER DEVICE, Disp: 90 capsule, Rfl: 3  •  terbinafine (lamiSIL) 250 MG  tablet, TAKE ONE TABLET BY MOUTH EVERY DAY, Disp: 30 tablet, Rfl: 2  •  traZODone (DESYREL) 50 MG tablet, trazodone 50 mg tablet  one tablet at bedtime, Disp: , Rfl:   •  triamcinolone (KENALOG) 0.025 % ointment, APPLY TO THE AFFECTED AREA(S) TWICE DAILY, Disp: 80 g, Rfl: 1  •  VENTOLIN  (90 Base) MCG/ACT inhaler, INHALE 1-2 PUFFS BY MOUTH EVERY 4 TO 6 HOURS AS NEEDED, Disp: 18 g, Rfl: 2    The following portions of the patient's history were reviewed and updated as appropriate: allergies, current medications, past family history, past medical history, past social history, past surgical history and problem list.    Review of Systems   Constitutional: Negative.    Respiratory: Negative.    Cardiovascular: Negative.    Gastrointestinal: Negative.    Musculoskeletal: Negative.    Skin: Negative.    Neurological: Negative.    Psychiatric/Behavioral: Negative.        Objective   Physical Exam   Constitutional: He is oriented to person, place, and time. He appears well-developed and well-nourished.   Neck: Neck supple.   Cardiovascular: Normal rate, regular rhythm and normal heart sounds.   Pulmonary/Chest: Effort normal and breath sounds normal.   Abdominal: Bowel sounds are normal.   Neurological: He is alert and oriented to person, place, and time.   Skin: Skin is warm.   Psychiatric: He has a normal mood and affect.       All tests have been reviewed.    Assessment/Plan   There are no diagnoses linked to this encounter.          Type 2 diabetes mellitus with complication, without long-term current use of insulin (CMS/Formerly [facility]) continue good diet     Hyperlipidemia, unspecified hyperlipidemia type continue medication     Iron deficiency anemia continue to follow-up with hematologist status post on transfusion again a week ago     Coronary arteriosclerosis in native artery continue to watch     Other emphysema (CMS/[facility]) continue medication     Sleep apnea, unspecified type continue present management     Essential  hypertension blood pressures normal now     Onychomycosis continue medicine much improved    Iron def anemia follow up with hematologist, stable now     Follow-up in 6 months     #1: Adenopathy, intra-abdominal. biopsy, reactive changes only follow up : Hematology/oncology. history of ITP s/p splenectomy, higher WBC and plts, now Hb low , watch--  #2: copd, continue medicine night time O2  #3: fatty liver, s/p cholecystectomy. history of gall stone, follow up with GI, liver enzymes normal now after cutting Lipitor 80 mg. Asked patient to cut down Lipitor 40 mg daily, discontinue acetaminophen, weight loss.alkaline phosphatase is elevated watch for now  #4: Coronary heart disease,blockage unable to stent. continue medicine prn NTG,metoprolol 25 bid, f/u cardio  #5:  diabetic, patient refuses medicines  #6: Hyperlipidemia, cont [id number] mg--  #7: Mild chronic anxiety, continue medicine  #8: GERD continue medicine,   Leukocytosis thrombocytosis lymphocytosis monocytosis history of splenectomy for possible thrombocytopenia. continue to watch--  Vocal cord paralysis follow up with ENT  right thumb tendonitis,resolved after tx. XR severe arthritis.  hemorroid continue medicine   Overweight encouraged patient to have good diet  renal cyst 3.3 cm, MRI November 2014 showed a 2 cm. Repeat normal  low iron normal  s/p iron transfusion, follow hematologist  insomia continue trazodone  vitD low vitD3 2000u daily  s/p pneumonia shot, prevnar 10/27, Td to HD , zostavax done, shingrix informed  aaa screen 3cm repeated 12/2017 smaller now, repeat 12/2019  Lung nodule, repeat in 7/2018 showed locular effusion, by lung doctor.   Colon 8/2/18 colon polyp , repeat 5 years  Onychomycosis on Lamisil ,liver enzyme normal after 4 weeks recheck in 1 month--  BPH continue PSA

## 2019-10-14 RX ORDER — ALBUTEROL SULFATE 90 UG/1
2 AEROSOL, METERED RESPIRATORY (INHALATION) EVERY 4 HOURS PRN
Qty: 18 G | Refills: 2 | Status: SHIPPED | OUTPATIENT
Start: 2019-10-14 | End: 2021-10-23 | Stop reason: SDUPTHER

## 2019-12-09 ENCOUNTER — TELEPHONE (OUTPATIENT)
Dept: ONCOLOGY | Facility: CLINIC | Age: 79
End: 2019-12-09

## 2019-12-09 DIAGNOSIS — D50.0 IRON DEFICIENCY ANEMIA DUE TO CHRONIC BLOOD LOSS: Primary | ICD-10-CM

## 2019-12-09 NOTE — TELEPHONE ENCOUNTER
Pt called because he unsure whether he needs to do labs or not before his next appt on Wed the 18th. If so, he also needs to know if he should fast for those labs.     Please give him a call at

## 2019-12-16 ENCOUNTER — LAB (OUTPATIENT)
Dept: LAB | Facility: HOSPITAL | Age: 79
End: 2019-12-16

## 2019-12-16 DIAGNOSIS — D50.0 IRON DEFICIENCY ANEMIA DUE TO CHRONIC BLOOD LOSS: ICD-10-CM

## 2019-12-16 LAB
BASOPHILS # BLD AUTO: 0.08 10*3/MM3 (ref 0–0.2)
BASOPHILS NFR BLD AUTO: 0.6 % (ref 0–1.5)
CHOLEST SERPL-MCNC: 133 MG/DL (ref 0–200)
CK SERPL-CCNC: 76 U/L (ref 20–200)
DEPRECATED RDW RBC AUTO: 50.4 FL (ref 37–54)
EOSINOPHIL # BLD AUTO: 0.28 10*3/MM3 (ref 0–0.4)
EOSINOPHIL NFR BLD AUTO: 1.9 % (ref 0.3–6.2)
ERYTHROCYTE [DISTWIDTH] IN BLOOD BY AUTOMATED COUNT: 14.2 % (ref 12.3–15.4)
FERRITIN SERPL-MCNC: 550.7 NG/ML (ref 30–400)
HBA1C MFR BLD: 6 % (ref 4.8–5.6)
HCT VFR BLD AUTO: 42.4 % (ref 37.5–51)
HDLC SERPL-MCNC: 31 MG/DL (ref 40–60)
HGB BLD-MCNC: 14.3 G/DL (ref 13–17.7)
IMM GRANULOCYTES # BLD AUTO: 0.09 10*3/MM3 (ref 0–0.05)
IMM GRANULOCYTES NFR BLD AUTO: 0.6 % (ref 0–0.5)
IRON 24H UR-MRATE: 85 MCG/DL (ref 59–158)
IRON SATN MFR SERPL: 32 % (ref 20–50)
LDLC SERPL CALC-MCNC: 69 MG/DL (ref 0–100)
LDLC/HDLC SERPL: 2.21 {RATIO}
LYMPHOCYTES # BLD AUTO: 2.89 10*3/MM3 (ref 0.7–3.1)
LYMPHOCYTES NFR BLD AUTO: 20.1 % (ref 19.6–45.3)
MCH RBC QN AUTO: 32.5 PG (ref 26.6–33)
MCHC RBC AUTO-ENTMCNC: 33.7 G/DL (ref 31.5–35.7)
MCV RBC AUTO: 96.4 FL (ref 79–97)
MONOCYTES # BLD AUTO: 1.6 10*3/MM3 (ref 0.1–0.9)
MONOCYTES NFR BLD AUTO: 11.1 % (ref 5–12)
NEUTROPHILS # BLD AUTO: 9.46 10*3/MM3 (ref 1.7–7)
NEUTROPHILS NFR BLD AUTO: 65.7 % (ref 42.7–76)
NRBC BLD AUTO-RTO: 0 /100 WBC (ref 0–0.2)
PLATELET # BLD AUTO: 450 10*3/MM3 (ref 140–450)
PMV BLD AUTO: 10.6 FL (ref 6–12)
RBC # BLD AUTO: 4.4 10*6/MM3 (ref 4.14–5.8)
TIBC SERPL-MCNC: 270 MCG/DL (ref 298–536)
TRANSFERRIN SERPL-MCNC: 181 MG/DL (ref 200–360)
TRIGL SERPL-MCNC: 167 MG/DL (ref 0–150)
VLDLC SERPL-MCNC: 33.4 MG/DL
WBC NRBC COR # BLD: 14.4 10*3/MM3 (ref 3.4–10.8)

## 2019-12-16 PROCEDURE — 80061 LIPID PANEL: CPT | Performed by: INTERNAL MEDICINE

## 2019-12-16 PROCEDURE — 85025 COMPLETE CBC W/AUTO DIFF WBC: CPT

## 2019-12-16 PROCEDURE — 83036 HEMOGLOBIN GLYCOSYLATED A1C: CPT | Performed by: INTERNAL MEDICINE

## 2019-12-16 PROCEDURE — 83540 ASSAY OF IRON: CPT

## 2019-12-16 PROCEDURE — 82728 ASSAY OF FERRITIN: CPT

## 2019-12-16 PROCEDURE — 36415 COLL VENOUS BLD VENIPUNCTURE: CPT | Performed by: INTERNAL MEDICINE

## 2019-12-16 PROCEDURE — 82550 ASSAY OF CK (CPK): CPT | Performed by: INTERNAL MEDICINE

## 2019-12-16 PROCEDURE — 84466 ASSAY OF TRANSFERRIN: CPT

## 2019-12-18 ENCOUNTER — OFFICE VISIT (OUTPATIENT)
Dept: ONCOLOGY | Facility: CLINIC | Age: 79
End: 2019-12-18

## 2019-12-18 VITALS
HEIGHT: 69 IN | HEART RATE: 59 BPM | WEIGHT: 188 LBS | RESPIRATION RATE: 16 BRPM | DIASTOLIC BLOOD PRESSURE: 56 MMHG | TEMPERATURE: 97.2 F | OXYGEN SATURATION: 92 % | SYSTOLIC BLOOD PRESSURE: 101 MMHG | BODY MASS INDEX: 27.85 KG/M2

## 2019-12-18 DIAGNOSIS — D50.0 IRON DEFICIENCY ANEMIA DUE TO CHRONIC BLOOD LOSS: Primary | ICD-10-CM

## 2019-12-18 PROCEDURE — 99214 OFFICE O/P EST MOD 30 MIN: CPT | Performed by: INTERNAL MEDICINE

## 2019-12-18 RX ORDER — HYDROCODONE BITARTRATE AND ACETAMINOPHEN 5; 325 MG/1; MG/1
1 TABLET ORAL SEE ADMIN INSTRUCTIONS
Refills: 0 | COMMUNITY
Start: 2019-10-29 | End: 2020-05-20

## 2019-12-18 RX ORDER — CHLORHEXIDINE GLUCONATE 0.12 MG/ML
RINSE ORAL
Refills: 0 | COMMUNITY
Start: 2019-10-29 | End: 2020-05-20

## 2019-12-18 RX ORDER — ALPRAZOLAM 0.5 MG/1
0.5 TABLET ORAL EVERY 12 HOURS SCHEDULED
COMMUNITY

## 2019-12-18 NOTE — PROGRESS NOTES
DATE OF VISIT: 12/18/2019    REASON FOR VISIT: Followup for iron def anemia     HISTORY OF PRESENT ILLNESS: The patient is a very pleasant 79 y.o. male with past medical history significant for Iron def anemia with thrombocythemia. The patient is here today for scheduled follow up visit.    SUBJECTIVE: The patient is here today by himself.  He is feeling well today denies any fatigue no bleeding.    REVIEW OF SYSTEMS: All the other systems are reviewed by me and negative  except what is mentioned in HPI and subjective.     PAST MEDICAL HISTORY/SOCIAL HISTORY/FAMILY HISTORY: Unchanged from my prior documentation.      Current Outpatient Medications:   •  albuterol sulfate HFA (VENTOLIN HFA) 108 (90 Base) MCG/ACT inhaler, Inhale 2 puffs Every 4 (Four) Hours As Needed for Wheezing., Disp: 18 g, Rfl: 2  •  ALPRAZolam (XANAX) 0.5 MG tablet, Every 12 (Twelve) Hours., Disp: , Rfl:   •  aspirin 81 MG EC tablet, Take 81 mg by mouth Daily., Disp: , Rfl:   •  atorvastatin (LIPITOR) 40 MG tablet, TAKE ONE TABLET BY MOUTH AT BEDTIME, Disp: 30 tablet, Rfl: 11  •  cetirizine (zyrTEC) 10 MG tablet, Take 10 mg by mouth Daily As Needed for Allergies., Disp: , Rfl:   •  chlorhexidine (PERIDEX) 0.12 % solution, Swish with 1/2 oz. (15 milliliters) by mouth for 30 seconds then spit twice daily., Disp: , Rfl: 0  •  cholecalciferol (VITAMIN D3) 1000 units tablet, Take 1,000 Units by mouth 2 (Two) Times a Day., Disp: , Rfl:   •  esomeprazole (nexIUM) 40 MG capsule, TAKE ONE CAPSULE BY MOUTH EVERY DAY, Disp: 30 capsule, Rfl: 11  •  fluticasone-salmeterol (ADVAIR DISKUS) 250-50 MCG/DOSE DISKUS, Inhale 1 puff 2 (Two) Times a Day., Disp: 1 each, Rfl: 3  •  HYDROcodone-acetaminophen (NORCO) 5-325 MG per tablet, Take 1 tablet by mouth See Admin Instructions. Take 1 tablet by mouth every 4 to 6 hours as needed for pain, Disp: , Rfl: 0  •  isosorbide mononitrate (IMDUR) 30 MG 24 hr tablet, Take 1 tablet by mouth Daily. as directed, Disp: 90 tablet,  "Rfl: 3  •  meclizine (ANTIVERT) 25 MG tablet, TAKE ONE TABLET BY MOUTH TWICE DAILY, Disp: 180 tablet, Rfl: 3  •  metoprolol tartrate (LOPRESSOR) 25 MG tablet, Take 1 tablet by mouth 2 (Two) Times a Day., Disp: 60 tablet, Rfl: 5  •  nitroglycerin (NITROSTAT) 0.4 MG SL tablet, Place 0.4 mg under the tongue Every 5 (Five) Minutes As Needed for Chest Pain., Disp: , Rfl:   •  Omega-3 Fatty Acids (FISH OIL) 1000 MG capsule capsule, Take 1,000 mg by mouth Daily With Breakfast., Disp: , Rfl:   •  sertraline (ZOLOFT) 25 MG tablet, TAKE ONE TABLET BY MOUTH EVERY DAY, Disp: 90 tablet, Rfl: 3  •  SPIRIVA HANDIHALER 18 MCG per inhalation capsule, INHALE THE ENTIRE CONTENTS OF 1 CAPSULE ONCE A DAY USING HANDIHALER DEVICE, Disp: 90 capsule, Rfl: 3  •  terbinafine (lamiSIL) 250 MG tablet, TAKE ONE TABLET BY MOUTH EVERY DAY, Disp: 30 tablet, Rfl: 2  •  traZODone (DESYREL) 50 MG tablet, trazodone 50 mg tablet  one tablet at bedtime, Disp: , Rfl:   •  triamcinolone (KENALOG) 0.025 % ointment, APPLY TO THE AFFECTED AREA(S) TWICE DAILY, Disp: 80 g, Rfl: 1    PHYSICAL EXAMINATION:   /56   Pulse 59   Temp 97.2 °F (36.2 °C) (Temporal)   Resp 16   Ht 175.3 cm (69\")   Wt 85.3 kg (188 lb)   SpO2 92%   BMI 27.76 kg/m²   ECOG1  GENERAL: Age appropriate. No acute distress.   HEENT: Head atraumatic, normocephalic.   NECK: Supple. No JVD. No lymphadenopathy.   LUNGS: Clear to auscultation bilaterally. No wheezing. No rhonchi.   HEART: Regular rate and rhythm. S1, S2, no murmurs.   ABDOMEN: Soft, nontender, nondistended. Bowel sounds positive. No  hepatosplenomegaly.   EXTREMITIES: No clubbing, cyanosis, or edema.   SKIN: No rashes. No purpura.   NEUROLOGIC: Awake and oriented x3. Strength 5 out of 5 in all muscle groups.     Lab on 12/16/2019   Component Date Value Ref Range Status   • Ferritin 12/16/2019 550.70* 30.00 - 400.00 ng/mL Final   • Iron 12/16/2019 85  59 - 158 mcg/dL Final   • Iron Saturation 12/16/2019 32  20 - 50 % Final "   • Transferrin 12/16/2019 181* 200 - 360 mg/dL Final   • TIBC 12/16/2019 270* 298 - 536 mcg/dL Final   • WBC 12/16/2019 14.40* 3.40 - 10.80 10*3/mm3 Final   • RBC 12/16/2019 4.40  4.14 - 5.80 10*6/mm3 Final   • Hemoglobin 12/16/2019 14.3  13.0 - 17.7 g/dL Final   • Hematocrit 12/16/2019 42.4  37.5 - 51.0 % Final   • MCV 12/16/2019 96.4  79.0 - 97.0 fL Final   • MCH 12/16/2019 32.5  26.6 - 33.0 pg Final   • MCHC 12/16/2019 33.7  31.5 - 35.7 g/dL Final   • RDW 12/16/2019 14.2  12.3 - 15.4 % Final   • RDW-SD 12/16/2019 50.4  37.0 - 54.0 fl Final   • MPV 12/16/2019 10.6  6.0 - 12.0 fL Final   • Platelets 12/16/2019 450  140 - 450 10*3/mm3 Final   • Neutrophil % 12/16/2019 65.7  42.7 - 76.0 % Final   • Lymphocyte % 12/16/2019 20.1  19.6 - 45.3 % Final   • Monocyte % 12/16/2019 11.1  5.0 - 12.0 % Final   • Eosinophil % 12/16/2019 1.9  0.3 - 6.2 % Final   • Basophil % 12/16/2019 0.6  0.0 - 1.5 % Final   • Immature Grans % 12/16/2019 0.6* 0.0 - 0.5 % Final   • Neutrophils, Absolute 12/16/2019 9.46* 1.70 - 7.00 10*3/mm3 Final   • Lymphocytes, Absolute 12/16/2019 2.89  0.70 - 3.10 10*3/mm3 Final   • Monocytes, Absolute 12/16/2019 1.60* 0.10 - 0.90 10*3/mm3 Final   • Eosinophils, Absolute 12/16/2019 0.28  0.00 - 0.40 10*3/mm3 Final   • Basophils, Absolute 12/16/2019 0.08  0.00 - 0.20 10*3/mm3 Final   • Immature Grans, Absolute 12/16/2019 0.09* 0.00 - 0.05 10*3/mm3 Final   • nRBC 12/16/2019 0.0  0.0 - 0.2 /100 WBC Final        No results found.    ASSESSMENT: The patient is a very pleasant 79 y.o. male  with Iron deficiency anemia    PROBLEM LIST:  1. 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 Thrombocythemia  3. Leukocytosis  4. S/P splenectomy   5. HTN      PLAN:  1.  I did go over the blood work results with the patient reassured his hemoglobin is normal 14.3 his iron storage is adequate.  2.  Patient need to have mild leukocytosis and thrombocytosis will most likely induced by his previous splenectomy.  3.  He will continue Imdur for his blood pressure. If it remains elevated he will notify his PCP.    4.  The patient follow-up with us in 1 year with repeat CBC and iron profile.  5.  I will arrange for another round of IV iron if ferritin goes below 30 or saturation below 10%.      Yocasta Harris MD,  12/18/2019

## 2019-12-19 ENCOUNTER — OFFICE VISIT (OUTPATIENT)
Dept: INTERNAL MEDICINE | Facility: CLINIC | Age: 79
End: 2019-12-19

## 2019-12-19 VITALS
OXYGEN SATURATION: 91 % | HEART RATE: 60 BPM | SYSTOLIC BLOOD PRESSURE: 134 MMHG | RESPIRATION RATE: 16 BRPM | WEIGHT: 186.38 LBS | BODY MASS INDEX: 27.6 KG/M2 | DIASTOLIC BLOOD PRESSURE: 66 MMHG | HEIGHT: 69 IN | TEMPERATURE: 98.2 F

## 2019-12-19 DIAGNOSIS — E11.69 TYPE 2 DIABETES MELLITUS WITH OTHER SPECIFIED COMPLICATION, WITHOUT LONG-TERM CURRENT USE OF INSULIN (HCC): ICD-10-CM

## 2019-12-19 DIAGNOSIS — E78.5 HYPERLIPIDEMIA, UNSPECIFIED HYPERLIPIDEMIA TYPE: ICD-10-CM

## 2019-12-19 DIAGNOSIS — D50.0 IRON DEFICIENCY ANEMIA DUE TO CHRONIC BLOOD LOSS: ICD-10-CM

## 2019-12-19 DIAGNOSIS — E61.1 IRON DEFICIENCY: ICD-10-CM

## 2019-12-19 DIAGNOSIS — G47.09 OTHER INSOMNIA: ICD-10-CM

## 2019-12-19 DIAGNOSIS — G47.30 SLEEP APNEA, UNSPECIFIED TYPE: ICD-10-CM

## 2019-12-19 DIAGNOSIS — J32.9 SINUSITIS, UNSPECIFIED CHRONICITY, UNSPECIFIED LOCATION: ICD-10-CM

## 2019-12-19 DIAGNOSIS — K21.9 GASTROESOPHAGEAL REFLUX DISEASE WITHOUT ESOPHAGITIS: ICD-10-CM

## 2019-12-19 DIAGNOSIS — F41.9 ANXIETY: ICD-10-CM

## 2019-12-19 DIAGNOSIS — D75.839 THROMBOCYTHEMIA: ICD-10-CM

## 2019-12-19 DIAGNOSIS — J43.8 OTHER EMPHYSEMA (HCC): ICD-10-CM

## 2019-12-19 DIAGNOSIS — I25.10 CORONARY ARTERIOSCLEROSIS IN NATIVE ARTERY: Primary | ICD-10-CM

## 2019-12-19 PROBLEM — J40 BRONCHITIS: Status: ACTIVE | Noted: 2019-12-19

## 2019-12-19 PROCEDURE — 99214 OFFICE O/P EST MOD 30 MIN: CPT | Performed by: INTERNAL MEDICINE

## 2019-12-19 RX ORDER — AMOXICILLIN AND CLAVULANATE POTASSIUM 875; 125 MG/1; MG/1
1 TABLET, FILM COATED ORAL 2 TIMES DAILY
Qty: 14 TABLET | Refills: 0 | Status: SHIPPED | OUTPATIENT
Start: 2019-12-19 | End: 2020-05-20

## 2019-12-19 NOTE — PROGRESS NOTES
Subjective   Jairo Mercedes is a 79 y.o. male.     Chief Complaint   Patient presents with   • Nasal Congestion     x 2 days    • Cough     x 2 days started as a dry cough and has become productive with dark flem        History of Present Illness   Patient here for follow-up.  Patient also complains sinus congestion postnasal drip cough upper yellow drainage nasal congestion runny nose sinus pressure no fever chills no wheezing no short of breath.  Coronary artery disease is stable seeing cardiologist.  GERD stable on medication.  Iron stable now.  The diabetes is stable on medication.  Thrombocytopenia patient is seeing hematologist.  Sleep apnea stable now.  Insomnia stable anxiety stable.    Current Outpatient Medications:   •  albuterol sulfate HFA (VENTOLIN HFA) 108 (90 Base) MCG/ACT inhaler, Inhale 2 puffs Every 4 (Four) Hours As Needed for Wheezing., Disp: 18 g, Rfl: 2  •  ALPRAZolam (XANAX) 0.5 MG tablet, Every 12 (Twelve) Hours., Disp: , Rfl:   •  aspirin 81 MG EC tablet, Take 81 mg by mouth Daily., Disp: , Rfl:   •  atorvastatin (LIPITOR) 40 MG tablet, TAKE ONE TABLET BY MOUTH AT BEDTIME, Disp: 30 tablet, Rfl: 11  •  cetirizine (zyrTEC) 10 MG tablet, Take 10 mg by mouth Daily As Needed for Allergies., Disp: , Rfl:   •  chlorhexidine (PERIDEX) 0.12 % solution, Swish with 1/2 oz. (15 milliliters) by mouth for 30 seconds then spit twice daily., Disp: , Rfl: 0  •  cholecalciferol (VITAMIN D3) 1000 units tablet, Take 1,000 Units by mouth 2 (Two) Times a Day., Disp: , Rfl:   •  esomeprazole (nexIUM) 40 MG capsule, TAKE ONE CAPSULE BY MOUTH EVERY DAY, Disp: 30 capsule, Rfl: 11  •  fluticasone-salmeterol (ADVAIR DISKUS) 250-50 MCG/DOSE DISKUS, Inhale 1 puff 2 (Two) Times a Day., Disp: 1 each, Rfl: 3  •  HYDROcodone-acetaminophen (NORCO) 5-325 MG per tablet, Take 1 tablet by mouth See Admin Instructions. Take 1 tablet by mouth every 4 to 6 hours as needed for pain, Disp: , Rfl: 0  •  isosorbide mononitrate (IMDUR)  30 MG 24 hr tablet, Take 1 tablet by mouth Daily. as directed, Disp: 90 tablet, Rfl: 3  •  meclizine (ANTIVERT) 25 MG tablet, TAKE ONE TABLET BY MOUTH TWICE DAILY, Disp: 180 tablet, Rfl: 3  •  metoprolol tartrate (LOPRESSOR) 25 MG tablet, Take 1 tablet by mouth 2 (Two) Times a Day., Disp: 60 tablet, Rfl: 5  •  nitroglycerin (NITROSTAT) 0.4 MG SL tablet, Place 0.4 mg under the tongue Every 5 (Five) Minutes As Needed for Chest Pain., Disp: , Rfl:   •  Omega-3 Fatty Acids (FISH OIL) 1000 MG capsule capsule, Take 1,000 mg by mouth Daily With Breakfast., Disp: , Rfl:   •  sertraline (ZOLOFT) 25 MG tablet, TAKE ONE TABLET BY MOUTH EVERY DAY, Disp: 90 tablet, Rfl: 3  •  SPIRIVA HANDIHALER 18 MCG per inhalation capsule, INHALE THE ENTIRE CONTENTS OF 1 CAPSULE ONCE A DAY USING HANDIHALER DEVICE, Disp: 90 capsule, Rfl: 3  •  traZODone (DESYREL) 50 MG tablet, trazodone 50 mg tablet  one tablet at bedtime, Disp: , Rfl:   •  triamcinolone (KENALOG) 0.025 % ointment, APPLY TO THE AFFECTED AREA(S) TWICE DAILY, Disp: 80 g, Rfl: 1  •  amoxicillin-clavulanate (AUGMENTIN) 875-125 MG per tablet, Take 1 tablet by mouth 2 (Two) Times a Day., Disp: 14 tablet, Rfl: 0    The following portions of the patient's history were reviewed and updated as appropriate: allergies, current medications, past family history, past medical history, past social history, past surgical history and problem list.    Review of Systems   Constitutional: Negative.    HENT: Positive for congestion, postnasal drip, rhinorrhea and sore throat.    Respiratory: Positive for cough. Negative for shortness of breath and wheezing.    Cardiovascular: Negative.    Gastrointestinal: Negative.    Musculoskeletal: Negative.    Skin: Negative.    Neurological: Negative.    Psychiatric/Behavioral: Negative.        Objective   Physical Exam   Constitutional: He is oriented to person, place, and time. He appears well-developed and well-nourished.   Neck: Neck supple.    Cardiovascular: Normal rate, regular rhythm and normal heart sounds.   Pulmonary/Chest: Effort normal and breath sounds normal.   Abdominal: Bowel sounds are normal.   Neurological: He is alert and oriented to person, place, and time.   Skin: Skin is warm.   Psychiatric: He has a normal mood and affect.       All tests have been reviewed.    Assessment/Plan   Diagnoses and all orders for this visit:    Coronary arteriosclerosis in native artery continue medication    Hyperlipidemia, unspecified hyperlipidemia type continue medication    Other emphysema (CMS/HCC) continue medication    Sleep apnea, unspecified type continue CPAP    Gastroesophageal reflux disease without esophagitis continue medication    Iron deficiency follow-up with hematologist    Type 2 diabetes mellitus with other specified complication, without long-term current use of insulin (CMS/[facility]) continue good diet    Thrombocythemia (CMS/[facility]) history of splenectomy    Iron deficiency anemia due to chronic blood loss stable now    Anxiety continue medication    Other insomnia    Sinusitis, unspecified chronicity, unspecified location over-the-counter sinus medicine Mucinex  -     amoxicillin-clavulanate (AUGMENTIN) 875-125 MG per tablet; Take 1 tablet by mouth 2 (Two) Times a Day.        Below is to historical records for reference only:  #1: Adenopathy, intra-abdominal. biopsy, reactive changes only follow up : Hematology/oncology. history of ITP s/p splenectomy, higher WBC and plts, now Hb low , watch--  #2: copd, continue medicine night time O2  #3: fatty liver, s/p cholecystectomy. history of gall stone, follow up with GI, liver enzymes normal now after cutting Lipitor 80 mg. Asked patient to cut down Lipitor 40 mg daily, discontinue acetaminophen, weight loss.alkaline phosphatase is elevated watch for now  #4: Coronary heart disease,blockage unable to stent. continue medicine prn NTG,metoprolol 25 bid, f/u cardio  #5:  diabetic, patient refuses  medicines  #7: Mild chronic anxiety, continue medicine  Leukocytosis thrombocytosis lymphocytosis monocytosis history of splenectomy for possible thrombocytopenia. continue to watch--  Vocal cord paralysis follow up with ENT  right thumb tendonitis,resolved after tx. XR severe arthritis.  hemorroid continue medicine   Overweight encouraged patient to have good diet  renal cyst 3.3 cm, MRI November 2014 showed a 2 cm. Repeat normal  low iron normal  s/p iron transfusion, follow hematologist  insomia continue trazodone  vitD low vitD3 2000u daily  s/p pneumonia shot, prevnar 10/27, Td to HD , zostavax done, shingrix informed  aaa screen 3cm repeated 12/2017 smaller now, repeat 12/2019  Lung nodule, repeat in 7/2018 showed locular effusion, by lung doctor.   Colon 8/2/18 colon polyp , repeat 5 years  Onychomycosis on Lamisil ,liver enzyme normal after 4 weeks recheck in 1 month--  BPH continue PSA

## 2019-12-20 LAB
CHOLEST SERPL-MCNC: 131 MG/DL (ref 0–200)
CK SERPL-CCNC: 71 U/L (ref 20–200)
HBA1C MFR BLD: 5.9 % (ref 4.8–5.6)
HDLC SERPL-MCNC: 33 MG/DL (ref 40–60)
LDLC SERPL CALC-MCNC: 66 MG/DL (ref 0–100)
TRIGL SERPL-MCNC: 159 MG/DL (ref 0–150)
VLDLC SERPL CALC-MCNC: 31.8 MG/DL

## 2020-01-02 RX ORDER — ISOSORBIDE MONONITRATE 30 MG/1
TABLET, EXTENDED RELEASE ORAL
Qty: 90 TABLET | Refills: 3 | Status: SHIPPED | OUTPATIENT
Start: 2020-01-02 | End: 2021-01-04

## 2020-01-08 RX ORDER — MECLIZINE HYDROCHLORIDE 25 MG/1
TABLET ORAL
Qty: 180 TABLET | Refills: 3 | Status: SHIPPED | OUTPATIENT
Start: 2020-01-08 | End: 2020-01-14 | Stop reason: SDUPTHER

## 2020-01-09 ENCOUNTER — OFFICE VISIT (OUTPATIENT)
Dept: UROLOGY | Facility: CLINIC | Age: 80
End: 2020-01-09

## 2020-01-09 VITALS
RESPIRATION RATE: 18 BRPM | HEART RATE: 79 BPM | BODY MASS INDEX: 27.55 KG/M2 | WEIGHT: 186 LBS | TEMPERATURE: 97.9 F | HEIGHT: 69 IN | OXYGEN SATURATION: 96 %

## 2020-01-09 DIAGNOSIS — R31.0 GROSS HEMATURIA: Primary | ICD-10-CM

## 2020-01-09 LAB
BILIRUB BLD-MCNC: NEGATIVE MG/DL
CLARITY, POC: CLEAR
COLOR UR: YELLOW
GLUCOSE UR STRIP-MCNC: NEGATIVE MG/DL
KETONES UR QL: NEGATIVE
LEUKOCYTE EST, POC: NEGATIVE
NITRITE UR-MCNC: NEGATIVE MG/ML
PH UR: 6 [PH] (ref 5–8)
PROT UR STRIP-MCNC: NEGATIVE MG/DL
RBC # UR STRIP: ABNORMAL /UL
SP GR UR: 1.01 (ref 1–1.03)
UROBILINOGEN UR QL: NORMAL

## 2020-01-09 PROCEDURE — 99213 OFFICE O/P EST LOW 20 MIN: CPT | Performed by: UROLOGY

## 2020-01-09 PROCEDURE — 81003 URINALYSIS AUTO W/O SCOPE: CPT | Performed by: UROLOGY

## 2020-01-09 NOTE — PROGRESS NOTES
Chief Complaint  Gross hematuria follow up    HPI  Mr. Mercedes is a 79 y.o. male with history of emphysema who presents for follow up of gross hematuria.    He says since our last visit he has had a few drops of blood in his urine multiple times.  He denies any dysuria or urgency today.  He denies any recent urinary infections.    To review,  He started urinating blood 8/8/18 early evening, wento urgent care, +UA, UCx ultimately negative. No dysuria. No other associated Sx  He strained and 2 blood clots came out. Felt that he emptied his bladder.   He c/o intermittent LUTS x 6 mo. Main Sx are frequency. He says sometimes he has urgency.   He is overall very pleased with his urination. Feels that he empties well now.   Used to follow with Dr. Arango for yearly PSA checks and prostate exams.   IPSS - 8  Main risk factor recently was a UTI    He currently denies fevers, chills, nausea, vomiting, constipation or flank pain.    Past Medical History  Past Medical History:   Diagnosis Date   • Abdominal pain    • Acute sinusitis    • Allergic N/A   • Anemia    • Anxiety    • Arthritis    • Asthma N/A   • COPD (chronic obstructive pulmonary disease) (CMS/[facility])    • Coronary artery disease N/A    Some Blockage   • Diabetes mellitus (CMS/[facility])     REPORTS DIET CONTROL ONLY, TAKES NO MEDICATION   • Emphysema lung (CMS/[facility])    • Emphysema lung (CMS/[facility])    • Emphysema lung (CMS/[facility])    • Emphysema of lung (CMS/[facility])    • Emphysema of lung (CMS/[facility])    • Heme positive stool    • History of ankle fracture     LEFT    • History of cardiovascular stress test     REPORTS IN HIS LATE 50'S AND THAT ALL WAS WNL'S AT THAT TIME   • History of echocardiogram     REPORTS HAS HAD 2. DOES NOT REPORT ANY HX OF CHEST PAIN.   • History of pneumonia    • [place] (hard of hearing)     WEARS HEARING AIDS   • Hypertension    • Melanoma (CMS/[facility])     2013   • Pneumonia    • Seasonal allergies    • Wears glasses      He has moderate emphysema he says, and  follows with Dr. Frias.    Past Surgical History  Past Surgical History:   Procedure Laterality Date   • BRAIN SURGERY  1999    REPORTS FOR SUBDURAL HEMATOMA    • BRONCHOSCOPY N/A 4/16/2018    Procedure: BRONCHOSCOPY DIAGNOSTIC WITH WASHINGS, BRUSHINGS AND BIOPSIES;  Surgeon: Morgan Frias MD;  Location: [facility] OR;  Service: Pulmonary   • ALEX HOLE FOR SUBDURAL HEMATOMA     • CARDIAC CATHETERIZATION      REPORTS APPROXIMATELY 2015 AND REPORTS NO STENTS WERE PLACED   • CHOLECYSTECTOMY     • COLONOSCOPY     • COLONOSCOPY N/A 8/2/2018    Procedure: COLONOSCOPY WITH HOT FORCEP POLYPECTOMY X2  ;  Surgeon: Sara Reynoso MD;  Location: [facility] ENDOSCOPY;  Service: Gastroenterology   • ENDOSCOPY     • LIVER BIOPSY     • LYMPH NODE BIOPSY     • MOLE REMOVAL  2005    MELANOMA ON BACK   • SPLENECTOMY     • THORACOTOMY  07/28/2015    Left thoracotomy and stapling of pulmonary blebs. Mechanical pleurodesis.   • TONSILLECTOMY     • WISDOM TOOTH EXTRACTION         Medications    Current Outpatient Medications:   •  albuterol sulfate HFA (VENTOLIN HFA) 108 (90 Base) MCG/ACT inhaler, Inhale 2 puffs Every 4 (Four) Hours As Needed for Wheezing., Disp: 18 g, Rfl: 2  •  ALPRAZolam (XANAX) 0.5 MG tablet, Every 12 (Twelve) Hours., Disp: , Rfl:   •  amoxicillin-clavulanate (AUGMENTIN) 875-125 MG per tablet, Take 1 tablet by mouth 2 (Two) Times a Day., Disp: 14 tablet, Rfl: 0  •  aspirin 81 MG EC tablet, Take 81 mg by mouth Daily., Disp: , Rfl:   •  atorvastatin (LIPITOR) 40 MG tablet, TAKE ONE TABLET BY MOUTH AT BEDTIME, Disp: 30 tablet, Rfl: 11  •  cetirizine (zyrTEC) 10 MG tablet, Take 10 mg by mouth Daily As Needed for Allergies., Disp: , Rfl:   •  chlorhexidine (PERIDEX) 0.12 % solution, Swish with 1/2 oz. (15 milliliters) by mouth for 30 seconds then spit twice daily., Disp: , Rfl: 0  •  cholecalciferol (VITAMIN D3) 1000 units tablet, Take 1,000 Units by mouth 2 (Two) Times a Day., Disp: , Rfl:   •  esomeprazole (nexIUM) 40 MG  "capsule, TAKE ONE CAPSULE BY MOUTH EVERY DAY, Disp: 30 capsule, Rfl: 11  •  fluticasone-salmeterol (ADVAIR DISKUS) 250-50 MCG/DOSE DISKUS, Inhale 1 puff 2 (Two) Times a Day., Disp: 1 each, Rfl: 3  •  HYDROcodone-acetaminophen (NORCO) 5-325 MG per tablet, Take 1 tablet by mouth See Admin Instructions. Take 1 tablet by mouth every 4 to 6 hours as needed for pain, Disp: , Rfl: 0  •  isosorbide mononitrate (IMDUR) 30 MG 24 hr tablet, TAKE ONE TABLET BY MOUTH EVERY DAY AS DIRECTED, Disp: 90 tablet, Rfl: 3  •  meclizine (ANTIVERT) 25 MG tablet, TAKE ONE TABLET BY MOUTH TWICE DAILY, Disp: 180 tablet, Rfl: 3  •  metoprolol tartrate (LOPRESSOR) 25 MG tablet, Take 1 tablet by mouth 2 (Two) Times a Day., Disp: 60 tablet, Rfl: 5  •  nitroglycerin (NITROSTAT) 0.4 MG SL tablet, Place 0.4 mg under the tongue Every 5 (Five) Minutes As Needed for Chest Pain., Disp: , Rfl:   •  Omega-3 Fatty Acids (FISH OIL) 1000 MG capsule capsule, Take 1,000 mg by mouth Daily With Breakfast., Disp: , Rfl:   •  sertraline (ZOLOFT) 25 MG tablet, TAKE ONE TABLET BY MOUTH EVERY DAY, Disp: 90 tablet, Rfl: 3  •  SPIRIVA HANDIHALER 18 MCG per inhalation capsule, INHALE THE ENTIRE CONTENTS OF 1 CAPSULE ONCE A DAY USING HANDIHALER DEVICE, Disp: 90 capsule, Rfl: 3  •  traZODone (DESYREL) 50 MG tablet, trazodone 50 mg tablet  one tablet at bedtime, Disp: , Rfl:   •  triamcinolone (KENALOG) 0.025 % ointment, APPLY TO THE AFFECTED AREA(S) TWICE DAILY, Disp: 80 g, Rfl: 1    Allergies  Allergies   Allergen Reactions   • Parabens Rash   • Other Rash     REPORTS ALLERGY TO \"PARABENS\" - REPORTS THEY CAUSE HIM TO BREAK OUT (REPORTS THIS IS A PRESERVATIVE THAT IS PUT IN CREAMS)       Social History  Social History     Socioeconomic History   • Marital status:      Spouse name: Not on file   • Number of children: Not on file   • Years of education: Not on file   • Highest education level: Not on file   Tobacco Use   • Smoking status: Former Smoker     Packs/day: " "3.00     Years: 42.00     Pack years: 126.00     Types: Cigarettes     Start date: 1957     Last attempt to quit:      Years since quittin.8   • Smokeless tobacco: Never Used   Substance and Sexual Activity   • Alcohol use: No   • Drug use: No   • Sexual activity: Never       Family History  He has no family history of prostate, bladder or kidney cancer    Review of Systems  Constitutional: No fevers or chills  Skin: Negative for rash  Endocrine: No heat/cold intolerance   Cardiovascular: Negative for chest pain or dyspnea on exertion  Respiratory: Negative for shortness of breath or wheezing  Gastrointestinal: No constipation, nausea or vomiting  Genitourinary: Negative for current symptoms or dysuria.  Musculoskeletal: No flank pain  Neurological:  Negative for frequent headaches or dizziness; + [place]  Lymph/Heme: Negative for leg swelling or calf pain.    Physical Exam  Visit Vitals  Pulse 79   Temp 97.9 °F (36.6 °C)   Resp 18   Ht 175.3 cm (69.02\")   Wt 84.4 kg (186 lb)   SpO2 96%   BMI 27.45 kg/m²     Constitutional: NAD, WDWN.   HEENT: NCAT. Conjunctivae normal.  MMM.    Cardiovascular: Regular rate.  Pulmonary/Chest: Respirations are even and non-labored bilaterally. + hoarseness  Abdominal: Soft. No distension, tenderness, masses or guarding. No CVA tenderness.  Neurological: A + O x 3.  Cranial Nerves II-XII grossly intact.  Extremities: WARREN x 4, Warm. No clubbing.  No cyanosis.    Skin: Pink, warm and dry.  No rashes noted.  Psychiatric:  Normal mood and affect    Labs    Chemistry        Component Value Date/Time     2019 0851    K 4.2 2019 0851     2019 0851    CO2 28.0 2019 0851    BUN 17 2019 0851    CREATININE 0.80 2019 0851     (H) 2019 0750        Component Value Date/Time    CALCIUM 8.9 2019 0851    ALKPHOS 158 (H) 2019 0851    AST 28 2019 0851    ALT 33 2019 0851    BILITOT 0.5 2019 0851    "     Brief Urine Lab Results  (Last result in the past 365 days)      Color   Clarity   Blood   Leuk Est   Nitrite   Protein   CREAT   Urine HCG        01/09/20 1006 Yellow Clear 2+ Negative Negative Negative               Radiologic Studies  PROCEDURE: US RENAL BILATERAL-  HISTORY: renal cyst; N28.1-Cyst of kidney, acquired  PROCEDURE: Ultrasound images of the kidneys were obtained.  FINDINGS:.    The kidneys are normal in size and echogenicity with the right kidney  measuring 10.3 cm and the left kidney measuring 11.5 cm. There is no  cystic or solid mass. There is no hydronephrosis.  IMPRESSION:  Normal renal ultrasound with no evidence of a cystic or  solid mass.   This report was finalized on 6/19/2018    The labs and images were reviewed by me personally.       Past cystoscopic findings  Cystoscopic findings included one right and left ureteral orifice in the normal anatomic position with normal bladder mucosa and no tumors, masses or stones. The urethral urothelium was within normal limits with no strictures.  There was not a prominent median lobe.  The lateral lobes were not obstructive in appearance.        PVR performed by my staff in the past- 0    Assessment  79 y.o. male who presented with gross hematuria and UTI.  He has no clear genitourinary pathology identified on renal ultrasound imaging and cystoscopic evaluation.  His prostate is not very enlarged visually and he is emptying his bladder well.     Plan  1.  CT Urogram and Fu in 2 weeks    Osmani Francois MD

## 2020-01-10 LAB
APPEARANCE UR: CLEAR
BACTERIA #/AREA URNS HPF: ABNORMAL /HPF
BILIRUB UR QL STRIP: NEGATIVE
CASTS URNS MICRO: ABNORMAL
COLOR UR: YELLOW
EPI CELLS #/AREA URNS HPF: ABNORMAL /HPF
GLUCOSE UR QL: NEGATIVE
HGB UR QL STRIP: ABNORMAL
KETONES UR QL STRIP: NEGATIVE
LEUKOCYTE ESTERASE UR QL STRIP: NEGATIVE
NITRITE UR QL STRIP: NEGATIVE
PH UR STRIP: 6 [PH] (ref 5–8)
PROT UR QL STRIP: NEGATIVE
RBC #/AREA URNS HPF: ABNORMAL /HPF
SP GR UR: 1.02 (ref 1–1.03)
UROBILINOGEN UR STRIP-MCNC: ABNORMAL MG/DL
WBC #/AREA URNS HPF: ABNORMAL /HPF

## 2020-01-14 RX ORDER — MECLIZINE HYDROCHLORIDE 25 MG/1
25 TABLET ORAL 2 TIMES DAILY
Qty: 180 TABLET | Refills: 3 | Status: SHIPPED | OUTPATIENT
Start: 2020-01-14 | End: 2020-10-14 | Stop reason: SDUPTHER

## 2020-01-15 ENCOUNTER — HOSPITAL ENCOUNTER (OUTPATIENT)
Dept: CT IMAGING | Facility: HOSPITAL | Age: 80
Discharge: HOME | End: 2020-01-15
Admitting: UROLOGY

## 2020-01-15 DIAGNOSIS — R31.0 GROSS HEMATURIA: ICD-10-CM

## 2020-01-15 LAB — CREAT BLDA-MCNC: 0.9 MG/DL (ref 0.6–1.3)

## 2020-01-15 PROCEDURE — 74178 CT ABD&PLV WO CNTR FLWD CNTR: CPT

## 2020-01-15 PROCEDURE — 82565 ASSAY OF CREATININE: CPT

## 2020-01-15 PROCEDURE — 25010000002 IOPAMIDOL 61 % SOLUTION: Performed by: UROLOGY

## 2020-01-15 RX ADMIN — IOPAMIDOL 100 ML: 612 INJECTION, SOLUTION INTRAVENOUS at 07:14

## 2020-01-20 RX ORDER — SERTRALINE HYDROCHLORIDE 25 MG/1
25 TABLET, FILM COATED ORAL DAILY
Qty: 90 TABLET | Refills: 3 | Status: SHIPPED | OUTPATIENT
Start: 2020-01-20 | End: 2020-01-22

## 2020-01-22 RX ORDER — SERTRALINE HYDROCHLORIDE 25 MG/1
TABLET, FILM COATED ORAL
Qty: 90 TABLET | Refills: 3 | Status: SHIPPED | OUTPATIENT
Start: 2020-01-22 | End: 2021-01-20

## 2020-01-27 ENCOUNTER — OFFICE VISIT (OUTPATIENT)
Dept: UROLOGY | Facility: CLINIC | Age: 80
End: 2020-01-27

## 2020-01-27 VITALS
TEMPERATURE: 97.9 F | BODY MASS INDEX: 27.55 KG/M2 | WEIGHT: 186 LBS | HEART RATE: 72 BPM | OXYGEN SATURATION: 98 % | HEIGHT: 69 IN | RESPIRATION RATE: 18 BRPM

## 2020-01-27 DIAGNOSIS — R31.0 GROSS HEMATURIA: Primary | ICD-10-CM

## 2020-01-27 PROCEDURE — 99213 OFFICE O/P EST LOW 20 MIN: CPT | Performed by: UROLOGY

## 2020-01-27 NOTE — PROGRESS NOTES
Chief Complaint  Gross hematuria follow up    HPI  Mr. Mercedes is a 79 y.o. male with history of emphysema who presents for follow up of gross hematuria.    No gross hematuria since last visit.  He has been drinking much more water than he had in the past.    To review,  He started urinating blood 8/8/18 early evening, wento urgent care, +UA, UCx ultimately negative. No dysuria. No other associated Sx  He strained and 2 blood clots came out. Felt that he emptied his bladder.   He c/o intermittent LUTS x 6 mo. Main Sx are frequency. He says sometimes he has urgency.   He is overall very pleased with his urination. Feels that he empties well now.   Used to follow with Dr. Arango for yearly PSA checks and prostate exams.   IPSS - 8  Main risk factor recently was a UTI    He currently denies fevers, chills, nausea, vomiting, constipation or flank pain.    Past Medical History  Past Medical History:   Diagnosis Date   • Abdominal pain    • Acute sinusitis    • Allergic N/A   • Anemia    • Anxiety    • Arthritis    • Asthma N/A   • COPD (chronic obstructive pulmonary disease) (CMS/[facility])    • Coronary artery disease N/A    Some Blockage   • Diabetes mellitus (CMS/[facility])     REPORTS DIET CONTROL ONLY, TAKES NO MEDICATION   • Emphysema lung (CMS/[facility])    • Emphysema lung (CMS/[facility])    • Emphysema lung (CMS/[facility])    • Emphysema of lung (CMS/[facility])    • Emphysema of lung (CMS/[facility])    • Heme positive stool    • History of ankle fracture     LEFT    • History of cardiovascular stress test     REPORTS IN HIS LATE 50'S AND THAT ALL WAS WNL'S AT THAT TIME   • History of echocardiogram     REPORTS HAS HAD 2. DOES NOT REPORT ANY HX OF CHEST PAIN.   • History of pneumonia    • [place] (hard of hearing)     WEARS HEARING AIDS   • Hypertension    • Melanoma (CMS/[facility])     2013   • Pneumonia    • Seasonal allergies    • Wears glasses      He has moderate emphysema he says, and follows with Dr. Frias.    Past Surgical History  Past Surgical History:    Procedure Laterality Date   • BRAIN SURGERY  1999    REPORTS FOR SUBDURAL HEMATOMA    • BRONCHOSCOPY N/A 4/16/2018    Procedure: BRONCHOSCOPY DIAGNOSTIC WITH WASHINGS, BRUSHINGS AND BIOPSIES;  Surgeon: Morgan Frias MD;  Location: [facility] OR;  Service: Pulmonary   • ALEX HOLE FOR SUBDURAL HEMATOMA     • CARDIAC CATHETERIZATION      REPORTS APPROXIMATELY 2015 AND REPORTS NO STENTS WERE PLACED   • CHOLECYSTECTOMY     • COLONOSCOPY     • COLONOSCOPY N/A 8/2/2018    Procedure: COLONOSCOPY WITH HOT FORCEP POLYPECTOMY X2  ;  Surgeon: Sara Reynoso MD;  Location: [facility] ENDOSCOPY;  Service: Gastroenterology   • ENDOSCOPY     • LIVER BIOPSY     • LYMPH NODE BIOPSY     • MOLE REMOVAL  2005    MELANOMA ON BACK   • SPLENECTOMY     • THORACOTOMY  07/28/2015    Left thoracotomy and stapling of pulmonary blebs. Mechanical pleurodesis.   • TONSILLECTOMY     • WISDOM TOOTH EXTRACTION         Medications    Current Outpatient Medications:   •  albuterol sulfate HFA (VENTOLIN HFA) 108 (90 Base) MCG/ACT inhaler, Inhale 2 puffs Every 4 (Four) Hours As Needed for Wheezing., Disp: 18 g, Rfl: 2  •  ALPRAZolam (XANAX) 0.5 MG tablet, Every 12 (Twelve) Hours., Disp: , Rfl:   •  amoxicillin-clavulanate (AUGMENTIN) 875-125 MG per tablet, Take 1 tablet by mouth 2 (Two) Times a Day., Disp: 14 tablet, Rfl: 0  •  aspirin 81 MG EC tablet, Take 81 mg by mouth Daily., Disp: , Rfl:   •  atorvastatin (LIPITOR) 40 MG tablet, TAKE ONE TABLET BY MOUTH AT BEDTIME, Disp: 30 tablet, Rfl: 11  •  cetirizine (zyrTEC) 10 MG tablet, Take 10 mg by mouth Daily As Needed for Allergies., Disp: , Rfl:   •  chlorhexidine (PERIDEX) 0.12 % solution, Swish with 1/2 oz. (15 milliliters) by mouth for 30 seconds then spit twice daily., Disp: , Rfl: 0  •  cholecalciferol (VITAMIN D3) 1000 units tablet, Take 1,000 Units by mouth 2 (Two) Times a Day., Disp: , Rfl:   •  esomeprazole (nexIUM) 40 MG capsule, TAKE ONE CAPSULE BY MOUTH EVERY DAY, Disp: 30 capsule, Rfl: 11  •   "fluticasone-salmeterol (ADVAIR DISKUS) 250-50 MCG/DOSE DISKUS, Inhale 1 puff 2 (Two) Times a Day., Disp: 1 each, Rfl: 3  •  HYDROcodone-acetaminophen (NORCO) 5-325 MG per tablet, Take 1 tablet by mouth See Admin Instructions. Take 1 tablet by mouth every 4 to 6 hours as needed for pain, Disp: , Rfl: 0  •  isosorbide mononitrate (IMDUR) 30 MG 24 hr tablet, TAKE ONE TABLET BY MOUTH EVERY DAY AS DIRECTED, Disp: 90 tablet, Rfl: 3  •  meclizine (ANTIVERT) 25 MG tablet, Take 1 tablet by mouth 2 (Two) Times a Day., Disp: 180 tablet, Rfl: 3  •  metoprolol tartrate (LOPRESSOR) 25 MG tablet, Take 1 tablet by mouth 2 (Two) Times a Day., Disp: 60 tablet, Rfl: 5  •  nitroglycerin (NITROSTAT) 0.4 MG SL tablet, Place 0.4 mg under the tongue Every 5 (Five) Minutes As Needed for Chest Pain., Disp: , Rfl:   •  Omega-3 Fatty Acids (FISH OIL) 1000 MG capsule capsule, Take 1,000 mg by mouth Daily With Breakfast., Disp: , Rfl:   •  sertraline (ZOLOFT) 25 MG tablet, TAKE ONE TABLET BY MOUTH EVERY DAY, Disp: 90 tablet, Rfl: 3  •  SPIRIVA HANDIHALER 18 MCG per inhalation capsule, INHALE THE ENTIRE CONTENTS OF 1 CAPSULE ONCE A DAY USING HANDIHALER DEVICE, Disp: 90 capsule, Rfl: 3  •  traZODone (DESYREL) 50 MG tablet, trazodone 50 mg tablet  one tablet at bedtime, Disp: , Rfl:   •  triamcinolone (KENALOG) 0.025 % ointment, APPLY TO THE AFFECTED AREA(S) TWICE DAILY, Disp: 80 g, Rfl: 1    Allergies  Allergies   Allergen Reactions   • Parabens Rash   • Other Rash     REPORTS ALLERGY TO \"PARABENS\" - REPORTS THEY CAUSE HIM TO BREAK OUT (REPORTS THIS IS A PRESERVATIVE THAT IS PUT IN CREAMS)       Social History  Social History     Socioeconomic History   • Marital status:      Spouse name: Not on file   • Number of children: Not on file   • Years of education: Not on file   • Highest education level: Not on file   Tobacco Use   • Smoking status: Former Smoker     Packs/day: 3.00     Years: 42.00     Pack years: 126.00     Types: Cigarettes " "    Start date: 1957     Last attempt to quit:      Years since quittin.9   • Smokeless tobacco: Never Used   Substance and Sexual Activity   • Alcohol use: No   • Drug use: No   • Sexual activity: Never       Family History  He has no family history of prostate, bladder or kidney cancer    Review of Systems  Constitutional: No fevers or chills  Skin: Negative for rash  Endocrine: No heat/cold intolerance   Cardiovascular: Negative for chest pain or dyspnea on exertion  Respiratory: Negative for shortness of breath or wheezing  Gastrointestinal: No constipation, nausea or vomiting  Genitourinary: Negative for current symptoms or dysuria.  Musculoskeletal: No flank pain  Neurological:  Negative for frequent headaches or dizziness; + [place]  Lymph/Heme: Negative for leg swelling or calf pain.    Physical Exam  Visit Vitals  Pulse 72   Temp 97.9 °F (36.6 °C)   Resp 18   Ht 175.3 cm (69.02\")   Wt 84.4 kg (186 lb)   SpO2 98%   BMI 27.45 kg/m²     Constitutional: NAD, WDWN.   HEENT: NCAT. Conjunctivae normal.  MMM.    Cardiovascular: Regular rate.  Pulmonary/Chest: Respirations are even and non-labored bilaterally. + hoarseness  Abdominal: Soft. No distension, tenderness, masses or guarding. No CVA tenderness.  Neurological: A + O x 3.  Cranial Nerves II-XII grossly intact.  Extremities: WARREN x 4, Warm. No clubbing.  No cyanosis.    Skin: Pink, warm and dry.  No rashes noted.  Psychiatric:  Normal mood and affect    Labs    Chemistry        Component Value Date/Time     2019 0851    K 4.2 2019 0851     2019 0851    CO2 28.0 2019 0851    BUN 17 2019 0851    CREATININE 0.90 01/15/2020 0753     (H) 2019 0750        Component Value Date/Time    CALCIUM 8.9 2019 0851    ALKPHOS 158 (H) 2019 0851    AST 28 2019 0851    ALT 33 2019 0851    BILITOT 0.5 2019 0851        Brief Urine Lab Results  (Last result in the past 365 days)      " Color   Clarity   Blood   Leuk Est   Nitrite   Protein   CREAT   Urine HCG        01/09/20 1323 Yellow  Comment:  REFERENCE RANGE: Yellow, Straw Clear See below:  Comment:  Small (1+) Negative Negative Negative               Radiologic Studies  PROCEDURE: US RENAL BILATERAL-  HISTORY: renal cyst; N28.1-Cyst of kidney, acquired  PROCEDURE: Ultrasound images of the kidneys were obtained.  FINDINGS:.    The kidneys are normal in size and echogenicity with the right kidney  measuring 10.3 cm and the left kidney measuring 11.5 cm. There is no  cystic or solid mass. There is no hydronephrosis.  IMPRESSION:  Normal renal ultrasound with no evidence of a cystic or  solid mass.   This report was finalized on 6/19/2018    The labs and images were reviewed by me personally.       Past cystoscopic findings  Cystoscopic findings included one right and left ureteral orifice in the normal anatomic position with normal bladder mucosa and no tumors, masses or stones. The urethral urothelium was within normal limits with no strictures.  There was not a prominent median lobe.  The lateral lobes were not obstructive in appearance.        PVR performed by my staff in the past- 0    Ct Abdomen Pelvis With & Without Contrast  Result Date: 1/15/2020  No evidence of a cystic/solid renal mass or hydronephrosis.  3390.94 mGy.cm   This study was performed with techniques to keep radiation doses as low as reasonably achievable (ALARA). Individualized dose reduction techniques using automated exposure control or adjustment of mA and/or kV according to the patient size were employed.  This report was finalized on 1/15/2020 10:49 AM by Aroldo Garcia M.D..      Assessment  79 y.o. male who presented with gross hematuria and UTI.  He has no clear genitourinary pathology identified on renal ultrasound imaging and cystoscopic evaluation.  His prostate is not very enlarged visually and he is emptying his bladder well.  His CT urogram was negative  for stones or masses.    Plan  1.  Follow-up as needed    Osmani Francois MD

## 2020-02-24 RX ORDER — TRAZODONE HYDROCHLORIDE 50 MG/1
TABLET ORAL
Qty: 90 TABLET | Refills: 1 | Status: SHIPPED | OUTPATIENT
Start: 2020-02-24 | End: 2021-01-14

## 2020-03-12 RX ORDER — AMOXICILLIN AND CLAVULANATE POTASSIUM 875; 125 MG/1; MG/1
1 TABLET, FILM COATED ORAL 2 TIMES DAILY
Qty: 14 TABLET | Refills: 0 | OUTPATIENT
Start: 2020-03-12

## 2020-04-06 RX ORDER — TIOTROPIUM BROMIDE 18 UG/1
CAPSULE ORAL; RESPIRATORY (INHALATION)
Qty: 90 CAPSULE | Refills: 3 | Status: SHIPPED | OUTPATIENT
Start: 2020-04-06 | End: 2021-04-20 | Stop reason: SDUPTHER

## 2020-04-30 RX ORDER — ATORVASTATIN CALCIUM 40 MG/1
TABLET, FILM COATED ORAL
Qty: 30 TABLET | Refills: 11 | Status: SHIPPED | OUTPATIENT
Start: 2020-04-30 | End: 2021-04-27

## 2020-05-20 ENCOUNTER — OFFICE VISIT (OUTPATIENT)
Dept: INTERNAL MEDICINE | Facility: CLINIC | Age: 80
End: 2020-05-20

## 2020-05-20 VITALS
WEIGHT: 186 LBS | TEMPERATURE: 98 F | SYSTOLIC BLOOD PRESSURE: 140 MMHG | OXYGEN SATURATION: 95 % | HEART RATE: 54 BPM | DIASTOLIC BLOOD PRESSURE: 70 MMHG | HEIGHT: 69 IN | BODY MASS INDEX: 27.55 KG/M2

## 2020-05-20 DIAGNOSIS — K64.9 HEMORRHOIDS, UNSPECIFIED HEMORRHOID TYPE: ICD-10-CM

## 2020-05-20 DIAGNOSIS — K76.0 STEATOSIS OF LIVER: ICD-10-CM

## 2020-05-20 DIAGNOSIS — E11.69 TYPE 2 DIABETES MELLITUS WITH OTHER SPECIFIED COMPLICATION, WITHOUT LONG-TERM CURRENT USE OF INSULIN (HCC): ICD-10-CM

## 2020-05-20 DIAGNOSIS — H91.90 HEARING LOSS, UNSPECIFIED HEARING LOSS TYPE, UNSPECIFIED LATERALITY: ICD-10-CM

## 2020-05-20 DIAGNOSIS — G47.30 SLEEP APNEA, UNSPECIFIED TYPE: ICD-10-CM

## 2020-05-20 DIAGNOSIS — D75.839 THROMBOCYTHEMIA: ICD-10-CM

## 2020-05-20 DIAGNOSIS — F41.9 ANXIETY: ICD-10-CM

## 2020-05-20 DIAGNOSIS — R31.9 HEMATURIA, UNSPECIFIED TYPE: ICD-10-CM

## 2020-05-20 DIAGNOSIS — E61.1 IRON DEFICIENCY: ICD-10-CM

## 2020-05-20 DIAGNOSIS — G47.09 OTHER INSOMNIA: ICD-10-CM

## 2020-05-20 DIAGNOSIS — K21.9 GASTROESOPHAGEAL REFLUX DISEASE WITHOUT ESOPHAGITIS: ICD-10-CM

## 2020-05-20 DIAGNOSIS — J43.8 OTHER EMPHYSEMA (HCC): ICD-10-CM

## 2020-05-20 DIAGNOSIS — E78.5 HYPERLIPIDEMIA, UNSPECIFIED HYPERLIPIDEMIA TYPE: ICD-10-CM

## 2020-05-20 DIAGNOSIS — H90.8 MIXED CONDUCTIVE AND SENSORINEURAL HEARING LOSS, UNSPECIFIED LATERALITY: ICD-10-CM

## 2020-05-20 DIAGNOSIS — N40.1 BENIGN PROSTATIC HYPERPLASIA WITH LOWER URINARY TRACT SYMPTOMS, SYMPTOM DETAILS UNSPECIFIED: ICD-10-CM

## 2020-05-20 DIAGNOSIS — I25.10 CORONARY ARTERIOSCLEROSIS IN NATIVE ARTERY: Primary | ICD-10-CM

## 2020-05-20 DIAGNOSIS — H81.10 BENIGN PAROXYSMAL POSITIONAL VERTIGO, UNSPECIFIED LATERALITY: ICD-10-CM

## 2020-05-20 PROBLEM — J40 BRONCHITIS: Status: RESOLVED | Noted: 2019-12-19 | Resolved: 2020-05-20

## 2020-05-20 PROCEDURE — 99214 OFFICE O/P EST MOD 30 MIN: CPT | Performed by: INTERNAL MEDICINE

## 2020-05-20 RX ORDER — KETOCONAZOLE 20 MG/ML
SHAMPOO TOPICAL
COMMUNITY
Start: 2020-04-14 | End: 2022-11-06

## 2020-05-20 NOTE — PROGRESS NOTES
The ABCs of the Annual Wellness Visit  Subsequent Medicare Wellness Visit    Chief Complaint   Patient presents with   • Medicare Wellness-subsequent     discuss ASA and Fish oil        Subjective   History of Present Illness:  Jairo Mercedes is a 79 y.o. male who presents for a Subsequent Medicare Wellness Visit.    HEALTH RISK ASSESSMENT    Recent Hospitalizations:  No hospitalization(s) within the last year.    Current Medical Providers:  Patient Care Team:  Maicol Cardenas MD as PCP - General (Internal Medicine)  Maicol Cardenas MD as PCP - Family Medicine  Sara Reynoso MD as Consulting Physician (General Surgery)    Smoking Status:  Social History     Tobacco Use   Smoking Status Former Smoker   • Packs/day: 3.00   • Years: 42.00   • Pack years: 126.00   • Types: Cigarettes   • Start date: 1957   • Last attempt to quit:    • Years since quittin.2   Smokeless Tobacco Never Used       Alcohol Consumption:  Social History     Substance and Sexual Activity   Alcohol Use No       Depression Screen:   PHQ-2/PHQ-9 Depression Screening 2020   Little interest or pleasure in doing things 0   Feeling down, depressed, or hopeless 0   Trouble falling or staying asleep, or sleeping too much -   Feeling tired or having little energy -   Poor appetite or overeating -   Feeling bad about yourself - or that you are a failure or have let yourself or your family down -   Trouble concentrating on things, such as reading the newspaper or watching television -   Moving or speaking so slowly that other people could have noticed. Or the opposite - being so fidgety or restless that you have been moving around a lot more than usual -   Thoughts that you would be better off dead, or of hurting yourself in some way -   Total Score 0   If you checked off any problems, how difficult have these problems made it for you to do your work, take care of things at home, or get along with other people? -       Fall Risk  Screen:  BYRON Fall Risk Assessment was completed, and patient is at LOW risk for falls.Assessment completed on:5/20/2020    Health Habits and Functional and Cognitive Screening:  Functional & Cognitive Status 5/20/2020   Do you have difficulty preparing food and eating? No   Do you have difficulty bathing yourself, getting dressed or grooming yourself? No   Do you have difficulty using the toilet? No   Do you have difficulty moving around from place to place? No   Do you have trouble with steps or getting out of a bed or a chair? No   Current Diet Well Balanced Diet   Dental Exam Up to date   Eye Exam Up to date   Exercise (times per week) 2 times per week   Current Exercise Activities Include walk   Do you need help using the phone?  No   Are you deaf or do you have serious difficulty hearing?  No   Do you need help with transportation? No   Do you need help shopping? No   Do you need help preparing meals?  No   Do you need help with housework?  No   Do you need help with laundry? No   Do you need help taking your medications? No   Do you need help managing money? No   Do you ever drive or ride in a car without wearing a seat belt? No   Have you felt unusual stress, anger or loneliness in the last month? No   Who do you live with? Spouse   If you need help, do you have trouble finding someone available to you? No   Have you been bothered in the last four weeks by sexual problems? No   Do you have difficulty concentrating, remembering or making decisions? No         Does the patient have evidence of cognitive impairment? No    Asprin use counseling:Taking ASA appropriately as indicated    Age-appropriate Screening Schedule:  Refer to the list below for future screening recommendations based on patient's age, sex and/or medical conditions. Orders for these recommended tests are listed in the plan section. The patient has been provided with a written plan.    Health Maintenance   Topic Date Due   • URINE  MICROALBUMIN  1940   • DIABETIC EYE EXAM  05/16/2016   • HEMOGLOBIN A1C  06/19/2020   • INFLUENZA VACCINE  08/01/2020   • LIPID PANEL  12/19/2020   • TDAP/TD VACCINES (2 - Td) 11/13/2023   • COLONOSCOPY  08/02/2028   • ZOSTER VACCINE  Completed          The following portions of the patient's history were reviewed and updated as appropriate: allergies, current medications, past family history, past medical history, past social history, past surgical history and problem list.    Outpatient Medications Prior to Visit   Medication Sig Dispense Refill   • albuterol sulfate HFA (VENTOLIN HFA) 108 (90 Base) MCG/ACT inhaler Inhale 2 puffs Every 4 (Four) Hours As Needed for Wheezing. 18 g 2   • ALPRAZolam (XANAX) 0.5 MG tablet Every 12 (Twelve) Hours.     • aspirin 81 MG EC tablet Take 81 mg by mouth Daily.     • atorvastatin (LIPITOR) 40 MG tablet TAKE ONE TABLET BY MOUTH AT BEDTIME 30 tablet 11   • esomeprazole (nexIUM) 40 MG capsule TAKE ONE CAPSULE BY MOUTH EVERY DAY 30 capsule 11   • fluticasone-salmeterol (ADVAIR DISKUS) 250-50 MCG/DOSE DISKUS Inhale 1 puff 2 (Two) Times a Day. 1 each 3   • isosorbide mononitrate (IMDUR) 30 MG 24 hr tablet TAKE ONE TABLET BY MOUTH EVERY DAY AS DIRECTED 90 tablet 3   • ketoconazole (NIZORAL) 2 % shampoo Shampoo with a small amount to skin once a day     • meclizine (ANTIVERT) 25 MG tablet Take 1 tablet by mouth 2 (Two) Times a Day. 180 tablet 3   • metoprolol tartrate (LOPRESSOR) 25 MG tablet Take 1 tablet by mouth 2 (Two) Times a Day. 60 tablet 5   • nitroglycerin (NITROSTAT) 0.4 MG SL tablet Place 0.4 mg under the tongue Every 5 (Five) Minutes As Needed for Chest Pain.     • Omega-3 Fatty Acids (FISH OIL) 1000 MG capsule capsule Take 1,000 mg by mouth Daily With Breakfast.     • sertraline (ZOLOFT) 25 MG tablet TAKE ONE TABLET BY MOUTH EVERY DAY 90 tablet 3   • SPIRIVA HANDIHALER 18 MCG per inhalation capsule INHALE THE ENTIRE CONTENTS OF 1 CAPSULE ONCE A DAY USING HANDIHALER  DEVICE 90 capsule 3   • traZODone (DESYREL) 50 MG tablet TAKE 1/2 TO 1 TABLET BY MOUTH AT BEDTIME AS NEEDED 90 tablet 1   • triamcinolone (KENALOG) 0.025 % ointment APPLY TO THE AFFECTED AREA(S) TWICE DAILY 80 g 1   • amoxicillin-clavulanate (AUGMENTIN) 875-125 MG per tablet Take 1 tablet by mouth 2 (Two) Times a Day. 14 tablet 0   • cetirizine (zyrTEC) 10 MG tablet Take 10 mg by mouth Daily As Needed for Allergies.     • chlorhexidine (PERIDEX) 0.12 % solution Swish with 1/2 oz. (15 milliliters) by mouth for 30 seconds then spit twice daily.  0   • cholecalciferol (VITAMIN D3) 1000 units tablet Take 1,000 Units by mouth 2 (Two) Times a Day.     • HYDROcodone-acetaminophen (NORCO) 5-325 MG per tablet Take 1 tablet by mouth See Admin Instructions. Take 1 tablet by mouth every 4 to 6 hours as needed for pain  0     No facility-administered medications prior to visit.        Patient Active Problem List   Diagnosis   • Anxiety   • Arthritis   • Coronary arteriosclerosis in native artery   • Benign paroxysmal positional vertigo   • Chronic obstructive pulmonary disease (CMS/HCC)   • Diabetes mellitus (CMS/HCC)   • Steatosis of liver   • Gastroesophageal reflux disease without esophagitis   • Hearing loss   • Hemorrhoids   • Hyperlipidemia   • Insomnia   • Iron deficiency   • Thrombocythemia (CMS/HCC)   • Iron deficiency anemia   • Sleep apnea   • Onychomycosis   • Iron malabsorption   • Mixed conductive and sensorineural hearing loss       Advanced Care Planning:  ACP discussion was held with the patient during this visit. Patient has an advance directive (not in EMR), copy requested.    Review of Systems   Constitutional: Negative.    Respiratory: Negative.    Cardiovascular: Negative.    Gastrointestinal: Negative.    Musculoskeletal: Negative.    Skin: Negative.    Neurological: Negative.    Psychiatric/Behavioral: Negative.        Compared to one year ago, the patient feels his physical health is the same.  Compared  "to one year ago, the patient feels his mental health is the same.    Reviewed chart for potential of high risk medication in the elderly: yes  Reviewed chart for potential of harmful drug interactions in the elderly:no    Objective         Vitals:    05/20/20 0836   BP: 140/70   Pulse: 54   Temp: 98 °F (36.7 °C)   TempSrc: Temporal   SpO2: 95%   Weight: 84.4 kg (186 lb)   Height: 175.3 cm (69.02\")   PainSc: 0-No pain       Body mass index is 27.45 kg/m².  Discussed the patient's BMI with him. The BMI is above average; BMI management plan is completed.    Physical Exam   Constitutional: He is oriented to person, place, and time. He appears well-developed and well-nourished.   Neck: Neck supple.   Cardiovascular: Normal rate, regular rhythm and normal heart sounds.   Pulmonary/Chest: Effort normal and breath sounds normal.   Abdominal: Bowel sounds are normal.   Neurological: He is alert and oriented to person, place, and time.   Skin: Skin is warm.   Psychiatric: He has a normal mood and affect.             Assessment/Plan   Medicare Risks and Personalized Health Plan  CMS Preventative Services Quick Reference  Advance Directive Discussion    The above risks/problems have been discussed with the patient.  Pertinent information has been shared with the patient in the After Visit Summary.  Follow up plans and orders are seen below in the Assessment/Plan Section.    Diagnoses and all orders for this visit:    1. Coronary arteriosclerosis in native artery (Primary)    2. Hemorrhoids, unspecified hemorrhoid type    3. Hyperlipidemia, unspecified hyperlipidemia type    4. Other emphysema (CMS/[facility])    5. Sleep apnea, unspecified type    6. Steatosis of liver    7. Gastroesophageal reflux disease without esophagitis    8. Iron deficiency    9. Type 2 diabetes mellitus with other specified complication, without long-term current use of insulin (CMS/[facility])    10. Benign paroxysmal positional vertigo, unspecified " laterality    11. Hearing loss, unspecified hearing loss type, unspecified laterality    12. Mixed conductive and sensorineural hearing loss, unspecified laterality    13. Thrombocythemia (CMS/HCC)    14. Anxiety    15. Other insomnia      Follow Up:  No follow-ups on file.     An After Visit Summary and PPPS were given to the patient.       Below is to historical records for reference only:  #1: Adenopathy, intra-abdominal. biopsy, reactive changes only follow up : Hematology/oncology. history of ITP s/p splenectomy, higher WBC and plts, now Hb low , watch--  #2: copd, continue medicine night time O2  #3: fatty liver, s/p cholecystectomy. history of gall stone, follow up with GI, liver enzymes normal now after cutting Lipitor 80 mg. Asked patient to cut down Lipitor 40 mg daily, discontinue acetaminophen, weight loss.alkaline phosphatase is elevated watch for now  #4: Coronary heart disease,blockage unable to stent. continue medicine prn NTG,metoprolol 25 bid, f/u cardio  #5:  diabetic, patient refuses medicines  #7: Mild chronic anxiety, continue medicine  Leukocytosis thrombocytosis lymphocytosis monocytosis history of splenectomy for possible thrombocytopenia. continue to watch--  Vocal cord paralysis follow up with ENT  right thumb tendonitis,resolved after tx. XR severe arthritis.  hemorroid continue medicine   Overweight encouraged patient to have good diet  renal cyst 3.3 cm, MRI November 2014 showed a 2 cm. Repeat normal  low iron normal  s/p iron transfusion, follow hematologist  insomia continue trazodone  vitD low vitD3 2000u daily  s/p pneumonia shot, prevnar 10/27, Td to HD , zostavax done, shingrix informed  aaa screen 3cm repeated 12/2017 smaller now, repeat 12/2019  Lung nodule, repeat in 7/2018 showed locular effusion, by lung doctor.   Colon 8/2/18 colon polyp , repeat 5 years  Onychomycosis on Lamisil ,liver enzyme normal after 4 weeks recheck in 1 month--  BPH continue PSA       Answers for  HPI/ROS submitted by the patient on 5/13/2020   What is the primary reason for your visit?: Physical

## 2020-05-27 LAB
ALBUMIN SERPL-MCNC: 4.3 G/DL (ref 3.5–5.2)
ALBUMIN/GLOB SERPL: 2 G/DL
ALP SERPL-CCNC: 155 U/L (ref 39–117)
ALT SERPL-CCNC: 17 U/L (ref 1–41)
AST SERPL-CCNC: 19 U/L (ref 1–40)
BASOPHILS # BLD AUTO: 0.05 10*3/MM3 (ref 0–0.2)
BASOPHILS NFR BLD AUTO: 0.4 % (ref 0–1.5)
BILIRUB SERPL-MCNC: 0.5 MG/DL (ref 0.2–1.2)
BUN SERPL-MCNC: 19 MG/DL (ref 8–23)
BUN/CREAT SERPL: 18.8 (ref 7–25)
CALCIUM SERPL-MCNC: 9.5 MG/DL (ref 8.6–10.5)
CHLORIDE SERPL-SCNC: 103 MMOL/L (ref 98–107)
CHOLEST SERPL-MCNC: 130 MG/DL (ref 0–200)
CK SERPL-CCNC: 108 U/L (ref 20–200)
CO2 SERPL-SCNC: 24.3 MMOL/L (ref 22–29)
CREAT SERPL-MCNC: 1.01 MG/DL (ref 0.76–1.27)
EOSINOPHIL # BLD AUTO: 0.28 10*3/MM3 (ref 0–0.4)
EOSINOPHIL NFR BLD AUTO: 2.2 % (ref 0.3–6.2)
ERYTHROCYTE [DISTWIDTH] IN BLOOD BY AUTOMATED COUNT: 13.2 % (ref 12.3–15.4)
FERRITIN SERPL-MCNC: 490 NG/ML (ref 30–400)
GLOBULIN SER CALC-MCNC: 2.2 GM/DL
GLUCOSE SERPL-MCNC: 110 MG/DL (ref 65–99)
HCT VFR BLD AUTO: 41.5 % (ref 37.5–51)
HCV AB S/CO SERPL IA: <0.1 S/CO RATIO (ref 0–0.9)
HDLC SERPL-MCNC: 34 MG/DL (ref 40–60)
HGB BLD-MCNC: 14.2 G/DL (ref 13–17.7)
IMM GRANULOCYTES # BLD AUTO: 0.08 10*3/MM3 (ref 0–0.05)
IMM GRANULOCYTES NFR BLD AUTO: 0.6 % (ref 0–0.5)
IRON SATN MFR SERPL: 19 % (ref 20–50)
IRON SERPL-MCNC: 54 MCG/DL (ref 59–158)
LDLC SERPL CALC-MCNC: 59 MG/DL (ref 0–100)
LYMPHOCYTES # BLD AUTO: 3.1 10*3/MM3 (ref 0.7–3.1)
LYMPHOCYTES NFR BLD AUTO: 23.9 % (ref 19.6–45.3)
MCH RBC QN AUTO: 31 PG (ref 26.6–33)
MCHC RBC AUTO-ENTMCNC: 34.2 G/DL (ref 31.5–35.7)
MCV RBC AUTO: 90.6 FL (ref 79–97)
MONOCYTES # BLD AUTO: 1.35 10*3/MM3 (ref 0.1–0.9)
MONOCYTES NFR BLD AUTO: 10.4 % (ref 5–12)
NEUTROPHILS # BLD AUTO: 8.13 10*3/MM3 (ref 1.7–7)
NEUTROPHILS NFR BLD AUTO: 62.5 % (ref 42.7–76)
NRBC BLD AUTO-RTO: 0 /100 WBC (ref 0–0.2)
PLATELET # BLD AUTO: 426 10*3/MM3 (ref 140–450)
POTASSIUM SERPL-SCNC: 4.8 MMOL/L (ref 3.5–5.2)
PROT SERPL-MCNC: 6.5 G/DL (ref 6–8.5)
PSA SERPL-MCNC: 0.67 NG/ML (ref 0–4)
RBC # BLD AUTO: 4.58 10*6/MM3 (ref 4.14–5.8)
SODIUM SERPL-SCNC: 140 MMOL/L (ref 136–145)
TIBC SERPL-MCNC: 279 MCG/DL
TRIGL SERPL-MCNC: 185 MG/DL (ref 0–150)
TSH SERPL DL<=0.005 MIU/L-ACNC: 1.74 UIU/ML (ref 0.27–4.2)
UIBC SERPL-MCNC: 225 MCG/DL (ref 112–346)
UNABLE TO VOID: NORMAL
VLDLC SERPL CALC-MCNC: 37 MG/DL
WBC # BLD AUTO: 12.99 10*3/MM3 (ref 3.4–10.8)

## 2020-06-05 RX ORDER — ESOMEPRAZOLE MAGNESIUM 40 MG/1
CAPSULE, DELAYED RELEASE ORAL
Qty: 30 CAPSULE | Refills: 11 | Status: SHIPPED | OUTPATIENT
Start: 2020-06-05 | End: 2021-06-01

## 2020-06-11 ENCOUNTER — OFFICE VISIT (OUTPATIENT)
Dept: INTERNAL MEDICINE | Facility: CLINIC | Age: 80
End: 2020-06-11

## 2020-06-11 VITALS
WEIGHT: 186 LBS | TEMPERATURE: 98 F | RESPIRATION RATE: 17 BRPM | HEIGHT: 69 IN | BODY MASS INDEX: 27.55 KG/M2 | DIASTOLIC BLOOD PRESSURE: 68 MMHG | OXYGEN SATURATION: 95 % | HEART RATE: 58 BPM | SYSTOLIC BLOOD PRESSURE: 138 MMHG

## 2020-06-11 DIAGNOSIS — F41.9 ANXIETY: ICD-10-CM

## 2020-06-11 DIAGNOSIS — E11.69 TYPE 2 DIABETES MELLITUS WITH OTHER SPECIFIED COMPLICATION, WITHOUT LONG-TERM CURRENT USE OF INSULIN (HCC): ICD-10-CM

## 2020-06-11 DIAGNOSIS — M25.512 ACUTE PAIN OF LEFT SHOULDER: ICD-10-CM

## 2020-06-11 DIAGNOSIS — G47.30 SLEEP APNEA, UNSPECIFIED TYPE: ICD-10-CM

## 2020-06-11 DIAGNOSIS — G47.09 OTHER INSOMNIA: ICD-10-CM

## 2020-06-11 DIAGNOSIS — K21.9 GASTROESOPHAGEAL REFLUX DISEASE WITHOUT ESOPHAGITIS: ICD-10-CM

## 2020-06-11 DIAGNOSIS — D75.839 THROMBOCYTHEMIA: ICD-10-CM

## 2020-06-11 DIAGNOSIS — D50.0 IRON DEFICIENCY ANEMIA DUE TO CHRONIC BLOOD LOSS: ICD-10-CM

## 2020-06-11 DIAGNOSIS — E78.5 HYPERLIPIDEMIA, UNSPECIFIED HYPERLIPIDEMIA TYPE: ICD-10-CM

## 2020-06-11 DIAGNOSIS — I10 ESSENTIAL HYPERTENSION: ICD-10-CM

## 2020-06-11 DIAGNOSIS — E61.1 IRON DEFICIENCY: ICD-10-CM

## 2020-06-11 DIAGNOSIS — K76.0 STEATOSIS OF LIVER: ICD-10-CM

## 2020-06-11 DIAGNOSIS — I25.10 CORONARY ARTERIOSCLEROSIS IN NATIVE ARTERY: Primary | ICD-10-CM

## 2020-06-11 DIAGNOSIS — J43.8 OTHER EMPHYSEMA (HCC): ICD-10-CM

## 2020-06-11 DIAGNOSIS — H81.10 BENIGN PAROXYSMAL POSITIONAL VERTIGO, UNSPECIFIED LATERALITY: ICD-10-CM

## 2020-06-11 PROCEDURE — 99214 OFFICE O/P EST MOD 30 MIN: CPT | Performed by: INTERNAL MEDICINE

## 2020-06-11 RX ORDER — LOSARTAN POTASSIUM 50 MG/1
50 TABLET ORAL DAILY
Qty: 30 TABLET | Refills: 1 | Status: SHIPPED | OUTPATIENT
Start: 2020-06-11 | End: 2020-07-16 | Stop reason: SDUPTHER

## 2020-06-11 NOTE — PROGRESS NOTES
Subjective   Jairo Mercedes is a 79 y.o. male.     Chief Complaint   Patient presents with   • Follow-up     go over labs       History of Present Illness   Patient here for follow-up.  Patient states her blood pressure was high yesterday in ENT and pulmonologist office.  Patient is taking metoprolol 25.  History of a coronary artery disease stable now.  Iron deficient anemia resolved but her iron level still low.  White blood cell mildly elevated.  Sugar slightly elevated.  Blood pressure today is normal.  Platelets normalized    Current Outpatient Medications:   •  albuterol sulfate HFA (VENTOLIN HFA) 108 (90 Base) MCG/ACT inhaler, Inhale 2 puffs Every 4 (Four) Hours As Needed for Wheezing., Disp: 18 g, Rfl: 2  •  ALPRAZolam (XANAX) 0.5 MG tablet, Every 12 (Twelve) Hours., Disp: , Rfl:   •  aspirin 81 MG EC tablet, Take 81 mg by mouth Daily., Disp: , Rfl:   •  atorvastatin (LIPITOR) 40 MG tablet, TAKE ONE TABLET BY MOUTH AT BEDTIME, Disp: 30 tablet, Rfl: 11  •  esomeprazole (nexIUM) 40 MG capsule, TAKE ONE CAPSULE BY MOUTH EVERY DAY, Disp: 30 capsule, Rfl: 11  •  fluticasone-salmeterol (ADVAIR DISKUS) 250-50 MCG/DOSE DISKUS, Inhale 1 puff 2 (Two) Times a Day., Disp: 1 each, Rfl: 3  •  isosorbide mononitrate (IMDUR) 30 MG 24 hr tablet, TAKE ONE TABLET BY MOUTH EVERY DAY AS DIRECTED, Disp: 90 tablet, Rfl: 3  •  ketoconazole (NIZORAL) 2 % shampoo, Shampoo with a small amount to skin once a day, Disp: , Rfl:   •  meclizine (ANTIVERT) 25 MG tablet, Take 1 tablet by mouth 2 (Two) Times a Day., Disp: 180 tablet, Rfl: 3  •  metoprolol tartrate (LOPRESSOR) 25 MG tablet, Take 1 tablet by mouth 2 (Two) Times a Day., Disp: 180 tablet, Rfl: 3  •  nitroglycerin (NITROSTAT) 0.4 MG SL tablet, Place 0.4 mg under the tongue Every 5 (Five) Minutes As Needed for Chest Pain., Disp: , Rfl:   •  Omega-3 Fatty Acids (FISH OIL) 1000 MG capsule capsule, Take 1,000 mg by mouth Daily With Breakfast., Disp: , Rfl:   •  sertraline (ZOLOFT) 25  MG tablet, TAKE ONE TABLET BY MOUTH EVERY DAY, Disp: 90 tablet, Rfl: 3  •  SPIRIVA HANDIHALER 18 MCG per inhalation capsule, INHALE THE ENTIRE CONTENTS OF 1 CAPSULE ONCE A DAY USING HANDIHALER DEVICE, Disp: 90 capsule, Rfl: 3  •  traZODone (DESYREL) 50 MG tablet, TAKE 1/2 TO 1 TABLET BY MOUTH AT BEDTIME AS NEEDED, Disp: 90 tablet, Rfl: 1  •  triamcinolone (KENALOG) 0.025 % ointment, APPLY TO THE AFFECTED AREA(S) TWICE DAILY, Disp: 80 g, Rfl: 1  •  losartan (Cozaar) 50 MG tablet, Take 1 tablet by mouth Daily., Disp: 30 tablet, Rfl: 1    The following portions of the patient's history were reviewed and updated as appropriate: allergies, current medications, past family history, past medical history, past social history, past surgical history and problem list.    Review of Systems   Constitutional: Negative.    Respiratory: Negative.    Cardiovascular: Negative.    Gastrointestinal: Negative.    Musculoskeletal: Negative.    Skin: Negative.    Neurological: Negative.    Psychiatric/Behavioral: Negative.        Objective   Physical Exam   Constitutional: He is oriented to person, place, and time. He appears well-developed and well-nourished.   Neck: Neck supple.   Cardiovascular: Normal rate, regular rhythm and normal heart sounds.   Pulmonary/Chest: Effort normal and breath sounds normal.   Abdominal: Bowel sounds are normal.   Neurological: He is alert and oriented to person, place, and time.   Skin: Skin is warm.   Psychiatric: He has a normal mood and affect.       All tests have been reviewed.    Assessment/Plan   Diagnoses and all orders for this visit:    Coronary arteriosclerosis in native artery follow-up with cardiologist    Hyperlipidemia, unspecified hyperlipidemia type continue medication    Other emphysema (CMS/HCC) follow-up with the lung doctor, continue nighttime oxygen    Sleep apnea, declined sleep study    Steatosis of liver    Gastroesophageal reflux disease without esophagitis continue  medication    Iron deficiency continue iron supplement    Type 2 diabetes mellitus with other specified complication, without long-term current use of insulin (CMS/HCC) stable    Benign paroxysmal positional vertigo, unspecified laterality    Thrombocythemia (CMS/HCC) normal now    Iron deficiency anemia due to chronic blood loss anemia resolved    Leukocytosis continue to watch    Anxiety stable    Other insomnia    Acute pain of left shoulder improved continue acetaminophen    Essential hypertension initiate medication  -     losartan (Cozaar) 50 MG tablet; Take 1 tablet by mouth Daily.    1 month follow-up          Below is to historical records for reference only:  #1: Adenopathy, intra-abdominal. biopsy, reactive changes only follow up : Hematology/oncology. history of ITP s/p splenectomy, higher WBC and plts, now Hb low , watch--  #2: copd, continue medicine night time O2  #3: fatty liver, s/p cholecystectomy. history of gall stone, follow up with GI, liver enzymes normal now after cutting Lipitor 80 mg. Asked patient to cut down Lipitor 40 mg daily, discontinue acetaminophen, weight loss.alkaline phosphatase is elevated watch for now  #4: Coronary heart disease,blockage unable to stent. continue medicine prn NTG,metoprolol 25 bid, f/u cardio  #5:  diabetic, patient refuses medicines  #7: Mild chronic anxiety, continue medicine  Leukocytosis thrombocytosis lymphocytosis monocytosis history of splenectomy for possible thrombocytopenia. continue to watch--  Vocal cord paralysis follow up with ENT  right thumb tendonitis,resolved after tx. XR severe arthritis.  hemorroid continue medicine   Overweight encouraged patient to have good diet  renal cyst 3.3 cm, MRI November 2014 showed a 2 cm. Repeat normal  low iron normal  s/p iron transfusion, follow hematologist  blanca continue trazodone  vitD low vitD3 2000u daily  s/p pneumonia shot, prevnar 10/27, Td to HD , zostavax done, shingrix informed  aaa screen 3cm  repeated 12/2017 smaller now, repeat 12/2019  Lung nodule, repeat in 7/2018 showed locular effusion, by lung doctor.   Colon 8/2/18 colon polyp , repeat 5 years  Onychomycosis on Lamisil ,liver enzyme normal after 4 weeks recheck in 1 month--  BPH continue PSA

## 2020-07-16 ENCOUNTER — OFFICE VISIT (OUTPATIENT)
Dept: INTERNAL MEDICINE | Facility: CLINIC | Age: 80
End: 2020-07-16

## 2020-07-16 VITALS
SYSTOLIC BLOOD PRESSURE: 118 MMHG | WEIGHT: 184.8 LBS | TEMPERATURE: 97.7 F | BODY MASS INDEX: 27.37 KG/M2 | OXYGEN SATURATION: 94 % | HEART RATE: 51 BPM | RESPIRATION RATE: 16 BRPM | HEIGHT: 69 IN | DIASTOLIC BLOOD PRESSURE: 66 MMHG

## 2020-07-16 DIAGNOSIS — I10 ESSENTIAL HYPERTENSION: ICD-10-CM

## 2020-07-16 DIAGNOSIS — E61.1 IRON DEFICIENCY: ICD-10-CM

## 2020-07-16 DIAGNOSIS — E78.5 HYPERLIPIDEMIA, UNSPECIFIED HYPERLIPIDEMIA TYPE: Primary | ICD-10-CM

## 2020-07-16 DIAGNOSIS — E11.69 TYPE 2 DIABETES MELLITUS WITH OTHER SPECIFIED COMPLICATION, WITHOUT LONG-TERM CURRENT USE OF INSULIN (HCC): ICD-10-CM

## 2020-07-16 PROCEDURE — 99213 OFFICE O/P EST LOW 20 MIN: CPT | Performed by: INTERNAL MEDICINE

## 2020-07-16 RX ORDER — LOSARTAN POTASSIUM 50 MG/1
50 TABLET ORAL DAILY
Qty: 90 TABLET | Refills: 3 | Status: SHIPPED | OUTPATIENT
Start: 2020-07-16 | End: 2021-07-19

## 2020-07-16 NOTE — PROGRESS NOTES
Subjective   Jairo Mercedes is a 79 y.o. male.     Chief Complaint   Patient presents with   • Hyperlipidemia     1 mo f/u        History of Present Illness   Patient here for follow-up.  Hypertension stable after adding medication.  Hyperlipidemia stable on medication needed blood tests next.  The diabetes is stable.  We will do blood test next time.  Iron deficiency continue iron supplement and her white blood cells slightly elevated.    Current Outpatient Medications:   •  albuterol sulfate HFA (VENTOLIN HFA) 108 (90 Base) MCG/ACT inhaler, Inhale 2 puffs Every 4 (Four) Hours As Needed for Wheezing., Disp: 18 g, Rfl: 2  •  ALPRAZolam (XANAX) 0.5 MG tablet, Every 12 (Twelve) Hours., Disp: , Rfl:   •  aspirin 81 MG EC tablet, Take 81 mg by mouth Daily., Disp: , Rfl:   •  atorvastatin (LIPITOR) 40 MG tablet, TAKE ONE TABLET BY MOUTH AT BEDTIME, Disp: 30 tablet, Rfl: 11  •  esomeprazole (nexIUM) 40 MG capsule, TAKE ONE CAPSULE BY MOUTH EVERY DAY, Disp: 30 capsule, Rfl: 11  •  fluticasone-salmeterol (ADVAIR DISKUS) 250-50 MCG/DOSE DISKUS, Inhale 1 puff 2 (Two) Times a Day., Disp: 1 each, Rfl: 3  •  isosorbide mononitrate (IMDUR) 30 MG 24 hr tablet, TAKE ONE TABLET BY MOUTH EVERY DAY AS DIRECTED, Disp: 90 tablet, Rfl: 3  •  ketoconazole (NIZORAL) 2 % shampoo, Shampoo with a small amount to skin once a day, Disp: , Rfl:   •  losartan (Cozaar) 50 MG tablet, Take 1 tablet by mouth Daily., Disp: 30 tablet, Rfl: 1  •  meclizine (ANTIVERT) 25 MG tablet, Take 1 tablet by mouth 2 (Two) Times a Day., Disp: 180 tablet, Rfl: 3  •  metoprolol tartrate (LOPRESSOR) 25 MG tablet, Take 1 tablet by mouth 2 (Two) Times a Day., Disp: 180 tablet, Rfl: 3  •  nitroglycerin (NITROSTAT) 0.4 MG SL tablet, Place 0.4 mg under the tongue Every 5 (Five) Minutes As Needed for Chest Pain., Disp: , Rfl:   •  Omega-3 Fatty Acids (FISH OIL) 1000 MG capsule capsule, Take 1,000 mg by mouth Daily With Breakfast., Disp: , Rfl:   •  sertraline (ZOLOFT) 25 MG  tablet, TAKE ONE TABLET BY MOUTH EVERY DAY, Disp: 90 tablet, Rfl: 3  •  SPIRIVA HANDIHALER 18 MCG per inhalation capsule, INHALE THE ENTIRE CONTENTS OF 1 CAPSULE ONCE A DAY USING HANDIHALER DEVICE, Disp: 90 capsule, Rfl: 3  •  traZODone (DESYREL) 50 MG tablet, TAKE 1/2 TO 1 TABLET BY MOUTH AT BEDTIME AS NEEDED, Disp: 90 tablet, Rfl: 1  •  triamcinolone (KENALOG) 0.025 % ointment, APPLY TO THE AFFECTED AREA(S) TWICE DAILY, Disp: 80 g, Rfl: 1    The following portions of the patient's history were reviewed and updated as appropriate: allergies, current medications, past family history, past medical history, past social history, past surgical history and problem list.    Review of Systems   Constitutional: Negative.    Respiratory: Negative.    Cardiovascular: Negative.    Gastrointestinal: Negative.    Musculoskeletal: Negative.    Skin: Negative.    Neurological: Negative.    Psychiatric/Behavioral: Negative.        Objective   Physical Exam   Constitutional: He is oriented to person, place, and time. He appears well-nourished.   Neck: Neck supple.   Cardiovascular: Normal rate, regular rhythm and normal heart sounds.   Pulmonary/Chest: Effort normal and breath sounds normal.   Abdominal: Bowel sounds are normal.   Neurological: He is alert and oriented to person, place, and time.   Skin: Skin is warm.   Psychiatric: He has a normal mood and affect.       All tests have been reviewed.    Assessment/Plan   Diagnoses and all orders for this visit:    Hyperlipidemia, unspecified hyperlipidemia type  -     Comprehensive Metabolic Panel  -     CK  -     Lipid Panel    Type 2 diabetes mellitus with other specified complication, without long-term current use of insulin (CMS/[facility])  -     Hemoglobin A1c    Essential hypertension  -     CBC & Differential    Iron deficiency  -     Iron Profile      Leukocytosis a repeat next       Follow-up in November

## 2020-08-20 ENCOUNTER — OFFICE VISIT (OUTPATIENT)
Dept: INTERNAL MEDICINE | Facility: CLINIC | Age: 80
End: 2020-08-20

## 2020-08-20 VITALS
OXYGEN SATURATION: 94 % | HEIGHT: 69 IN | HEART RATE: 52 BPM | BODY MASS INDEX: 27.42 KG/M2 | SYSTOLIC BLOOD PRESSURE: 120 MMHG | RESPIRATION RATE: 16 BRPM | WEIGHT: 185.12 LBS | DIASTOLIC BLOOD PRESSURE: 86 MMHG | TEMPERATURE: 97.7 F

## 2020-08-20 DIAGNOSIS — G89.29 CHRONIC BILATERAL LOW BACK PAIN WITHOUT SCIATICA: Primary | ICD-10-CM

## 2020-08-20 DIAGNOSIS — M54.50 CHRONIC BILATERAL LOW BACK PAIN WITHOUT SCIATICA: Primary | ICD-10-CM

## 2020-08-20 PROCEDURE — 99213 OFFICE O/P EST LOW 20 MIN: CPT | Performed by: INTERNAL MEDICINE

## 2020-08-20 RX ORDER — BACLOFEN 10 MG/1
10 TABLET ORAL 2 TIMES DAILY
Qty: 40 TABLET | Refills: 1 | Status: SHIPPED | OUTPATIENT
Start: 2020-08-20 | End: 2020-09-21

## 2020-08-20 NOTE — PROGRESS NOTES
Subjective   Jairo Mercedes is a 79 y.o. male.     Chief Complaint   Patient presents with   • Back Pain     onset several months ago, pt c/o lower back pain. pt states he did climb up on a boat and might have pulled his back this way.        History of Present Illness   Several mo ago twisted low back , now supra gluteal area pain and tenderness bilaterally.  Pain is achy in nature certain position worse.  No radiation pain.  No urinary symptoms or fecal symptoms.  Tylenol helps some.    Current Outpatient Medications:   •  albuterol sulfate HFA (VENTOLIN HFA) 108 (90 Base) MCG/ACT inhaler, Inhale 2 puffs Every 4 (Four) Hours As Needed for Wheezing., Disp: 18 g, Rfl: 2  •  ALPRAZolam (XANAX) 0.5 MG tablet, Every 12 (Twelve) Hours., Disp: , Rfl:   •  aspirin 81 MG EC tablet, Take 81 mg by mouth Daily., Disp: , Rfl:   •  atorvastatin (LIPITOR) 40 MG tablet, TAKE ONE TABLET BY MOUTH AT BEDTIME, Disp: 30 tablet, Rfl: 11  •  esomeprazole (nexIUM) 40 MG capsule, TAKE ONE CAPSULE BY MOUTH EVERY DAY, Disp: 30 capsule, Rfl: 11  •  fluticasone-salmeterol (ADVAIR DISKUS) 250-50 MCG/DOSE DISKUS, Inhale 1 puff 2 (Two) Times a Day., Disp: 1 each, Rfl: 3  •  isosorbide mononitrate (IMDUR) 30 MG 24 hr tablet, TAKE ONE TABLET BY MOUTH EVERY DAY AS DIRECTED, Disp: 90 tablet, Rfl: 3  •  ketoconazole (NIZORAL) 2 % shampoo, Shampoo with a small amount to skin once a day, Disp: , Rfl:   •  losartan (Cozaar) 50 MG tablet, Take 1 tablet by mouth Daily., Disp: 90 tablet, Rfl: 3  •  meclizine (ANTIVERT) 25 MG tablet, Take 1 tablet by mouth 2 (Two) Times a Day., Disp: 180 tablet, Rfl: 3  •  metoprolol tartrate (LOPRESSOR) 25 MG tablet, Take 1 tablet by mouth 2 (Two) Times a Day., Disp: 180 tablet, Rfl: 3  •  nitroglycerin (NITROSTAT) 0.4 MG SL tablet, Place 0.4 mg under the tongue Every 5 (Five) Minutes As Needed for Chest Pain., Disp: , Rfl:   •  Omega-3 Fatty Acids (FISH OIL) 1000 MG capsule capsule, Take 1,000 mg by mouth Daily With  Breakfast., Disp: , Rfl:   •  sertraline (ZOLOFT) 25 MG tablet, TAKE ONE TABLET BY MOUTH EVERY DAY, Disp: 90 tablet, Rfl: 3  •  SPIRIVA HANDIHALER 18 MCG per inhalation capsule, INHALE THE ENTIRE CONTENTS OF 1 CAPSULE ONCE A DAY USING HANDIHALER DEVICE, Disp: 90 capsule, Rfl: 3  •  traZODone (DESYREL) 50 MG tablet, TAKE 1/2 TO 1 TABLET BY MOUTH AT BEDTIME AS NEEDED, Disp: 90 tablet, Rfl: 1  •  triamcinolone (KENALOG) 0.025 % ointment, APPLY TO THE AFFECTED AREA(S) TWICE DAILY, Disp: 80 g, Rfl: 1  •  baclofen (LIORESAL) 10 MG tablet, Take 1 tablet by mouth 2 (Two) Times a Day., Disp: 40 tablet, Rfl: 1    The following portions of the patient's history were reviewed and updated as appropriate: allergies, current medications, past family history, past medical history, past social history, past surgical history and problem list.    Review of Systems   Cardiovascular: Negative for chest pain.   Gastrointestinal: Negative for abdominal pain.   Genitourinary: Negative for dysuria.   Musculoskeletal: Positive for back pain.   Neurological: Negative for headaches.       Objective   Physical Exam   Constitutional: He is oriented to person, place, and time. He appears well-developed and well-nourished.   Neck: Neck supple.   Cardiovascular: Normal rate, regular rhythm and normal heart sounds.   Pulmonary/Chest: Effort normal and breath sounds normal.   Abdominal: Bowel sounds are normal.   Musculoskeletal: He exhibits tenderness ( supragluteal tender wendie).   Neurological: He is alert and oriented to person, place, and time.   Skin: Skin is warm.   Psychiatric: He has a normal mood and affect.       All tests have been reviewed.    Assessment/Plan   Diagnoses and all orders for this visit:    Chronic bilateral low back pain without sciatica  -     Ambulatory Referral to Physical Therapy Evaluate and treat    Other orders  -     baclofen (LIORESAL) 10 MG tablet; Take 1 tablet by mouth 2 (Two) Times a Day.    tylenol start , call  if no better           Answers for HPI/ROS submitted by the patient on 8/20/2020   Back pain  What is the primary reason for your visit?: Back Pain

## 2020-09-17 ENCOUNTER — TREATMENT (OUTPATIENT)
Dept: PHYSICAL THERAPY | Facility: CLINIC | Age: 80
End: 2020-09-17

## 2020-09-17 DIAGNOSIS — G89.29 CHRONIC RIGHT-SIDED LOW BACK PAIN WITHOUT SCIATICA: Primary | ICD-10-CM

## 2020-09-17 DIAGNOSIS — M54.50 CHRONIC RIGHT-SIDED LOW BACK PAIN WITHOUT SCIATICA: Primary | ICD-10-CM

## 2020-09-17 PROCEDURE — 97161 PT EVAL LOW COMPLEX 20 MIN: CPT | Performed by: PHYSICAL THERAPIST

## 2020-09-17 PROCEDURE — 97140 MANUAL THERAPY 1/> REGIONS: CPT | Performed by: PHYSICAL THERAPIST

## 2020-09-17 PROCEDURE — 97110 THERAPEUTIC EXERCISES: CPT | Performed by: PHYSICAL THERAPIST

## 2020-09-17 NOTE — PROGRESS NOTES
Physical Therapy Initial Evaluation and Plan of Care      Patient: Jairo Mercedes   : 1940  Diagnosis/ICD-10 Code:  Chronic right-sided low back pain without sciatica [M54.5, G89.29]  Referring practitioner: Maicol Cardenas MD    Subjective Evaluation    History of Present Illness  Date of onset: 3/17/2020  Mechanism of injury: Fall from boat    Subjective comment: Reports having low back pain on both sides starting about 6 months ago after getting his right leg caught in a tire when stepping off of a boat.  Symptoms have stayed along the right side along the upper hip.  Denies numbnes and tingling in the legs.  Reports no difficulty sleeping.   Has not played golf in 6 weeks.    Patient Occupation: Retired Quality of life: excellent    Pain  Location: R proximal and posterior hip  Quality: dull ache  Alleviating factors: muscle relaxer; Tylenol; bending forward.  Exacerbated by: Sitting >1-2 hrs.  Progression: improved    Social Support  Lives in: multiple-level home    Treatments  Current treatment: chiropractic  Current treatment comments: Relief from chiropractor->attending 1x/month.     Patient Goals  Patient goals for therapy: decreased pain, increased motion and return to sport/leisure activities  Patient goal: Return to playing golf without pain.           Objective          Neurological Testing     Reflexes   Left   Patellar (L4): normal (2+)  Achilles (S1): absent (0)    Right   Patellar (L4): normal (2+)  Achilles (S1): absent (0)    Active Range of Motion   Cervical/Thoracic Spine     Thoracic   Flexion: 70 (Eases symptoms) degrees   Extension: 25 (Aggravates symptoms when initiating movement) degrees   Left lateral flexion: 50 degrees   Right lateral flexion: 50 degrees     Strength/Myotome Testing     Left Hip   Planes of Motion   Flexion: 5    Right Hip   Planes of Motion   Flexion: 5    Left Knee   Flexion: 5  Extension: 5    Right Knee   Flexion: 5 (Reproduces R posterior hip  pain)  Extension: 5    Left Ankle/Foot   Dorsiflexion: 5  Great toe extension: 5    Right Ankle/Foot   Dorsiflexion: 5  Great toe extension: 5    Tests       Thoracic   Negative slump.     Additional Tests Details  (+) SLR on  R at 45 deg with pulling along posterior hip  (-) FADIR for p! In R hip            Assessment & Plan     Assessment  Impairments: lacks appropriate home exercise program and pain with function  Assessment details: Mr. Mercedes is a 79 year old male that presents to physical therapy with c/o R sided posterior and proximal hip pain that started 6 months ago.  Neurological exam is unremarkable.  (+) SLR for reproduction of comparable sign on R.  Has signs and symptoms consistent with an upper sciatic nn aggravation.  Will benefit from physical therapy services to improve neurodynamics, lumbar mobility and general trunk strength to allow a full return to prior level of function.  Prognosis: good    Goals  Plan Goals: STGs:  1.)  Report at least 50% improvement in symptoms in 2 weeks.  2.)  Have no pain with standing trunk extension in 2 weeks.  LTGs:  1.)  Have at least 80-90% improvement in symptoms in 4 weeks.  2.)  Return to all ADLs/iADLs without pain or functional limitations in 6 weeks.      Plan  Therapy options: will be seen for skilled physical therapy services  Planned therapy interventions: manual therapy, abdominal trunk stabilization, therapeutic activities, home exercise program and functional ROM exercises  Frequency: 2x week  Duration in visits: 8  Duration in weeks: 4  Treatment plan discussed with: patient        Manual Therapy:    10     mins  91499;  Therapeutic Exercise:    15     mins  83428;     Neuromuscular Lizandro:        mins  67734;    Therapeutic Activity:          mins  57729;     Gait Training:           mins  24003;     Ultrasound:          mins  97161;    Electrical Stimulation:         mins  88002 ( );  Dry Needling          mins self-pay    Timed Treatment:    25   mins   Total Treatment:     50   mins    PT SIGNATURE: Joshua Choe, PT   DATE TREATMENT INITIATED: 9/17/2020    Initial Certification  Certification Period: 12/16/2020  I certify that the therapy services are furnished while this patient is under my care.  The services outlined above are required by this patient, and will be reviewed every 90 days.     PHYSICIAN: Maicol Cardenas MD      DATE:     Please sign and return via fax to [phone].. Thank you, [facility].

## 2020-09-21 RX ORDER — BACLOFEN 10 MG/1
TABLET ORAL
Qty: 40 TABLET | Refills: 1 | Status: SHIPPED | OUTPATIENT
Start: 2020-09-21 | End: 2020-11-10

## 2020-09-22 ENCOUNTER — TREATMENT (OUTPATIENT)
Dept: PHYSICAL THERAPY | Facility: CLINIC | Age: 80
End: 2020-09-22

## 2020-09-22 DIAGNOSIS — G89.29 CHRONIC LEFT-SIDED LOW BACK PAIN WITHOUT SCIATICA: Primary | ICD-10-CM

## 2020-09-22 DIAGNOSIS — M54.50 CHRONIC LEFT-SIDED LOW BACK PAIN WITHOUT SCIATICA: Primary | ICD-10-CM

## 2020-09-22 PROCEDURE — 97110 THERAPEUTIC EXERCISES: CPT | Performed by: PHYSICAL THERAPIST

## 2020-09-22 PROCEDURE — 97140 MANUAL THERAPY 1/> REGIONS: CPT | Performed by: PHYSICAL THERAPIST

## 2020-09-23 NOTE — PROGRESS NOTES
Physical Therapy Daily Progress Note    Patient: Jairo Mercedes   : 1940  Diagnosis/ICD-10 Code:  Chronic left-sided low back pain without sciatica [M54.5, G89.29]  Referring practitioner: Maicol Cardenas MD  Date of Initial Visit: No linked episodes  Today's Date: 2020  Patient seen for Visit count could not be calculated. Make sure you are using a visit which is associated with an episode. sessions         Jairo Mercedes reports that the exercises were aggravating his back, so he did half of the recommended dosage, and felt good with that. Feels the same since last visit.    Subjective     Objective   See Exercise, Manual, and Modality Logs for complete treatment.   *(+) SLR on L for R sided L/S p! At 45 deg  *(+) SLR on R for pulling along posterior thigh on R at 40 deg  *(-) Slump B/L for comparable sign    Assessment/Plan  Emphasis of treatment was to reduce L/S and proximal sciatic nn pathway sensitivity.  Combined with activation of the posterior thigh and lower trunk mm.       Manual Therapy:    10     mins  89093;  Therapeutic Exercise:    20     mins  68252;     Neuromuscular Lizandro:        mins  31341;    Therapeutic Activity:          mins  40739;     Gait Training:           mins  88308;     Ultrasound:          mins  31214;    Electrical Stimulation:         mins  76326 ( );  Dry Needling          mins self-pay    Timed Treatment:   30   mins   Total Treatment:     30   mins    Joshua Choe PT  Physical Therapist

## 2020-09-24 ENCOUNTER — TREATMENT (OUTPATIENT)
Dept: PHYSICAL THERAPY | Facility: CLINIC | Age: 80
End: 2020-09-24

## 2020-09-24 DIAGNOSIS — M54.50 LUMBAR PAIN: Primary | ICD-10-CM

## 2020-09-24 PROCEDURE — 97110 THERAPEUTIC EXERCISES: CPT | Performed by: PHYSICAL THERAPIST

## 2020-09-24 PROCEDURE — 97140 MANUAL THERAPY 1/> REGIONS: CPT | Performed by: PHYSICAL THERAPIST

## 2020-09-24 NOTE — PROGRESS NOTES
Physical Therapy Daily Progress Note    Time In   Time Out     Patient: Jairo Mercedes   : 1940  Diagnosis/ICD-10 Code:  Lumbar pain [M54.5]  Referring practitioner: Maicol Cardenas MD  Date of Initial Visit: No linked episodes  Today's Date: 2020  Patient seen for Visit count could not be calculated. Make sure you are using a visit which is associated with an episode. sessions         Jairo Mercedes reports less aggravation of low back pain, and feels more muscular now.    Subjective     Objective   See Exercise, Manual, and Modality Logs for complete treatment.   *(-) SLR for L/S p! And pulling into R posterior thigh    Assessment/Plan  Focus of care was to improve L/S sensitivity at the L4/5 region on the R.  Combined with exercise to improve proximal hip mm activity/force output.       Manual Therapy:    15     mins  98866;  Therapeutic Exercise:    15     mins  15466;     Neuromuscular Lizandro:        mins  63927;    Therapeutic Activity:          mins  94374;     Gait Training:           mins  10552;     Ultrasound:          mins  45791;    Electrical Stimulation:         mins  23210 ( );  Dry Needling          mins self-pay    Timed Treatment:   30   mins   Total Treatment:     30   mins    Joshua Choe, PT  Physical Therapist

## 2020-09-29 ENCOUNTER — TREATMENT (OUTPATIENT)
Dept: PHYSICAL THERAPY | Facility: CLINIC | Age: 80
End: 2020-09-29

## 2020-09-29 DIAGNOSIS — M54.41 CHRONIC RIGHT-SIDED LOW BACK PAIN WITH RIGHT-SIDED SCIATICA: Primary | ICD-10-CM

## 2020-09-29 DIAGNOSIS — G89.29 CHRONIC RIGHT-SIDED LOW BACK PAIN WITH RIGHT-SIDED SCIATICA: Primary | ICD-10-CM

## 2020-09-29 PROCEDURE — 97140 MANUAL THERAPY 1/> REGIONS: CPT | Performed by: PHYSICAL THERAPIST

## 2020-09-29 PROCEDURE — 97110 THERAPEUTIC EXERCISES: CPT | Performed by: PHYSICAL THERAPIST

## 2020-09-29 NOTE — PROGRESS NOTES
Physical Therapy Daily Progress Note    Patient: Jairo Mercedes   : 1940  Diagnosis/ICD-10 Code:  Chronic right-sided low back pain with right-sided sciatica [M54.41, G89.29]  Referring practitioner: Maicol Cardenas MD  Date of Initial Visit: No linked episodes  Today's Date: 2020  Patient seen for Visit count could not be calculated. Make sure you are using a visit which is associated with an episode. sessions         Jairo Mercedes reports that his low back is feeling much better since last visit.  Only has a mild ache when walking for the first 10 feet or so.    Subjective     Objective   See Exercise, Manual, and Modality Logs for complete treatment.   *(-) L/S p! With R UPA L4/5     Assessment/Plan  Focused on improving loading tolerance of the L/S, hip mm activation and general balance.       Manual Therapy:    10     mins  27852;  Therapeutic Exercise:    20     mins  83015;     Neuromuscular Lizandro:        mins  20302;    Therapeutic Activity:          mins  76807;     Gait Training:           mins  18861;     Ultrasound:         mins  20680;    Electrical Stimulation:         mins  76470 ( );  Dry Needling          mins self-pay    Timed Treatment:   30   mins   Total Treatment:     30   mins    Joshua Choe, LEILANI  Physical Therapist

## 2020-10-01 ENCOUNTER — TREATMENT (OUTPATIENT)
Dept: PHYSICAL THERAPY | Facility: CLINIC | Age: 80
End: 2020-10-01

## 2020-10-01 DIAGNOSIS — M54.50 CHRONIC RIGHT-SIDED LOW BACK PAIN WITHOUT SCIATICA: Primary | ICD-10-CM

## 2020-10-01 DIAGNOSIS — G89.29 CHRONIC RIGHT-SIDED LOW BACK PAIN WITHOUT SCIATICA: Primary | ICD-10-CM

## 2020-10-01 PROCEDURE — 97110 THERAPEUTIC EXERCISES: CPT | Performed by: PHYSICAL THERAPIST

## 2020-10-01 PROCEDURE — 97140 MANUAL THERAPY 1/> REGIONS: CPT | Performed by: PHYSICAL THERAPIST

## 2020-10-01 NOTE — PROGRESS NOTES
Physical Therapy Daily Progress Note    Patient: Jairo Mercedes   : 1940  Diagnosis/ICD-10 Code:  No primary diagnosis found.  Referring practitioner: Maicol Cardenas MD  Date of Initial Visit: No linked episodes  Today's Date: 10/1/2020  Patient seen for Visit count could not be calculated. Make sure you are using a visit which is associated with an episode. sessions         Jairo Mercedes reports that his low back pain is not present.  Not waking up a night time.      Subjective     Objective   See Exercise, Manual, and Modality Logs for complete treatment.   (-) SLR for reproduction of symptoms at 70 deg with DF overpressure  (-) reproduction of symptoms with R hip PPM flx to 120 deg    Assessment/Plan  Emphasis on improving general hip and pelvic mm activation.  Combined with improving general balance.  Mr. Mercedes will follow up in 1 week.  In the meantime, he will be using a stationary bike at home, and return to the driving range for an hour, with possibility of playing 9 holes of golf.       Manual Therapy:    10     mins  92546;  Therapeutic Exercise:    20     mins  45201;     Neuromuscular Lizandro:        mins  69664;    Therapeutic Activity:          mins  16283;     Gait Training:           mins  30211;     Ultrasound:          mins  86296;    Electrical Stimulation:         mins  64512 ( );  Dry Needling          mins self-pay    Timed Treatment:   30   mins   Total Treatment:     30   mins    Joshua Choe PT  Physical Therapist

## 2020-10-08 ENCOUNTER — TREATMENT (OUTPATIENT)
Dept: PHYSICAL THERAPY | Facility: CLINIC | Age: 80
End: 2020-10-08

## 2020-10-08 DIAGNOSIS — G89.29 CHRONIC RIGHT-SIDED LOW BACK PAIN WITHOUT SCIATICA: Primary | ICD-10-CM

## 2020-10-08 DIAGNOSIS — M54.50 CHRONIC RIGHT-SIDED LOW BACK PAIN WITHOUT SCIATICA: Primary | ICD-10-CM

## 2020-10-08 PROCEDURE — 97110 THERAPEUTIC EXERCISES: CPT | Performed by: PHYSICAL THERAPIST

## 2020-10-08 PROCEDURE — 97140 MANUAL THERAPY 1/> REGIONS: CPT | Performed by: PHYSICAL THERAPIST

## 2020-10-08 NOTE — PROGRESS NOTES
Physical Therapy Daily Progress Note    Patient: Jairo Mercedes   : 1940  Diagnosis/ICD-10 Code:  Chronic right-sided low back pain without sciatica [M54.5, G89.29]  Referring practitioner: Maicol Cardenas MD  Date of Initial Visit: No linked episodes  Today's Date: 10/8/2020  Patient seen for Visit count could not be calculated. Make sure you are using a visit which is associated with an episode. sessions         Jairo Mercedes reports feeling a very great deal better since starting physical therapy.  States that he only has mild discomfort along the right side of the low back that is improving each day.  Back to playing golf without pain or difficulty.  Subjective     Objective   See Exercise, Manual, and Modality Logs for complete treatment.   *IAN (IE):  16%; IAN (today):  8%  *GROC:  A very great deal better  *L/S AROM:  flx->70 deg; SB:  50 deg B/L; ext->30 deg  *R hip flx PROM->120 deg    Assessment/Plan  Mr. Mercedes attended physical therapy for a total of 6 visits for R sided L/S pain.  He has full AROM in all planes of the trunk with only mild pulling in the R lower L/S quadrant.  Provided patient with a finalized HEP to improve power output of the lumbopelvic mm.  Mr. Mercedes will call our clinic he if feels he needs more physical therapy, but will be planned to discharge.       Manual Therapy:    15     mins  52841;  Therapeutic Exercise:    30     mins  25017;     Neuromuscular Lizandro:        mins  61206;    Therapeutic Activity:          mins  68349;     Gait Training:           mins  42108;     Ultrasound:          mins  86001;    Electrical Stimulation:         mins  96717 ( );  Dry Needling          mins self-pay    Timed Treatment:   45   mins   Total Treatment:     45   mins    Joshua Choe, PT  Physical Therapist

## 2020-10-13 RX ORDER — MECLIZINE HYDROCHLORIDE 25 MG/1
25 TABLET ORAL 2 TIMES DAILY
Qty: 180 TABLET | Refills: 3 | Status: CANCELLED | OUTPATIENT
Start: 2020-10-13

## 2020-10-14 LAB
ALBUMIN SERPL-MCNC: 4.1 G/DL (ref 3.5–5.2)
ALBUMIN/GLOB SERPL: 2 G/DL
ALP SERPL-CCNC: 160 U/L (ref 39–117)
ALT SERPL-CCNC: 23 U/L (ref 1–41)
AST SERPL-CCNC: 22 U/L (ref 1–40)
BASOPHILS # BLD AUTO: 0.04 10*3/MM3 (ref 0–0.2)
BASOPHILS NFR BLD AUTO: 0.3 % (ref 0–1.5)
BILIRUB SERPL-MCNC: 0.4 MG/DL (ref 0–1.2)
BUN SERPL-MCNC: 21 MG/DL (ref 8–23)
BUN/CREAT SERPL: 20.6 (ref 7–25)
CALCIUM SERPL-MCNC: 9.2 MG/DL (ref 8.6–10.5)
CHLORIDE SERPL-SCNC: 104 MMOL/L (ref 98–107)
CHOLEST SERPL-MCNC: 131 MG/DL (ref 0–200)
CK SERPL-CCNC: 99 U/L (ref 20–200)
CO2 SERPL-SCNC: 26.9 MMOL/L (ref 22–29)
CREAT SERPL-MCNC: 1.02 MG/DL (ref 0.76–1.27)
EOSINOPHIL # BLD AUTO: 0.25 10*3/MM3 (ref 0–0.4)
EOSINOPHIL NFR BLD AUTO: 1.9 % (ref 0.3–6.2)
ERYTHROCYTE [DISTWIDTH] IN BLOOD BY AUTOMATED COUNT: 13.1 % (ref 12.3–15.4)
GLOBULIN SER CALC-MCNC: 2.1 GM/DL
GLUCOSE SERPL-MCNC: 103 MG/DL (ref 65–99)
HBA1C MFR BLD: 6 % (ref 4.8–5.6)
HCT VFR BLD AUTO: 41.4 % (ref 37.5–51)
HDLC SERPL-MCNC: 35 MG/DL (ref 40–60)
HGB BLD-MCNC: 14.2 G/DL (ref 13–17.7)
IMM GRANULOCYTES # BLD AUTO: 0.11 10*3/MM3 (ref 0–0.05)
IMM GRANULOCYTES NFR BLD AUTO: 0.8 % (ref 0–0.5)
IRON SATN MFR SERPL: 23 % (ref 20–50)
IRON SERPL-MCNC: 63 MCG/DL (ref 59–158)
LDLC SERPL CALC-MCNC: 68 MG/DL (ref 0–100)
LYMPHOCYTES # BLD AUTO: 3.18 10*3/MM3 (ref 0.7–3.1)
LYMPHOCYTES NFR BLD AUTO: 23.9 % (ref 19.6–45.3)
MCH RBC QN AUTO: 31.1 PG (ref 26.6–33)
MCHC RBC AUTO-ENTMCNC: 34.3 G/DL (ref 31.5–35.7)
MCV RBC AUTO: 90.8 FL (ref 79–97)
MONOCYTES # BLD AUTO: 1.4 10*3/MM3 (ref 0.1–0.9)
MONOCYTES NFR BLD AUTO: 10.5 % (ref 5–12)
NEUTROPHILS # BLD AUTO: 8.34 10*3/MM3 (ref 1.7–7)
NEUTROPHILS NFR BLD AUTO: 62.6 % (ref 42.7–76)
NRBC BLD AUTO-RTO: 0 /100 WBC (ref 0–0.2)
PLATELET # BLD AUTO: 458 10*3/MM3 (ref 140–450)
POTASSIUM SERPL-SCNC: 4.7 MMOL/L (ref 3.5–5.2)
PROT SERPL-MCNC: 6.2 G/DL (ref 6–8.5)
RBC # BLD AUTO: 4.56 10*6/MM3 (ref 4.14–5.8)
SODIUM SERPL-SCNC: 141 MMOL/L (ref 136–145)
TIBC SERPL-MCNC: 272 MCG/DL
TRIGL SERPL-MCNC: 163 MG/DL (ref 0–150)
UIBC SERPL-MCNC: 209 MCG/DL (ref 112–346)
VLDLC SERPL CALC-MCNC: 28 MG/DL (ref 5–40)
WBC # BLD AUTO: 13.32 10*3/MM3 (ref 3.4–10.8)

## 2020-10-14 RX ORDER — MECLIZINE HYDROCHLORIDE 25 MG/1
25 TABLET ORAL 2 TIMES DAILY
Qty: 30 TABLET | Refills: 3 | Status: SHIPPED | OUTPATIENT
Start: 2020-10-14 | End: 2021-04-01

## 2020-10-16 ENCOUNTER — OFFICE VISIT (OUTPATIENT)
Dept: INTERNAL MEDICINE | Facility: CLINIC | Age: 80
End: 2020-10-16

## 2020-10-16 VITALS
WEIGHT: 187 LBS | SYSTOLIC BLOOD PRESSURE: 122 MMHG | BODY MASS INDEX: 27.7 KG/M2 | OXYGEN SATURATION: 94 % | TEMPERATURE: 97.3 F | DIASTOLIC BLOOD PRESSURE: 62 MMHG | HEART RATE: 53 BPM | HEIGHT: 69 IN

## 2020-10-16 DIAGNOSIS — I10 ESSENTIAL HYPERTENSION: Primary | ICD-10-CM

## 2020-10-16 DIAGNOSIS — G47.30 SLEEP APNEA, UNSPECIFIED TYPE: ICD-10-CM

## 2020-10-16 DIAGNOSIS — F41.9 ANXIETY: ICD-10-CM

## 2020-10-16 DIAGNOSIS — E11.69 TYPE 2 DIABETES MELLITUS WITH OTHER SPECIFIED COMPLICATION, WITHOUT LONG-TERM CURRENT USE OF INSULIN (HCC): ICD-10-CM

## 2020-10-16 DIAGNOSIS — D75.839 THROMBOCYTHEMIA: ICD-10-CM

## 2020-10-16 DIAGNOSIS — E78.5 HYPERLIPIDEMIA, UNSPECIFIED HYPERLIPIDEMIA TYPE: ICD-10-CM

## 2020-10-16 DIAGNOSIS — D50.0 IRON DEFICIENCY ANEMIA DUE TO CHRONIC BLOOD LOSS: ICD-10-CM

## 2020-10-16 PROBLEM — M54.50 CHRONIC BILATERAL LOW BACK PAIN WITHOUT SCIATICA: Status: RESOLVED | Noted: 2020-08-20 | Resolved: 2020-10-16

## 2020-10-16 PROBLEM — M25.512 ACUTE PAIN OF LEFT SHOULDER: Status: RESOLVED | Noted: 2020-06-11 | Resolved: 2020-10-16

## 2020-10-16 PROBLEM — G89.29 CHRONIC BILATERAL LOW BACK PAIN WITHOUT SCIATICA: Status: RESOLVED | Noted: 2020-08-20 | Resolved: 2020-10-16

## 2020-10-16 PROBLEM — E11.40 TYPE 2 DIABETES MELLITUS WITH DIABETIC NEUROPATHY, WITHOUT LONG-TERM CURRENT USE OF INSULIN: Status: ACTIVE | Noted: 2020-10-16

## 2020-10-16 PROCEDURE — 99214 OFFICE O/P EST MOD 30 MIN: CPT | Performed by: INTERNAL MEDICINE

## 2020-10-16 NOTE — PROGRESS NOTES
Subjective   Jairo Mercedes is a 80 y.o. male.     Chief Complaint   Patient presents with   • Follow-up     3 mo f/u        History of Present Illness   Patient here for follow-up.  Sciatica nerve pain resolved after physical therapy.  Hypertension stable on medication.  Hyperlipidemia stable on medication.  The sleep apnea patient declined to have a sleep study.  The diabetes is stable on medication.  Iron deficient anemia resolved.  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  Anxiety stable on medication.    Current Outpatient Medications:   •  albuterol sulfate HFA (VENTOLIN HFA) 108 (90 Base) MCG/ACT inhaler, Inhale 2 puffs Every 4 (Four) Hours As Needed for Wheezing., Disp: 18 g, Rfl: 2  •  ALPRAZolam (XANAX) 0.5 MG tablet, Every 12 (Twelve) Hours., Disp: , Rfl:   •  aspirin 81 MG EC tablet, Take 81 mg by mouth Daily., Disp: , Rfl:   •  atorvastatin (LIPITOR) 40 MG tablet, TAKE ONE TABLET BY MOUTH AT BEDTIME, Disp: 30 tablet, Rfl: 11  •  baclofen (LIORESAL) 10 MG tablet, TAKE ONE TABLET BY MOUTH TWICE DAILY, Disp: 40 tablet, Rfl: 1  •  esomeprazole (nexIUM) 40 MG capsule, TAKE ONE CAPSULE BY MOUTH EVERY DAY, Disp: 30 capsule, Rfl: 11  •  fluticasone-salmeterol (ADVAIR DISKUS) 250-50 MCG/DOSE DISKUS, Inhale 1 puff 2 (Two) Times a Day., Disp: 1 each, Rfl: 3  •  isosorbide mononitrate (IMDUR) 30 MG 24 hr tablet, TAKE ONE TABLET BY MOUTH EVERY DAY AS DIRECTED, Disp: 90 tablet, Rfl: 3  •  ketoconazole (NIZORAL) 2 % shampoo, Shampoo with a small amount to skin once a day, Disp: , Rfl:   •  losartan (Cozaar) 50 MG tablet, Take 1 tablet by mouth Daily., Disp: 90 tablet, Rfl: 3  •  meclizine (ANTIVERT) 25 MG tablet, Take 1 tablet by mouth 2 (Two) Times a Day., Disp: 30 tablet, Rfl: 3  •  metoprolol tartrate (LOPRESSOR) 25 MG tablet, Take 1 tablet by mouth 2 (Two) Times a Day., Disp: 180 tablet, Rfl: 3  •  nitroglycerin (NITROSTAT) 0.4 MG SL tablet, Place 0.4 mg under the tongue Every 5 (Five) Minutes As Needed  for Chest Pain., Disp: , Rfl:   •  Omega-3 Fatty Acids (FISH OIL) 1000 MG capsule capsule, Take 1,000 mg by mouth Daily With Breakfast., Disp: , Rfl:   •  sertraline (ZOLOFT) 25 MG tablet, TAKE ONE TABLET BY MOUTH EVERY DAY, Disp: 90 tablet, Rfl: 3  •  SPIRIVA HANDIHALER 18 MCG per inhalation capsule, INHALE THE ENTIRE CONTENTS OF 1 CAPSULE ONCE A DAY USING HANDIHALER DEVICE, Disp: 90 capsule, Rfl: 3  •  traZODone (DESYREL) 50 MG tablet, TAKE 1/2 TO 1 TABLET BY MOUTH AT BEDTIME AS NEEDED, Disp: 90 tablet, Rfl: 1  •  triamcinolone (KENALOG) 0.025 % ointment, APPLY TO THE AFFECTED AREA(S) TWICE DAILY, Disp: 80 g, Rfl: 1    The following portions of the patient's history were reviewed and updated as appropriate: allergies, current medications, past family history, past medical history, past social history, past surgical history and problem list.    Review of Systems   Constitutional: Negative.    Respiratory: Negative.    Cardiovascular: Negative.    Gastrointestinal: Negative.    Musculoskeletal: Negative.    Skin: Negative.    Neurological: Negative.    Psychiatric/Behavioral: Negative.        Objective   Physical Exam  Neck:      Musculoskeletal: Neck supple.   Cardiovascular:      Rate and Rhythm: Normal rate and regular rhythm.      Heart sounds: Normal heart sounds.   Pulmonary:      Effort: Pulmonary effort is normal.      Breath sounds: Normal breath sounds.   Abdominal:      General: Bowel sounds are normal.   Skin:     General: Skin is warm.   Neurological:      Mental Status: He is alert and oriented to person, place, and time.         All tests have been reviewed.    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 biopsy, reactive changes only follow up : Hematology/oncology. history of ITP s/p splenectomy, higher WBC and plts, now Hb low , watch--  #2: copd, continue medicine night time O2  #3: fatty liver, s/p cholecystectomy. history of gall stone, follow up with GI, liver enzymes normal now after cutting Lipitor 80 mg. Asked patient to cut down Lipitor 40 mg daily, discontinue acetaminophen, weight loss.alkaline phosphatase is elevated watch for now  #4: Coronary heart disease,blockage unable to stent. continue medicine prn NTG,metoprolol 25 bid, f/u cardio  #5:  diabetic, patient refuses medicines  DM neuropathy watch for now  #7: Mild chronic anxiety, continue medicine  Leukocytosis thrombocytosis lymphocytosis monocytosis history of splenectomy for possible thrombocytopenia. continue to watch--  Vocal cord paralysis follow up with ENT  right thumb tendonitis,resolved after tx. XR severe arthritis.  hemorroid continue medicine   Overweight encouraged patient to have good diet  renal cyst 3.3 cm, MRI November 2014 showed a 2 cm. Repeat normal  low iron normal  s/p iron transfusion, follow hematologist  insomia continue trazodone  vitD low vitD3 2000u daily  s/p pneumonia shot, prevnar 10/27, Td to HD , zostavax done, shingrix informed  aaa screen 3cm repeated 12/2017 smaller now, repeat 12/2019  Lung nodule, repeat in 7/2018 showed locular effusion, by lung doctor.   Colon 8/2/18 colon polyp , repeat 5 years  Onychomycosis on Lamisil ,liver enzyme normal after 4 weeks recheck in 1 month--  BPH  continue PSA

## 2020-11-10 RX ORDER — BACLOFEN 10 MG/1
TABLET ORAL
Qty: 90 TABLET | Refills: 3 | Status: SHIPPED | OUTPATIENT
Start: 2020-11-10 | End: 2022-06-23 | Stop reason: HOSPADM

## 2020-12-01 ENCOUNTER — TELEPHONE (OUTPATIENT)
Dept: ONCOLOGY | Facility: CLINIC | Age: 80
End: 2020-12-01

## 2020-12-03 DIAGNOSIS — D72.829 LEUKOCYTOSIS, UNSPECIFIED TYPE: ICD-10-CM

## 2020-12-03 DIAGNOSIS — D50.0 IRON DEFICIENCY ANEMIA DUE TO CHRONIC BLOOD LOSS: Primary | ICD-10-CM

## 2020-12-03 DIAGNOSIS — K90.9 IRON MALABSORPTION: ICD-10-CM

## 2020-12-03 DIAGNOSIS — D75.839 THROMBOCYTHEMIA: ICD-10-CM

## 2020-12-07 ENCOUNTER — LAB (OUTPATIENT)
Dept: LAB | Facility: HOSPITAL | Age: 80
End: 2020-12-07

## 2020-12-07 DIAGNOSIS — D75.839 THROMBOCYTHEMIA: ICD-10-CM

## 2020-12-07 DIAGNOSIS — D72.829 LEUKOCYTOSIS, UNSPECIFIED TYPE: ICD-10-CM

## 2020-12-07 DIAGNOSIS — K90.9 IRON MALABSORPTION: ICD-10-CM

## 2020-12-07 DIAGNOSIS — D50.0 IRON DEFICIENCY ANEMIA DUE TO CHRONIC BLOOD LOSS: ICD-10-CM

## 2020-12-07 LAB
BASOPHILS # BLD AUTO: 0.06 10*3/MM3 (ref 0–0.2)
BASOPHILS NFR BLD AUTO: 0.4 % (ref 0–1.5)
DEPRECATED RDW RBC AUTO: 49.1 FL (ref 37–54)
EOSINOPHIL # BLD AUTO: 0.38 10*3/MM3 (ref 0–0.4)
EOSINOPHIL NFR BLD AUTO: 2.8 % (ref 0.3–6.2)
ERYTHROCYTE [DISTWIDTH] IN BLOOD BY AUTOMATED COUNT: 13.9 % (ref 12.3–15.4)
FERRITIN SERPL-MCNC: 448.5 NG/ML (ref 30–400)
HCT VFR BLD AUTO: 42.9 % (ref 37.5–51)
HGB BLD-MCNC: 14 G/DL (ref 13–17.7)
IMM GRANULOCYTES # BLD AUTO: 0.1 10*3/MM3 (ref 0–0.05)
IMM GRANULOCYTES NFR BLD AUTO: 0.7 % (ref 0–0.5)
IRON 24H UR-MRATE: 67 MCG/DL (ref 59–158)
IRON SATN MFR SERPL: 26 % (ref 20–50)
LYMPHOCYTES # BLD AUTO: 3.61 10*3/MM3 (ref 0.7–3.1)
LYMPHOCYTES NFR BLD AUTO: 26.3 % (ref 19.6–45.3)
MCH RBC QN AUTO: 31.3 PG (ref 26.6–33)
MCHC RBC AUTO-ENTMCNC: 32.6 G/DL (ref 31.5–35.7)
MCV RBC AUTO: 96 FL (ref 79–97)
MONOCYTES # BLD AUTO: 1.38 10*3/MM3 (ref 0.1–0.9)
MONOCYTES NFR BLD AUTO: 10 % (ref 5–12)
NEUTROPHILS NFR BLD AUTO: 59.8 % (ref 42.7–76)
NEUTROPHILS NFR BLD AUTO: 8.21 10*3/MM3 (ref 1.7–7)
NRBC BLD AUTO-RTO: 0 /100 WBC (ref 0–0.2)
PLATELET # BLD AUTO: 418 10*3/MM3 (ref 140–450)
PMV BLD AUTO: 10.9 FL (ref 6–12)
RBC # BLD AUTO: 4.47 10*6/MM3 (ref 4.14–5.8)
TIBC SERPL-MCNC: 255 MCG/DL (ref 298–536)
TRANSFERRIN SERPL-MCNC: 171 MG/DL (ref 200–360)
WBC # BLD AUTO: 13.74 10*3/MM3 (ref 3.4–10.8)

## 2020-12-07 PROCEDURE — 85025 COMPLETE CBC W/AUTO DIFF WBC: CPT

## 2020-12-07 PROCEDURE — 83540 ASSAY OF IRON: CPT

## 2020-12-07 PROCEDURE — 84466 ASSAY OF TRANSFERRIN: CPT

## 2020-12-07 PROCEDURE — 36415 COLL VENOUS BLD VENIPUNCTURE: CPT

## 2020-12-07 PROCEDURE — 82728 ASSAY OF FERRITIN: CPT

## 2020-12-16 ENCOUNTER — OFFICE VISIT (OUTPATIENT)
Dept: ONCOLOGY | Facility: CLINIC | Age: 80
End: 2020-12-16

## 2020-12-16 VITALS
SYSTOLIC BLOOD PRESSURE: 153 MMHG | HEART RATE: 61 BPM | DIASTOLIC BLOOD PRESSURE: 67 MMHG | WEIGHT: 186 LBS | BODY MASS INDEX: 27.55 KG/M2 | HEIGHT: 69 IN | TEMPERATURE: 98 F | OXYGEN SATURATION: 90 % | RESPIRATION RATE: 12 BRPM

## 2020-12-16 DIAGNOSIS — E61.1 IRON DEFICIENCY: ICD-10-CM

## 2020-12-16 DIAGNOSIS — D50.0 IRON DEFICIENCY ANEMIA DUE TO CHRONIC BLOOD LOSS: ICD-10-CM

## 2020-12-16 DIAGNOSIS — K90.9 IRON MALABSORPTION: ICD-10-CM

## 2020-12-16 DIAGNOSIS — D75.839 THROMBOCYTHEMIA: Primary | ICD-10-CM

## 2020-12-16 PROCEDURE — 99213 OFFICE O/P EST LOW 20 MIN: CPT | Performed by: NURSE PRACTITIONER

## 2020-12-16 NOTE — PROGRESS NOTES
DATE OF VISIT: 12/16/2020    REASON FOR VISIT: Followup for iron def anemia     HISTORY OF PRESENT ILLNESS: The patient is a very pleasant 80 y.o. male with past medical history significant for Iron def anemia with thrombocythemia. The patient is here today for scheduled follow up visit.    SUBJECTIVE: The patient is here today by himself.  He is feeling well today.  Denies any fatigue no bleeding.  Bruising is about the same.     REVIEW OF SYSTEMS: All the other systems are reviewed by me and negative  except what is mentioned in HPI and subjective.     PAST MEDICAL HISTORY/SOCIAL HISTORY/FAMILY HISTORY: Unchanged from my prior documentation.      Current Outpatient Medications:   •  albuterol sulfate HFA (VENTOLIN HFA) 108 (90 Base) MCG/ACT inhaler, Inhale 2 puffs Every 4 (Four) Hours As Needed for Wheezing., Disp: 18 g, Rfl: 2  •  ALPRAZolam (XANAX) 0.5 MG tablet, Every 12 (Twelve) Hours., Disp: , Rfl:   •  aspirin 81 MG EC tablet, Take 81 mg by mouth Daily., Disp: , Rfl:   •  atorvastatin (LIPITOR) 40 MG tablet, TAKE ONE TABLET BY MOUTH AT BEDTIME, Disp: 30 tablet, Rfl: 11  •  baclofen (LIORESAL) 10 MG tablet, TAKE ONE TABLET BY MOUTH TWICE DAILY, Disp: 90 tablet, Rfl: 3  •  esomeprazole (nexIUM) 40 MG capsule, TAKE ONE CAPSULE BY MOUTH EVERY DAY, Disp: 30 capsule, Rfl: 11  •  fluticasone-salmeterol (ADVAIR DISKUS) 250-50 MCG/DOSE DISKUS, Inhale 1 puff 2 (Two) Times a Day., Disp: 1 each, Rfl: 3  •  isosorbide mononitrate (IMDUR) 30 MG 24 hr tablet, TAKE ONE TABLET BY MOUTH EVERY DAY AS DIRECTED, Disp: 90 tablet, Rfl: 3  •  ketoconazole (NIZORAL) 2 % shampoo, Shampoo with a small amount to skin once a day, Disp: , Rfl:   •  losartan (Cozaar) 50 MG tablet, Take 1 tablet by mouth Daily., Disp: 90 tablet, Rfl: 3  •  meclizine (ANTIVERT) 25 MG tablet, Take 1 tablet by mouth 2 (Two) Times a Day., Disp: 30 tablet, Rfl: 3  •  metoprolol tartrate (LOPRESSOR) 25 MG tablet, Take 1 tablet by mouth 2 (Two) Times a Day.,  "Disp: 180 tablet, Rfl: 3  •  nitroglycerin (NITROSTAT) 0.4 MG SL tablet, Place 0.4 mg under the tongue Every 5 (Five) Minutes As Needed for Chest Pain., Disp: , Rfl:   •  Omega-3 Fatty Acids (FISH OIL) 1000 MG capsule capsule, Take 1,000 mg by mouth Daily With Breakfast., Disp: , Rfl:   •  sertraline (ZOLOFT) 25 MG tablet, TAKE ONE TABLET BY MOUTH EVERY DAY, Disp: 90 tablet, Rfl: 3  •  SPIRIVA HANDIHALER 18 MCG per inhalation capsule, INHALE THE ENTIRE CONTENTS OF 1 CAPSULE ONCE A DAY USING HANDIHALER DEVICE, Disp: 90 capsule, Rfl: 3  •  traZODone (DESYREL) 50 MG tablet, TAKE 1/2 TO 1 TABLET BY MOUTH AT BEDTIME AS NEEDED, Disp: 90 tablet, Rfl: 1  •  triamcinolone (KENALOG) 0.025 % ointment, APPLY TO THE AFFECTED AREA(S) TWICE DAILY, Disp: 80 g, Rfl: 1    PHYSICAL EXAMINATION:   /67   Pulse 61   Temp 98 °F (36.7 °C) (Temporal)   Resp 12   Ht 175.3 cm (69\")   Wt 84.4 kg (186 lb)   SpO2 90%   BMI 27.47 kg/m²   ECOG1  GENERAL: Age appropriate. No acute distress.   HEENT: Head atraumatic, normocephalic.   NECK: Supple. No JVD. No lymphadenopathy.   LUNGS: Clear to auscultation bilaterally. No wheezing. No rhonchi.   HEART: Regular rate and rhythm. S1, S2, no murmurs.   ABDOMEN: Soft, nontender, nondistended. Bowel sounds positive. No  hepatosplenomegaly.   EXTREMITIES: No clubbing, cyanosis, or edema.   SKIN: No rashes. No purpura.   NEUROLOGIC: Awake and oriented x3. Strength 5 out of 5 in all muscle groups.     Lab on 12/07/2020   Component Date Value Ref Range Status   • Iron 12/07/2020 67  59 - 158 mcg/dL Final   • Iron Saturation 12/07/2020 26  20 - 50 % Final   • Transferrin 12/07/2020 171* 200 - 360 mg/dL Final   • TIBC 12/07/2020 255* 298 - 536 mcg/dL Final   • Ferritin 12/07/2020 448.50* 30.00 - 400.00 ng/mL Final   • WBC 12/07/2020 13.74* 3.40 - 10.80 10*3/mm3 Final   • RBC 12/07/2020 4.47  4.14 - 5.80 10*6/mm3 Final   • Hemoglobin 12/07/2020 14.0  13.0 - 17.7 g/dL Final   • Hematocrit 12/07/2020 " 42.9  37.5 - 51.0 % Final   • MCV 12/07/2020 96.0  79.0 - 97.0 fL Final   • MCH 12/07/2020 31.3  26.6 - 33.0 pg Final   • MCHC 12/07/2020 32.6  31.5 - 35.7 g/dL Final   • RDW 12/07/2020 13.9  12.3 - 15.4 % Final   • RDW-SD 12/07/2020 49.1  37.0 - 54.0 fl Final   • MPV 12/07/2020 10.9  6.0 - 12.0 fL Final   • Platelets 12/07/2020 418  140 - 450 10*3/mm3 Final   • Neutrophil % 12/07/2020 59.8  42.7 - 76.0 % Final   • Lymphocyte % 12/07/2020 26.3  19.6 - 45.3 % Final   • Monocyte % 12/07/2020 10.0  5.0 - 12.0 % Final   • Eosinophil % 12/07/2020 2.8  0.3 - 6.2 % Final   • Basophil % 12/07/2020 0.4  0.0 - 1.5 % Final   • Immature Grans % 12/07/2020 0.7* 0.0 - 0.5 % Final   • Neutrophils, Absolute 12/07/2020 8.21* 1.70 - 7.00 10*3/mm3 Final   • Lymphocytes, Absolute 12/07/2020 3.61* 0.70 - 3.10 10*3/mm3 Final   • Monocytes, Absolute 12/07/2020 1.38* 0.10 - 0.90 10*3/mm3 Final   • Eosinophils, Absolute 12/07/2020 0.38  0.00 - 0.40 10*3/mm3 Final   • Basophils, Absolute 12/07/2020 0.06  0.00 - 0.20 10*3/mm3 Final   • Immature Grans, Absolute 12/07/2020 0.10* 0.00 - 0.05 10*3/mm3 Final   • nRBC 12/07/2020 0.0  0.0 - 0.2 /100 WBC Final        No results found.    ASSESSMENT: The patient is a very pleasant 80 y.o. male  with Iron deficiency anemia    PROBLEM LIST:  1. 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 Thrombocythemia  3. Leukocytosis  4. S/P splenectomy   5. HTN      PLAN:  1.  I did go over the blood work results with the patient From December. I reassured him his hemoglobin is normal at 14.0  And his iron storage is adequate.  2.  Patient continues to have mild leukocytosis. Platelets are normal.   3. He will continue Imdur for his blood pressure. If it remains elevated he will notify his PCP.    4.  The patient follow-up with us in 1 year with repeat CBC and iron profile.  5.  I will arrange for another round of IV iron if ferritin goes below 30 or saturation below 10%.      Betsy Hopper,  APRN,  12/16/2020

## 2021-01-04 RX ORDER — ISOSORBIDE MONONITRATE 30 MG/1
TABLET, EXTENDED RELEASE ORAL
Qty: 90 TABLET | Refills: 3 | Status: SHIPPED | OUTPATIENT
Start: 2021-01-04 | End: 2021-12-28

## 2021-01-14 RX ORDER — TRAZODONE HYDROCHLORIDE 50 MG/1
TABLET ORAL
Qty: 90 TABLET | Refills: 3 | Status: SHIPPED | OUTPATIENT
Start: 2021-01-14 | End: 2022-04-25 | Stop reason: SDUPTHER

## 2021-01-20 RX ORDER — SERTRALINE HYDROCHLORIDE 25 MG/1
TABLET, FILM COATED ORAL
Qty: 90 TABLET | Refills: 3 | Status: SHIPPED | OUTPATIENT
Start: 2021-01-20 | End: 2022-01-28 | Stop reason: SDUPTHER

## 2021-02-12 ENCOUNTER — OFFICE VISIT (OUTPATIENT)
Dept: UROLOGY | Facility: CLINIC | Age: 81
End: 2021-02-12

## 2021-02-12 VITALS
HEART RATE: 53 BPM | BODY MASS INDEX: 27.55 KG/M2 | SYSTOLIC BLOOD PRESSURE: 117 MMHG | DIASTOLIC BLOOD PRESSURE: 74 MMHG | OXYGEN SATURATION: 95 % | TEMPERATURE: 98 F | HEIGHT: 69 IN | WEIGHT: 186 LBS | RESPIRATION RATE: 20 BRPM

## 2021-02-12 DIAGNOSIS — R31.0 GROSS HEMATURIA: Primary | ICD-10-CM

## 2021-02-12 DIAGNOSIS — R39.9 LOWER URINARY TRACT SYMPTOMS (LUTS): ICD-10-CM

## 2021-02-12 PROCEDURE — 81003 URINALYSIS AUTO W/O SCOPE: CPT | Performed by: UROLOGY

## 2021-02-12 PROCEDURE — 99214 OFFICE O/P EST MOD 30 MIN: CPT | Performed by: UROLOGY

## 2021-02-12 RX ORDER — AMOXICILLIN AND CLAVULANATE POTASSIUM 875; 125 MG/1; MG/1
TABLET, FILM COATED ORAL
COMMUNITY
End: 2021-02-12

## 2021-02-12 NOTE — PROGRESS NOTES
/Chief Complaint  Gross hematuria      HPI  Mr. Mercedes is a 80 y.o. male with history of emphysema who presents for follow up of gross hematuria and LUTS.    At his last office visit, we performed a full workup which revealed no abnormalities. He does report an episode of gross hematuria with urinary burning, frequency, and urgency on 02/04/2021 after his 2nd time urinating for the day. He notes that he felt a clot come out while urinating then noticed the blood, which did not resolve until the next day. The patient was on antibiotics at that time which was prescribed by his foot specialist for inflammation; however, is unsure of the medication name. He is now off the antibiotics, and has not seen any blood in his urine since last Thursday.     Additionally, he reports experiencing an overactive bladder and has been urinating very frequently. He states his stream continues to be strong and states he only urinates for about 10 to 15 seconds. He feels like he is completely emptying.     To review,  He started urinating blood 8/8/18 early evening, cresencio to urgent care, +UA, UCx ultimately negative. No dysuria. No other associated Sx  He strained and 2 blood clots came out. Felt that he emptied his bladder.   He c/o intermittent LUTS x 6 mo. Main Sx are frequency. He says sometimes he has urgency.   He is overall very pleased with his urination. Feels that he empties well now.   Used to follow with Dr. Arango for yearly PSA checks and prostate exams.   IPSS - 8  Main risk factor recently was a UTI  He currently denies fevers, chills, nausea, vomiting, constipation or flank pain.    Past Medical History  Past Medical History:   Diagnosis Date   • Abdominal pain    • Acute sinusitis    • Allergic N/A   • Anemia    • Anxiety    • Arthritis    • Asthma N/A   • COPD (chronic obstructive pulmonary disease) (CMS/[facility])    • Coronary artery disease N/A    Some Blockage   • Diabetes mellitus (CMS/[facility])     REPORTS DIET CONTROL ONLY,  TAKES NO MEDICATION   • Emphysema lung (CMS/HCC)    • Emphysema lung (CMS/HCC)    • Emphysema lung (CMS/HCC)    • Emphysema of lung (CMS/HCC)    • Emphysema of lung (CMS/HCC)    • Heme positive stool    • History of ankle fracture     LEFT    • History of cardiovascular stress test     REPORTS IN HIS LATE 50'S AND THAT ALL WAS WNL'S AT THAT TIME   • History of echocardiogram     REPORTS HAS HAD 2. DOES NOT REPORT ANY HX OF CHEST PAIN.   • History of pneumonia    • [place] (hard of hearing)     WEARS HEARING AIDS   • Hypertension    • Melanoma (CMS/HCC)     2013   • Pneumonia    • Seasonal allergies    • Wears glasses      He has moderate emphysema he says, and follows with Dr. Frias.    Past Surgical History  Past Surgical History:   Procedure Laterality Date   • BRAIN SURGERY  1999    REPORTS FOR SUBDURAL HEMATOMA    • BRONCHOSCOPY N/A 4/16/2018    Procedure: BRONCHOSCOPY DIAGNOSTIC WITH WASHINGS, BRUSHINGS AND BIOPSIES;  Surgeon: Morgan Frias MD;  Location: [facility];  Service: Pulmonary   • ALEX HOLE FOR SUBDURAL HEMATOMA     • CARDIAC CATHETERIZATION      REPORTS APPROXIMATELY 2015 AND REPORTS NO STENTS WERE PLACED   • CHOLECYSTECTOMY     • COLONOSCOPY     • COLONOSCOPY N/A 8/2/2018    Procedure: COLONOSCOPY WITH HOT FORCEP POLYPECTOMY X2  ;  Surgeon: Sara Reynoso MD;  Location: [facility];  Service: Gastroenterology   • ENDOSCOPY     • LIVER BIOPSY     • LYMPH NODE BIOPSY     • MOLE REMOVAL  2005    MELANOMA ON BACK   • SPLENECTOMY     • THORACOTOMY  07/28/2015    Left thoracotomy and stapling of pulmonary blebs. Mechanical pleurodesis.   • TONSILLECTOMY     • WISDOM TOOTH EXTRACTION         Medications    Current Outpatient Medications:   •  albuterol sulfate HFA (VENTOLIN HFA) 108 (90 Base) MCG/ACT inhaler, Inhale 2 puffs Every 4 (Four) Hours As Needed for Wheezing., Disp: 18 g, Rfl: 2  •  ALPRAZolam (XANAX) 0.5 MG tablet, Every 12 (Twelve) Hours., Disp: , Rfl:   •  aspirin 81 MG EC tablet, Take 81  mg by mouth Daily., Disp: , Rfl:   •  atorvastatin (LIPITOR) 40 MG tablet, TAKE ONE TABLET BY MOUTH AT BEDTIME, Disp: 30 tablet, Rfl: 11  •  baclofen (LIORESAL) 10 MG tablet, TAKE ONE TABLET BY MOUTH TWICE DAILY, Disp: 90 tablet, Rfl: 3  •  esomeprazole (nexIUM) 40 MG capsule, TAKE ONE CAPSULE BY MOUTH EVERY DAY, Disp: 30 capsule, Rfl: 11  •  fluticasone-salmeterol (ADVAIR DISKUS) 250-50 MCG/DOSE DISKUS, Inhale 1 puff 2 (Two) Times a Day., Disp: 1 each, Rfl: 3  •  isosorbide mononitrate (IMDUR) 30 MG 24 hr tablet, TAKE ONE TABLET BY MOUTH EVERY DAY AS DIRECTED, Disp: 90 tablet, Rfl: 3  •  meclizine (ANTIVERT) 25 MG tablet, Take 1 tablet by mouth 2 (Two) Times a Day., Disp: 30 tablet, Rfl: 3  •  metoprolol tartrate (LOPRESSOR) 25 MG tablet, Take 1 tablet by mouth 2 (Two) Times a Day., Disp: 180 tablet, Rfl: 3  •  nitroglycerin (NITROSTAT) 0.4 MG SL tablet, Place 0.4 mg under the tongue Every 5 (Five) Minutes As Needed for Chest Pain., Disp: , Rfl:   •  Omega-3 Fatty Acids (FISH OIL) 1000 MG capsule capsule, Take 1,000 mg by mouth Daily With Breakfast., Disp: , Rfl:   •  sertraline (ZOLOFT) 25 MG tablet, Take 1 tablet by mouth Daily., Disp: 90 tablet, Rfl: 3  •  SPIRIVA HANDIHALER 18 MCG per inhalation capsule, INHALE THE ENTIRE CONTENTS OF 1 CAPSULE ONCE A DAY USING HANDIHALER DEVICE, Disp: 90 capsule, Rfl: 3  •  traZODone (DESYREL) 50 MG tablet, TAKE 1/2 TO 1 TABLET BY MOUTH AT BEDTIME AS NEEDED, Disp: 90 tablet, Rfl: 3  •  triamcinolone (KENALOG) 0.025 % ointment, APPLY TO THE AFFECTED AREA(S) TWICE DAILY, Disp: 80 g, Rfl: 1  •  HYDROcodone-homatropine (HYCODAN) 5-1.5 MG/5ML syrup, hydrocodone-homatropine 5 mg-1.5 mg/5 mL oral syrup, Disp: , Rfl:   •  ketoconazole (NIZORAL) 2 % shampoo, Shampoo with a small amount to skin once a day, Disp: , Rfl:   •  losartan (Cozaar) 50 MG tablet, Take 1 tablet by mouth Daily., Disp: 90 tablet, Rfl: 3    Allergies  Allergies   Allergen Reactions   • Parabens Rash   • Other Rash  "    REPORTS ALLERGY TO \"PARABENS\" - REPORTS THEY CAUSE HIM TO BREAK OUT (REPORTS THIS IS A PRESERVATIVE THAT IS PUT IN CREAMS)       Social History  Social History     Socioeconomic History   • Marital status:      Spouse name: Not on file   • Number of children: Not on file   • Years of education: Not on file   • Highest education level: Not on file   Tobacco Use   • Smoking status: Former Smoker     Packs/day: 3.00     Years: 42.00     Pack years: 126.00     Types: Cigarettes     Start date: 1957     Quit date:      Years since quittin.9   • Smokeless tobacco: Never Used   Substance and Sexual Activity   • Alcohol use: No   • Drug use: No   • Sexual activity: Never       Family History  He has no family history of prostate, bladder or kidney cancer    Review of Systems  Constitutional: No fevers or chills  Skin: Negative for rash  Endocrine: No heat/cold intolerance   Cardiovascular: Negative for chest pain or dyspnea on exertion  Respiratory: Negative for shortness of breath or wheezing  Gastrointestinal: No constipation, nausea or vomiting  Genitourinary: Negative for current symptoms or dysuria.  Musculoskeletal: No flank pain  Neurological:  Negative for frequent headaches or dizziness; + [place]  Lymph/Heme: Negative for leg swelling or calf pain.    Physical Exam  Visit Vitals  /74   Pulse 53   Temp 98 °F (36.7 °C)   Resp 20   Ht 175.3 cm (69.02\")   Wt 84.4 kg (186 lb)   SpO2 95%   BMI 27.45 kg/m²     Constitutional: NAD, WDWN.   HEENT: NCAT. Conjunctivae normal.  MMM.    Cardiovascular: Regular rate.  Pulmonary/Chest: Respirations are even and non-labored bilaterally. + hoarseness  Abdominal: Soft. No distension, tenderness, masses or guarding. No CVA tenderness.  Neurological: A + O x 3.  Cranial Nerves II-XII grossly intact.  Extremities: WARREN x 4, Warm. No clubbing.  No cyanosis.    Skin: Pink, warm and dry.  No rashes noted.  Psychiatric:  Normal mood and affect    Labs    Chemistry "        Component Value Date/Time     10/14/2020 0808     06/26/2019 0851    K 4.7 10/14/2020 0808    K 4.2 06/26/2019 0851     10/14/2020 0808     06/26/2019 0851    CO2 26.9 10/14/2020 0808    CO2 28.0 06/26/2019 0851    BUN 21 10/14/2020 0808    BUN 17 06/26/2019 0851    CREATININE 1.02 10/14/2020 0808    CREATININE 0.90 01/15/2020 0753     (H) 10/14/2020 0808        Component Value Date/Time    CALCIUM 9.2 10/14/2020 0808    CALCIUM 8.9 06/26/2019 0851    ALKPHOS 160 (H) 10/14/2020 0808    ALKPHOS 158 (H) 06/26/2019 0851    AST 22 10/14/2020 0808    AST 28 06/26/2019 0851    ALT 23 10/14/2020 0808    ALT 33 06/26/2019 0851    BILITOT 0.4 10/14/2020 0808    BILITOT 0.5 06/26/2019 0851        Brief Urine Lab Results  (Last result in the past 365 days)      Color   Clarity   Blood   Leuk Est   Nitrite   Protein   CREAT   Urine HCG        02/12/21 0940 Yellow Clear 3+ Negative Negative Negative               Radiologic Studies  PROCEDURE: US RENAL BILATERAL-  HISTORY: renal cyst; N28.1-Cyst of kidney, acquired  PROCEDURE: Ultrasound images of the kidneys were obtained.  FINDINGS:.    The kidneys are normal in size and echogenicity with the right kidney  measuring 10.3 cm and the left kidney measuring 11.5 cm. There is no  cystic or solid mass. There is no hydronephrosis.  IMPRESSION:  Normal renal ultrasound with no evidence of a cystic or  solid mass.   This report was finalized on 6/19/2018    The labs and images were reviewed by me personally.       Past cystoscopic findings  Cystoscopic findings included one right and left ureteral orifice in the normal anatomic position with normal bladder mucosa and no tumors, masses or stones. The urethral urothelium was within normal limits with no strictures.  There was not a prominent median lobe.  The lateral lobes were not obstructive in appearance.        PVR performed by my staff in the past- 0    Ct Abdomen Pelvis With & Without  Contrast  Result Date: 1/15/2020  No evidence of a cystic/solid renal mass or hydronephrosis.  3390.94 mGy.cm   This study was performed with techniques to keep radiation doses as low as reasonably achievable (ALARA). Individualized dose reduction techniques using automated exposure control or adjustment of mA and/or kV according to the patient size were employed.  This report was finalized on 1/15/2020 10:49 AM by Aroldo Garcia M.D..    Assessment  80 y.o. male who presented with gross hematuria and UTI.   He has no clear genitourinary pathology identified on renal ultrasound imaging and cystoscopic evaluation.  His prostate is not very enlarged visually and he is emptying his bladder well.  His CT urogram was negative for stones or masses.    He presents with continued gross hematuria and worsening LUTS, specifically urgency and frequency. He had no clear pathology on past ultrasound and cystoscopic evaluation. He presents today with continued gross hematuria and LUTS.     Plan  1.  Follow-up for cystoscopy, Uroflow, and transrectal ultrasound of the prostate.  2. Prescription for myrbetriq sent in and samples were given for overactive bladder.     Scribed for Osmani Francois MD by KATHERINE SUTTON.  2/12/2021  13:22 EST    KAIDEN Francois MD have personally performed the services described in this document as scribed by the above individual, and it is both accurate and complete.     Osmani Francois MD  2/13/2021  12:08 EMELY Francois MD

## 2021-03-04 ENCOUNTER — PROCEDURE VISIT (OUTPATIENT)
Dept: UROLOGY | Facility: CLINIC | Age: 81
End: 2021-03-04

## 2021-03-04 VITALS — WEIGHT: 186 LBS | TEMPERATURE: 97.3 F | BODY MASS INDEX: 27.55 KG/M2 | RESPIRATION RATE: 18 BRPM | HEIGHT: 69 IN

## 2021-03-04 DIAGNOSIS — R39.9 LOWER URINARY TRACT SYMPTOMS (LUTS): ICD-10-CM

## 2021-03-04 DIAGNOSIS — D49.4 BLADDER TUMOR: Primary | ICD-10-CM

## 2021-03-04 PROCEDURE — 99214 OFFICE O/P EST MOD 30 MIN: CPT | Performed by: UROLOGY

## 2021-03-04 PROCEDURE — 51798 US URINE CAPACITY MEASURE: CPT | Performed by: UROLOGY

## 2021-03-04 PROCEDURE — 52000 CYSTOURETHROSCOPY: CPT | Performed by: UROLOGY

## 2021-03-04 PROCEDURE — 51741 ELECTRO-UROFLOWMETRY FIRST: CPT | Performed by: UROLOGY

## 2021-03-04 NOTE — PROGRESS NOTES
/Chief Complaint  Gross hematuria      HPI  Mr. Mercedes is a 80 y.o. male with history of emphysema who presents for follow up of gross hematuria and LUTS.     He presents to the clinic today for a cystoscopy due to continued intermittent gross hematuria. He states that he has not had any further episodes of gross hematuria since the last office visit. He continued to take Mybetriq 25 mg PO for urinary urgency and frequency. He states that his stream strength is quite strong and is overall happy with the way he is urinating. He only has nocturia x1.     Past Medical History  Past Medical History:   Diagnosis Date   • Abdominal pain    • Acute sinusitis    • Allergic N/A   • Anemia    • Anxiety    • Arthritis    • Asthma N/A   • COPD (chronic obstructive pulmonary disease) (CMS/[facility])    • Coronary artery disease N/A    Some Blockage   • Diabetes mellitus (CMS/[facility])     REPORTS DIET CONTROL ONLY, TAKES NO MEDICATION   • Emphysema lung (CMS/HCC)    • Emphysema lung (CMS/HCC)    • Emphysema lung (CMS/HCC)    • Emphysema of lung (CMS/HCC)    • Emphysema of lung (CMS/HCC)    • Heme positive stool    • History of ankle fracture     LEFT    • History of cardiovascular stress test     REPORTS IN HIS LATE 50'S AND THAT ALL WAS WNL'S AT THAT TIME   • History of echocardiogram     REPORTS HAS HAD 2. DOES NOT REPORT ANY HX OF CHEST PAIN.   • History of pneumonia    • [place] (hard of hearing)     WEARS HEARING AIDS   • Hypertension    • Melanoma (CMS/HCC)     2013   • Pneumonia    • Seasonal allergies    • Wears glasses      He has moderate emphysema he says, and follows with Dr. Frias.    Past Surgical History  Past Surgical History:   Procedure Laterality Date   • BRAIN SURGERY  1999    REPORTS FOR SUBDURAL HEMATOMA    • BRONCHOSCOPY N/A 4/16/2018    Procedure: BRONCHOSCOPY DIAGNOSTIC WITH WASHINGS, BRUSHINGS AND BIOPSIES;  Surgeon: Morgan Frias MD;  Location: [facility];  Service: Pulmonary   • ALEX HOLE FOR SUBDURAL HEMATOMA      • CARDIAC CATHETERIZATION      REPORTS APPROXIMATELY 2015 AND REPORTS NO STENTS WERE PLACED   • CHOLECYSTECTOMY     • COLONOSCOPY     • COLONOSCOPY N/A 8/2/2018    Procedure: COLONOSCOPY WITH HOT FORCEP POLYPECTOMY X2  ;  Surgeon: Sara Reynoso MD;  Location: [facility] ENDOSCOPY;  Service: Gastroenterology   • ENDOSCOPY     • LIVER BIOPSY     • LYMPH NODE BIOPSY     • MOLE REMOVAL  2005    MELANOMA ON BACK   • SPLENECTOMY     • THORACOTOMY  07/28/2015    Left thoracotomy and stapling of pulmonary blebs. Mechanical pleurodesis.   • TONSILLECTOMY     • WISDOM TOOTH EXTRACTION         Medications    Current Outpatient Medications:   •  albuterol sulfate HFA (VENTOLIN HFA) 108 (90 Base) MCG/ACT inhaler, Inhale 2 puffs Every 4 (Four) Hours As Needed for Wheezing., Disp: 18 g, Rfl: 2  •  ALPRAZolam (XANAX) 0.5 MG tablet, Every 12 (Twelve) Hours., Disp: , Rfl:   •  aspirin 81 MG EC tablet, Take 81 mg by mouth Daily., Disp: , Rfl:   •  atorvastatin (LIPITOR) 40 MG tablet, TAKE ONE TABLET BY MOUTH AT BEDTIME, Disp: 30 tablet, Rfl: 11  •  baclofen (LIORESAL) 10 MG tablet, TAKE ONE TABLET BY MOUTH TWICE DAILY, Disp: 90 tablet, Rfl: 3  •  esomeprazole (nexIUM) 40 MG capsule, TAKE ONE CAPSULE BY MOUTH EVERY DAY, Disp: 30 capsule, Rfl: 11  •  fluticasone-salmeterol (Advair Diskus) 250-50 MCG/DOSE DISKUS, Inhale 1 puff 2 (Two) Times a Day., Disp: 1 each, Rfl: 3  •  HYDROcodone-homatropine (HYCODAN) 5-1.5 MG/5ML syrup, hydrocodone-homatropine 5 mg-1.5 mg/5 mL oral syrup, Disp: , Rfl:   •  isosorbide mononitrate (IMDUR) 30 MG 24 hr tablet, TAKE ONE TABLET BY MOUTH EVERY DAY AS DIRECTED, Disp: 90 tablet, Rfl: 3  •  ketoconazole (NIZORAL) 2 % shampoo, Shampoo with a small amount to skin once a day, Disp: , Rfl:   •  losartan (Cozaar) 50 MG tablet, Take 1 tablet by mouth Daily., Disp: 90 tablet, Rfl: 3  •  meclizine (ANTIVERT) 25 MG tablet, Take 1 tablet by mouth 2 (Two) Times a Day., Disp: 30 tablet, Rfl: 3  •  metoprolol tartrate  "(LOPRESSOR) 25 MG tablet, Take 1 tablet by mouth 2 (Two) Times a Day., Disp: 180 tablet, Rfl: 3  •  Mirabegron ER (Myrbetriq) 25 MG tablet sustained-release 24 hour 24 hr tablet, Take 1 tablet by mouth Daily., Disp: 30 tablet, Rfl: 11  •  nitroglycerin (NITROSTAT) 0.4 MG SL tablet, Place 0.4 mg under the tongue Every 5 (Five) Minutes As Needed for Chest Pain., Disp: , Rfl:   •  Omega-3 Fatty Acids (FISH OIL) 1000 MG capsule capsule, Take 1,000 mg by mouth Daily With Breakfast., Disp: , Rfl:   •  sertraline (ZOLOFT) 25 MG tablet, Take 1 tablet by mouth Daily., Disp: 90 tablet, Rfl: 3  •  SPIRIVA HANDIHALER 18 MCG per inhalation capsule, INHALE THE ENTIRE CONTENTS OF 1 CAPSULE ONCE A DAY USING HANDIHALER DEVICE, Disp: 90 capsule, Rfl: 3  •  traZODone (DESYREL) 50 MG tablet, TAKE 1/2 TO 1 TABLET BY MOUTH AT BEDTIME AS NEEDED, Disp: 90 tablet, Rfl: 3  •  triamcinolone (KENALOG) 0.025 % ointment, APPLY TO THE AFFECTED AREA(S) TWICE DAILY, Disp: 80 g, Rfl: 1    Allergies  Allergies   Allergen Reactions   • Parabens Rash   • Other Rash     REPORTS ALLERGY TO \"PARABENS\" - REPORTS THEY CAUSE HIM TO BREAK OUT (REPORTS THIS IS A PRESERVATIVE THAT IS PUT IN CREAMS)       Social History  Social History     Socioeconomic History   • Marital status:      Spouse name: Not on file   • Number of children: Not on file   • Years of education: Not on file   • Highest education level: Not on file   Tobacco Use   • Smoking status: Former Smoker     Packs/day: 3.00     Years: 42.00     Pack years: 126.00     Types: Cigarettes     Start date: 1957     Quit date:      Years since quittin.0   • Smokeless tobacco: Never Used   Substance and Sexual Activity   • Alcohol use: No   • Drug use: No   • Sexual activity: Never       Family History  He has no family history of prostate, bladder or kidney cancer    Review of Systems  Constitutional: No fevers or chills  Skin: Negative for rash  Endocrine: No heat/cold intolerance " "  Cardiovascular: Negative for chest pain or dyspnea on exertion  Respiratory: Negative for shortness of breath or wheezing  Gastrointestinal: No constipation, nausea or vomiting  Genitourinary: Negative for current symptoms or dysuria.  Musculoskeletal: No flank pain  Neurological:  Negative for frequent headaches or dizziness; + [place]  Lymph/Heme: Negative for leg swelling or calf pain.    Physical Exam  Visit Vitals  Temp 97.3 °F (36.3 °C)   Resp 18   Ht 175.3 cm (69.02\")   Wt 84.4 kg (186 lb)   BMI 27.45 kg/m²     Constitutional: NAD, WDWN.   HEENT: NCAT. Conjunctivae normal.  MMM.    Cardiovascular: Regular rate.  Pulmonary/Chest: Respirations are even and non-labored bilaterally. + hoarseness  Abdominal: Soft. No distension, tenderness, masses or guarding. No CVA tenderness.  Neurological: A + O x 3.  Cranial Nerves II-XII grossly intact.  Extremities: WARREN x 4, Warm. No clubbing.  No cyanosis.    Skin: Pink, warm and dry.  No rashes noted.  Psychiatric:  Normal mood and affect    Labs    Chemistry        Component Value Date/Time     10/14/2020 0808     06/26/2019 0851    K 4.7 10/14/2020 0808    K 4.2 06/26/2019 0851     10/14/2020 0808     06/26/2019 0851    CO2 26.9 10/14/2020 0808    CO2 28.0 06/26/2019 0851    BUN 21 10/14/2020 0808    BUN 17 06/26/2019 0851    CREATININE 1.02 10/14/2020 0808    CREATININE 0.90 01/15/2020 0753     (H) 10/14/2020 0808        Component Value Date/Time    CALCIUM 9.2 10/14/2020 0808    CALCIUM 8.9 06/26/2019 0851    ALKPHOS 160 (H) 10/14/2020 0808    ALKPHOS 158 (H) 06/26/2019 0851    AST 22 10/14/2020 0808    AST 28 06/26/2019 0851    ALT 23 10/14/2020 0808    ALT 33 06/26/2019 0851    BILITOT 0.4 10/14/2020 0808    BILITOT 0.5 06/26/2019 0851        Brief Urine Lab Results  (Last result in the past 365 days)      Color   Clarity   Blood   Leuk Est   Nitrite   Protein   CREAT   Urine HCG        02/12/21 0940 Yellow Clear 3+ Negative Negative " Negative               Radiologic Studies  PROCEDURE: US RENAL BILATERAL-  HISTORY: renal cyst; N28.1-Cyst of kidney, acquired  PROCEDURE: Ultrasound images of the kidneys were obtained.  FINDINGS:.    The kidneys are normal in size and echogenicity with the right kidney  measuring 10.3 cm and the left kidney measuring 11.5 cm. There is no  cystic or solid mass. There is no hydronephrosis.  IMPRESSION:  Normal renal ultrasound with no evidence of a cystic or  solid mass.   This report was finalized on 6/19/2018    The labs and images were reviewed by me personally.       Past cystoscopic findings  Cystoscopic findings included one right and left ureteral orifice in the normal anatomic position with normal bladder mucosa and no tumors, masses or stones. The urethral urothelium was within normal limits with no strictures.  There was not a prominent median lobe.  The lateral lobes were not obstructive in appearance.        PVR performed by my staff in the past- 0    Ct Abdomen Pelvis With & Without Contrast  Result Date: 1/15/2020  No evidence of a cystic/solid renal mass or hydronephrosis.  3390.94 mGy.cm   This study was performed with techniques to keep radiation doses as low as reasonably achievable (ALARA). Individualized dose reduction techniques using automated exposure control or adjustment of mA and/or kV according to the patient size were employed.  This report was finalized on 1/15/2020 10:49 AM by Aroldo Garcia M.D..    Preprocedure diagnosis  Gross hematuria    Postprocedure diagnosis  Bladder tumor    Procedure  Flexible Cystourethroscopy    Attending surgeon  Osmani Francois MD    Anesthesia  2% lidocaine jelly intraurethrally    Complications  None    Indications  80 y.o. male undergoing a flexible cystoscopy for the above mentioned indications.  Informed consent was obtained.      Findings  Cystoscopic findings included one right and left ureteral orifice in the normal anatomic position.  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 The prostate had large lateral lobes and appeared quite occlusive.      Procedure  The patient was placed in supine position and prepped and draped in sterile fashion with lidocaine jelly per urethra for anesthesia.  A timeout was performed.  The 14F flexible cystoscope was lubricated and gently placed through the penile urethra and into the bladder.  The bladder was completely visualized.  The cystoscope was retroflexed and the bladder neck and prostate visualized.  The cystoscope was slowly withdrawn while visualizing the urethra and the procedure terminated.  The patient tolerated the procedure well.      Uroflow:  Peak flow rate - 26.8 mL/sec  Average flow rate - 11.1 mL/sec  Flow curve - Bell-shaped hump and jagged at the top.  Voided volume - 365 mLmL    I personally reviewed  and interpreted this study.       Assessment  80 y.o. male who presented with gross hematuria and UTI. He is overall happy with his LUTS on Mybetriq and has a good force of stream. I do suspect that he has to strain some as his prostate is quite occlusive appearing; however, the Uroflow was normal. He continues to have gross hematuria, which prompted a repeat cystoscopy today. This demonstrated a small papillary bladder tumor.    In a separate room and separate encounter, we discussed the risks, benefits, and alternatives to transurethral resection of the bladder tumor. This is a new diagnosis of uncertain clinical significance. His risk factors for complications with surgery are his COPD. Overall, he is at acceptable risk and wishes to proceed with the surgery.        Plan  1.  Schedule TURBT  2. PAT 2 weeks prior and COVID testing 72 hours prior.    Scribed for Osmani Francois MD by KATHERINE SUTTON.  3/4/2021  12:30 EST    I Osmani Francois MD have personally performed the services described in this  document as scribed by the above individual, and it is both accurate and complete.     Osmani Francois MD  3/7/2021  21:40 EST          Osmani Francois MD

## 2021-03-23 ENCOUNTER — TELEPHONE (OUTPATIENT)
Dept: UROLOGY | Facility: CLINIC | Age: 81
End: 2021-03-23

## 2021-03-23 ENCOUNTER — HOSPITAL ENCOUNTER (OUTPATIENT)
Dept: GENERAL RADIOLOGY | Facility: HOSPITAL | Age: 81
Discharge: HOME | End: 2021-03-23

## 2021-03-23 ENCOUNTER — APPOINTMENT (OUTPATIENT)
Dept: PREADMISSION TESTING | Facility: HOSPITAL | Age: 81
End: 2021-03-23

## 2021-03-23 VITALS — WEIGHT: 185.6 LBS | BODY MASS INDEX: 27.49 KG/M2 | HEIGHT: 69 IN

## 2021-03-23 LAB
ANION GAP SERPL CALCULATED.3IONS-SCNC: 9.5 MMOL/L (ref 5–15)
BASOPHILS # BLD AUTO: 0.05 10*3/MM3 (ref 0–0.2)
BASOPHILS NFR BLD AUTO: 0.4 % (ref 0–1.5)
BUN SERPL-MCNC: 17 MG/DL (ref 8–23)
BUN/CREAT SERPL: 17.9 (ref 7–25)
CALCIUM SPEC-SCNC: 9.6 MG/DL (ref 8.6–10.5)
CHLORIDE SERPL-SCNC: 103 MMOL/L (ref 98–107)
CO2 SERPL-SCNC: 28.5 MMOL/L (ref 22–29)
CREAT SERPL-MCNC: 0.95 MG/DL (ref 0.76–1.27)
DEPRECATED RDW RBC AUTO: 50.3 FL (ref 37–54)
EOSINOPHIL # BLD AUTO: 0.25 10*3/MM3 (ref 0–0.4)
EOSINOPHIL NFR BLD AUTO: 1.8 % (ref 0.3–6.2)
ERYTHROCYTE [DISTWIDTH] IN BLOOD BY AUTOMATED COUNT: 14 % (ref 12.3–15.4)
GFR SERPL CREATININE-BSD FRML MDRD: 76 ML/MIN/1.73
GLUCOSE SERPL-MCNC: 102 MG/DL (ref 65–99)
HCT VFR BLD AUTO: 43.4 % (ref 37.5–51)
HGB BLD-MCNC: 14 G/DL (ref 13–17.7)
IMM GRANULOCYTES # BLD AUTO: 0.13 10*3/MM3 (ref 0–0.05)
IMM GRANULOCYTES NFR BLD AUTO: 0.9 % (ref 0–0.5)
LYMPHOCYTES # BLD AUTO: 3.5 10*3/MM3 (ref 0.7–3.1)
LYMPHOCYTES NFR BLD AUTO: 25.5 % (ref 19.6–45.3)
MCH RBC QN AUTO: 31.1 PG (ref 26.6–33)
MCHC RBC AUTO-ENTMCNC: 32.3 G/DL (ref 31.5–35.7)
MCV RBC AUTO: 96.4 FL (ref 79–97)
MONOCYTES # BLD AUTO: 1.5 10*3/MM3 (ref 0.1–0.9)
MONOCYTES NFR BLD AUTO: 10.9 % (ref 5–12)
NEUTROPHILS NFR BLD AUTO: 60.5 % (ref 42.7–76)
NEUTROPHILS NFR BLD AUTO: 8.29 10*3/MM3 (ref 1.7–7)
NRBC BLD AUTO-RTO: 0 /100 WBC (ref 0–0.2)
PLATELET # BLD AUTO: 420 10*3/MM3 (ref 140–450)
PMV BLD AUTO: 10 FL (ref 6–12)
POTASSIUM SERPL-SCNC: 4.5 MMOL/L (ref 3.5–5.2)
RBC # BLD AUTO: 4.5 10*6/MM3 (ref 4.14–5.8)
SODIUM SERPL-SCNC: 141 MMOL/L (ref 136–145)
WBC # BLD AUTO: 13.72 10*3/MM3 (ref 3.4–10.8)

## 2021-03-23 PROCEDURE — 36415 COLL VENOUS BLD VENIPUNCTURE: CPT

## 2021-03-23 PROCEDURE — 71045 X-RAY EXAM CHEST 1 VIEW: CPT

## 2021-03-23 PROCEDURE — 80048 BASIC METABOLIC PNL TOTAL CA: CPT

## 2021-03-23 PROCEDURE — 93005 ELECTROCARDIOGRAM TRACING: CPT

## 2021-03-23 PROCEDURE — 85025 COMPLETE CBC W/AUTO DIFF WBC: CPT

## 2021-03-23 NOTE — PAT
Spoke with Pelon De La Paz regarding CAD disease that is being managed by meds and closely followed by Dr. Lewis.  Requested cardiac clearance, spoke to Mary Lou at Dr. Francois's office, they verbalized that they would get clearance from Dr. Lewis.

## 2021-03-23 NOTE — TELEPHONE ENCOUNTER
Scheduled pt appt with cardiologist to get cardiac clearance. Gave pt information for appt- Monday at 12p.

## 2021-03-24 LAB
QT INTERVAL: 464 MS
QTC INTERVAL: 406 MS

## 2021-04-01 RX ORDER — MECLIZINE HYDROCHLORIDE 25 MG/1
TABLET ORAL
Qty: 180 TABLET | Refills: 3 | Status: SHIPPED | OUTPATIENT
Start: 2021-04-01

## 2021-04-02 ENCOUNTER — APPOINTMENT (OUTPATIENT)
Dept: LAB | Facility: HOSPITAL | Age: 81
End: 2021-04-02

## 2021-04-09 ENCOUNTER — LAB (OUTPATIENT)
Dept: LAB | Facility: HOSPITAL | Age: 81
End: 2021-04-09

## 2021-04-09 DIAGNOSIS — Z01.818 PRE-OP TESTING: Primary | ICD-10-CM

## 2021-04-09 LAB — SARS-COV-2 RNA NOSE QL NAA+PROBE: NOT DETECTED

## 2021-04-09 PROCEDURE — U0004 COV-19 TEST NON-CDC HGH THRU: HCPCS

## 2021-04-09 PROCEDURE — C9803 HOPD COVID-19 SPEC COLLECT: HCPCS

## 2021-04-12 NOTE — PAT
Called pt to notify of arrival time of procedure tomorrow with Dr. Francois.  Arrival time given to pt.  Verbalized understanding. Pt inquiring as to whether or not he can eat in the morning since he doesn't come in until 1 pm.  Called anesthesia phone and spoke with Pelon Jameson CRNA.  Pelon stated that the pt is not to eat after midnight before the procedure, but the pt may drink clear liquids by 8 am the morning of procedure, but no later.  Informed pt.  Verbalized understanding.

## 2021-04-13 ENCOUNTER — HOSPITAL ENCOUNTER (OUTPATIENT)
Facility: HOSPITAL | Age: 81
Setting detail: HOSPITAL OUTPATIENT SURGERY
Discharge: HOME | End: 2021-04-13
Attending: UROLOGY | Admitting: UROLOGY

## 2021-04-13 ENCOUNTER — ANESTHESIA (OUTPATIENT)
Dept: PERIOP | Facility: HOSPITAL | Age: 81
End: 2021-04-13

## 2021-04-13 ENCOUNTER — ANESTHESIA EVENT (OUTPATIENT)
Dept: PERIOP | Facility: HOSPITAL | Age: 81
End: 2021-04-13

## 2021-04-13 VITALS
TEMPERATURE: 98 F | OXYGEN SATURATION: 90 % | SYSTOLIC BLOOD PRESSURE: 128 MMHG | HEART RATE: 61 BPM | DIASTOLIC BLOOD PRESSURE: 75 MMHG | RESPIRATION RATE: 16 BRPM

## 2021-04-13 DIAGNOSIS — D49.4 BLADDER TUMOR: ICD-10-CM

## 2021-04-13 PROCEDURE — 25010000002 ONDANSETRON PER 1 MG: Performed by: NURSE ANESTHETIST, CERTIFIED REGISTERED

## 2021-04-13 PROCEDURE — 25010000002 PROPOFOL 200 MG/20ML EMULSION: Performed by: NURSE ANESTHETIST, CERTIFIED REGISTERED

## 2021-04-13 PROCEDURE — 25010000002 IOPAMIDOL 61 % SOLUTION: Performed by: UROLOGY

## 2021-04-13 PROCEDURE — 25010000002 SUCCINYLCHOLINE PER 20 MG: Performed by: NURSE ANESTHETIST, CERTIFIED REGISTERED

## 2021-04-13 PROCEDURE — 88307 TISSUE EXAM BY PATHOLOGIST: CPT | Performed by: UROLOGY

## 2021-04-13 PROCEDURE — 52235 CYSTOSCOPY AND TREATMENT: CPT | Performed by: UROLOGY

## 2021-04-13 PROCEDURE — 25010000002 DEXAMETHASONE PER 1 MG: Performed by: NURSE ANESTHETIST, CERTIFIED REGISTERED

## 2021-04-13 PROCEDURE — 25010000003 CEFAZOLIN SODIUM-DEXTROSE 2-3 GM-%(50ML) RECONSTITUTED SOLUTION: Performed by: UROLOGY

## 2021-04-13 PROCEDURE — 25010000002 HYDROMORPHONE PER 4 MG: Performed by: NURSE ANESTHETIST, CERTIFIED REGISTERED

## 2021-04-13 RX ORDER — ATROPA BELLADONNA AND OPIUM 16.2; 3 MG/1; MG/1
SUPPOSITORY RECTAL AS NEEDED
Status: DISCONTINUED | OUTPATIENT
Start: 2021-04-13 | End: 2021-04-13 | Stop reason: HOSPADM

## 2021-04-13 RX ORDER — SODIUM CHLORIDE 0.9 % (FLUSH) 0.9 %
10 SYRINGE (ML) INJECTION AS NEEDED
Status: DISCONTINUED | OUTPATIENT
Start: 2021-04-13 | End: 2021-04-13 | Stop reason: HOSPADM

## 2021-04-13 RX ORDER — PROPOFOL 10 MG/ML
INJECTION, EMULSION INTRAVENOUS AS NEEDED
Status: DISCONTINUED | OUTPATIENT
Start: 2021-04-13 | End: 2021-04-13 | Stop reason: SURG

## 2021-04-13 RX ORDER — ACETAMINOPHEN 325 MG/1
650 TABLET ORAL EVERY 6 HOURS
Qty: 30 TABLET | Refills: 0 | Status: SHIPPED | OUTPATIENT
Start: 2021-04-13 | End: 2021-04-16

## 2021-04-13 RX ORDER — MORPHINE SULFATE 2 MG/ML
2 INJECTION, SOLUTION INTRAMUSCULAR; INTRAVENOUS
Status: DISCONTINUED | OUTPATIENT
Start: 2021-04-13 | End: 2021-04-13 | Stop reason: HOSPADM

## 2021-04-13 RX ORDER — ONDANSETRON 2 MG/ML
INJECTION INTRAMUSCULAR; INTRAVENOUS AS NEEDED
Status: DISCONTINUED | OUTPATIENT
Start: 2021-04-13 | End: 2021-04-13 | Stop reason: SURG

## 2021-04-13 RX ORDER — HYDROMORPHONE HCL 110MG/55ML
PATIENT CONTROLLED ANALGESIA SYRINGE INTRAVENOUS AS NEEDED
Status: DISCONTINUED | OUTPATIENT
Start: 2021-04-13 | End: 2021-04-13 | Stop reason: SURG

## 2021-04-13 RX ORDER — DOCUSATE SODIUM 100 MG/1
100 CAPSULE, LIQUID FILLED ORAL 2 TIMES DAILY
Qty: 15 CAPSULE | Refills: 1 | Status: SHIPPED | OUTPATIENT
Start: 2021-04-13 | End: 2021-04-20

## 2021-04-13 RX ORDER — ROCURONIUM BROMIDE 10 MG/ML
INJECTION, SOLUTION INTRAVENOUS AS NEEDED
Status: DISCONTINUED | OUTPATIENT
Start: 2021-04-13 | End: 2021-04-13 | Stop reason: SURG

## 2021-04-13 RX ORDER — DEXAMETHASONE SODIUM PHOSPHATE 4 MG/ML
INJECTION, SOLUTION INTRA-ARTICULAR; INTRALESIONAL; INTRAMUSCULAR; INTRAVENOUS; SOFT TISSUE AS NEEDED
Status: DISCONTINUED | OUTPATIENT
Start: 2021-04-13 | End: 2021-04-13 | Stop reason: SURG

## 2021-04-13 RX ORDER — OXYBUTYNIN CHLORIDE 10 MG/1
10 TABLET, EXTENDED RELEASE ORAL DAILY PRN
Qty: 10 TABLET | Refills: 0 | Status: SHIPPED | OUTPATIENT
Start: 2021-04-13 | End: 2021-12-15

## 2021-04-13 RX ORDER — SODIUM CHLORIDE, SODIUM LACTATE, POTASSIUM CHLORIDE, CALCIUM CHLORIDE 600; 310; 30; 20 MG/100ML; MG/100ML; MG/100ML; MG/100ML
1000 INJECTION, SOLUTION INTRAVENOUS CONTINUOUS
Status: DISCONTINUED | OUTPATIENT
Start: 2021-04-13 | End: 2021-04-13 | Stop reason: HOSPADM

## 2021-04-13 RX ORDER — CEFAZOLIN SODIUM 2 G/50ML
2 SOLUTION INTRAVENOUS ONCE
Status: COMPLETED | OUTPATIENT
Start: 2021-04-13 | End: 2021-04-13

## 2021-04-13 RX ORDER — SUCCINYLCHOLINE CHLORIDE 20 MG/ML
INJECTION INTRAMUSCULAR; INTRAVENOUS AS NEEDED
Status: DISCONTINUED | OUTPATIENT
Start: 2021-04-13 | End: 2021-04-13 | Stop reason: SURG

## 2021-04-13 RX ORDER — OXYCODONE HYDROCHLORIDE 5 MG/1
5 TABLET ORAL EVERY 6 HOURS PRN
Qty: 5 TABLET | Refills: 0 | Status: SHIPPED | OUTPATIENT
Start: 2021-04-13 | End: 2021-04-20

## 2021-04-13 RX ORDER — LIDOCAINE HYDROCHLORIDE 20 MG/ML
INJECTION, SOLUTION INTRAVENOUS AS NEEDED
Status: DISCONTINUED | OUTPATIENT
Start: 2021-04-13 | End: 2021-04-13 | Stop reason: SURG

## 2021-04-13 RX ORDER — ONDANSETRON 2 MG/ML
4 INJECTION INTRAMUSCULAR; INTRAVENOUS ONCE AS NEEDED
Status: DISCONTINUED | OUTPATIENT
Start: 2021-04-13 | End: 2021-04-13 | Stop reason: HOSPADM

## 2021-04-13 RX ORDER — MAGNESIUM HYDROXIDE 1200 MG/15ML
LIQUID ORAL AS NEEDED
Status: DISCONTINUED | OUTPATIENT
Start: 2021-04-13 | End: 2021-04-13 | Stop reason: HOSPADM

## 2021-04-13 RX ORDER — MEPERIDINE HYDROCHLORIDE 25 MG/ML
25 INJECTION INTRAMUSCULAR; INTRAVENOUS; SUBCUTANEOUS ONCE
Status: DISCONTINUED | OUTPATIENT
Start: 2021-04-13 | End: 2021-04-13 | Stop reason: HOSPADM

## 2021-04-13 RX ORDER — CIPROFLOXACIN 500 MG/1
500 TABLET, FILM COATED ORAL 2 TIMES DAILY
Qty: 6 TABLET | Refills: 0 | Status: SHIPPED | OUTPATIENT
Start: 2021-04-13 | End: 2021-04-20

## 2021-04-13 RX ADMIN — HYDROMORPHONE HYDROCHLORIDE 1 MG: 2 INJECTION INTRAMUSCULAR; INTRAVENOUS; SUBCUTANEOUS at 16:21

## 2021-04-13 RX ADMIN — ROCURONIUM BROMIDE 30 MG: 10 INJECTION INTRAVENOUS at 16:03

## 2021-04-13 RX ADMIN — SODIUM CHLORIDE, POTASSIUM CHLORIDE, SODIUM LACTATE AND CALCIUM CHLORIDE 1000 ML: 600; 310; 30; 20 INJECTION, SOLUTION INTRAVENOUS at 13:23

## 2021-04-13 RX ADMIN — SODIUM CHLORIDE, POTASSIUM CHLORIDE, SODIUM LACTATE AND CALCIUM CHLORIDE: 600; 310; 30; 20 INJECTION, SOLUTION INTRAVENOUS at 16:46

## 2021-04-13 RX ADMIN — SUCCINYLCHOLINE CHLORIDE 100 MG: 20 INJECTION, SOLUTION INTRAMUSCULAR; INTRAVENOUS at 16:03

## 2021-04-13 RX ADMIN — HYDROMORPHONE HYDROCHLORIDE 1 MG: 2 INJECTION INTRAMUSCULAR; INTRAVENOUS; SUBCUTANEOUS at 16:44

## 2021-04-13 RX ADMIN — DEXAMETHASONE SODIUM PHOSPHATE 4 MG: 4 INJECTION, SOLUTION INTRAMUSCULAR; INTRAVENOUS at 16:03

## 2021-04-13 RX ADMIN — ONDANSETRON 4 MG: 2 INJECTION INTRAMUSCULAR; INTRAVENOUS at 16:03

## 2021-04-13 RX ADMIN — LIDOCAINE HYDROCHLORIDE 60 MG: 20 INJECTION, SOLUTION INTRAVENOUS at 16:03

## 2021-04-13 RX ADMIN — PROPOFOL 200 MG: 10 INJECTION, EMULSION INTRAVENOUS at 16:03

## 2021-04-13 RX ADMIN — CEFAZOLIN SODIUM 2 G: 2 SOLUTION INTRAVENOUS at 16:03

## 2021-04-13 RX ADMIN — SUGAMMADEX 500 MG: 100 INJECTION, SOLUTION INTRAVENOUS at 16:51

## 2021-04-13 NOTE — DISCHARGE INSTRUCTIONS
No pushing, pulling, tugging,  heavy lifting, or strenuous activity.  No major decision making, driving, or drinking alcoholic beverages for 24 hours. ( due to the medications you have  received)  Always use good hand hygiene/washing techniques.  NO driving while taking pain medications.    * if you have an incision:  Check your incision area every day for signs of infection.   Check for:  * more redness, swelling, or pain  *more fluid or blood  *warmth  *pus or bad smell.    To assist you in voiding:  Drink plenty of fluids  Listen to running water while attempting to void.    If you are unable to urinate and you have an uncomfortable urge to void or it has been   6 hours since you were discharged, return to the Emergency Room.        Home Care After Transurethral Resection of Bladder Tumor  The following instructions will help you care for yourself, or be cared for upon your return home today.  These are guidelines for your care right after surgery only.     Diet  Drink plenty of liquids and eat light meals today.    Start your regular diet tomorrow.    Activity  Start normal activities in twenty-four (24) hours.    Wound Care and Hygiene  No restrictions, start normal routine.    Anesthesia Precautions & Expectations  After anesthesia, rest for 24 hours.    Do not drive, drink alcoholic beverages or make any important decisions during this time.    General anesthesia may cause a sore throat, jaw discomfort or muscle aches.    These symptoms can last for one or two days.     What to Expect after Surgery  Mild pain with voiding.  Frequency or urgency.  Bladder cramps.  Minimal bleeding with voiding.    Call your Doctor  Passing clots in urine preventing bladder emptying  Severe pain not controlled by oral medication  Temperature above 101.5 degrees  Inability to urinate within eight (8) hours after surgery    Antibiotics  Because of the recent invasive procedure to your bladder, we need to prescribe you antibiotics  to prevent bladder infection.  We are following the current recommended guidelines for antibiotic use following this procedure.  You will need to take oral antibiotics for the next 48 hours starting tomorrow      Other Contacts  Urology Office:  [address]   [place] [id number] [phone] office  [phone] fax    Follow up Appointment  You have a follow up scheduled with Dr. Francois on 4/22/2021 9:50 AM

## 2021-04-13 NOTE — ANESTHESIA POSTPROCEDURE EVALUATION
Patient: Jairo Mercedes    Procedure Summary     Date: 04/13/21 Room / Location: [facility]O /  [facility] OR    Anesthesia Start: 1553 Anesthesia Stop: 1652    Procedure: TRANSURETHRAL RESECTION OF BLADDER TUMOR (N/A ) Diagnosis:       Bladder tumor      (Bladder tumor [D49.4])    Surgeons: Osmani Francois MD Provider: Octavio Worley CRNA    Anesthesia Type: general ASA Status: 3          Anesthesia Type: general    Vitals  Vitals Value Taken Time   /53 04/13/21 1755   Temp 98.1 °F (36.7 °C) 04/13/21 1755   Pulse 56 04/13/21 1756   Resp 13 04/13/21 1755   SpO2 93 % 04/13/21 1756   Vitals shown include unvalidated device data.        Post Anesthesia Care and Evaluation    Patient location during evaluation: PACU  Patient participation: complete - patient participated  Level of consciousness: awake  Pain score: 3  Pain management: adequate  Airway patency: patent  Anesthetic complications: No anesthetic complications  PONV Status: controlled  Cardiovascular status: acceptable and stable  Respiratory status: acceptable and face mask  Hydration status: acceptable

## 2021-04-13 NOTE — H&P
HPI  Jairo Mercedes is a 80 y.o. with history of   1. Bladder tumor         No recent fevers or new LUTS  Does not take any blood thinners    Past Medical History  Past Medical History:   Diagnosis Date   • Abdominal pain    • Acute sinusitis    • Allergic N/A   • Anemia    • Anxiety    • Arthritis    • Asthma N/A   • COPD (chronic obstructive pulmonary disease) (CMS/HCC)    • Coronary artery disease N/A    Some Blockage   • Diabetes mellitus (CMS/HCC)     REPORTS DIET CONTROL ONLY, TAKES NO MEDICATION   • Emphysema lung (CMS/HCC)    • Emphysema lung (CMS/HCC)    • Emphysema lung (CMS/HCC)    • Emphysema of lung (CMS/HCC)    • Emphysema of lung (CMS/HCC)    • Emphysema of lung (CMS/HCC)    • Heme positive stool    • History of ankle fracture     LEFT    • History of cardiovascular stress test     REPORTS IN HIS LATE 50'S AND THAT ALL WAS WNL'S AT THAT TIME   • History of echocardiogram     REPORTS HAS HAD 2. DOES NOT REPORT ANY HX OF CHEST PAIN.   • History of pneumonia    • [place] (hard of hearing)     WEARS HEARING AIDS   • Hypertension    • Melanoma (CMS/HCC)     2013   • Oxygen dependent     2 L NC HS   • Pneumonia    • Seasonal allergies    • Wears glasses        Past Surgical History  Past Surgical History:   Procedure Laterality Date   • BRAIN SURGERY  1999    REPORTS FOR SUBDURAL HEMATOMA    • BRONCHOSCOPY N/A 4/16/2018    Procedure: BRONCHOSCOPY DIAGNOSTIC WITH WASHINGS, BRUSHINGS AND BIOPSIES;  Surgeon: Morgan Frias MD;  Location: [facility] OR;  Service: Pulmonary   • BRONCHOSCOPY     • ALEX HOLE FOR SUBDURAL HEMATOMA     • CARDIAC CATHETERIZATION      REPORTS APPROXIMATELY 2015 AND REPORTS NO STENTS WERE PLACED   • CHOLECYSTECTOMY     • COLONOSCOPY     • COLONOSCOPY N/A 8/2/2018    Procedure: COLONOSCOPY WITH HOT FORCEP POLYPECTOMY X2  ;  Surgeon: Sara Reynoso MD;  Location: [facility] ENDOSCOPY;  Service: Gastroenterology   • ENDOSCOPY     • LIVER BIOPSY     • LYMPH NODE BIOPSY     • MOLE REMOVAL  2005     "MELANOMA ON BACK   • SPLENECTOMY     • THORACOTOMY  2015    Left thoracotomy and stapling of pulmonary blebs. Mechanical pleurodesis.   • TONSILLECTOMY     • WISDOM TOOTH EXTRACTION         Medications    Current Facility-Administered Medications:   •  ceFAZolin Sodium-Dextrose (ANCEF) IVPB (duplex) 2 g, 2 g, Intravenous, Once, Osmani Francois MD  •  lactated ringers infusion 1,000 mL, 1,000 mL, Intravenous, Continuous, Osmani Francois MD, Last Rate: 25 mL/hr at 21 1323, 1,000 mL at 21 1323  •  sodium chloride 0.9 % flush 10 mL, 10 mL, Intravenous, PRN, Osmani Francois MD    Allergies  Allergies   Allergen Reactions   • Parabens Rash   • Other Rash     REPORTS ALLERGY TO \"PARABENS\" - REPORTS THEY CAUSE HIM TO BREAK OUT (REPORTS THIS IS A PRESERVATIVE THAT IS PUT IN CREAMS)       Social History  Social History     Socioeconomic History   • Marital status:      Spouse name: Not on file   • Number of children: Not on file   • Years of education: Not on file   • Highest education level: Not on file   Tobacco Use   • Smoking status: Former Smoker     Packs/day: 3.00     Years: 42.00     Pack years: 126.00     Types: Cigarettes     Start date: 1957     Quit date:      Years since quittin.1   • Smokeless tobacco: Never Used   Substance and Sexual Activity   • Alcohol use: Not Currently   • Drug use: No   • Sexual activity: Never       Review of Systems  Constitutional: No fevers or chills  Skin: Negative for rash  Endocrine: No heat/cold intolerance   Cardiovascular: Negative for chest pain or dyspnea on exertion  Respiratory: Negative for shortness of breath or wheezing  Gastrointestinal: No constipation, nausea or vomiting  Genitourinary: Negative for new lower urinary tract symptoms, current gross hematuria or dysuria.  Musculoskeletal: No flank pain  Neurological:  Negative for frequent headaches or dizziness  Lymph/Heme: Negative for leg swelling or calf " pain.    Physical Exam  Visit Vitals  /65 (BP Location: Right arm, Patient Position: Sitting)   Pulse 58   Temp 97.4 °F (36.3 °C) (Temporal)   Resp 18   SpO2 92%     Constitutional: NAD, WDWN.   HEENT: NCAT. Conjunctivae normal.  MMM.    Cardiovascular: Regular rate.  Pulmonary/Chest: Respirations are even and non-labored bilaterally.  Abdominal: Soft. No distension, tenderness, masses or guarding. No CVA tenderness.  Neurological: A + O x 3.  Cranial Nerves II-XII grossly intact. Normal gait.  Extremities: WARREN x 4, Warm. No clubbing.  No cyanosis.    Skin: Pink, warm and dry.  No rashes noted.  Psychiatric:  Normal mood and affect    Labs  @LASTLABOSUSHORT(SODIUM,POTASSIUM,CHLORIDE,CO2,BUN,CREATSERUM,GLUCOSE)@  @LASTLABOSUSHORT(WBC,WBCCOUNT,WBCFETAL,HGB,HCT,PLATELET,MCV)@   No results found for: PSA  No components found for: CREATSERUM      Assessment  Jairo Mercedes is a 80 y.o. male who presents with the following diagnosis:  1. Bladder tumor         Plan  1. To OR for TRANSURETHRAL RESECTION OF BLADDER TUMOR & POSSIBLE BILATERAL RETROGRADE URETEROPYELOGRAMS     Osmani Francois MD

## 2021-04-13 NOTE — ANESTHESIA PREPROCEDURE EVALUATION
Anesthesia Evaluation     Patient summary reviewed and Nursing notes reviewed   no history of anesthetic complications:  NPO Solid Status: > 8 hours  NPO Liquid Status: > 8 hours           Airway   Mallampati: II  TM distance: >3 FB  Neck ROM: full  No difficulty expected  Dental - normal exam     Pulmonary    (+) pneumonia resolved , a smoker Former, COPD moderate, asthma,shortness of breath, sleep apnea, decreased breath sounds,   Cardiovascular - normal exam  Exercise tolerance: good (4-7 METS)    ECG reviewed  PT is on anticoagulation therapy  Patient on routine beta blocker and Beta blocker given within 24 hours of surgery    (+) hypertension well controlled less than 2 medications, CAD, THOMAS, PVD, hyperlipidemia,     ROS comment: ASA 81 mg     Pt has 3 cm infrarenal AAA according to report.     Neuro/Psych  (+) psychiatric history Anxiety,     GI/Hepatic/Renal/Endo    (+)  GERD well controlled,  liver disease, diabetes mellitus well controlled,     Musculoskeletal     (+) arthralgias, back pain, chronic pain, myalgias,   Abdominal   (+) obese,    Substance History - negative use     OB/GYN negative ob/gyn ROS         Other   arthritis, blood dyscrasia anemia,   history of cancer      Other Comment: Melanoma 2013  ROS/Med Hx Other: labs reviewed  cxr chronic findings nad  ekg sr short pr      Phys Exam Other: [place], wears hearing aides                  Anesthesia Plan    ASA 3     general   (Patient advised that intravenous sedation would be utilized as primary anesthetic technique. Every effort will be made to make sure patient is comfortable. Patient advised that they may experience recall of events of the procedure. Patient verbalized understanding and agreed to plan. )  intravenous induction     Anesthetic plan, all risks, benefits, and alternatives have been provided, discussed and informed consent has been obtained with: patient.

## 2021-04-13 NOTE — OP NOTE
Preoperative diagnosis  Bladder tumor    Postoperative diagnosis  Bladder tumor    Procedure performed  1.  Exam under anesthesia  2.  Rigid cystoscopy  3.  Transurethral resection of bladder tumor, 2-5cm ([id number])  5.  Fluoroscopy time < 1 hour    Surgeon  Osmani Francois MD    Anesthesia  General    EBL  10 mL    Complications  None    Specimen  Bladder tumor for pathology      Findings  Exam under anesthesia revealed mobile bladder no palpable masses  Cystoscopy revealed multiple bladder tumors along the right lateral wall and right posterior lateral to the ureteral orifice.  They are not involving the ureteral orifice and I did not have to resect over it..  The bladder tumors appeared papillary and low-grade in nature.    Indications  80 y.o. male agreed to undergo the above named procedure after discussion of the alternatives, risks and benefits.  He was found to have a bladder tumor on cystoscopy for work up of gross hematuria. Informed consent was obtained.      Procedure  The patient was taken to the operating room and placed supine on the operating table.  Pre-operative antibiotics were administered.  Bilateral lower extremity SCDs were placed.  After induction of general anesthesia the patient was positioned in dorsal lithotomy.  A time-out was performed.  An exam under anesthesia was performed with the above described findings.  The patient was prepped and draped in a sterile fashion.      A 26 Fr rigid resectoscope was introduced into the bladder under direct vision.   Formal rigid cystoscopy was performed next with the aid of the 30 and 70 degree lenses with findings described above.      The working element was then placed into the resectoscope and the described mass was resected in its entirety.  Hemostasis was confirmed. The patient tolerated the procedure well, was awakened, extubated and transferred to the recovery room in stable condition.    Plan  The patient will follow up with me next week for  pathology discussion

## 2021-04-13 NOTE — ANESTHESIA PROCEDURE NOTES
Airway  Urgency: elective    Date/Time: 4/13/2021 4:05 PM  Airway not difficult    General Information and Staff    Patient location during procedure: OR  CRNA: Octavio Worley CRNA    Indications and Patient Condition  Indications for airway management: airway protection    Preoxygenated: yes  Mask difficulty assessment: 1 - vent by mask    Final Airway Details  Final airway type: endotracheal airway      Successful airway: ETT  Cuffed: yes   Successful intubation technique: direct laryngoscopy  Endotracheal tube insertion site: oral  Blade: Brar  Blade size: 3  ETT size (mm): 8.0  Cormack-Lehane Classification: grade I - full view of glottis  Placement verified by: chest auscultation and capnometry   Measured from: lips  ETT/EBT  to lips (cm): 21  Number of attempts at approach: 1  Assessment: lips, teeth, and gum same as pre-op and atraumatic intubation

## 2021-04-16 LAB
LAB AP CASE REPORT: NORMAL
PATH REPORT.ADDENDUM SPEC: NORMAL
PATH REPORT.FINAL DX SPEC: NORMAL

## 2021-04-20 ENCOUNTER — OFFICE VISIT (OUTPATIENT)
Dept: INTERNAL MEDICINE | Facility: CLINIC | Age: 81
End: 2021-04-20

## 2021-04-20 VITALS
SYSTOLIC BLOOD PRESSURE: 124 MMHG | WEIGHT: 186 LBS | HEIGHT: 69 IN | DIASTOLIC BLOOD PRESSURE: 80 MMHG | TEMPERATURE: 97.7 F | BODY MASS INDEX: 27.55 KG/M2 | OXYGEN SATURATION: 91 % | HEART RATE: 56 BPM

## 2021-04-20 DIAGNOSIS — G47.09 OTHER INSOMNIA: ICD-10-CM

## 2021-04-20 DIAGNOSIS — J43.8 OTHER EMPHYSEMA (HCC): ICD-10-CM

## 2021-04-20 DIAGNOSIS — I25.10 CORONARY ARTERIOSCLEROSIS IN NATIVE ARTERY: ICD-10-CM

## 2021-04-20 DIAGNOSIS — I10 ESSENTIAL HYPERTENSION: ICD-10-CM

## 2021-04-20 DIAGNOSIS — E55.9 VITAMIN D DEFICIENCY: ICD-10-CM

## 2021-04-20 DIAGNOSIS — E78.5 HYPERLIPIDEMIA, UNSPECIFIED HYPERLIPIDEMIA TYPE: ICD-10-CM

## 2021-04-20 DIAGNOSIS — E11.40 TYPE 2 DIABETES MELLITUS WITH DIABETIC NEUROPATHY, WITHOUT LONG-TERM CURRENT USE OF INSULIN (HCC): Primary | ICD-10-CM

## 2021-04-20 LAB
HBA1C MFR BLD: 5.6 %
POC CREATININE URINE: 200
POC MICROALBUMIN URINE: 150

## 2021-04-20 PROCEDURE — 82044 UR ALBUMIN SEMIQUANTITATIVE: CPT | Performed by: INTERNAL MEDICINE

## 2021-04-20 PROCEDURE — 83036 HEMOGLOBIN GLYCOSYLATED A1C: CPT | Performed by: INTERNAL MEDICINE

## 2021-04-20 PROCEDURE — 99214 OFFICE O/P EST MOD 30 MIN: CPT | Performed by: INTERNAL MEDICINE

## 2021-04-20 RX ORDER — TIOTROPIUM BROMIDE 18 UG/1
CAPSULE ORAL; RESPIRATORY (INHALATION)
Qty: 90 CAPSULE | Refills: 3 | OUTPATIENT
Start: 2021-04-20

## 2021-04-20 NOTE — PROGRESS NOTES
Subjective   Jairo Mercedes is a 80 y.o. male.     Chief Complaint   Patient presents with   • Hypertension   • Hyperlipidemia   • Diabetes       History of Present Illness   Patient here for follow-up of blood pressure stable on medication.  The CAD stable without chest pain or short of breath.  The COPD stable on medication needs some medications refilled.  Hyperlipidemia on medication needs of blood tests.  The diabetes is stable now.  Insomnia stable on medication.    Current Outpatient Medications:   •  albuterol sulfate HFA (VENTOLIN HFA) 108 (90 Base) MCG/ACT inhaler, Inhale 2 puffs Every 4 (Four) Hours As Needed for Wheezing., Disp: 18 g, Rfl: 2  •  ALPRAZolam (XANAX) 0.5 MG tablet, Every 12 (Twelve) Hours., Disp: , Rfl:   •  aspirin 81 MG EC tablet, Take 81 mg by mouth Daily., Disp: , Rfl:   •  atorvastatin (LIPITOR) 40 MG tablet, TAKE ONE TABLET BY MOUTH AT BEDTIME, Disp: 30 tablet, Rfl: 11  •  baclofen (LIORESAL) 10 MG tablet, TAKE ONE TABLET BY MOUTH TWICE DAILY, Disp: 90 tablet, Rfl: 3  •  esomeprazole (nexIUM) 40 MG capsule, TAKE ONE CAPSULE BY MOUTH EVERY DAY, Disp: 30 capsule, Rfl: 11  •  fluticasone-salmeterol (Advair Diskus) 250-50 MCG/DOSE DISKUS, Inhale 1 puff 2 (Two) Times a Day., Disp: 1 each, Rfl: 3  •  isosorbide mononitrate (IMDUR) 30 MG 24 hr tablet, TAKE ONE TABLET BY MOUTH EVERY DAY AS DIRECTED, Disp: 90 tablet, Rfl: 3  •  ketoconazole (NIZORAL) 2 % shampoo, Shampoo with a small amount to skin once a day, Disp: , Rfl:   •  losartan (Cozaar) 50 MG tablet, Take 1 tablet by mouth Daily., Disp: 90 tablet, Rfl: 3  •  meclizine (ANTIVERT) 25 MG tablet, TAKE ONE TABLET BY MOUTH TWICE DAILY, Disp: 180 tablet, Rfl: 3  •  metoprolol tartrate (LOPRESSOR) 25 MG tablet, Take 1 tablet by mouth 2 (Two) Times a Day., Disp: 180 tablet, Rfl: 3  •  nitroglycerin (NITROSTAT) 0.4 MG SL tablet, Place 0.4 mg under the tongue Every 5 (Five) Minutes As Needed for Chest Pain., Disp: , Rfl:   •  Omega-3 Fatty Acids  (FISH OIL) 1000 MG capsule capsule, Take 1,000 mg by mouth Daily With Breakfast., Disp: , Rfl:   •  oxybutynin XL (Ditropan XL) 10 MG 24 hr tablet, Take 1 tablet by mouth Daily As Needed for bladder spasm, Disp: 10 tablet, Rfl: 0  •  sertraline (ZOLOFT) 25 MG tablet, Take 1 tablet by mouth Daily., Disp: 90 tablet, Rfl: 3  •  traZODone (DESYREL) 50 MG tablet, TAKE 1/2 TO 1 TABLET BY MOUTH AT BEDTIME AS NEEDED, Disp: 90 tablet, Rfl: 3  •  triamcinolone (KENALOG) 0.025 % ointment, APPLY TO THE AFFECTED AREA(S) TWICE DAILY, Disp: 80 g, Rfl: 1  •  tiotropium (Spiriva HandiHaler) 18 MCG per inhalation capsule, Place 1 capsule into inhaler and inhale Daily., Disp: 90 capsule, Rfl: 3    The following portions of the patient's history were reviewed and updated as appropriate: allergies, current medications, past family history, past medical history, past social history, past surgical history and problem list.    Review of Systems   Constitutional: Negative.    Respiratory: Negative.    Cardiovascular: Negative.    Gastrointestinal: Negative.    Musculoskeletal: Negative.    Skin: Negative.    Neurological: Positive for numbness.   Psychiatric/Behavioral: Negative.        Objective   Physical Exam  Cardiovascular:      Rate and Rhythm: Normal rate and regular rhythm.      Heart sounds: Normal heart sounds.   Pulmonary:      Effort: Pulmonary effort is normal.      Breath sounds: Normal breath sounds.   Abdominal:      General: Bowel sounds are normal.   Musculoskeletal:      Cervical back: Neck supple.   Skin:     General: Skin is warm.   Neurological:      Mental Status: He is alert and oriented to person, place, and time.   Psychiatric:         Mood and Affect: Mood normal.         Behavior: Behavior normal.         All tests have been reviewed.    Assessment/Plan   Diagnoses and all orders for this visit:    Type 2 diabetes mellitus with diabetic neuropathy, without long-term current use of insulin (CMS/[facility])  -     POC  Glycosylated Hemoglobin (Hb A1C)  -     POCT microalbumin  -     CBC & Differential    Essential hypertension    Hyperlipidemia, unspecified hyperlipidemia type  -     CK  -     Comprehensive Metabolic Panel  -     Lipid Panel    Other emphysema (CMS/HCC)  -     tiotropium (Spiriva HandiHaler) 18 MCG per inhalation capsule; Place 1 capsule into inhaler and inhale Daily.  -     fluticasone-salmeterol (Advair Diskus) 250-50 MCG/DOSE DISKUS; Inhale 1 puff 2 (Two) Times a Day.    Other insomnia    Coronary arteriosclerosis in native artery      6 months follow-up for annual wellness check          Below is to historical records for reference only:  #1: Adenopathy, intra-abdominal. biopsy, reactive changes only follow up : Hematology/oncology. history of ITP s/p splenectomy, higher WBC and plts, now Hb low , watch--  #2: copd, continue medicine night time O2  #3: fatty liver, s/p cholecystectomy. history of gall stone, follow up with GI, liver enzymes normal now after cutting Lipitor 80 mg. Asked patient to cut down Lipitor 40 mg daily, discontinue acetaminophen, weight loss.alkaline phosphatase is elevated watch for now  #4: Coronary heart disease,blockage unable to stent. continue medicine prn NTG,metoprolol 25 bid, f/u cardio  #5:  diabetic, patient refuses medicines  DM neuropathy watch for now  #7: Mild chronic anxiety, continue medicine  Leukocytosis thrombocytosis lymphocytosis monocytosis history of splenectomy for possible thrombocytopenia. continue to watch--  Vocal cord paralysis follow up with ENT  right thumb tendonitis,resolved after tx. XR severe arthritis.  hemorroid continue medicine   Overweight encouraged patient to have good diet  renal cyst 3.3 cm, MRI November 2014 showed a 2 cm. Repeat normal  low iron normal  s/p iron transfusion, follow hematologist  insomia continue trazodone  vitD low vitD3 2000u daily  s/p pneumonia shot, prevnar 10/27, Td to HD , zostavax done, shingrix informed  aaa screen  3cm repeated 12/2017 smaller now, repeat 12/2019  Lung nodule, repeat in 7/2018 showed locular effusion, by lung doctor.   Colon 8/2/18 colon polyp , repeat 5 years  Onychomycosis on Lamisil ,liver enzyme normal after 4 weeks recheck in 1 month--  BPH continue PSA

## 2021-04-22 ENCOUNTER — OFFICE VISIT (OUTPATIENT)
Dept: UROLOGY | Facility: CLINIC | Age: 81
End: 2021-04-22

## 2021-04-22 VITALS
WEIGHT: 186 LBS | HEIGHT: 69 IN | OXYGEN SATURATION: 94 % | RESPIRATION RATE: 14 BRPM | HEART RATE: 51 BPM | TEMPERATURE: 97.6 F | DIASTOLIC BLOOD PRESSURE: 66 MMHG | BODY MASS INDEX: 27.55 KG/M2 | SYSTOLIC BLOOD PRESSURE: 130 MMHG

## 2021-04-22 DIAGNOSIS — C68.9 UROTHELIAL CARCINOMA (HCC): Primary | ICD-10-CM

## 2021-04-22 PROCEDURE — 99214 OFFICE O/P EST MOD 30 MIN: CPT | Performed by: UROLOGY

## 2021-04-22 NOTE — PROGRESS NOTES
/Chief Complaint  Gross hematuria      HPI  Mr. Mercedes is a 80 y.o. male with history of emphysema who presents for follow up of gross hematuria and LUTS.    At his last office visit, we performed a full workup which revealed no abnormalities. He does report an episode of gross hematuria with urinary burning, frequency, and urgency on 02/04/2021 after his 2nd time urinating for the day. He notes that he felt a clot come out while urinating then noticed the blood, which did not resolve until the next day. The patient was on antibiotics at that time which was prescribed by his foot specialist for inflammation; however, is unsure of the medication name. He is now off the antibiotics, and has not seen any blood in his urine since last Thursday.     Additionally, he reports experiencing an overactive bladder and has been urinating very frequently. He states his stream continues to be strong and states he only urinates for about 10 to 15 seconds. He feels like he is completely emptying.       Past Medical History  Past Medical History:   Diagnosis Date   • Abdominal pain    • Acute sinusitis    • Allergic N/A   • Anemia    • Anxiety    • Arthritis    • Asthma N/A   • COPD (chronic obstructive pulmonary disease) (CMS/[facility])    • Coronary artery disease N/A    Some Blockage   • Diabetes mellitus (CMS/[facility])     REPORTS DIET CONTROL ONLY, TAKES NO MEDICATION   • Emphysema lung (CMS/[facility])    • Emphysema lung (CMS/[facility])    • Emphysema lung (CMS/[facility])    • Emphysema of lung (CMS/[facility])    • Emphysema of lung (CMS/[facility])    • Emphysema of lung (CMS/[facility])    • Heme positive stool    • History of ankle fracture     LEFT    • History of cardiovascular stress test     REPORTS IN HIS LATE 50'S AND THAT ALL WAS WNL'S AT THAT TIME   • History of echocardiogram     REPORTS HAS HAD 2. DOES NOT REPORT ANY HX OF CHEST PAIN.   • History of pneumonia    • [place] (hard of hearing)     WEARS HEARING AIDS   • Hypertension    • Melanoma (CMS/[facility])     2013   •  Oxygen dependent     2 L NC HS   • Pneumonia    • Seasonal allergies    • Wears glasses      He has moderate emphysema he says, and follows with Dr. Frias.    Past Surgical History  Past Surgical History:   Procedure Laterality Date   • BRAIN SURGERY  1999    REPORTS FOR SUBDURAL HEMATOMA    • BRONCHOSCOPY N/A 4/16/2018    Procedure: BRONCHOSCOPY DIAGNOSTIC WITH WASHINGS, BRUSHINGS AND BIOPSIES;  Surgeon: Morgan Frias MD;  Location: [facility] OR;  Service: Pulmonary   • BRONCHOSCOPY     • ALEX HOLE FOR SUBDURAL HEMATOMA     • CARDIAC CATHETERIZATION      REPORTS APPROXIMATELY 2015 AND REPORTS NO STENTS WERE PLACED   • CHOLECYSTECTOMY     • COLONOSCOPY     • COLONOSCOPY N/A 8/2/2018    Procedure: COLONOSCOPY WITH HOT FORCEP POLYPECTOMY X2  ;  Surgeon: Sara Reynoso MD;  Location: [facility] ENDOSCOPY;  Service: Gastroenterology   • ENDOSCOPY     • LIVER BIOPSY     • LYMPH NODE BIOPSY     • MOLE REMOVAL  2005    MELANOMA ON BACK   • SPLENECTOMY     • THORACOTOMY  07/28/2015    Left thoracotomy and stapling of pulmonary blebs. Mechanical pleurodesis.   • TONSILLECTOMY     • TRANSURETHRAL RESECTION OF BLADDER TUMOR N/A 4/13/2021    Procedure: TRANSURETHRAL RESECTION OF BLADDER TUMOR;  Surgeon: Osmani Francois MD;  Location: [facility] OR;  Service: Urology;  Laterality: N/A;   • WISDOM TOOTH EXTRACTION         Medications    Current Outpatient Medications:   •  albuterol sulfate HFA (VENTOLIN HFA) 108 (90 Base) MCG/ACT inhaler, Inhale 2 puffs Every 4 (Four) Hours As Needed for Wheezing., Disp: 18 g, Rfl: 2  •  ALPRAZolam (XANAX) 0.5 MG tablet, Every 12 (Twelve) Hours., Disp: , Rfl:   •  aspirin 81 MG EC tablet, Take 81 mg by mouth Daily., Disp: , Rfl:   •  atorvastatin (LIPITOR) 40 MG tablet, TAKE ONE TABLET BY MOUTH AT BEDTIME, Disp: 30 tablet, Rfl: 11  •  baclofen (LIORESAL) 10 MG tablet, TAKE ONE TABLET BY MOUTH TWICE DAILY, Disp: 90 tablet, Rfl: 3  •  esomeprazole (nexIUM) 40 MG capsule, TAKE ONE CAPSULE BY MOUTH  "EVERY DAY, Disp: 30 capsule, Rfl: 11  •  fluticasone-salmeterol (Advair Diskus) 250-50 MCG/DOSE DISKUS, Inhale 1 puff 2 (Two) Times a Day., Disp: 1 each, Rfl: 3  •  isosorbide mononitrate (IMDUR) 30 MG 24 hr tablet, TAKE ONE TABLET BY MOUTH EVERY DAY AS DIRECTED, Disp: 90 tablet, Rfl: 3  •  ketoconazole (NIZORAL) 2 % shampoo, Shampoo with a small amount to skin once a day, Disp: , Rfl:   •  losartan (Cozaar) 50 MG tablet, Take 1 tablet by mouth Daily., Disp: 90 tablet, Rfl: 3  •  meclizine (ANTIVERT) 25 MG tablet, TAKE ONE TABLET BY MOUTH TWICE DAILY, Disp: 180 tablet, Rfl: 3  •  metoprolol tartrate (LOPRESSOR) 25 MG tablet, Take 1 tablet by mouth 2 (Two) Times a Day., Disp: 180 tablet, Rfl: 3  •  Omega-3 Fatty Acids (FISH OIL) 1000 MG capsule capsule, Take 1,000 mg by mouth Daily With Breakfast., Disp: , Rfl:   •  oxybutynin XL (Ditropan XL) 10 MG 24 hr tablet, Take 1 tablet by mouth Daily As Needed for bladder spasm, Disp: 10 tablet, Rfl: 0  •  sertraline (ZOLOFT) 25 MG tablet, Take 1 tablet by mouth Daily., Disp: 90 tablet, Rfl: 3  •  tiotropium (Spiriva HandiHaler) 18 MCG per inhalation capsule, Place 1 capsule into inhaler and inhale Daily., Disp: 90 capsule, Rfl: 3  •  traZODone (DESYREL) 50 MG tablet, TAKE 1/2 TO 1 TABLET BY MOUTH AT BEDTIME AS NEEDED, Disp: 90 tablet, Rfl: 3  •  triamcinolone (KENALOG) 0.025 % ointment, APPLY TO THE AFFECTED AREA(S) TWICE DAILY, Disp: 80 g, Rfl: 1  •  nitroglycerin (NITROSTAT) 0.4 MG SL tablet, Place 0.4 mg under the tongue Every 5 (Five) Minutes As Needed for Chest Pain., Disp: , Rfl:     Allergies  Allergies   Allergen Reactions   • Parabens Rash   • Other Rash     REPORTS ALLERGY TO \"PARABENS\" - REPORTS THEY CAUSE HIM TO BREAK OUT (REPORTS THIS IS A PRESERVATIVE THAT IS PUT IN CREAMS)       Social History  Social History     Socioeconomic History   • Marital status:      Spouse name: Not on file   • Number of children: Not on file   • Years of education: Not on " "file   • Highest education level: Not on file   Tobacco Use   • Smoking status: Former Smoker     Packs/day: 3.00     Years: 42.00     Pack years: 126.00     Types: Cigarettes     Start date: 1957     Quit date:      Years since quittin.1   • Smokeless tobacco: Never Used   Substance and Sexual Activity   • Alcohol use: Not Currently   • Drug use: No   • Sexual activity: Never       Family History  He has no family history of prostate, bladder or kidney cancer    Review of Systems  Constitutional: No fevers or chills  Skin: Negative for rash  Endocrine: No heat/cold intolerance   Cardiovascular: Negative for chest pain or dyspnea on exertion  Respiratory: Negative for shortness of breath or wheezing  Gastrointestinal: No constipation, nausea or vomiting  Genitourinary: Negative for current symptoms or dysuria.  Musculoskeletal: No flank pain  Neurological:  Negative for frequent headaches or dizziness; + [place]  Lymph/Heme: Negative for leg swelling or calf pain.    Physical Exam  Visit Vitals  /66   Pulse 51   Temp 97.6 °F (36.4 °C) (Temporal)   Resp 14   Ht 175.3 cm (69\")   Wt 84.4 kg (186 lb)   SpO2 94%   BMI 27.47 kg/m²     Constitutional: NAD, WDWN.   HEENT: NCAT. Conjunctivae normal.  MMM.    Cardiovascular: Regular rate.  Pulmonary/Chest: Respirations are even and non-labored bilaterally. + hoarseness  Abdominal: Soft. No distension, tenderness, masses or guarding. No CVA tenderness.  Neurological: A + O x 3.  Cranial Nerves II-XII grossly intact.  Extremities: WARREN x 4, Warm. No clubbing.  No cyanosis.    Skin: Pink, warm and dry.  No rashes noted.  Psychiatric:  Normal mood and affect    Labs    Chemistry        Component Value Date/Time     2021 0902    K 4.5 2021 0902     2021 0902    CO2 28.5 2021 0902    BUN 17 2021 0902    CREATININE 0.95 2021 0902    CREATININE 0.90 01/15/2020 0753     (H) 10/14/2020 0808        Component Value " Date/Time    CALCIUM 9.6 03/23/2021 0902    ALKPHOS 160 (H) 10/14/2020 0808    ALKPHOS 158 (H) 06/26/2019 0851    AST 22 10/14/2020 0808    AST 28 06/26/2019 0851    ALT 23 10/14/2020 0808    ALT 33 06/26/2019 0851    BILITOT 0.4 10/14/2020 0808    BILITOT 0.5 06/26/2019 0851        Brief Urine Lab Results  (Last result in the past 365 days)      Color   Clarity   Blood   Leuk Est   Nitrite   Protein   CREAT   Urine HCG        04/20/21 0813             200             Radiologic Studies  PROCEDURE: US RENAL BILATERAL-  HISTORY: renal cyst; N28.1-Cyst of kidney, acquired  PROCEDURE: Ultrasound images of the kidneys were obtained.  FINDINGS:.    The kidneys are normal in size and echogenicity with the right kidney  measuring 10.3 cm and the left kidney measuring 11.5 cm. There is no  cystic or solid mass. There is no hydronephrosis.  IMPRESSION:  Normal renal ultrasound with no evidence of a cystic or  solid mass.   This report was finalized on 6/19/2018    The labs and images were reviewed by me personally.       Past cystoscopic findings  Cystoscopic findings included one right and left ureteral orifice in the normal anatomic position with normal bladder mucosa and no tumors, masses or stones. The urethral urothelium was within normal limits with no strictures.  There was not a prominent median lobe.  The lateral lobes were not obstructive in appearance.        PVR performed by my staff in the past- 0    Ct Abdomen Pelvis With & Without Contrast  Result Date: 1/15/2020  No evidence of a cystic/solid renal mass or hydronephrosis.  3390.94 mGy.cm   This study was performed with techniques to keep radiation doses as low as reasonably achievable (ALARA). Individualized dose reduction techniques using automated exposure control or adjustment of mA and/or kV according to the patient size were employed.  This report was finalized on 1/15/2020 10:49 AM by Aroldo Garcia M.D..    Assessment  80 y.o. male who presented  with gross hematuria and UTI.   He has no clear genitourinary pathology identified on renal ultrasound imaging and cystoscopic evaluation.  His prostate is not very enlarged visually and he is emptying his bladder well.  His CT urogram was negative for stones or masses.    He presents with continued gross hematuria and worsening LUTS, specifically urgency and frequency. He now presents for pathology follow-up after TRBT on 04/13/2021. Unfortunately, the pathology demonstrates muscle invasive high grade bladder cancer. We had a discussion and I spent over 30 minutes in his care calling and reviewing the pathology with multiple pathologists and time spent counseling him on the nature of this very significant diagnosis.     Plan  1.  CT scan urogram and CT of the chest for staging.  2. Urgent referral to Dr. Powell at [facility] to discuss cystectomy versus chemotherapy and radiation with maximal resection, though he did have a complete resection with me recently.   3. Follow-up in 6 months      Scribed for Osmani Francois MD by Milla Wu.  04/22/21   13:29 EDT    I have personally performed the services described in this document as scribed by the above individual, and it is both accurate and complete.  Osmani Francois MD  4/26/2021  22:00 EDT    I spent a total of 30 minutes with the patient and the chart engaging in data gathering and interpretation, patient interaction, as well as counseling on the risks, benefits, and alternatives of the therapy and coordinating care.       Osmani Francois MD

## 2021-04-27 RX ORDER — ATORVASTATIN CALCIUM 40 MG/1
TABLET, FILM COATED ORAL
Qty: 90 TABLET | Refills: 3 | Status: SHIPPED | OUTPATIENT
Start: 2021-04-27 | End: 2022-05-09

## 2021-04-29 ENCOUNTER — HOSPITAL ENCOUNTER (OUTPATIENT)
Dept: CT IMAGING | Facility: HOSPITAL | Age: 81
Discharge: HOME | End: 2021-04-29

## 2021-04-29 DIAGNOSIS — C68.9 UROTHELIAL CARCINOMA (HCC): ICD-10-CM

## 2021-04-29 PROCEDURE — 74178 CT ABD&PLV WO CNTR FLWD CNTR: CPT

## 2021-04-29 PROCEDURE — 25010000002 IOPAMIDOL 61 % SOLUTION: Performed by: UROLOGY

## 2021-04-29 PROCEDURE — 71260 CT THORAX DX C+: CPT

## 2021-04-29 RX ADMIN — IOPAMIDOL 100 ML: 612 INJECTION, SOLUTION INTRAVENOUS at 07:25

## 2021-06-01 RX ORDER — ESOMEPRAZOLE MAGNESIUM 40 MG/1
CAPSULE, DELAYED RELEASE ORAL
Qty: 30 CAPSULE | Refills: 11 | Status: SHIPPED | OUTPATIENT
Start: 2021-06-01 | End: 2022-05-31

## 2021-06-05 RX ORDER — TRIAMCINOLONE ACETONIDE 0.25 MG/G
OINTMENT TOPICAL
Qty: 80 G | Refills: 1 | Status: SHIPPED | OUTPATIENT
Start: 2021-06-05 | End: 2022-07-27 | Stop reason: SDUPTHER

## 2021-06-11 ENCOUNTER — LAB (OUTPATIENT)
Dept: LAB | Facility: HOSPITAL | Age: 81
End: 2021-06-11

## 2021-06-11 ENCOUNTER — TRANSCRIBE ORDERS (OUTPATIENT)
Dept: LAB | Facility: HOSPITAL | Age: 81
End: 2021-06-11

## 2021-06-11 DIAGNOSIS — Z01.818 PRE-OPERATIVE CLEARANCE: ICD-10-CM

## 2021-06-11 DIAGNOSIS — Z01.818 PRE-OPERATIVE CLEARANCE: Primary | ICD-10-CM

## 2021-06-11 LAB — SARS-COV-2 RNA NOSE QL NAA+PROBE: NOT DETECTED

## 2021-06-11 PROCEDURE — U0004 COV-19 TEST NON-CDC HGH THRU: HCPCS

## 2021-06-13 DIAGNOSIS — J43.8 OTHER EMPHYSEMA (HCC): ICD-10-CM

## 2021-07-19 DIAGNOSIS — I10 ESSENTIAL HYPERTENSION: ICD-10-CM

## 2021-07-19 RX ORDER — LOSARTAN POTASSIUM 50 MG/1
TABLET ORAL
Qty: 90 TABLET | Refills: 3 | Status: SHIPPED | OUTPATIENT
Start: 2021-07-19 | End: 2022-08-08

## 2021-10-08 ENCOUNTER — LAB (OUTPATIENT)
Dept: LAB | Facility: HOSPITAL | Age: 81
End: 2021-10-08

## 2021-10-08 ENCOUNTER — TRANSCRIBE ORDERS (OUTPATIENT)
Dept: LAB | Facility: HOSPITAL | Age: 81
End: 2021-10-08

## 2021-10-08 DIAGNOSIS — Z11.59 SCREENING EXAMINATION FOR OTHER ARTHROPOD-BORNE VIRAL DISEASES: Primary | ICD-10-CM

## 2021-10-08 DIAGNOSIS — Z11.59 SCREENING EXAMINATION FOR OTHER ARTHROPOD-BORNE VIRAL DISEASES: ICD-10-CM

## 2021-10-08 LAB — SARS-COV-2 RNA PNL SPEC NAA+PROBE: NOT DETECTED

## 2021-10-08 PROCEDURE — C9803 HOPD COVID-19 SPEC COLLECT: HCPCS

## 2021-10-08 PROCEDURE — U0004 COV-19 TEST NON-CDC HGH THRU: HCPCS

## 2021-10-25 RX ORDER — ALBUTEROL SULFATE 90 UG/1
2 AEROSOL, METERED RESPIRATORY (INHALATION) EVERY 4 HOURS PRN
Qty: 18 G | Refills: 2 | Status: SHIPPED | OUTPATIENT
Start: 2021-10-25 | End: 2022-10-20

## 2021-10-25 NOTE — TELEPHONE ENCOUNTER
Rx Refill Note  Requested Prescriptions     Pending Prescriptions Disp Refills   • albuterol sulfate HFA (Ventolin HFA) 108 (90 Base) MCG/ACT inhaler 18 g 2     Sig: Inhale 2 puffs Every 4 (Four) Hours As Needed for Wheezing.      Last office visit with prescribing clinician: 4/20/2021      Next office visit with prescribing clinician: Visit date not found            Teresa Bates MA  10/25/21, 15:26 EDT

## 2021-11-28 DIAGNOSIS — J43.8 OTHER EMPHYSEMA (HCC): ICD-10-CM

## 2021-11-29 NOTE — TELEPHONE ENCOUNTER
Rx Refill Note  Requested Prescriptions     Pending Prescriptions Disp Refills   • fluticasone-salmeterol (ADVAIR) 250-50 MCG/DOSE DISKUS [Pharmacy Med Name: fluticasone 250 mcg-salmeterol 50 mcg/dose blistr powdr for inhalation] 60 each 3     Sig: Inhale 1 puff by mouth twice a day.      Last office visit with prescribing clinician: 4/20/2021      Next office visit with prescribing clinician: 1/6/2022            Teresa Bates MA  11/29/21, 09:48 EST

## 2021-12-14 NOTE — PROGRESS NOTES
DATE OF VISIT: 12/15/2021    REASON FOR VISIT: Followup for iron def anemia     HISTORY OF PRESENT ILLNESS: The patient is a very pleasant 81 y.o. male with past medical history significant for Iron def anemia with thrombocythemia. The patient is here today for scheduled follow up visit.    SUBJECTIVE: The patient is here today by himself.  He is recovering from recent surgery for bladder cancer.  He has lost some weight since then.  He denies any bleeding.    REVIEW OF SYSTEMS: All the other systems are reviewed by me and negative  except what is mentioned in HPI and subjective.     PAST MEDICAL HISTORY/SOCIAL HISTORY/FAMILY HISTORY: Unchanged from my prior documentation.      Current Outpatient Medications:   •  acetaminophen (TYLENOL) 500 MG tablet, Take  by mouth Every 6 (Six) Hours As Needed., Disp: , Rfl:   •  albuterol sulfate HFA (Ventolin HFA) 108 (90 Base) MCG/ACT inhaler, Inhale 2 puffs Every 4 (Four) Hours As Needed for Wheezing., Disp: 18 g, Rfl: 2  •  ALPRAZolam (XANAX) 0.5 MG tablet, Every 12 (Twelve) Hours., Disp: , Rfl:   •  aspirin 81 MG EC tablet, Take 81 mg by mouth Daily., Disp: , Rfl:   •  atorvastatin (LIPITOR) 40 MG tablet, TAKE ONE TABLET BY MOUTH AT BEDTIME, Disp: 90 tablet, Rfl: 3  •  baclofen (LIORESAL) 10 MG tablet, TAKE ONE TABLET BY MOUTH TWICE DAILY, Disp: 90 tablet, Rfl: 3  •  esomeprazole (nexIUM) 40 MG capsule, TAKE ONE CAPSULE BY MOUTH EVERY DAY, Disp: 30 capsule, Rfl: 11  •  fluocinonide (LIDEX) 0.05 % external solution, APPLY TOPICALLY AS DIRECTED AT BEDTIME TO affected area, Disp: , Rfl:   •  fluticasone-salmeterol (ADVAIR) 250-50 MCG/DOSE DISKUS, Inhale 1 puff by mouth twice a day., Disp: 60 each, Rfl: 3  •  isosorbide mononitrate (IMDUR) 30 MG 24 hr tablet, TAKE ONE TABLET BY MOUTH EVERY DAY AS DIRECTED, Disp: 90 tablet, Rfl: 3  •  ketoconazole (NIZORAL) 2 % shampoo, Shampoo with a small amount to skin once a day, Disp: , Rfl:   •  losartan (COZAAR) 50 MG tablet, TAKE ONE  "TABLET BY MOUTH EVERY DAY, Disp: 90 tablet, Rfl: 3  •  meclizine (ANTIVERT) 25 MG tablet, TAKE ONE TABLET BY MOUTH TWICE DAILY, Disp: 180 tablet, Rfl: 3  •  metoprolol tartrate (LOPRESSOR) 25 MG tablet, TAKE ONE TABLET BY MOUTH TWICE DAILY, Disp: 180 tablet, Rfl: 3  •  nitroglycerin (NITROSTAT) 0.4 MG SL tablet, Place 0.4 mg under the tongue Every 5 (Five) Minutes As Needed for Chest Pain., Disp: , Rfl:   •  Omega-3 Fatty Acids (FISH OIL) 1000 MG capsule capsule, Take 1,000 mg by mouth Daily With Breakfast., Disp: , Rfl:   •  sertraline (ZOLOFT) 25 MG tablet, Take 1 tablet by mouth Daily., Disp: 90 tablet, Rfl: 3  •  tiotropium (Spiriva HandiHaler) 18 MCG per inhalation capsule, Place 1 capsule into inhaler and inhale Daily., Disp: 90 capsule, Rfl: 3  •  traZODone (DESYREL) 50 MG tablet, TAKE 1/2 TO 1 TABLET BY MOUTH AT BEDTIME AS NEEDED, Disp: 90 tablet, Rfl: 3  •  triamcinolone (KENALOG) 0.025 % ointment, APPLY TO THE AFFECTED AREA(S) TWICE DAILY, Disp: 80 g, Rfl: 1    PHYSICAL EXAMINATION:   /56   Pulse 57   Temp 97.6 °F (36.4 °C) (Temporal)   Resp 16   Ht 175.3 cm (69\")   Wt 77.1 kg (170 lb)   SpO2 94%   BMI 25.10 kg/m²   ECOG1  GENERAL: Age appropriate. No acute distress.   HEENT: Head atraumatic, normocephalic.   NECK: Supple. No JVD. No lymphadenopathy.   LUNGS: Clear to auscultation bilaterally. No wheezing. No rhonchi.   HEART: Regular rate and rhythm. S1, S2, no murmurs.   ABDOMEN: Soft, nontender, nondistended. Bowel sounds positive. No  hepatosplenomegaly.   EXTREMITIES: No clubbing, cyanosis, or edema.   SKIN: No rashes. No purpura.   NEUROLOGIC: Awake and oriented x3. Strength 5 out of 5 in all muscle groups.     No visits with results within 2 Week(s) from this visit.   Latest known visit with results is:   Lab on 10/08/2021   Component Date Value Ref Range Status   • COVID19 10/08/2021 Not Detected  Not Detected - Ref. Range Final        No results found.    ASSESSMENT: The patient is a " very pleasant 81 y.o. male  with Iron deficiency anemia    PROBLEM LIST:  1. 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 Thrombocythemia  3. Leukocytosis  4. S/P splenectomy   5. HTN  6.  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  Status post radical cystectomy done by Dr. Osmani Powell at  June 2021    PLAN:  1.  I did go over the blood work results with the patient From 2/19/2021 his hemoglobin has dropped to 9.4.  His platelet count are stable 378 and white cells slightly elevated but stable 14.9.  2.  I will follow up on the CBC from today and call with any significant abnormalities.   3. He will continue Imdur for his blood pressure. If it remains elevated he will notify his PCP.    4.  The patient follow-up with us in 1 year with repeat CBC and iron profile.  5.  I will arrange for another round of IV iron if ferritin goes below 30 or saturation below 10%.  6.  He will continue to follow-up with Dr. Powell at  for bladder cancer surveillance.    Yocasta Harris MD,  12/15/2021

## 2021-12-15 ENCOUNTER — LAB (OUTPATIENT)
Dept: LAB | Facility: HOSPITAL | Age: 81
End: 2021-12-15

## 2021-12-15 ENCOUNTER — OFFICE VISIT (OUTPATIENT)
Dept: ONCOLOGY | Facility: CLINIC | Age: 81
End: 2021-12-15

## 2021-12-15 ENCOUNTER — TELEPHONE (OUTPATIENT)
Dept: ONCOLOGY | Facility: CLINIC | Age: 81
End: 2021-12-15

## 2021-12-15 ENCOUNTER — TELEPHONE (OUTPATIENT)
Dept: ONCOLOGY | Facility: OTHER | Age: 81
End: 2021-12-15

## 2021-12-15 VITALS
SYSTOLIC BLOOD PRESSURE: 120 MMHG | OXYGEN SATURATION: 94 % | WEIGHT: 170 LBS | HEART RATE: 57 BPM | RESPIRATION RATE: 16 BRPM | HEIGHT: 69 IN | DIASTOLIC BLOOD PRESSURE: 56 MMHG | TEMPERATURE: 97.6 F | BODY MASS INDEX: 25.18 KG/M2

## 2021-12-15 DIAGNOSIS — D50.0 IRON DEFICIENCY ANEMIA DUE TO CHRONIC BLOOD LOSS: ICD-10-CM

## 2021-12-15 DIAGNOSIS — D75.839 THROMBOCYTHEMIA: ICD-10-CM

## 2021-12-15 DIAGNOSIS — K90.9 IRON MALABSORPTION: ICD-10-CM

## 2021-12-15 DIAGNOSIS — D50.0 IRON DEFICIENCY ANEMIA DUE TO CHRONIC BLOOD LOSS: Primary | ICD-10-CM

## 2021-12-15 LAB
BASOPHILS # BLD AUTO: 0.04 10*3/MM3 (ref 0–0.2)
BASOPHILS NFR BLD AUTO: 0.3 % (ref 0–1.5)
DEPRECATED RDW RBC AUTO: 51.7 FL (ref 37–54)
EOSINOPHIL # BLD AUTO: 0.27 10*3/MM3 (ref 0–0.4)
EOSINOPHIL NFR BLD AUTO: 1.9 % (ref 0.3–6.2)
ERYTHROCYTE [DISTWIDTH] IN BLOOD BY AUTOMATED COUNT: 15.7 % (ref 12.3–15.4)
FERRITIN SERPL-MCNC: 273.6 NG/ML (ref 30–400)
HCT VFR BLD AUTO: 38.1 % (ref 37.5–51)
HGB BLD-MCNC: 11.9 G/DL (ref 13–17.7)
IMM GRANULOCYTES # BLD AUTO: 0.09 10*3/MM3 (ref 0–0.05)
IMM GRANULOCYTES NFR BLD AUTO: 0.6 % (ref 0–0.5)
IRON 24H UR-MRATE: 47 MCG/DL (ref 59–158)
IRON SATN MFR SERPL: 17 % (ref 20–50)
LYMPHOCYTES # BLD AUTO: 2.91 10*3/MM3 (ref 0.7–3.1)
LYMPHOCYTES NFR BLD AUTO: 21 % (ref 19.6–45.3)
MCH RBC QN AUTO: 28.2 PG (ref 26.6–33)
MCHC RBC AUTO-ENTMCNC: 31.2 G/DL (ref 31.5–35.7)
MCV RBC AUTO: 90.3 FL (ref 79–97)
MONOCYTES # BLD AUTO: 1.7 10*3/MM3 (ref 0.1–0.9)
MONOCYTES NFR BLD AUTO: 12.3 % (ref 5–12)
NEUTROPHILS NFR BLD AUTO: 63.9 % (ref 42.7–76)
NEUTROPHILS NFR BLD AUTO: 8.84 10*3/MM3 (ref 1.7–7)
NRBC BLD AUTO-RTO: 0 /100 WBC (ref 0–0.2)
PLATELET # BLD AUTO: 514 10*3/MM3 (ref 140–450)
PMV BLD AUTO: 10.6 FL (ref 6–12)
RBC # BLD AUTO: 4.22 10*6/MM3 (ref 4.14–5.8)
TIBC SERPL-MCNC: 271 MCG/DL (ref 298–536)
TRANSFERRIN SERPL-MCNC: 182 MG/DL (ref 200–360)
WBC NRBC COR # BLD: 13.85 10*3/MM3 (ref 3.4–10.8)

## 2021-12-15 PROCEDURE — 83540 ASSAY OF IRON: CPT

## 2021-12-15 PROCEDURE — 84466 ASSAY OF TRANSFERRIN: CPT

## 2021-12-15 PROCEDURE — 82728 ASSAY OF FERRITIN: CPT

## 2021-12-15 PROCEDURE — 99214 OFFICE O/P EST MOD 30 MIN: CPT | Performed by: INTERNAL MEDICINE

## 2021-12-15 PROCEDURE — 85025 COMPLETE CBC W/AUTO DIFF WBC: CPT

## 2021-12-15 PROCEDURE — 36415 COLL VENOUS BLD VENIPUNCTURE: CPT

## 2021-12-15 RX ORDER — ACETAMINOPHEN 500 MG
TABLET ORAL EVERY 6 HOURS PRN
COMMUNITY

## 2021-12-15 RX ORDER — FLUOCINONIDE TOPICAL SOLUTION USP, 0.05% 0.5 MG/ML
SOLUTION TOPICAL
COMMUNITY
Start: 2021-12-01

## 2021-12-15 NOTE — TELEPHONE ENCOUNTER
Called and let patient know that Dr. Harris advised he does not need any iv iron at this time and to keep f/u in 1 year.

## 2021-12-15 NOTE — TELEPHONE ENCOUNTER
GALINA WITH  [facility] [employer] CALLED. SHE SAID PT WAS THERE TO HAVE LABS DRAWN BUT THERE WERE NO ORDERS. CALLED CLINICAL LINE AND SPOKE WITH ZIGGY. HE IS GOING TO GET THE ORDERS ADDED TO THE PTS CHART. INFORMED GALINA THE ORDERS WOULD BE IN THE CHART IN A FEW MINS.

## 2021-12-28 RX ORDER — ISOSORBIDE MONONITRATE 30 MG/1
TABLET, EXTENDED RELEASE ORAL
Qty: 90 TABLET | Refills: 3 | Status: SHIPPED | OUTPATIENT
Start: 2021-12-28 | End: 2022-12-27

## 2022-01-06 ENCOUNTER — OFFICE VISIT (OUTPATIENT)
Dept: INTERNAL MEDICINE | Facility: CLINIC | Age: 82
End: 2022-01-06

## 2022-01-06 VITALS
HEIGHT: 69 IN | DIASTOLIC BLOOD PRESSURE: 78 MMHG | WEIGHT: 175 LBS | BODY MASS INDEX: 25.92 KG/M2 | HEART RATE: 56 BPM | SYSTOLIC BLOOD PRESSURE: 120 MMHG | OXYGEN SATURATION: 95 % | TEMPERATURE: 97.3 F

## 2022-01-06 DIAGNOSIS — E11.40 TYPE 2 DIABETES MELLITUS WITH DIABETIC NEUROPATHY, WITHOUT LONG-TERM CURRENT USE OF INSULIN: ICD-10-CM

## 2022-01-06 DIAGNOSIS — I71.40 ABDOMINAL ANEURYSM: ICD-10-CM

## 2022-01-06 DIAGNOSIS — I10 ESSENTIAL HYPERTENSION: ICD-10-CM

## 2022-01-06 DIAGNOSIS — D50.0 IRON DEFICIENCY ANEMIA DUE TO CHRONIC BLOOD LOSS: ICD-10-CM

## 2022-01-06 DIAGNOSIS — K21.9 GASTROESOPHAGEAL REFLUX DISEASE WITHOUT ESOPHAGITIS: ICD-10-CM

## 2022-01-06 DIAGNOSIS — K76.0 STEATOSIS OF LIVER: ICD-10-CM

## 2022-01-06 DIAGNOSIS — G47.09 OTHER INSOMNIA: ICD-10-CM

## 2022-01-06 DIAGNOSIS — F41.9 ANXIETY: ICD-10-CM

## 2022-01-06 DIAGNOSIS — E55.9 VITAMIN D DEFICIENCY, UNSPECIFIED: ICD-10-CM

## 2022-01-06 DIAGNOSIS — D75.839 THROMBOCYTHEMIA: ICD-10-CM

## 2022-01-06 DIAGNOSIS — E78.5 HYPERLIPIDEMIA, UNSPECIFIED HYPERLIPIDEMIA TYPE: Primary | ICD-10-CM

## 2022-01-06 DIAGNOSIS — Z00.00 WELLNESS EXAMINATION: ICD-10-CM

## 2022-01-06 DIAGNOSIS — K90.9 IRON MALABSORPTION: ICD-10-CM

## 2022-01-06 DIAGNOSIS — J43.8 OTHER EMPHYSEMA: ICD-10-CM

## 2022-01-06 DIAGNOSIS — I25.10 CORONARY ARTERIOSCLEROSIS IN NATIVE ARTERY: ICD-10-CM

## 2022-01-06 PROBLEM — B35.1 ONYCHOMYCOSIS: Status: RESOLVED | Noted: 2019-05-21 | Resolved: 2022-01-06

## 2022-01-06 PROBLEM — G47.30 SLEEP APNEA: Status: RESOLVED | Noted: 2018-12-06 | Resolved: 2022-01-06

## 2022-01-06 PROCEDURE — G0439 PPPS, SUBSEQ VISIT: HCPCS | Performed by: INTERNAL MEDICINE

## 2022-01-06 PROCEDURE — 99397 PER PM REEVAL EST PAT 65+ YR: CPT | Performed by: INTERNAL MEDICINE

## 2022-01-06 PROCEDURE — 1170F FXNL STATUS ASSESSED: CPT | Performed by: INTERNAL MEDICINE

## 2022-01-06 PROCEDURE — 1159F MED LIST DOCD IN RCRD: CPT | Performed by: INTERNAL MEDICINE

## 2022-01-06 NOTE — PROGRESS NOTES
The ABCs of the Annual Wellness Visit  Subsequent Medicare Wellness Visit    Chief Complaint   Patient presents with   • Medicare Wellness-subsequent      Subjective    History of Present Illness:  Jairo Mercedes is a 81 y.o. male who presents for a Subsequent Medicare Wellness Visit.    The following portions of the patient's history were reviewed and   updated as appropriate: allergies, current medications, past family history, past medical history, past social history, past surgical history and problem list.    Compared to one year ago, the patient feels his physical   health is the same.    Compared to one year ago, the patient feels his mental   health is the same.    Recent Hospitalizations:  He was not admitted to the hospital during the last year.       Current Medical Providers:  Patient Care Team:  Maicol Cardenas MD as PCP - General (Internal Medicine)  Sara Reynoso MD as Consulting Physician (General Surgery)    Outpatient Medications Prior to Visit   Medication Sig Dispense Refill   • acetaminophen (TYLENOL) 500 MG tablet Take  by mouth Every 6 (Six) Hours As Needed.     • albuterol sulfate HFA (Ventolin HFA) 108 (90 Base) MCG/ACT inhaler Inhale 2 puffs Every 4 (Four) Hours As Needed for Wheezing. 18 g 2   • ALPRAZolam (XANAX) 0.5 MG tablet Every 12 (Twelve) Hours.     • aspirin 81 MG EC tablet Take 81 mg by mouth Daily.     • atorvastatin (LIPITOR) 40 MG tablet TAKE ONE TABLET BY MOUTH AT BEDTIME 90 tablet 3   • baclofen (LIORESAL) 10 MG tablet TAKE ONE TABLET BY MOUTH TWICE DAILY 90 tablet 3   • esomeprazole (nexIUM) 40 MG capsule TAKE ONE CAPSULE BY MOUTH EVERY DAY 30 capsule 11   • fluocinonide (LIDEX) 0.05 % external solution APPLY TOPICALLY AS DIRECTED AT BEDTIME TO affected area     • fluticasone-salmeterol (ADVAIR) 250-50 MCG/DOSE DISKUS Inhale 1 puff by mouth twice a day. 60 each 3   • isosorbide mononitrate (IMDUR) 30 MG 24 hr tablet TAKE ONE TABLET BY MOUTH EVERY DAY AS DIRECTED 90 tablet  3   • ketoconazole (NIZORAL) 2 % shampoo Shampoo with a small amount to skin once a day     • losartan (COZAAR) 50 MG tablet TAKE ONE TABLET BY MOUTH EVERY DAY 90 tablet 3   • meclizine (ANTIVERT) 25 MG tablet TAKE ONE TABLET BY MOUTH TWICE DAILY 180 tablet 3   • metoprolol tartrate (LOPRESSOR) 25 MG tablet TAKE ONE TABLET BY MOUTH TWICE DAILY 180 tablet 3   • nitroglycerin (NITROSTAT) 0.4 MG SL tablet Place 0.4 mg under the tongue Every 5 (Five) Minutes As Needed for Chest Pain.     • Omega-3 Fatty Acids (FISH OIL) 1000 MG capsule capsule Take 1,000 mg by mouth Daily With Breakfast.     • sertraline (ZOLOFT) 25 MG tablet Take 1 tablet by mouth Daily. 90 tablet 3   • tiotropium (Spiriva HandiHaler) 18 MCG per inhalation capsule Place 1 capsule into inhaler and inhale Daily. 90 capsule 3   • traZODone (DESYREL) 50 MG tablet TAKE 1/2 TO 1 TABLET BY MOUTH AT BEDTIME AS NEEDED 90 tablet 3   • triamcinolone (KENALOG) 0.025 % ointment APPLY TO THE AFFECTED AREA(S) TWICE DAILY 80 g 1     No facility-administered medications prior to visit.       No opioid medication identified on active medication list. I have reviewed chart for other potential  high risk medication/s and harmful drug interactions in the elderly.          Aspirin is on active medication list. Aspirin use is indicated based on review of current medical condition/s. Pros and cons of this therapy have been discussed today. Benefits of this medication outweigh potential harm.  Patient has been encouraged to continue taking this medication.  .      Patient Active Problem List   Diagnosis   • Anxiety   • Arthritis   • Coronary arteriosclerosis in native artery   • Essential hypertension   • Chronic obstructive pulmonary disease (HCC)   • Type 2 diabetes mellitus with diabetic neuropathy, without long-term current use of insulin (HCC)   • Steatosis of liver   • Gastroesophageal reflux disease without esophagitis   • Hyperlipidemia   • Insomnia   • Iron deficiency  "  • Thrombocythemia   • Iron deficiency anemia   • Sleep apnea   • Onychomycosis   • Iron malabsorption   • Mixed conductive and sensorineural hearing loss   • Bladder tumor     Advance Care Planning  Advance Directive is on file.  ACP discussion was held with the patient during this visit. Patient has an advance directive in EMR which is still valid.     Review of Systems   All other systems reviewed and are negative.       Objective    Vitals:    01/06/22 1011   BP: 120/78   Pulse: 56   Temp: 97.3 °F (36.3 °C)   SpO2: 95%   Weight: 79.4 kg (175 lb)   Height: 175.3 cm (69\")   PainSc: 0-No pain     BMI Readings from Last 1 Encounters:   01/06/22 25.84 kg/m²   BMI is above normal parameters. Recommendations include: exercise counseling    Does the patient have evidence of cognitive impairment? No    Physical Exam  Vitals and nursing note reviewed.   Constitutional:       Appearance: Normal appearance. He is well-developed.   HENT:      Head: Normocephalic and atraumatic.      Right Ear: Tympanic membrane, ear canal and external ear normal.      Left Ear: Tympanic membrane, ear canal and external ear normal.      Nose: Nose normal.   Eyes:      Conjunctiva/sclera: Conjunctivae normal.      Pupils: Pupils are equal, round, and reactive to light.   Neck:      Thyroid: No thyromegaly.   Cardiovascular:      Rate and Rhythm: Normal rate and regular rhythm.      Heart sounds: Normal heart sounds.   Pulmonary:      Effort: Pulmonary effort is normal.      Breath sounds: Normal breath sounds.   Abdominal:      General: Bowel sounds are normal.      Palpations: Abdomen is soft.   Musculoskeletal:         General: Normal range of motion.      Cervical back: Normal range of motion and neck supple.   Skin:     General: Skin is warm and dry.   Neurological:      General: No focal deficit present.      Mental Status: He is alert and oriented to person, place, and time. Mental status is at baseline.      Deep Tendon Reflexes: " Reflexes are normal and symmetric.   Psychiatric:         Mood and Affect: Mood normal.         Behavior: Behavior normal.         Thought Content: Thought content normal.         Judgment: Judgment normal.                 HEALTH RISK ASSESSMENT    Smoking Status:  Social History     Tobacco Use   Smoking Status Former Smoker   • Packs/day: 3.00   • Years: 42.00   • Pack years: 126.00   • Types: Cigarettes   • Start date: 1957   • Quit date:    • Years since quittin.8   Smokeless Tobacco Never Used     Alcohol Consumption:  Social History     Substance and Sexual Activity   Alcohol Use Not Currently     Fall Risk Screen:    STEADI Fall Risk Assessment was completed, and patient is at LOW risk for falls.Assessment completed on:2022    Depression Screening:  PHQ-2/PHQ-9 Depression Screening 2022   Little interest or pleasure in doing things 0   Feeling down, depressed, or hopeless 0   Trouble falling or staying asleep, or sleeping too much -   Feeling tired or having little energy -   Poor appetite or overeating -   Feeling bad about yourself - or that you are a failure or have let yourself or your family down -   Trouble concentrating on things, such as reading the newspaper or watching television -   Moving or speaking so slowly that other people could have noticed. Or the opposite - being so fidgety or restless that you have been moving around a lot more than usual -   Thoughts that you would be better off dead, or of hurting yourself in some way -   Total Score 0   If you checked off any problems, how difficult have these problems made it for you to do your work, take care of things at home, or get along with other people? -       Health Habits and Functional and Cognitive Screening:  Functional & Cognitive Status 2022   Do you have difficulty preparing food and eating? No   Do you have difficulty bathing yourself, getting dressed or grooming yourself? No   Do you have difficulty using the  toilet? No   Do you have difficulty moving around from place to place? No   Do you have trouble with steps or getting out of a bed or a chair? No   Current Diet Well Balanced Diet   Dental Exam Up to date        Dental Exam Comment 06/2021   Eye Exam Up to date        Eye Exam Comment 09/2021   Exercise (times per week) 0 times per week   Current Exercises Include No Regular Exercise   Current Exercise Activities Include -   Do you need help using the phone?  No   Are you deaf or do you have serious difficulty hearing?  Yes   Do you need help with transportation? No   Do you need help shopping? No   Do you need help preparing meals?  No   Do you need help with housework?  No   Do you need help with laundry? No   Do you need help taking your medications? No   Do you need help managing money? No   Do you ever drive or ride in a car without wearing a seat belt? No   Have you felt unusual stress, anger or loneliness in the last month? No   Who do you live with? Spouse   If you need help, do you have trouble finding someone available to you? No   Have you been bothered in the last four weeks by sexual problems? No   Do you have difficulty concentrating, remembering or making decisions? No       Age-appropriate Screening Schedule:  Refer to the list below for future screening recommendations based on patient's age, sex and/or medical conditions. Orders for these recommended tests are listed in the plan section. The patient has been provided with a written plan.    Health Maintenance   Topic Date Due   • DIABETIC EYE EXAM  02/11/2021   • LIPID PANEL  10/14/2021   • HEMOGLOBIN A1C  10/20/2021   • URINE MICROALBUMIN  04/20/2022   • TDAP/TD VACCINES (2 - Td or Tdap) 11/13/2023   • INFLUENZA VACCINE  Completed   • ZOSTER VACCINE  Completed              Assessment/Plan   CMS Preventative Services Quick Reference  Risk Factors Identified During Encounter  Inactivity/Sedentary  The above risks/problems have been discussed with  the patient.  Follow up actions/plans if indicated are seen below in the Assessment/Plan Section.  Pertinent information has been shared with the patient in the After Visit Summary.    Diagnoses and all orders for this visit:    1. Hyperlipidemia, unspecified hyperlipidemia type (Primary)    2. Essential hypertension    3. Coronary arteriosclerosis in native artery    4. Type 2 diabetes mellitus with diabetic neuropathy, without long-term current use of insulin (HCC)    5. Steatosis of liver    6. Gastroesophageal reflux disease without esophagitis    7. Iron malabsorption    8. Thrombocythemia    9. Iron deficiency anemia due to chronic blood loss    10. Anxiety    11. Other emphysema (HCC)    12. Other insomnia        Follow Up:   No follow-ups on file.     An After Visit Summary and PPPS were made available to the patient.                Below is to historical records for reference only:  #1: Adenopathy, intra-abdominal. biopsy, reactive changes only follow up : Hematology/oncology. history of ITP s/p splenectomy, higher WBC and plts, now Hb low , watch--  #2: copd, continue medicine night time O2  #3: fatty liver, s/p cholecystectomy. history of gall stone, follow up with GI, liver enzymes normal now after cutting Lipitor 80 mg. Asked patient to cut down Lipitor 40 mg daily, discontinue acetaminophen, weight loss.alkaline phosphatase is elevated watch for now  #4: Coronary heart disease,blockage unable to stent. continue medicine prn NTG,metoprolol 25 bid, f/u cardio  #5:  diabetic, patient refuses medicines  DM neuropathy watch for now  Leukocytosis thrombocytosis lymphocytosis monocytosis history of splenectomy for possible thrombocytopenia. continue to watch--  Vocal cord paralysis follow up with ENT  right thumb XR severe arthritis.  hemorroid continue medicine   renal cyst 3.3 cm, MRI November 2014 showed a 2 cm. Repeat normal  low iron normal  s/p iron transfusion, follow hematologist  aaa screen 3cm  repeated 12/2017 smaller now, repeat 12/2019  Lung nodule, repeat in 7/2018 showed locular effusion, by lung doctor.   Colon 8/2/18 colon polyp , repeat 5 years  Onychomycosis s/p Lamisil  resolved  Sleep apnea patient refuses cpap   Hx of bladder ca s/p surgery removed bladder and prostate and follow uro

## 2022-01-28 DIAGNOSIS — F41.9 ANXIETY: Primary | ICD-10-CM

## 2022-01-28 RX ORDER — SERTRALINE HYDROCHLORIDE 25 MG/1
25 TABLET, FILM COATED ORAL DAILY
Qty: 90 TABLET | Refills: 3 | Status: SHIPPED | OUTPATIENT
Start: 2022-01-28 | End: 2023-01-19

## 2022-01-28 NOTE — TELEPHONE ENCOUNTER
Rx Refill Note  Requested Prescriptions     Pending Prescriptions Disp Refills   • sertraline (ZOLOFT) 25 MG tablet 90 tablet 3     Sig: Take 1 tablet by mouth Daily.      Last office visit with prescribing clinician: 1/6/2022      Next office visit with prescribing clinician: 7/6/2022            JOSÉ HUBBARD MA  01/28/22, 13:50 EST

## 2022-03-24 NOTE — ANESTHESIA POSTPROCEDURE EVALUATION
Patient: Jairo Mercedes    Procedure Summary     Date:  08/02/18 Room / Location:  [facility] ENDOSCOPY 2 / [facility] ENDOSCOPY    Anesthesia Start:  0656 Anesthesia Stop:  0720    Procedure:  COLONOSCOPY WITH POLYPECTOMY X2 (N/A ) Diagnosis:       Heme positive stool      (Heme positive stool [R19.5])    Surgeon:  Sara Reynoso MD Provider:  Donald Weaver CRNA    Anesthesia Type:  MAC ASA Status:  3          Anesthesia Type: MAC  Last vitals  BP   148/62 (08/02/18 0744)   Temp   97.2 °F (36.2 °C) (08/02/18 0721)   Pulse   (!) 49 (08/02/18 0744)   Resp   16 (08/02/18 0744)     SpO2   94 % (08/02/18 0744)     Post Anesthesia Care and Evaluation    Patient location during evaluation: bedside  Patient participation: complete - patient participated  Level of consciousness: awake and sleepy but conscious  Pain management: adequate  Airway patency: patent  Anesthetic complications: No anesthetic complications  PONV Status: none  Cardiovascular status: acceptable  Respiratory status: acceptable and nasal cannula  Hydration status: acceptable      
No Decelerations

## 2022-03-28 DIAGNOSIS — J43.8 OTHER EMPHYSEMA: ICD-10-CM

## 2022-03-28 NOTE — TELEPHONE ENCOUNTER
Rx Refill Note  Requested Prescriptions     Pending Prescriptions Disp Refills   • tiotropium (Spiriva HandiHaler) 18 MCG per inhalation capsule 90 capsule 3     Sig: Place 1 capsule into inhaler and inhale Daily.      Last office visit with prescribing clinician: 1/6/2022      Next office visit with prescribing clinician: 7/6/2022            Lulu Shetty LPN  03/28/22, 14:21 EDT

## 2022-04-15 ENCOUNTER — OFFICE VISIT (OUTPATIENT)
Dept: INTERNAL MEDICINE | Facility: CLINIC | Age: 82
End: 2022-04-15

## 2022-04-15 VITALS
TEMPERATURE: 98.1 F | SYSTOLIC BLOOD PRESSURE: 94 MMHG | HEIGHT: 69 IN | DIASTOLIC BLOOD PRESSURE: 62 MMHG | WEIGHT: 172 LBS | HEART RATE: 67 BPM | RESPIRATION RATE: 16 BRPM | OXYGEN SATURATION: 94 % | BODY MASS INDEX: 25.48 KG/M2

## 2022-04-15 DIAGNOSIS — R68.83 CHILLS (WITHOUT FEVER): Primary | ICD-10-CM

## 2022-04-15 DIAGNOSIS — R31.9 HEMATURIA, UNSPECIFIED TYPE: ICD-10-CM

## 2022-04-15 DIAGNOSIS — Z93.2 ILEOSTOMY IN PLACE: ICD-10-CM

## 2022-04-15 PROBLEM — R09.02 HYPOXEMIA: Status: ACTIVE | Noted: 2020-06-18

## 2022-04-15 PROBLEM — C67.9 UROTHELIAL CARCINOMA OF BLADDER WITH INVASION OF MUSCLE: Status: ACTIVE | Noted: 2021-06-01

## 2022-04-15 PROBLEM — Z87.828 HISTORY OF TRAUMATIC SUBDURAL HEMATOMA: Status: ACTIVE | Noted: 2021-06-14

## 2022-04-15 PROBLEM — Z87.891 PERSONAL HISTORY OF NICOTINE DEPENDENCE: Status: ACTIVE | Noted: 2021-01-01

## 2022-04-15 LAB
BILIRUB BLD-MCNC: NEGATIVE MG/DL
CLARITY, POC: ABNORMAL
COLOR UR: YELLOW
EXPIRATION DATE: ABNORMAL
EXPIRATION DATE: NORMAL
FLUAV AG UPPER RESP QL IA.RAPID: NOT DETECTED
FLUBV AG UPPER RESP QL IA.RAPID: NOT DETECTED
GLUCOSE UR STRIP-MCNC: NEGATIVE MG/DL
INTERNAL CONTROL: NORMAL
KETONES UR QL: NEGATIVE
LEUKOCYTE EST, POC: ABNORMAL
Lab: ABNORMAL
Lab: NORMAL
NITRITE UR-MCNC: POSITIVE MG/ML
PH UR: 6 [PH] (ref 5–8)
PROT UR STRIP-MCNC: ABNORMAL MG/DL
RBC # UR STRIP: ABNORMAL /UL
SARS-COV-2 AG UPPER RESP QL IA.RAPID: NOT DETECTED
SP GR UR: 1.01 (ref 1–1.03)
UROBILINOGEN UR QL: NORMAL

## 2022-04-15 PROCEDURE — 87428 SARSCOV & INF VIR A&B AG IA: CPT | Performed by: NURSE PRACTITIONER

## 2022-04-15 PROCEDURE — 99213 OFFICE O/P EST LOW 20 MIN: CPT | Performed by: NURSE PRACTITIONER

## 2022-04-15 PROCEDURE — 81003 URINALYSIS AUTO W/O SCOPE: CPT | Performed by: NURSE PRACTITIONER

## 2022-04-15 RX ORDER — NITROFURANTOIN 25; 75 MG/1; MG/1
100 CAPSULE ORAL 2 TIMES DAILY
Qty: 10 CAPSULE | Refills: 0 | Status: SHIPPED | OUTPATIENT
Start: 2022-04-15 | End: 2022-04-20

## 2022-04-21 LAB
BACTERIA UR CULT: ABNORMAL
OTHER ANTIBIOTIC SUSC ISLT: ABNORMAL

## 2022-04-25 RX ORDER — TRAZODONE HYDROCHLORIDE 50 MG/1
25-50 TABLET ORAL NIGHTLY PRN
Qty: 90 TABLET | Refills: 3 | Status: SHIPPED | OUTPATIENT
Start: 2022-04-25

## 2022-04-25 NOTE — TELEPHONE ENCOUNTER
Rx Refill Note  Requested Prescriptions     Pending Prescriptions Disp Refills   • traZODone (DESYREL) 50 MG tablet 90 tablet 3     Sig: Take 0.5-1 tablets by mouth At Night As Needed.      Last office visit with prescribing clinician: 1/6/2022      Next office visit with prescribing clinician: 7/6/2022            Shiloh Cunningham LPN  04/25/22, 09:39 EDT

## 2022-05-09 RX ORDER — ATORVASTATIN CALCIUM 40 MG/1
TABLET, FILM COATED ORAL
Qty: 90 TABLET | Refills: 3 | Status: SHIPPED | OUTPATIENT
Start: 2022-05-09

## 2022-05-09 NOTE — TELEPHONE ENCOUNTER
Rx Refill Note  Requested Prescriptions     Pending Prescriptions Disp Refills   • atorvastatin (LIPITOR) 40 MG tablet [Pharmacy Med Name: atorvastatin 40 mg tablet] 90 tablet 3     Sig: TAKE ONE TABLET BY MOUTH AT BEDTIME      Last office visit with prescribing clinician: 1/6/2022      Next office visit with prescribing clinician: 7/6/2022            Teresa Bates MA  05/09/22, 11:41 EDT

## 2022-05-13 DIAGNOSIS — J43.8 OTHER EMPHYSEMA: ICD-10-CM

## 2022-05-13 RX ORDER — TIOTROPIUM BROMIDE 18 UG/1
CAPSULE ORAL; RESPIRATORY (INHALATION)
Qty: 90 CAPSULE | Refills: 3 | Status: SHIPPED | OUTPATIENT
Start: 2022-05-13 | End: 2022-11-21 | Stop reason: SDUPTHER

## 2022-05-13 NOTE — TELEPHONE ENCOUNTER
Rx Refill Note  Requested Prescriptions     Pending Prescriptions Disp Refills   • Spiriva HandiHaler 18 MCG per inhalation capsule [Pharmacy Med Name: Spiriva with HandiHaler 18 mcg and inhalation capsules] 90 capsule 3     Sig: PLACE A CAPSULE INTO INHALER AND INHALE DAILY      Last office visit with prescribing clinician: 1/6/2022      Next office visit with prescribing clinician: 7/6/2022            Teresa Bates MA  05/13/22, 08:38 EDT

## 2022-05-21 LAB
25(OH)D3+25(OH)D2 SERPL-MCNC: 50.1 NG/ML (ref 30–100)
ALBUMIN SERPL-MCNC: 3.9 G/DL (ref 3.5–5.2)
ALBUMIN/GLOB SERPL: 1.4 G/DL
ALP SERPL-CCNC: 152 U/L (ref 39–117)
ALT SERPL-CCNC: 18 U/L (ref 1–41)
AST SERPL-CCNC: 17 U/L (ref 1–40)
BILIRUB SERPL-MCNC: 0.4 MG/DL (ref 0–1.2)
BUN SERPL-MCNC: 23 MG/DL (ref 8–23)
BUN/CREAT SERPL: 21.3 (ref 7–25)
CALCIUM SERPL-MCNC: 9.6 MG/DL (ref 8.6–10.5)
CHLORIDE SERPL-SCNC: 105 MMOL/L (ref 98–107)
CHOLEST SERPL-MCNC: 128 MG/DL (ref 0–200)
CO2 SERPL-SCNC: 25 MMOL/L (ref 22–29)
CREAT SERPL-MCNC: 1.08 MG/DL (ref 0.76–1.27)
EGFRCR SERPLBLD CKD-EPI 2021: 69.4 ML/MIN/1.73
GLOBULIN SER CALC-MCNC: 2.7 GM/DL
GLUCOSE SERPL-MCNC: 91 MG/DL (ref 65–99)
HBA1C MFR BLD: 6.1 % (ref 4.8–5.6)
HDLC SERPL-MCNC: 39 MG/DL (ref 40–60)
LDLC SERPL CALC-MCNC: 68 MG/DL (ref 0–100)
MICROALBUMIN UR-MCNC: 15.1 UG/ML
POTASSIUM SERPL-SCNC: 4.8 MMOL/L (ref 3.5–5.2)
PROT SERPL-MCNC: 6.6 G/DL (ref 6–8.5)
SODIUM SERPL-SCNC: 141 MMOL/L (ref 136–145)
TRIGL SERPL-MCNC: 116 MG/DL (ref 0–150)
TSH SERPL DL<=0.005 MIU/L-ACNC: 1.4 UIU/ML (ref 0.27–4.2)
VLDLC SERPL CALC-MCNC: 21 MG/DL (ref 5–40)

## 2022-05-31 RX ORDER — ESOMEPRAZOLE MAGNESIUM 40 MG/1
CAPSULE, DELAYED RELEASE ORAL
Qty: 30 CAPSULE | Refills: 11 | Status: SHIPPED | OUTPATIENT
Start: 2022-05-31

## 2022-06-14 ENCOUNTER — PREP FOR SURGERY (OUTPATIENT)
Dept: OTHER | Facility: HOSPITAL | Age: 82
End: 2022-06-14

## 2022-06-14 DIAGNOSIS — H25.12 NUCLEAR SCLEROTIC CATARACT OF LEFT EYE: Primary | ICD-10-CM

## 2022-06-14 RX ORDER — CYCLOPENT/TROPIC/PHEN/KETR/WAT 1%-1%-2.5%
1 DROPS (EA) OPHTHALMIC (EYE)
Status: CANCELLED | OUTPATIENT
Start: 2022-06-14 | End: 2022-06-14

## 2022-06-14 RX ORDER — SODIUM CHLORIDE 0.9 % (FLUSH) 0.9 %
1-10 SYRINGE (ML) INJECTION AS NEEDED
Status: CANCELLED | OUTPATIENT
Start: 2022-06-14

## 2022-06-14 RX ORDER — SODIUM CHLORIDE 0.9 % (FLUSH) 0.9 %
10 SYRINGE (ML) INJECTION EVERY 12 HOURS SCHEDULED
Status: CANCELLED | OUTPATIENT
Start: 2022-06-14

## 2022-06-14 RX ORDER — DIFLUPREDNATE OPHTHALMIC 0.5 MG/ML
1 EMULSION OPHTHALMIC SEE ADMIN INSTRUCTIONS
Status: CANCELLED | OUTPATIENT
Start: 2022-06-14

## 2022-06-14 RX ORDER — TETRACAINE HYDROCHLORIDE 5 MG/ML
1 SOLUTION OPHTHALMIC SEE ADMIN INSTRUCTIONS
Status: CANCELLED | OUTPATIENT
Start: 2022-06-14

## 2022-06-20 PROBLEM — H25.12 NUCLEAR SCLEROTIC CATARACT OF LEFT EYE: Status: ACTIVE | Noted: 2022-06-20

## 2022-06-22 NOTE — PRE-PROCEDURE INSTRUCTIONS
PAT phone history completed with pt for upcoming procedure on 6/23/22 with Dr. Blake.     PAT PASS GIVEN/REVIEWED WITH PT.  VERBALIZED UNDERSTANDING OF THE FOLLOWING:  DO NOT EAT, DRINK, SMOKE, USE SMOKELESS TOBACCO OR CHEW GUM AFTER MIDNIGHT THE NIGHT BEFORE SURGERY.  THIS ALSO INCLUDES HARD CANDIES AND MINTS.    DO NOT SHAVE THE AREA TO BE OPERATED ON AT LEAST 48 HOURS PRIOR TO THE PROCEDURE.  DO NOT WEAR MAKE UP OR NAIL POLISH.  DO NOT LEAVE IN ANY PIERCING OR WEAR JEWELRY THE DAY OF SURGERY.      DO NOT USE ADHESIVES IF YOU WEAR DENTURES.    DO NOT WEAR EYE CONTACTS; BRING IN YOUR GLASSES.    ONLY TAKE MEDICATION THE MORNING OF YOUR PROCEDURE IF INSTRUCTED BY YOUR SURGEON WITH ENOUGH WATER TO SWALLOW THE MEDICATION.  IF YOUR SURGEON DID NOT SPECIFY WHICH MEDICATIONS TO TAKE, YOU WILL NEED TO CALL THEIR OFFICE FOR FURTHER INSTRUCTIONS AND DO AS THEY INSTRUCT.    LEAVE ANYTHING YOU CONSIDER VALUABLE AT HOME.    YOU WILL NEED TO ARRANGE FOR SOMEONE TO DRIVE YOU HOME AFTER SURGERY.  IT IS RECOMMENDED THAT YOU DO NOT DRIVE, WORK, DRINK ALCOHOL OR MAKE MAJOR DECISIONS FOR AT LEAST 24 HOURS AFTER YOUR PROCEDURE IS COMPLETE.      THE DAY OF YOUR PROCEDURE, BRING IN THE FOLLOWING IF APPLICABLE:   PICTURE ID AND INSURANCE/MEDICARE OR MEDICAID CARDS/ANY CO-PAY THAT MAY BE DUE   COPY OF ADVANCED DIRECTIVE/LIVING WILL/POWER OR    CPAP/BIPAP/INHALERS   SKIN PREP SHEET   YOUR PREADMISSION TESTING PASS (IF NOT A PHONE HISTORY)

## 2022-06-23 ENCOUNTER — HOSPITAL ENCOUNTER (OUTPATIENT)
Facility: HOSPITAL | Age: 82
Setting detail: HOSPITAL OUTPATIENT SURGERY
Discharge: HOME | End: 2022-06-23
Attending: OPHTHALMOLOGY | Admitting: OPHTHALMOLOGY

## 2022-06-23 ENCOUNTER — TELEPHONE (OUTPATIENT)
Dept: INTERNAL MEDICINE | Facility: CLINIC | Age: 82
End: 2022-06-23

## 2022-06-23 ENCOUNTER — ANESTHESIA EVENT (OUTPATIENT)
Dept: PERIOP | Facility: HOSPITAL | Age: 82
End: 2022-06-23

## 2022-06-23 ENCOUNTER — ANESTHESIA (OUTPATIENT)
Dept: PERIOP | Facility: HOSPITAL | Age: 82
End: 2022-06-23

## 2022-06-23 VITALS
RESPIRATION RATE: 18 BRPM | SYSTOLIC BLOOD PRESSURE: 141 MMHG | HEIGHT: 69 IN | WEIGHT: 175 LBS | TEMPERATURE: 97.6 F | DIASTOLIC BLOOD PRESSURE: 62 MMHG | HEART RATE: 46 BPM | OXYGEN SATURATION: 95 % | BODY MASS INDEX: 25.92 KG/M2

## 2022-06-23 DIAGNOSIS — H25.12 NUCLEAR SCLEROTIC CATARACT OF LEFT EYE: ICD-10-CM

## 2022-06-23 PROCEDURE — V2632 POST CHMBR INTRAOCULAR LENS: HCPCS | Performed by: OPHTHALMOLOGY

## 2022-06-23 PROCEDURE — 25010000002 PROPOFOL 10 MG/ML EMULSION: Performed by: NURSE ANESTHETIST, CERTIFIED REGISTERED

## 2022-06-23 DEVICE — LENS ACRYSOF IQ SA60WF W/ULTRASERT 6X13MM ACU0T0 23: Type: IMPLANTABLE DEVICE | Site: POSTERIOR CHAMBER | Status: FUNCTIONAL

## 2022-06-23 RX ORDER — KETAMINE HYDROCHLORIDE 50 MG/ML
INJECTION, SOLUTION, CONCENTRATE INTRAMUSCULAR; INTRAVENOUS AS NEEDED
Status: DISCONTINUED | OUTPATIENT
Start: 2022-06-23 | End: 2022-06-23 | Stop reason: SURG

## 2022-06-23 RX ORDER — LIDOCAINE HYDROCHLORIDE 20 MG/ML
INJECTION, SOLUTION INTRAVENOUS AS NEEDED
Status: DISCONTINUED | OUTPATIENT
Start: 2022-06-23 | End: 2022-06-23 | Stop reason: SURG

## 2022-06-23 RX ORDER — SODIUM CHLORIDE 0.9 % (FLUSH) 0.9 %
1-10 SYRINGE (ML) INJECTION AS NEEDED
Status: DISCONTINUED | OUTPATIENT
Start: 2022-06-23 | End: 2022-06-23 | Stop reason: HOSPADM

## 2022-06-23 RX ORDER — PROPOFOL 10 MG/ML
VIAL (ML) INTRAVENOUS AS NEEDED
Status: DISCONTINUED | OUTPATIENT
Start: 2022-06-23 | End: 2022-06-23 | Stop reason: SURG

## 2022-06-23 RX ORDER — DIFLUPREDNATE OPHTHALMIC 0.5 MG/ML
1 EMULSION OPHTHALMIC SEE ADMIN INSTRUCTIONS
Status: DISCONTINUED | OUTPATIENT
Start: 2022-06-23 | End: 2022-06-23 | Stop reason: HOSPADM

## 2022-06-23 RX ORDER — SODIUM CHLORIDE, SODIUM LACTATE, POTASSIUM CHLORIDE, CALCIUM CHLORIDE 600; 310; 30; 20 MG/100ML; MG/100ML; MG/100ML; MG/100ML
1000 INJECTION, SOLUTION INTRAVENOUS CONTINUOUS
Status: DISCONTINUED | OUTPATIENT
Start: 2022-06-23 | End: 2022-06-23 | Stop reason: HOSPADM

## 2022-06-23 RX ORDER — TETRACAINE HYDROCHLORIDE 5 MG/ML
1 SOLUTION OPHTHALMIC SEE ADMIN INSTRUCTIONS
Status: COMPLETED | OUTPATIENT
Start: 2022-06-23 | End: 2022-06-23

## 2022-06-23 RX ORDER — DIFLUPREDNATE OPHTHALMIC 0.5 MG/ML
EMULSION OPHTHALMIC AS NEEDED
Status: DISCONTINUED | OUTPATIENT
Start: 2022-06-23 | End: 2022-06-23 | Stop reason: HOSPADM

## 2022-06-23 RX ORDER — BALANCED SALT SOLUTION 6.4; .75; .48; .3; 3.9; 1.7 MG/ML; MG/ML; MG/ML; MG/ML; MG/ML; MG/ML
SOLUTION OPHTHALMIC AS NEEDED
Status: DISCONTINUED | OUTPATIENT
Start: 2022-06-23 | End: 2022-06-23 | Stop reason: HOSPADM

## 2022-06-23 RX ORDER — DIFLUPREDNATE OPHTHALMIC 0.5 MG/ML
1 EMULSION OPHTHALMIC 4 TIMES DAILY
Start: 2022-06-23 | End: 2022-11-06

## 2022-06-23 RX ORDER — LIDOCAINE HYDROCHLORIDE 40 MG/ML
INJECTION, SOLUTION RETROBULBAR; TOPICAL AS NEEDED
Status: DISCONTINUED | OUTPATIENT
Start: 2022-06-23 | End: 2022-06-23 | Stop reason: HOSPADM

## 2022-06-23 RX ORDER — CYCLOPENT/TROPIC/PHEN/KETR/WAT 1%-1%-2.5%
1 DROPS (EA) OPHTHALMIC (EYE)
Status: COMPLETED | OUTPATIENT
Start: 2022-06-23 | End: 2022-06-23

## 2022-06-23 RX ORDER — ACETAZOLAMIDE 500 MG/1
500 CAPSULE, EXTENDED RELEASE ORAL ONCE
Status: COMPLETED | OUTPATIENT
Start: 2022-06-23 | End: 2022-06-23

## 2022-06-23 RX ORDER — TETRACAINE HYDROCHLORIDE 5 MG/ML
SOLUTION OPHTHALMIC AS NEEDED
Status: DISCONTINUED | OUTPATIENT
Start: 2022-06-23 | End: 2022-06-23 | Stop reason: HOSPADM

## 2022-06-23 RX ORDER — SODIUM CHLORIDE 0.9 % (FLUSH) 0.9 %
10 SYRINGE (ML) INJECTION EVERY 12 HOURS SCHEDULED
Status: DISCONTINUED | OUTPATIENT
Start: 2022-06-23 | End: 2022-06-23 | Stop reason: HOSPADM

## 2022-06-23 RX ADMIN — PROPOFOL 150 MCG/KG/MIN: 10 INJECTION, EMULSION INTRAVENOUS at 12:59

## 2022-06-23 RX ADMIN — Medication 1 DROP: at 11:57

## 2022-06-23 RX ADMIN — Medication 1 DROP: at 12:02

## 2022-06-23 RX ADMIN — KETAMINE HYDROCHLORIDE 10 MG: 50 INJECTION, SOLUTION INTRAMUSCULAR; INTRAVENOUS at 12:59

## 2022-06-23 RX ADMIN — PROPOFOL 50 MG: 10 INJECTION, EMULSION INTRAVENOUS at 12:59

## 2022-06-23 RX ADMIN — Medication 1 DROP: at 12:07

## 2022-06-23 RX ADMIN — TETRACAINE HYDROCHLORIDE 1 DROP: 5 SOLUTION OPHTHALMIC at 11:55

## 2022-06-23 RX ADMIN — LIDOCAINE HYDROCHLORIDE 60 MG: 20 INJECTION, SOLUTION INTRAVENOUS at 12:59

## 2022-06-23 RX ADMIN — TETRACAINE HYDROCHLORIDE 1 DROP: 5 SOLUTION OPHTHALMIC at 11:56

## 2022-06-23 RX ADMIN — SODIUM CHLORIDE, POTASSIUM CHLORIDE, SODIUM LACTATE AND CALCIUM CHLORIDE 1000 ML: 600; 310; 30; 20 INJECTION, SOLUTION INTRAVENOUS at 12:11

## 2022-06-23 RX ADMIN — ACETAZOLAMIDE 500 MG: 500 CAPSULE, EXTENDED RELEASE ORAL at 13:29

## 2022-06-23 NOTE — ANESTHESIA PREPROCEDURE EVALUATION
Anesthesia Evaluation     Patient summary reviewed and Nursing notes reviewed   no history of anesthetic complications:  NPO Solid Status: > 8 hours  NPO Liquid Status: > 8 hours           Airway   Mallampati: II  TM distance: >3 FB  Neck ROM: full  Possible difficult intubation  Dental - normal exam     Pulmonary    (+) pneumonia resolved , a smoker Former, COPD moderate, asthma,shortness of breath, sleep apnea, decreased breath sounds,   Cardiovascular - normal exam  Exercise tolerance: good (4-7 METS)    ECG reviewed  PT is on anticoagulation therapy  Patient on routine beta blocker and Beta blocker given within 24 hours of surgery    (+) hypertension well controlled less than 2 medications, CAD, THOMAS, PVD, hyperlipidemia,     ROS comment: ASA 81 mg     Pt has 3 cm infrarenal AAA according to report.     Neuro/Psych  (+) psychiatric history Anxiety,    GI/Hepatic/Renal/Endo    (+)  GERD well controlled,  liver disease, diabetes mellitus well controlled,     Musculoskeletal     (+) arthralgias, back pain, chronic pain, myalgias,   Abdominal   (+) obese,    Substance History - negative use     OB/GYN negative ob/gyn ROS         Other   arthritis, blood dyscrasia anemia,   history of cancer      Other Comment: Melanoma 2013  ROS/Med Hx Other: labs reviewed  cxr chronic findings nad  ekg sr short pr    11/2018  Negative stress test  EF 57%      Phys Exam Other: [employer], wears hearing aides                Anesthesia Plan    ASA 4     MAC     (Patient advised that intravenous sedation would be utilized as primary anesthetic technique. Every effort will be made to make sure patient is comfortable. Patient advised that they may experience recall of events of the procedure. Patient verbalized understanding and agreed to plan. )  intravenous induction     Anesthetic plan, risks, benefits, and alternatives have been provided, discussed and informed consent has been obtained with: patient.

## 2022-06-23 NOTE — TELEPHONE ENCOUNTER
Incoming Refill Request      Medication requested (name and dose): [facility]    Pharmacy where request should be sent: [facility]    Additional details provided by patient: Patient needs this ASAP    Best call back number: [phone]    Does the patient have less than a 3 day supply:  [x] Yes  [] No    Deny Canchola  06/23/22, 14:39 EDT

## 2022-06-23 NOTE — DISCHARGE INSTRUCTIONS
Post Operative Cataract Instructions     Left Eye  POST OPERATIVE INSTRUCTIONS  You have received anesthesia today. DO NOT drive, drink alcohol, sign legal documents.   After surgery, your eye will not hurt. It may feel scratchy, sticky or uncomfortable. Your eye will be sensitive to light.  Most people see better 1-3 days after the procedure, but it could take 3 weeks to get the full benefits and reach your visual potential. If your vision is blurry for a few days it is normal, and means you may have swelling outside or inside the eye. For some patients, a bubble is placed and there will be blurriness.   You should receive a post-op kit with tape and an eye shield. Wear the shield for the first 3 nights after surgery to keep you from rubbing the eye.  You may wear glasses or sunglasses during the day.  You must keep eye protected at all times.  Most people are able to return to their normal routine 1-3 days after surgery, however, due to the brain adjusting to your new vision you may have trouble judging distances and want to be careful when driving and going up and downstairs.   You can shower and wash your hair the day after surgery. Keep water, shampoo, hair spray and shaving lotion out of the eye, especially for the first week.   If eyes become stuck together after surgery you may soak with warm cloth.  During the first week, you should AVOID:   Rubbing or putting pressure on your eye.  Eye make-up, face cream or lotion, hair coloring or perms  Strenuous activities, such as running or lifting weights, as to avoid sweat from getting in the eye. Avoid swimming, hot tubs, fumes or silas conditions.   Sleeping on operative side.  Excessive sneezing or coughing.  Hanging the head down for periods of time. Keep your head above your heart.    Some discomfort and blurred vision after surgery are normal, but if you have any unusual pain, swelling, bleeding or sudden decrease in vision, contact our office immediately.      Emergency assistance is available at any time by calling:    Dr.Mark Lou Blake  [phone]    If unable to reach call [facility] @ [phone]    You have been prescribed an eye drop to use after surgery, please follow these instructions and bring eye drops and instructions with you to post op appointment:    Difluprednate (DUREZOL) 0.05 %. Shake well before use.    USE 1 DROP 4 (FOUR) TIMES A DAY FOR 1 WEEK THEN STARTING WEEK 2, ONLY USE 2 (TWO) TIMES A DAY UNTIL YOU RUN OUT OF DROPS.     PLACE A PHIL IN THE DAY COLUMN EACH TIME YOU USE TO KEEP ON SCHEDULE    WEEK 1-USE 4  (FOUR) TIMES A DAY DAY 1  []   []    []   []     DAY 2  []   []    []   []  DAY 3  []   []    []   []  DAY 4  []   []    []   []  DAY 5  []   []    []   []  DAY 6  []   []    []   []  DAY 7  []   []    []   []      WEEK 2-USE 2 (TWO)  TIMES A DAY DAY 1  []   []  DAY 2  []   []  DAY 3  []   []  DAY 4  []   []  DAY 5  []   []  DAY 6  []   []  DAY 7  []   []      WEEK 3-USE 2 (TWO) TIMES A DAY DAY 1  []   []  DAY 2  []   []  DAY 3  []   []  DAY 4  []   []  DAY 5  []   []  DAY 6  []   []  DAY 7  []   []      WEEK 4-USE 2 (TWO)TIMES A DAY DAY 1  []   []  DAY 2  []   []  DAY 3  []   []  DAY 4  []   []  DAY 5  []   []  DAY 6  []   []  DAY 7  []   []       Please follow all post op instructions and follow up appointment time from your physician's office included in your discharge packet.  .  No pushing, pulling, tugging,  heavy lifting, or strenuous activity.  No major decision making, driving, or drinking alcoholic beverages for 24 hours. ( due to the medications you have  received)  Always use good hand hygiene/washing techniques.  NO driving while taking pain medications.    * if you have an incision:  Check your incision area every day for signs of infection.   Check for:  * more redness, swelling, or pain  *more fluid or blood  *warmth  *pus or bad smell     To  assist you in voiding:  Drink plenty of fluids  Listen to running water while attempting to void.    If you are unable to urinate and you have an uncomfortable urge to void or it has been   6 hours since you were discharged, return to the Emergency Room

## 2022-06-23 NOTE — OP NOTE
OPERATIVE NOTE    Date of Procedure: 6/23/2022  Patient Name: Jairo Mercedes  Patient MRN: [id number]  YOB: 1940     Preoperative Diagnosis: Left nuclear sclerotic cataract.     Postoperative Diagnosis: Left nuclear sclerotic cataract.     Procedure Performed: Phacoemulsification with implantation of a  foldable posterior chamber intraocular lens, Left eye.     Surgeon: Mack Blake MD     Anesthesia:  Monitored Anesthesia Care (MAC)      Brief History and Indication: The patient presents with a history of past progressive loss of vision.  Patient was diagnosed with cataract and requests removal for increased ability to read and see.     Operation Description: The patient was taken to the OR and prepped and draped in the usual sterile ophthalmic fashion. A lid speculum was placed in the eye.  A #75 blade was then used to make a stab incision two o’clock hours from the intended temporal clear cornea groove. The anterior chamber was then inflated with a Viscoelastic. A metal microkeratome blade was then used to enter the anterior chamber at the temporal clear cornea site. A three level tunnel incision was made. A curvilinear capsulorrhexis was then performed with a bent cystotome needle and capsulorrhexis forceps.  BSS on a 30 gauge bent cannula was used to hydro-dissect, and hydro-delineate the lens. Good fluid waves and hydro-delineation were noted. Phacoemulsification was then used to remove nuclear material without complications. The residual cortical and lenticular material was then removed with irrigation and aspiration. Viscoelastics were then used to inflate the bag in a soft shell technique. A PCIOL was injected into the bag. Post-implantation, there were no rents or tears in the bag and the lens was noted to be stable and centered. The residual Viscoelastic was then removed with irrigation and aspiration.  The wound was checked and found to be without leaks. Therefore a Polydex  ointment and one drop of Durezol eye drop was placed in the eye.     Implant Information:   Implant Name Type Inv. Item Serial No.  Lot No. LRB No. Used Action   LENS ACRYSOF IQ [id number] W/ULTRASERT [id number] - [id number] - [id number] Implant LENS ACRYSOF IQ [id number] W/ULTRASERT [id number] ZACKARY  Left 1 Implanted       Complications: None    Estimated Blood Loss:  Less than 1 cc.       [x]   Changed to complex procedure due to: []  hypermature, white cataract. Blue dye was used. [x]   small iris. A Malyugin Ring was used.    Discharge and Condition  The patient was transported to same day surgery in excellent condition and scheduled for follow-up tomorrow morning. The patient was given instructions on use of eye drops for the operative eye and was specifically instructed to call Dr. Blake at his office or home for any nausea, vomiting, headache, decreased visual acuity, or pain, or if the patient had any general concerns.    Mack Blake MD  6/23/2022

## 2022-06-23 NOTE — H&P
[facility]         History and Physical    Patient Name: Jairo Mercedes  MRN: [id number]  : 1940  Gender: male     HPI: Patient complaint of PPLOV Left eye diagnosed with cataract. Patient requests PHACO PCIOL for Increase of VA/ADL.    History:    Past Medical History:   Diagnosis Date   • Abdominal pain    • Acute sinusitis    • Allergic N/A   • Anemia    • Anxiety    • Arthritis    • Asthma N/A   • Bladder cancer ([facility]) 2021   • Cataracts, bilateral    • COPD (chronic obstructive pulmonary disease) ([facility])    • Coronary artery disease N/A    Some Blockage   • Diabetes mellitus ([facility])     REPORTS DIET CONTROL ONLY, TAKES NO MEDICATION   • Emphysema of lung ([facility])    • GERD (gastroesophageal reflux disease)    • Heme positive stool    • History of ankle fracture     LEFT    • History of cardiovascular stress test     REPORTS IN HIS LATE 50'S AND THAT ALL WAS WNL'S AT THAT TIME   • History of echocardiogram     REPORTS HAS HAD 2. DOES NOT REPORT ANY HX OF CHEST PAIN.   • History of pneumonia    • Nikolai (hard of hearing)     WEARS HEARING AIDS   • Hypertension    • Melanoma ([facility])        • Oxygen dependent     2 L NC HS   • Presence of urostomy ([facility]) 06/15/2021   • Seasonal allergies    • Wears glasses    • Wears partial dentures        Past Surgical History:   Procedure Laterality Date   • BRAIN SURGERY      REPORTS FOR SUBDURAL HEMATOMA    • BRONCHOSCOPY N/A 2018    Procedure: BRONCHOSCOPY DIAGNOSTIC WITH WASHINGS, BRUSHINGS AND BIOPSIES;  Surgeon: Morgan Frias MD;  Location: [facility] OR;  Service: Pulmonary   • BRONCHOSCOPY     • ALEX HOLE FOR SUBDURAL HEMATOMA     • CARDIAC CATHETERIZATION      REPORTS APPROXIMATELY  AND REPORTS NO STENTS WERE PLACED   • CHOLECYSTECTOMY     • COLONOSCOPY     • COLONOSCOPY N/A 2018    Procedure: COLONOSCOPY WITH HOT FORCEP POLYPECTOMY X2  ;  Surgeon: Sara Reynoso MD;  Location: [facility] ENDOSCOPY;  Service:  Gastroenterology   • ENDOSCOPY     • LIVER BIOPSY     • LYMPH NODE BIOPSY     • MOLE REMOVAL  2005    MELANOMA ON BACK   • SPLENECTOMY     • THORACOTOMY  2015    Left thoracotomy and stapling of pulmonary blebs. Mechanical pleurodesis.   • TONSILLECTOMY     • TRANSURETHRAL RESECTION OF BLADDER TUMOR N/A 2021    Procedure: TRANSURETHRAL RESECTION OF BLADDER TUMOR;  Surgeon: Osmani Francois MD;  Location: [facility];  Service: Urology;  Laterality: N/A;   • WISDOM TOOTH EXTRACTION         Social History     Socioeconomic History   • Marital status:    Tobacco Use   • Smoking status: Former Smoker     Packs/day: 3.00     Years: 42.00     Pack years: 126.00     Types: Cigarettes     Start date: 1957     Quit date: 1999     Years since quittin.3   • Smokeless tobacco: Never Used   Vaping Use   • Vaping Use: Never used   Substance and Sexual Activity   • Alcohol use: Not Currently   • Drug use: No   • Sexual activity: Defer     Partners: Female       Family History   Problem Relation Age of Onset   • Alzheimer's disease Mother    • Arthritis Mother    • Heart disease Father    • Hyperlipidemia Father    • Cancer Other    • Cancer Maternal Grandmother        Prior to Admission Medications:  Medications Prior to Admission   Medication Sig Dispense Refill Last Dose   • acetaminophen (TYLENOL) 500 MG tablet Take  by mouth Every 6 (Six) Hours As Needed.   2022 at Unknown time   • albuterol sulfate HFA (Ventolin HFA) 108 (90 Base) MCG/ACT inhaler Inhale 2 puffs Every 4 (Four) Hours As Needed for Wheezing. 18 g 2 2022 at Unknown time   • ALPRAZolam (XANAX) 0.5 MG tablet Every 12 (Twelve) Hours.   2022 at Unknown time   • aspirin 81 MG EC tablet Take 81 mg by mouth Daily.   2022 at Unknown time   • atorvastatin (LIPITOR) 40 MG tablet TAKE ONE TABLET BY MOUTH AT BEDTIME 90 tablet 3 2022 at Unknown time   • esomeprazole (nexIUM) 40 MG capsule TAKE ONE CAPSULE BY MOUTH  "EVERY DAY 30 capsule 11 6/23/2022 at Unknown time   • fluocinonide (LIDEX) 0.05 % external solution APPLY TOPICALLY AS DIRECTED AT BEDTIME TO affected area   6/22/2022 at Unknown time   • fluticasone-salmeterol (ADVAIR) 250-50 MCG/DOSE DISKUS Inhale 1 puff by mouth twice a day. 60 each 3 6/22/2022 at Unknown time   • isosorbide mononitrate (IMDUR) 30 MG 24 hr tablet TAKE ONE TABLET BY MOUTH EVERY DAY AS DIRECTED 90 tablet 3 6/23/2022 at Unknown time   • ketoconazole (NIZORAL) 2 % shampoo Shampoo with a small amount to skin once a day   6/22/2022 at Unknown time   • losartan (COZAAR) 50 MG tablet TAKE ONE TABLET BY MOUTH EVERY DAY 90 tablet 3 6/23/2022 at Unknown time   • meclizine (ANTIVERT) 25 MG tablet TAKE ONE TABLET BY MOUTH TWICE DAILY (Patient taking differently: As Needed.) 180 tablet 3 6/23/2022 at Unknown time   • metoprolol tartrate (LOPRESSOR) 25 MG tablet TAKE ONE TABLET BY MOUTH TWICE DAILY 180 tablet 3 6/23/2022 at Unknown time   • nitroglycerin (NITROSTAT) 0.4 MG SL tablet Place 0.4 mg under the tongue Every 5 (Five) Minutes As Needed for Chest Pain.   6/23/2022 at Unknown time   • sertraline (ZOLOFT) 25 MG tablet Take 1 tablet by mouth Daily. 90 tablet 3 6/23/2022 at Unknown time   • Spiriva HandiHaler 18 MCG per inhalation capsule PLACE A CAPSULE INTO INHALER AND INHALE DAILY 90 capsule 3 6/22/2022 at Unknown time   • traZODone (DESYREL) 50 MG tablet Take 0.5-1 tablets by mouth At Night As Needed (insomnia). 90 tablet 3 6/22/2022 at Unknown time   • triamcinolone (KENALOG) 0.025 % ointment APPLY TO THE AFFECTED AREA(S) TWICE DAILY 80 g 1 6/22/2022 at Unknown time   • baclofen (LIORESAL) 10 MG tablet TAKE ONE TABLET BY MOUTH TWICE DAILY 90 tablet 3    • Omega-3 Fatty Acids (FISH OIL) 1000 MG capsule capsule Take 1,000 mg by mouth Daily With Breakfast.   Unknown at Unknown time       Allergies:  Allergies   Allergen Reactions   • Parabens Rash   • Other Rash     REPORTS ALLERGY TO \"PARABENS\" - REPORTS " THEY CAUSE HIM TO BREAK OUT (REPORTS THIS IS A PRESERVATIVE THAT IS PUT IN CREAMS)        Vitals: Temp:  [97.6 °F (36.4 °C)] 97.6 °F (36.4 °C)  Heart Rate:  [52] 52  Resp:  [18] 18  BP: (127)/(55) 127/55    Review of Systems:   Within Normal Limits Abnormal   HEENT [x]    []     Cardiovascular [x]   []     Gastrointestinal [x]   []     Genitourinary [x]   []     Neurologic [x]   []     Pulmonary [x]   []       Physical Exam:   Within Normal Limits Abnormal   HEENT [x]    []     Heart [x]   []     Lungs [x]   []     Abdomen [x]   []     Extremities [x]   []       Impression: Left nuclear sclerotic cataract.     Plan: CATARACT PHACO EXTRACTION WITH INTRAOCULAR LENS IMPLANT LEFT (Left)     Mack Blake MD  6/23/2022

## 2022-06-23 NOTE — ANESTHESIA POSTPROCEDURE EVALUATION
Patient: Jairo Mercedes    Procedure Summary     Date: 06/23/22 Room / Location: [facility] OR 1 /  [facility]    Anesthesia Start: 1256 Anesthesia Stop: 1315    Procedure: CATARACT PHACO EXTRACTION WITH INTRAOCULAR LENS IMPLANT LEFT COMPLICATED WITH MALYUGIN RING (Left Eye) Diagnosis:       Nuclear sclerotic cataract of left eye      (Nuclear sclerotic cataract of left eye [H25.12])    Surgeons: Mack Blake MD Provider: Ananda Toussaint CRNA    Anesthesia Type: MAC ASA Status: 4          Anesthesia Type: MAC    Vitals  No vitals data found for the desired time range.          Post Anesthesia Care and Evaluation    Patient location during evaluation: PHASE II  Patient participation: complete - patient participated  Level of consciousness: awake and alert  Pain score: 0  Pain management: satisfactory to patient    Airway patency: patent  Anesthetic complications: No anesthetic complications  PONV Status: none  Cardiovascular status: acceptable and stable  Respiratory status: acceptable  Hydration status: acceptable    Comments: Vitals signs as noted in nursing documentation as per protocol.

## 2022-06-24 ENCOUNTER — PREP FOR SURGERY (OUTPATIENT)
Dept: OTHER | Facility: HOSPITAL | Age: 82
End: 2022-06-24

## 2022-06-24 DIAGNOSIS — H25.11 NUCLEAR SCLEROTIC CATARACT OF RIGHT EYE: Primary | ICD-10-CM

## 2022-06-24 RX ORDER — FLUTICASONE PROPIONATE AND SALMETEROL 250; 50 UG/1; UG/1
1 POWDER RESPIRATORY (INHALATION)
Qty: 250 EACH | Refills: 3 | Status: SHIPPED | OUTPATIENT
Start: 2022-06-24 | End: 2022-07-26 | Stop reason: SDUPTHER

## 2022-06-24 RX ORDER — TETRACAINE HYDROCHLORIDE 5 MG/ML
1 SOLUTION OPHTHALMIC SEE ADMIN INSTRUCTIONS
Status: CANCELLED | OUTPATIENT
Start: 2022-06-24

## 2022-06-24 RX ORDER — SODIUM CHLORIDE 0.9 % (FLUSH) 0.9 %
10 SYRINGE (ML) INJECTION EVERY 12 HOURS SCHEDULED
Status: CANCELLED | OUTPATIENT
Start: 2022-06-24

## 2022-06-24 RX ORDER — CYCLOPENT/TROPIC/PHEN/KETR/WAT 1%-1%-2.5%
1 DROPS (EA) OPHTHALMIC (EYE)
Status: CANCELLED | OUTPATIENT
Start: 2022-06-24 | End: 2022-06-24

## 2022-06-24 RX ORDER — DIFLUPREDNATE OPHTHALMIC 0.5 MG/ML
1 EMULSION OPHTHALMIC SEE ADMIN INSTRUCTIONS
Status: CANCELLED | OUTPATIENT
Start: 2022-06-24

## 2022-06-24 RX ORDER — SODIUM CHLORIDE 0.9 % (FLUSH) 0.9 %
1-10 SYRINGE (ML) INJECTION AS NEEDED
Status: CANCELLED | OUTPATIENT
Start: 2022-06-24

## 2022-07-06 ENCOUNTER — OFFICE VISIT (OUTPATIENT)
Dept: INTERNAL MEDICINE | Facility: CLINIC | Age: 82
End: 2022-07-06

## 2022-07-06 VITALS
DIASTOLIC BLOOD PRESSURE: 72 MMHG | HEIGHT: 69 IN | WEIGHT: 168 LBS | HEART RATE: 59 BPM | TEMPERATURE: 97.3 F | SYSTOLIC BLOOD PRESSURE: 116 MMHG | BODY MASS INDEX: 24.88 KG/M2 | OXYGEN SATURATION: 95 %

## 2022-07-06 DIAGNOSIS — F41.9 ANXIETY: ICD-10-CM

## 2022-07-06 DIAGNOSIS — E78.5 HYPERLIPIDEMIA, UNSPECIFIED HYPERLIPIDEMIA TYPE: Primary | ICD-10-CM

## 2022-07-06 DIAGNOSIS — E11.40 TYPE 2 DIABETES MELLITUS WITH DIABETIC NEUROPATHY, WITHOUT LONG-TERM CURRENT USE OF INSULIN: ICD-10-CM

## 2022-07-06 DIAGNOSIS — D75.839 THROMBOCYTHEMIA: ICD-10-CM

## 2022-07-06 DIAGNOSIS — K90.9 IRON MALABSORPTION: ICD-10-CM

## 2022-07-06 DIAGNOSIS — D50.0 IRON DEFICIENCY ANEMIA DUE TO CHRONIC BLOOD LOSS: ICD-10-CM

## 2022-07-06 DIAGNOSIS — R63.4 WEIGHT LOSS: ICD-10-CM

## 2022-07-06 DIAGNOSIS — I10 ESSENTIAL HYPERTENSION: ICD-10-CM

## 2022-07-06 PROBLEM — R09.02 HYPOXEMIA: Status: RESOLVED | Noted: 2020-06-18 | Resolved: 2022-07-06

## 2022-07-06 PROCEDURE — 99214 OFFICE O/P EST MOD 30 MIN: CPT | Performed by: INTERNAL MEDICINE

## 2022-07-06 NOTE — PROGRESS NOTES
Subjective   Jairo Mercedes is a 81 y.o. male.     Chief Complaint   Patient presents with   • Follow-up     Recent lab results   • Hypertension   • Hyperlipidemia       History of Present Illness   Patient here for follow-up. Blood pressure stable medication hyperlipidemia stable medication diabetes is stable medication iron deficient anemia needs to be followed up. Anxiety stable medication. Patient lost the 7 pound recently.    Current Outpatient Medications:   •  acetaminophen (TYLENOL) 500 MG tablet, Take  by mouth Every 6 (Six) Hours As Needed., Disp: , Rfl:   •  albuterol sulfate HFA (Ventolin HFA) 108 (90 Base) MCG/ACT inhaler, Inhale 2 puffs Every 4 (Four) Hours As Needed for Wheezing., Disp: 18 g, Rfl: 2  •  ALPRAZolam (XANAX) 0.5 MG tablet, Every 12 (Twelve) Hours., Disp: , Rfl:   •  aspirin 81 MG EC tablet, Take 81 mg by mouth Daily., Disp: , Rfl:   •  atorvastatin (LIPITOR) 40 MG tablet, TAKE ONE TABLET BY MOUTH AT BEDTIME, Disp: 90 tablet, Rfl: 3  •  difluprednate (DUREZOL) 0.05 % ophthalmic emulsion, Administer 1 drop into the left eye 4 (Four) Times a Day. See admin instructions on AVS., Disp: , Rfl:   •  esomeprazole (nexIUM) 40 MG capsule, TAKE ONE CAPSULE BY MOUTH EVERY DAY, Disp: 30 capsule, Rfl: 11  •  fluocinonide (LIDEX) 0.05 % external solution, APPLY TOPICALLY AS DIRECTED AT BEDTIME TO affected area, Disp: , Rfl:   •  Fluticasone-Salmeterol (ADVAIR) 250-50 MCG/ACT DISKUS, Inhale 1 puff 2 (Two) Times a Day., Disp: 250 each, Rfl: 3  •  fluticasone-salmeterol (ADVAIR) 250-50 MCG/DOSE DISKUS, Inhale 1 puff by mouth twice a day., Disp: 60 each, Rfl: 3  •  isosorbide mononitrate (IMDUR) 30 MG 24 hr tablet, TAKE ONE TABLET BY MOUTH EVERY DAY AS DIRECTED, Disp: 90 tablet, Rfl: 3  •  ketoconazole (NIZORAL) 2 % shampoo, Shampoo with a small amount to skin once a day, Disp: , Rfl:   •  losartan (COZAAR) 50 MG tablet, TAKE ONE TABLET BY MOUTH EVERY DAY, Disp: 90 tablet, Rfl: 3  •  meclizine (ANTIVERT)  25 MG tablet, TAKE ONE TABLET BY MOUTH TWICE DAILY (Patient taking differently: As Needed.), Disp: 180 tablet, Rfl: 3  •  metoprolol tartrate (LOPRESSOR) 25 MG tablet, TAKE ONE TABLET BY MOUTH TWICE DAILY, Disp: 180 tablet, Rfl: 3  •  nitroglycerin (NITROSTAT) 0.4 MG SL tablet, Place 0.4 mg under the tongue Every 5 (Five) Minutes As Needed for Chest Pain., Disp: , Rfl:   •  Omega-3 Fatty Acids (FISH OIL) 1000 MG capsule capsule, Take 1,000 mg by mouth Daily With Breakfast., Disp: , Rfl:   •  sertraline (ZOLOFT) 25 MG tablet, Take 1 tablet by mouth Daily., Disp: 90 tablet, Rfl: 3  •  Spiriva HandiHaler 18 MCG per inhalation capsule, PLACE A CAPSULE INTO INHALER AND INHALE DAILY, Disp: 90 capsule, Rfl: 3  •  traZODone (DESYREL) 50 MG tablet, Take 0.5-1 tablets by mouth At Night As Needed (insomnia)., Disp: 90 tablet, Rfl: 3  •  triamcinolone (KENALOG) 0.025 % ointment, APPLY TO THE AFFECTED AREA(S) TWICE DAILY, Disp: 80 g, Rfl: 1    The following portions of the patient's history were reviewed and updated as appropriate: allergies, current medications, past family history, past medical history, past social history, past surgical history and problem list.    Review of Systems   Constitutional: Negative.    Respiratory: Negative.    Cardiovascular: Negative.    Gastrointestinal: Negative.    Musculoskeletal: Negative.    Skin: Negative.    Neurological: Negative.    Psychiatric/Behavioral: Negative.        Objective   Physical Exam  Cardiovascular:      Rate and Rhythm: Normal rate and regular rhythm.      Heart sounds: Normal heart sounds.   Pulmonary:      Effort: Pulmonary effort is normal.      Breath sounds: Normal breath sounds.   Abdominal:      General: Bowel sounds are normal.   Musculoskeletal:      Cervical back: Neck supple.   Skin:     General: Skin is warm.   Neurological:      Mental Status: He is alert and oriented to person, place, and time.         All tests have been reviewed.    Assessment & Plan    Diagnoses and all orders for this visit:    Hyperlipidemia, stable on Lipitor 40 mg daily continue medication    Essential hypertension stable on losartan 50 mg daily continue medication    Type 2 diabetes mellitus with diabetic neuropathy, without long-term current use of insulin (HCC) controlled on diet    Iron malabsorption check lab    Thrombocythemia follow-up with oncology    Iron deficiency anemia due to chronic blood loss check lab  -     CBC & Differential  -     Iron Profile    Anxiety stable on sertraline 25 mg daily continue medication    Weight loss not on purpose check lab  -     C-reactive Protein  -     Sedimentation Rate      6 mo          Answers for HPI/ROS submitted by the patient on 7/1/2022  What is the primary reason for your visit?: Other  Please describe your symptoms.: Check up  Have you had these symptoms before?: No  How long have you been having these symptoms?: 1-4 days  Please list any medications you are currently taking for this condition.: None  Please describe any probable cause for these symptoms. : None

## 2022-07-07 LAB
BASOPHILS # BLD AUTO: 0.05 10*3/MM3 (ref 0–0.2)
BASOPHILS NFR BLD AUTO: 0.4 % (ref 0–1.5)
CRP SERPL-MCNC: 0.59 MG/DL (ref 0–0.5)
EOSINOPHIL # BLD AUTO: 0.19 10*3/MM3 (ref 0–0.4)
EOSINOPHIL NFR BLD AUTO: 1.4 % (ref 0.3–6.2)
ERYTHROCYTE [DISTWIDTH] IN BLOOD BY AUTOMATED COUNT: 14.6 % (ref 12.3–15.4)
ERYTHROCYTE [SEDIMENTATION RATE] IN BLOOD BY WESTERGREN METHOD: 11 MM/HR (ref 0–20)
HCT VFR BLD AUTO: 36.2 % (ref 37.5–51)
HGB BLD-MCNC: 12.1 G/DL (ref 13–17.7)
IMM GRANULOCYTES # BLD AUTO: 0.15 10*3/MM3 (ref 0–0.05)
IMM GRANULOCYTES NFR BLD AUTO: 1.1 % (ref 0–0.5)
IRON SATN MFR SERPL: 17 % (ref 20–50)
IRON SERPL-MCNC: 47 MCG/DL (ref 59–158)
LYMPHOCYTES # BLD AUTO: 2.6 10*3/MM3 (ref 0.7–3.1)
LYMPHOCYTES NFR BLD AUTO: 18.7 % (ref 19.6–45.3)
MCH RBC QN AUTO: 29.7 PG (ref 26.6–33)
MCHC RBC AUTO-ENTMCNC: 33.4 G/DL (ref 31.5–35.7)
MCV RBC AUTO: 88.7 FL (ref 79–97)
MONOCYTES # BLD AUTO: 1.51 10*3/MM3 (ref 0.1–0.9)
MONOCYTES NFR BLD AUTO: 10.9 % (ref 5–12)
NEUTROPHILS # BLD AUTO: 9.41 10*3/MM3 (ref 1.7–7)
NEUTROPHILS NFR BLD AUTO: 67.5 % (ref 42.7–76)
NRBC BLD AUTO-RTO: 0 /100 WBC (ref 0–0.2)
PLATELET # BLD AUTO: 538 10*3/MM3 (ref 140–450)
RBC # BLD AUTO: 4.08 10*6/MM3 (ref 4.14–5.8)
TIBC SERPL-MCNC: 279 MCG/DL
UIBC SERPL-MCNC: 232 MCG/DL (ref 112–346)
WBC # BLD AUTO: 13.91 10*3/MM3 (ref 3.4–10.8)

## 2022-07-13 ENCOUNTER — OFFICE VISIT (OUTPATIENT)
Dept: INTERNAL MEDICINE | Facility: CLINIC | Age: 82
End: 2022-07-13

## 2022-07-13 VITALS
HEART RATE: 56 BPM | SYSTOLIC BLOOD PRESSURE: 120 MMHG | TEMPERATURE: 97.1 F | DIASTOLIC BLOOD PRESSURE: 80 MMHG | WEIGHT: 167 LBS | OXYGEN SATURATION: 97 % | BODY MASS INDEX: 24.73 KG/M2 | HEIGHT: 69 IN

## 2022-07-13 DIAGNOSIS — E78.5 HYPERLIPIDEMIA, UNSPECIFIED HYPERLIPIDEMIA TYPE: ICD-10-CM

## 2022-07-13 DIAGNOSIS — D50.0 IRON DEFICIENCY ANEMIA DUE TO CHRONIC BLOOD LOSS: ICD-10-CM

## 2022-07-13 DIAGNOSIS — D75.839 THROMBOCYTHEMIA: ICD-10-CM

## 2022-07-13 DIAGNOSIS — I10 ESSENTIAL HYPERTENSION: Primary | ICD-10-CM

## 2022-07-13 DIAGNOSIS — Z90.81 HISTORY OF SPLENECTOMY: ICD-10-CM

## 2022-07-13 DIAGNOSIS — E61.1 IRON DEFICIENCY: ICD-10-CM

## 2022-07-13 DIAGNOSIS — F41.9 ANXIETY: ICD-10-CM

## 2022-07-13 DIAGNOSIS — E11.40 TYPE 2 DIABETES MELLITUS WITH DIABETIC NEUROPATHY, WITHOUT LONG-TERM CURRENT USE OF INSULIN: ICD-10-CM

## 2022-07-13 PROCEDURE — 99214 OFFICE O/P EST MOD 30 MIN: CPT | Performed by: INTERNAL MEDICINE

## 2022-07-13 NOTE — PROGRESS NOTES
Subjective   Jairo Mercedes is a 81 y.o. male.     Chief Complaint   Patient presents with   • Follow-up     Recent lab results   • Hypertension   • Hyperlipidemia       History of Present Illness   patient here for follow-up. Blood pressure stable medication. Hyperlipidemia stable medication diabetes is stable medication. Iron deficient still patient still has anemia anxiety stable medication. Leukocytosis thrombocytosis patient does have a history of splenectomy.    Current Outpatient Medications:   •  acetaminophen (TYLENOL) 500 MG tablet, Take  by mouth Every 6 (Six) Hours As Needed., Disp: , Rfl:   •  albuterol sulfate HFA (Ventolin HFA) 108 (90 Base) MCG/ACT inhaler, Inhale 2 puffs Every 4 (Four) Hours As Needed for Wheezing., Disp: 18 g, Rfl: 2  •  ALPRAZolam (XANAX) 0.5 MG tablet, Every 12 (Twelve) Hours., Disp: , Rfl:   •  aspirin 81 MG EC tablet, Take 81 mg by mouth Daily., Disp: , Rfl:   •  atorvastatin (LIPITOR) 40 MG tablet, TAKE ONE TABLET BY MOUTH AT BEDTIME, Disp: 90 tablet, Rfl: 3  •  difluprednate (DUREZOL) 0.05 % ophthalmic emulsion, Administer 1 drop into the left eye 4 (Four) Times a Day. See admin instructions on AVS., Disp: , Rfl:   •  esomeprazole (nexIUM) 40 MG capsule, TAKE ONE CAPSULE BY MOUTH EVERY DAY, Disp: 30 capsule, Rfl: 11  •  fluocinonide (LIDEX) 0.05 % external solution, APPLY TOPICALLY AS DIRECTED AT BEDTIME TO affected area, Disp: , Rfl:   •  Fluticasone-Salmeterol (ADVAIR) 250-50 MCG/ACT DISKUS, Inhale 1 puff 2 (Two) Times a Day., Disp: 250 each, Rfl: 3  •  fluticasone-salmeterol (ADVAIR) 250-50 MCG/DOSE DISKUS, Inhale 1 puff by mouth twice a day., Disp: 60 each, Rfl: 3  •  isosorbide mononitrate (IMDUR) 30 MG 24 hr tablet, TAKE ONE TABLET BY MOUTH EVERY DAY AS DIRECTED, Disp: 90 tablet, Rfl: 3  •  ketoconazole (NIZORAL) 2 % shampoo, Shampoo with a small amount to skin once a day, Disp: , Rfl:   •  losartan (COZAAR) 50 MG tablet, TAKE ONE TABLET BY MOUTH EVERY DAY, Disp: 90  tablet, Rfl: 3  •  meclizine (ANTIVERT) 25 MG tablet, TAKE ONE TABLET BY MOUTH TWICE DAILY (Patient taking differently: As Needed.), Disp: 180 tablet, Rfl: 3  •  metoprolol tartrate (LOPRESSOR) 25 MG tablet, TAKE ONE TABLET BY MOUTH TWICE DAILY, Disp: 180 tablet, Rfl: 3  •  nitroglycerin (NITROSTAT) 0.4 MG SL tablet, Place 0.4 mg under the tongue Every 5 (Five) Minutes As Needed for Chest Pain., Disp: , Rfl:   •  Omega-3 Fatty Acids (FISH OIL) 1000 MG capsule capsule, Take 1,000 mg by mouth Daily With Breakfast., Disp: , Rfl:   •  sertraline (ZOLOFT) 25 MG tablet, Take 1 tablet by mouth Daily., Disp: 90 tablet, Rfl: 3  •  Spiriva HandiHaler 18 MCG per inhalation capsule, PLACE A CAPSULE INTO INHALER AND INHALE DAILY, Disp: 90 capsule, Rfl: 3  •  traZODone (DESYREL) 50 MG tablet, Take 0.5-1 tablets by mouth At Night As Needed (insomnia)., Disp: 90 tablet, Rfl: 3  •  triamcinolone (KENALOG) 0.025 % ointment, APPLY TO THE AFFECTED AREA(S) TWICE DAILY, Disp: 80 g, Rfl: 1    The following portions of the patient's history were reviewed and updated as appropriate: allergies, current medications, past family history, past medical history, past social history, past surgical history and problem list.    Review of Systems   Constitutional: Negative.    Respiratory: Negative.    Cardiovascular: Negative.    Gastrointestinal: Negative.    Musculoskeletal: Negative.    Skin: Negative.    Neurological: Negative.    Psychiatric/Behavioral: Negative.        Objective   Physical Exam  Cardiovascular:      Rate and Rhythm: Normal rate and regular rhythm.      Heart sounds: Normal heart sounds.   Pulmonary:      Effort: Pulmonary effort is normal.      Breath sounds: Normal breath sounds.   Abdominal:      General: Bowel sounds are normal.   Musculoskeletal:      Cervical back: Neck supple.   Skin:     General: Skin is warm.   Neurological:      Mental Status: He is alert and oriented to person, place, and time.         All tests have  been reviewed.    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    Iron deficiency anemia due to chronic blood loss patient had a history of IV iron transfusion patient is going to discuss with hematologist as soon as possible    Iron deficiency patient may need another transfusion    Anxiety stable medication    History of splenectomy    Hyperlipidemia, stable on atorvastatin 40 mg daily continue medication    Six months follow-up           Answers for HPI/ROS submitted by the patient on 7/11/2022  What is the primary reason for your visit?: Other  Please describe your symptoms.: Follow up  Have you had these symptoms before?: Yes  How long have you been having these symptoms?: 1-4 days  Please list any medications you are currently taking for this condition.: None  Please describe any probable cause for these symptoms. : None

## 2022-07-13 NOTE — PRE-PROCEDURE INSTRUCTIONS
PAT phone history completed with pt for upcoming procedure on 7/14/22 with Dr. Blake.    PAT PASS GIVEN/REVIEWED WITH PT.  VERBALIZED UNDERSTANDING OF THE FOLLOWING:  DO NOT EAT, DRINK, SMOKE, USE SMOKELESS TOBACCO OR CHEW GUM AFTER MIDNIGHT THE NIGHT BEFORE SURGERY.  THIS ALSO INCLUDES HARD CANDIES AND MINTS.    DO NOT SHAVE THE AREA TO BE OPERATED ON AT LEAST 48 HOURS PRIOR TO THE PROCEDURE.  DO NOT WEAR MAKE UP OR NAIL POLISH.  DO NOT LEAVE IN ANY PIERCING OR WEAR JEWELRY THE DAY OF SURGERY.      DO NOT USE ADHESIVES IF YOU WEAR DENTURES.    DO NOT WEAR EYE CONTACTS; BRING IN YOUR GLASSES.    ONLY TAKE MEDICATION THE MORNING OF YOUR PROCEDURE IF INSTRUCTED BY YOUR SURGEON WITH ENOUGH WATER TO SWALLOW THE MEDICATION.  IF YOUR SURGEON DID NOT SPECIFY WHICH MEDICATIONS TO TAKE, YOU WILL NEED TO CALL THEIR OFFICE FOR FURTHER INSTRUCTIONS AND DO AS THEY INSTRUCT.    LEAVE ANYTHING YOU CONSIDER VALUABLE AT HOME.    YOU WILL NEED TO ARRANGE FOR SOMEONE TO DRIVE YOU HOME AFTER SURGERY.  IT IS RECOMMENDED THAT YOU DO NOT DRIVE, WORK, DRINK ALCOHOL OR MAKE MAJOR DECISIONS FOR AT LEAST 24 HOURS AFTER YOUR PROCEDURE IS COMPLETE.      THE DAY OF YOUR PROCEDURE, BRING IN THE FOLLOWING IF APPLICABLE:   PICTURE ID AND INSURANCE/MEDICARE OR MEDICAID CARDS/ANY CO-PAY THAT MAY BE DUE   COPY OF ADVANCED DIRECTIVE/LIVING WILL/POWER OR    CPAP/BIPAP/INHALERS   SKIN PREP SHEET   YOUR PREADMISSION TESTING PASS (IF NOT A PHONE HISTORY)

## 2022-07-14 ENCOUNTER — ANESTHESIA EVENT (OUTPATIENT)
Dept: PERIOP | Facility: HOSPITAL | Age: 82
End: 2022-07-14

## 2022-07-14 ENCOUNTER — ANESTHESIA (OUTPATIENT)
Dept: PERIOP | Facility: HOSPITAL | Age: 82
End: 2022-07-14

## 2022-07-14 ENCOUNTER — HOSPITAL ENCOUNTER (OUTPATIENT)
Facility: HOSPITAL | Age: 82
Setting detail: HOSPITAL OUTPATIENT SURGERY
Discharge: HOME | End: 2022-07-14
Attending: OPHTHALMOLOGY | Admitting: OPHTHALMOLOGY

## 2022-07-14 VITALS
BODY MASS INDEX: 24.88 KG/M2 | RESPIRATION RATE: 18 BRPM | WEIGHT: 168 LBS | OXYGEN SATURATION: 95 % | TEMPERATURE: 97.4 F | HEART RATE: 52 BPM | DIASTOLIC BLOOD PRESSURE: 54 MMHG | HEIGHT: 69 IN | SYSTOLIC BLOOD PRESSURE: 115 MMHG

## 2022-07-14 DIAGNOSIS — H25.11 NUCLEAR SCLEROTIC CATARACT OF RIGHT EYE: ICD-10-CM

## 2022-07-14 PROCEDURE — 25010000002 PROPOFOL 200 MG/20ML EMULSION: Performed by: NURSE ANESTHETIST, CERTIFIED REGISTERED

## 2022-07-14 PROCEDURE — V2632 POST CHMBR INTRAOCULAR LENS: HCPCS | Performed by: OPHTHALMOLOGY

## 2022-07-14 DEVICE — LENS ACRYSOF IQ SA60WF W/ULTRASERT 6X13MM ACU0T0 23.5: Type: IMPLANTABLE DEVICE | Site: POSTERIOR CHAMBER | Status: FUNCTIONAL

## 2022-07-14 RX ORDER — SODIUM CHLORIDE 0.9 % (FLUSH) 0.9 %
10 SYRINGE (ML) INJECTION EVERY 12 HOURS SCHEDULED
Status: DISCONTINUED | OUTPATIENT
Start: 2022-07-14 | End: 2022-07-14 | Stop reason: HOSPADM

## 2022-07-14 RX ORDER — CYCLOPENTOLATE HYDROCHLORIDE 20 MG/ML
1 SOLUTION/ DROPS OPHTHALMIC
Status: COMPLETED | OUTPATIENT
Start: 2022-07-14 | End: 2022-07-14

## 2022-07-14 RX ORDER — DIFLUPREDNATE OPHTHALMIC 0.5 MG/ML
EMULSION OPHTHALMIC AS NEEDED
Status: DISCONTINUED | OUTPATIENT
Start: 2022-07-14 | End: 2022-07-14 | Stop reason: HOSPADM

## 2022-07-14 RX ORDER — PHENYLEPHRINE HCL 2.5 %
1 DROPS OPHTHALMIC (EYE)
Status: COMPLETED | OUTPATIENT
Start: 2022-07-14 | End: 2022-07-14

## 2022-07-14 RX ORDER — TETRACAINE HYDROCHLORIDE 5 MG/ML
SOLUTION OPHTHALMIC AS NEEDED
Status: DISCONTINUED | OUTPATIENT
Start: 2022-07-14 | End: 2022-07-14 | Stop reason: HOSPADM

## 2022-07-14 RX ORDER — KETAMINE HCL IN NACL, ISO-OSM 100MG/10ML
SYRINGE (ML) INJECTION AS NEEDED
Status: DISCONTINUED | OUTPATIENT
Start: 2022-07-14 | End: 2022-07-14 | Stop reason: SURG

## 2022-07-14 RX ORDER — LIDOCAINE HYDROCHLORIDE 40 MG/ML
INJECTION, SOLUTION RETROBULBAR; TOPICAL AS NEEDED
Status: DISCONTINUED | OUTPATIENT
Start: 2022-07-14 | End: 2022-07-14 | Stop reason: HOSPADM

## 2022-07-14 RX ORDER — BALANCED SALT SOLUTION 6.4; .75; .48; .3; 3.9; 1.7 MG/ML; MG/ML; MG/ML; MG/ML; MG/ML; MG/ML
SOLUTION OPHTHALMIC AS NEEDED
Status: DISCONTINUED | OUTPATIENT
Start: 2022-07-14 | End: 2022-07-14 | Stop reason: HOSPADM

## 2022-07-14 RX ORDER — ACETAZOLAMIDE 500 MG/1
500 CAPSULE, EXTENDED RELEASE ORAL ONCE
Status: COMPLETED | OUTPATIENT
Start: 2022-07-14 | End: 2022-07-14

## 2022-07-14 RX ORDER — PREDNISOLONE ACETATE 10 MG/ML
SUSPENSION/ DROPS OPHTHALMIC AS NEEDED
Status: DISCONTINUED | OUTPATIENT
Start: 2022-07-14 | End: 2022-07-14 | Stop reason: HOSPADM

## 2022-07-14 RX ORDER — TETRACAINE HYDROCHLORIDE 5 MG/ML
1 SOLUTION OPHTHALMIC SEE ADMIN INSTRUCTIONS
Status: COMPLETED | OUTPATIENT
Start: 2022-07-14 | End: 2022-07-14

## 2022-07-14 RX ORDER — SODIUM CHLORIDE, SODIUM LACTATE, POTASSIUM CHLORIDE, CALCIUM CHLORIDE 600; 310; 30; 20 MG/100ML; MG/100ML; MG/100ML; MG/100ML
1000 INJECTION, SOLUTION INTRAVENOUS CONTINUOUS
Status: DISCONTINUED | OUTPATIENT
Start: 2022-07-14 | End: 2022-07-14 | Stop reason: HOSPADM

## 2022-07-14 RX ORDER — PROPOFOL 10 MG/ML
INJECTION, EMULSION INTRAVENOUS AS NEEDED
Status: DISCONTINUED | OUTPATIENT
Start: 2022-07-14 | End: 2022-07-14 | Stop reason: SURG

## 2022-07-14 RX ORDER — DIFLUPREDNATE OPHTHALMIC 0.5 MG/ML
1 EMULSION OPHTHALMIC SEE ADMIN INSTRUCTIONS
Status: DISCONTINUED | OUTPATIENT
Start: 2022-07-14 | End: 2022-07-14 | Stop reason: HOSPADM

## 2022-07-14 RX ORDER — SODIUM CHLORIDE 0.9 % (FLUSH) 0.9 %
1-10 SYRINGE (ML) INJECTION AS NEEDED
Status: DISCONTINUED | OUTPATIENT
Start: 2022-07-14 | End: 2022-07-14 | Stop reason: HOSPADM

## 2022-07-14 RX ADMIN — Medication 25 MG: at 09:07

## 2022-07-14 RX ADMIN — PROPOFOL 50 MG: 10 INJECTION, EMULSION INTRAVENOUS at 09:07

## 2022-07-14 RX ADMIN — SODIUM CHLORIDE, POTASSIUM CHLORIDE, SODIUM LACTATE AND CALCIUM CHLORIDE 1000 ML: 600; 310; 30; 20 INJECTION, SOLUTION INTRAVENOUS at 08:44

## 2022-07-14 RX ADMIN — CYCLOPENTOLATE HYDROCHLORIDE 1 DROP: 20 SOLUTION/ DROPS OPHTHALMIC at 08:47

## 2022-07-14 RX ADMIN — TETRACAINE HYDROCHLORIDE 1 DROP: 5 SOLUTION OPHTHALMIC at 08:35

## 2022-07-14 RX ADMIN — ACETAZOLAMIDE 500 MG: 500 CAPSULE, EXTENDED RELEASE ORAL at 09:40

## 2022-07-14 RX ADMIN — TETRACAINE HYDROCHLORIDE 1 DROP: 5 SOLUTION OPHTHALMIC at 08:36

## 2022-07-14 RX ADMIN — CYCLOPENTOLATE HYDROCHLORIDE 1 DROP: 20 SOLUTION/ DROPS OPHTHALMIC at 08:37

## 2022-07-14 RX ADMIN — PHENYLEPHRINE HYDROCHLORIDE 1 DROP: 25 SOLUTION/ DROPS OPHTHALMIC at 08:37

## 2022-07-14 RX ADMIN — PHENYLEPHRINE HYDROCHLORIDE 1 DROP: 25 SOLUTION/ DROPS OPHTHALMIC at 08:42

## 2022-07-14 RX ADMIN — PHENYLEPHRINE HYDROCHLORIDE 1 DROP: 25 SOLUTION/ DROPS OPHTHALMIC at 08:47

## 2022-07-14 RX ADMIN — CYCLOPENTOLATE HYDROCHLORIDE 1 DROP: 20 SOLUTION/ DROPS OPHTHALMIC at 08:42

## 2022-07-14 NOTE — H&P
[facility]         History and Physical    Patient Name: Jairo Mercedes  MRN: [id number]  : 1940  Gender: male     HPI: Patient complaint of PPLOV Right eye diagnosed with cataract. Patient requests PHACO PCIOL for Increase of VA/ADL.    History:    Past Medical History:   Diagnosis Date   • Abdominal pain    • Acute sinusitis    • Allergic N/A   • Anemia    • Anxiety    • Arthritis    • Asthma N/A   • Bladder cancer ([facility]) 2021   • Cataracts, bilateral    • COPD (chronic obstructive pulmonary disease) ([facility])    • Coronary artery disease N/A    Some Blockage   • Diabetes mellitus ([facility])     REPORTS DIET CONTROL ONLY, TAKES NO MEDICATION   • Emphysema of lung ([facility])    • GERD (gastroesophageal reflux disease)    • Heme positive stool    • History of ankle fracture     LEFT    • History of cardiovascular stress test     REPORTS IN HIS LATE 50'S AND THAT ALL WAS WNL'S AT THAT TIME   • History of echocardiogram     REPORTS HAS HAD 2. DOES NOT REPORT ANY HX OF CHEST PAIN.   • History of pneumonia    • [place] (hard of hearing)     WEARS HEARING AIDS   • Hypertension    • Melanoma ([facility])        • Oxygen dependent     2 L NC HS   • Presence of urostomy ([facility]) 06/15/2021   • Seasonal allergies    • Wears glasses    • Wears partial dentures        Past Surgical History:   Procedure Laterality Date   • BRAIN SURGERY      REPORTS FOR SUBDURAL HEMATOMA    • BRONCHOSCOPY N/A 2018    Procedure: BRONCHOSCOPY DIAGNOSTIC WITH WASHINGS, BRUSHINGS AND BIOPSIES;  Surgeon: Morgan Frias MD;  Location: [facility];  Service: Pulmonary   • BRONCHOSCOPY     • ALEX HOLE FOR SUBDURAL HEMATOMA     • CARDIAC CATHETERIZATION      REPORTS APPROXIMATELY  AND REPORTS NO STENTS WERE PLACED   • CATARACT EXTRACTION W/ INTRAOCULAR LENS IMPLANT Left 2022    Procedure: CATARACT PHACO EXTRACTION WITH INTRAOCULAR LENS IMPLANT LEFT COMPLICATED WITH MALYUGIN RING;  Surgeon: Mack Blake MD;   Location: [facility] OR;  Service: Ophthalmology;  Laterality: Left;   • CHOLECYSTECTOMY     • COLONOSCOPY     • COLONOSCOPY N/A 2018    Procedure: COLONOSCOPY WITH HOT FORCEP POLYPECTOMY X2  ;  Surgeon: Sara Reynoso MD;  Location: [facility] ENDOSCOPY;  Service: Gastroenterology   • ENDOSCOPY     • LIVER BIOPSY     • LYMPH NODE BIOPSY     • MOLE REMOVAL  2005    MELANOMA ON BACK   • OTHER SURGICAL HISTORY Right     UROSTOMY   • SPLENECTOMY     • THORACOTOMY  2015    Left thoracotomy and stapling of pulmonary blebs. Mechanical pleurodesis.   • TONSILLECTOMY     • TRANSURETHRAL RESECTION OF BLADDER TUMOR N/A 2021    Procedure: TRANSURETHRAL RESECTION OF BLADDER TUMOR;  Surgeon: Osmani Francois MD;  Location: [facility] OR;  Service: Urology;  Laterality: N/A;   • WISDOM TOOTH EXTRACTION         Social History     Socioeconomic History   • Marital status:    Tobacco Use   • Smoking status: Former Smoker     Packs/day: 3.00     Years: 42.00     Pack years: 126.00     Types: Cigarettes     Start date: 1957     Quit date: 1999     Years since quittin.4   • Smokeless tobacco: Never Used   Vaping Use   • Vaping Use: Never used   Substance and Sexual Activity   • Alcohol use: Not Currently   • Drug use: No   • Sexual activity: Defer     Partners: Female       Family History   Problem Relation Age of Onset   • Alzheimer's disease Mother    • Arthritis Mother    • Heart disease Father    • Hyperlipidemia Father    • Cancer Other    • Cancer Maternal Grandmother        Prior to Admission Medications:  Medications Prior to Admission   Medication Sig Dispense Refill Last Dose   • acetaminophen (TYLENOL) 500 MG tablet Take  by mouth Every 6 (Six) Hours As Needed.   Past Month at Unknown time   • albuterol sulfate HFA (Ventolin HFA) 108 (90 Base) MCG/ACT inhaler Inhale 2 puffs Every 4 (Four) Hours As Needed for Wheezing. 18 g 2 2022 at 1200   • ALPRAZolam (XANAX) 0.5 MG tablet Every 12  (Twelve) Hours.   7/13/2022 at Unknown time   • aspirin 81 MG EC tablet Take 81 mg by mouth Daily.   7/13/2022 at 0800   • atorvastatin (LIPITOR) 40 MG tablet TAKE ONE TABLET BY MOUTH AT BEDTIME 90 tablet 3 7/13/2022 at 2100   • difluprednate (DUREZOL) 0.05 % ophthalmic emulsion Administer 1 drop into the left eye 4 (Four) Times a Day. See admin instructions on AVS.   7/13/2022 at Unknown time   • esomeprazole (nexIUM) 40 MG capsule TAKE ONE CAPSULE BY MOUTH EVERY DAY 30 capsule 11 7/13/2022 at 0800   • fluocinonide (LIDEX) 0.05 % external solution APPLY TOPICALLY AS DIRECTED AT BEDTIME TO affected area   7/13/2022 at Unknown time   • Fluticasone-Salmeterol (ADVAIR) 250-50 MCG/ACT DISKUS Inhale 1 puff 2 (Two) Times a Day. 250 each 3 7/13/2022 at Unknown time   • fluticasone-salmeterol (ADVAIR) 250-50 MCG/DOSE DISKUS Inhale 1 puff by mouth twice a day. 60 each 3 7/13/2022 at Unknown time   • isosorbide mononitrate (IMDUR) 30 MG 24 hr tablet TAKE ONE TABLET BY MOUTH EVERY DAY AS DIRECTED 90 tablet 3 7/13/2022 at 0800   • ketoconazole (NIZORAL) 2 % shampoo Shampoo with a small amount to skin once a day   7/13/2022 at Unknown time   • losartan (COZAAR) 50 MG tablet TAKE ONE TABLET BY MOUTH EVERY DAY 90 tablet 3 7/13/2022 at 0800   • metoprolol tartrate (LOPRESSOR) 25 MG tablet TAKE ONE TABLET BY MOUTH TWICE DAILY 180 tablet 3 7/13/2022 at 2100   • Omega-3 Fatty Acids (FISH OIL) 1000 MG capsule capsule Take 1,000 mg by mouth Daily With Breakfast.   7/14/2022 at 0530   • sertraline (ZOLOFT) 25 MG tablet Take 1 tablet by mouth Daily. 90 tablet 3 7/13/2022 at 0800   • Spiriva HandiHaler 18 MCG per inhalation capsule PLACE A CAPSULE INTO INHALER AND INHALE DAILY 90 capsule 3 7/13/2022 at Unknown time   • traZODone (DESYREL) 50 MG tablet Take 0.5-1 tablets by mouth At Night As Needed (insomnia). 90 tablet 3 7/13/2022 at Unknown time   • triamcinolone (KENALOG) 0.025 % ointment APPLY TO THE AFFECTED AREA(S) TWICE DAILY 80 g 1  Past Month at Unknown time   • meclizine (ANTIVERT) 25 MG tablet TAKE ONE TABLET BY MOUTH TWICE DAILY (Patient taking differently: As Needed.) 180 tablet 3 More than a month at Unknown time   • nitroglycerin (NITROSTAT) 0.4 MG SL tablet Place 0.4 mg under the tongue Every 5 (Five) Minutes As Needed for Chest Pain.   Unknown at Unknown time       Allergies:  Allergies   Allergen Reactions   • Parabens Rash        Vitals: Temp:  [97.1 °F (36.2 °C)-97.5 °F (36.4 °C)] 97.5 °F (36.4 °C)  Heart Rate:  [50-56] 50  Resp:  [18] 18  BP: (105-120)/(53-80) 105/53    Review of Systems:   Within Normal Limits Abnormal   HEENT [x]    []     Cardiovascular [x]   []     Gastrointestinal [x]   []     Genitourinary [x]   []     Neurologic [x]   []     Pulmonary [x]   []       Physical Exam:   Within Normal Limits Abnormal   HEENT [x]    []     Heart [x]   []     Lungs [x]   []     Abdomen [x]   []     Extremities [x]   []       Impression: Right nuclear sclerotic cataract.     Plan: CATARACT PHACO EXTRACTION WITH INTRAOCULAR LENS IMPLANT RIGHT (Right)     Mack Blake MD  7/14/2022

## 2022-07-14 NOTE — OP NOTE
OPERATIVE NOTE    Date of Procedure: 7/14/2022  Patient Name: Jairo Mercedes  Patient MRN: [id number]  YOB: 1940     Preoperative Diagnosis: Right nuclear sclerotic cataract.     Postoperative Diagnosis: Right nuclear sclerotic cataract.     Procedure Performed: Phacoemulsification with implantation of a  foldable posterior chamber intraocular lens, Right eye.     Surgeon: Mack Blake MD     Anesthesia:  Monitored Anesthesia Care (MAC)      Brief History and Indication: The patient presents with a history of past progressive loss of vision.  Patient was diagnosed with cataract and requests removal for increased ability to read and see.     Operation Description: The patient was taken to the OR and prepped and draped in the usual sterile ophthalmic fashion. A lid speculum was placed in the eye.  A #75 blade was then used to make a stab incision two o’clock hours from the intended temporal clear cornea groove. The anterior chamber was then inflated with a Viscoelastic. A metal microkeratome blade was then used to enter the anterior chamber at the temporal clear cornea site. A three level tunnel incision was made. A curvilinear capsulorrhexis was then performed with a bent cystotome needle and capsulorrhexis forceps.  BSS on a 30 gauge bent cannula was used to hydro-dissect, and hydro-delineate the lens. Good fluid waves and hydro-delineation were noted. Phacoemulsification was then used to remove nuclear material without complications. The residual cortical and lenticular material was then removed with irrigation and aspiration. Viscoelastics were then used to inflate the bag in a soft shell technique. A PCIOL was injected into the bag. Post-implantation, there were no rents or tears in the bag and the lens was noted to be stable and centered. The residual Viscoelastic was then removed with irrigation and aspiration.  The wound was checked and found to be without leaks. Therefore a Polydex  ointment and one drop of Durezol eye drop was placed in the eye.     Implant Information:   Implant Name Type Inv. Item Serial No.  Lot No. LRB No. Used Action   LENS ACRYSOF IQ [id number] W/ULTRASERT [id number] 23.5 - [id number] - [id number] Implant LENS ACRYSOF IQ [id number] W/ULTRASERT [id number] 23.5 [id number] [id number]  Right 1 Implanted       Complications: None    Estimated Blood Loss:  Less than 1 cc.       [x]   Changed to complex procedure due to: []  hypermature, white cataract. Blue dye was used. [x]   small iris. A Malyugin Ring was used.    Discharge and Condition  The patient was transported to same day surgery in excellent condition and scheduled for follow-up tomorrow morning. The patient was given instructions on use of eye drops for the operative eye and was specifically instructed to call Dr. Blake at his office or home for any nausea, vomiting, headache, decreased visual acuity, or pain, or if the patient had any general concerns.    Mack Blake MD  7/14/2022

## 2022-07-14 NOTE — ANESTHESIA POSTPROCEDURE EVALUATION
Patient: Jairo Mercedes    Procedure Summary     Date: 07/14/22 Room / Location: [facility] OR 1 /  [facility]    Anesthesia Start: 0902 Anesthesia Stop: 0924    Procedure: CATARACT PHACO EXTRACTION WITH INTRAOCULAR LENS IMPLANT RIGHT COMPLICATED WITH MALYUGIN RING (Right Eye) Diagnosis:       Nuclear sclerotic cataract of right eye      (Nuclear sclerotic cataract of right eye [H25.11])    Surgeons: Mack Blake MD Provider: Sid Watson CRNA    Anesthesia Type: MAC ASA Status: 4          Anesthesia Type: MAC    Vitals  No vitals data found for the desired time range.          Post Anesthesia Care and Evaluation    Patient location during evaluation: PHASE II  Patient participation: complete - patient participated  Level of consciousness: awake  Pain score: 0  Pain management: adequate    Airway patency: patent  Anesthetic complications: No anesthetic complications  PONV Status: controlled  Cardiovascular status: acceptable and stable  Respiratory status: acceptable and room air  Hydration status: acceptable    Comments: Vital signs as noted in nursing documentation as per protocol

## 2022-07-14 NOTE — ANESTHESIA PREPROCEDURE EVALUATION
Anesthesia Evaluation     Patient summary reviewed and Nursing notes reviewed   no history of anesthetic complications:  NPO Solid Status: > 8 hours  NPO Liquid Status: > 8 hours           Airway   Mallampati: II  TM distance: >3 FB  Neck ROM: full  Possible difficult intubation  Dental - normal exam     Pulmonary    (+) pneumonia resolved , a smoker Former Abstained day of surgery, COPD moderate, asthma,home oxygen (2l NC at home HS), shortness of breath, sleep apnea, decreased breath sounds,   Cardiovascular - normal exam  Exercise tolerance: good (4-7 METS)    ECG reviewed  PT is on anticoagulation therapy  Patient on routine beta blocker and Beta blocker given within 24 hours of surgery    (+) hypertension well controlled less than 2 medications, CAD, THOMAS, PVD, hyperlipidemia,     ROS comment: ASA 81 mg     Pt has 3 cm infrarenal AAA according to report.     Neuro/Psych  (+) psychiatric history Anxiety,    GI/Hepatic/Renal/Endo    (+)  GERD well controlled,  liver disease, diabetes mellitus (diet controlled) well controlled,     ROS Comment: Urostomy    Musculoskeletal     (+) arthralgias, back pain, chronic pain, myalgias,   Abdominal   (+) obese,    Substance History - negative use     OB/GYN negative ob/gyn ROS         Other   arthritis, blood dyscrasia anemia,   history of cancer      Other Comment: Melanoma 2013  ROS/Med Hx Other: labs reviewed  cxr chronic findings nad  ekg sr short pr    11/2018  Negative stress test  EF 57%      Phys Exam Other: [place], wears hearing aides                  Anesthesia Plan    ASA 4     MAC     (Patient advised that intravenous sedation would be utilized as primary anesthetic technique. Every effort will be made to make sure patient is comfortable. Patient advised that they may experience recall of events of the procedure. Patient verbalized understanding and agreed to plan. )  intravenous induction     Anesthetic plan, risks, benefits, and alternatives have been provided,  discussed and informed consent has been obtained with: patient.

## 2022-07-14 NOTE — DISCHARGE INSTRUCTIONS
Post Operative Cataract Instructions     Right Eye  POST OPERATIVE INSTRUCTIONS  You have received anesthesia today. DO NOT drive, drink alcohol, sign legal documents.   After surgery, your eye will not hurt. It may feel scratchy, sticky or uncomfortable. Your eye will be sensitive to light.  Most people see better 1-3 days after the procedure, but it could take 3 weeks to get the full benefits and reach your visual potential. If your vision is blurry for a few days it is normal, and means you may have swelling outside or inside the eye. For some patients, a bubble is placed and there will be blurriness.   You should receive a post-op kit with tape and an eye shield. Wear the shield for the first 3 nights after surgery to keep you from rubbing the eye.  You may wear glasses or sunglasses during the day.  You must keep eye protected at all times.  Most people are able to return to their normal routine 1-3 days after surgery, however, due to the brain adjusting to your new vision you may have trouble judging distances and want to be careful when driving and going up and downstairs.   You can shower and wash your hair the day after surgery. Keep water, shampoo, hair spray and shaving lotion out of the eye, especially for the first week.   If eyes become stuck together after surgery you may soak with warm cloth.  During the first week, you should AVOID:   Rubbing or putting pressure on your eye.  Eye make-up, face cream or lotion, hair coloring or perms  Strenuous activities, such as running or lifting weights, as to avoid sweat from getting in the eye. Avoid swimming, hot tubs, fumes or silas conditions.   Sleeping on operative side.  Excessive sneezing or coughing.  Hanging the head down for periods of time. Keep your head above your heart.    Some discomfort and blurred vision after surgery are normal, but if you have any unusual pain, swelling, bleeding or sudden decrease in vision, contact our office immediately.      Emergency assistance is available at any time by calling:    Dr.Mark Lou Blake  [phone]    If unable to reach call [facility] @ [phone]    You have been prescribed an eye drop to use after surgery, please follow these instructions and bring eye drops and instructions with you to post op appointment:    Difluprednate (DUREZOL) 0.05 %. Shake well before use.    USE 1 DROP 4 (FOUR) TIMES A DAY FOR 1 WEEK THEN STARTING WEEK 2, ONLY USE 2 (TWO) TIMES A DAY UNTIL YOU RUN OUT OF DROPS.     PLACE A PHIL IN THE DAY COLUMN EACH TIME YOU USE TO KEEP ON SCHEDULE    WEEK 1-USE 4  (FOUR) TIMES A DAY DAY 1  []   []    []   []     DAY 2  []   []    []   []  DAY 3  []   []    []   []  DAY 4  []   []    []   []  DAY 5  []   []    []   []  DAY 6  []   []    []   []  DAY 7  []   []    []   []      WEEK 2-USE 2 (TWO)  TIMES A DAY DAY 1  []   []  DAY 2  []   []  DAY 3  []   []  DAY 4  []   []  DAY 5  []   []  DAY 6  []   []  DAY 7  []   []      WEEK 3-USE 2 (TWO) TIMES A DAY DAY 1  []   []  DAY 2  []   []  DAY 3  []   []  DAY 4  []   []  DAY 5  []   []  DAY 6  []   []  DAY 7  []   []      WEEK 4-USE 2 (TWO)TIMES A DAY DAY 1  []   []  DAY 2  []   []  DAY 3  []   []  DAY 4  []   []  DAY 5  []   []  DAY 6  []   []  DAY 7  []   []       Please follow all post op instructions and follow up appointment time from your physician's office included in your discharge packet.  .  No pushing, pulling, tugging,  heavy lifting, or strenuous activity.  No major decision making, driving, or drinking alcoholic beverages for 24 hours. ( due to the medications you have  received)  Always use good hand hygiene/washing techniques.  NO driving while taking pain medications.    * if you have an incision:  Check your incision area every day for signs of infection.   Check for:  * more redness, swelling, or pain  *more fluid or blood  *warmth  *pus or bad smell To  assist you in voiding:  Drink plenty of fluids  Listen to running water while attempting to void.    If you are unable to urinate and you have an uncomfortable urge to void or it has been   6 hours since you were discharged, return to the Emergency Room

## 2022-07-26 ENCOUNTER — PATIENT MESSAGE (OUTPATIENT)
Dept: INTERNAL MEDICINE | Facility: CLINIC | Age: 82
End: 2022-07-26

## 2022-07-26 RX ORDER — FLUTICASONE PROPIONATE AND SALMETEROL 250; 50 UG/1; UG/1
1 POWDER RESPIRATORY (INHALATION)
Qty: 250 EACH | Refills: 3 | Status: SHIPPED | OUTPATIENT
Start: 2022-07-26 | End: 2022-11-22

## 2022-07-26 NOTE — TELEPHONE ENCOUNTER
From: Jairo Mercedes  To: Maicol Cardenas MD  Sent: 7/26/2022 8:31 AM EDT  Subject: Refill    Hi Dr. Cardenas     I would like a refill on [place] it’s the inhaler I take twice a day.  Thanks   Latoya Mercedes

## 2022-07-27 RX ORDER — TRIAMCINOLONE ACETONIDE 0.25 MG/G
OINTMENT TOPICAL
Qty: 80 G | Refills: 1 | Status: SHIPPED | OUTPATIENT
Start: 2022-07-27

## 2022-07-27 NOTE — TELEPHONE ENCOUNTER
Rx Refill Note  Requested Prescriptions     Pending Prescriptions Disp Refills   • triamcinolone (KENALOG) 0.025 % ointment 80 g 1      Last office visit with prescribing clinician: 7/13/2022      Next office visit with prescribing clinician: 1/9/2023            Teresa Bates MA  07/27/22, 10:03 EDT

## 2022-08-08 DIAGNOSIS — I10 ESSENTIAL HYPERTENSION: ICD-10-CM

## 2022-08-08 RX ORDER — LOSARTAN POTASSIUM 50 MG/1
TABLET ORAL
Qty: 90 TABLET | Refills: 3 | Status: SHIPPED | OUTPATIENT
Start: 2022-08-08

## 2022-08-08 NOTE — TELEPHONE ENCOUNTER
Rx Refill Note  Requested Prescriptions     Pending Prescriptions Disp Refills   • losartan (COZAAR) 50 MG tablet [Pharmacy Med Name: losartan 50 mg tablet] 90 tablet 3     Sig: TAKE ONE TABLET BY MOUTH EVERY DAY      Last office visit with prescribing clinician: 7/13/2022      Next office visit with prescribing clinician: 1/9/2023            Teresa Bates MA  08/08/22, 10:18 EDT

## 2022-10-20 ENCOUNTER — TELEMEDICINE (OUTPATIENT)
Dept: INTERNAL MEDICINE | Facility: CLINIC | Age: 82
End: 2022-10-20

## 2022-10-20 DIAGNOSIS — R39.9 URINARY TRACT INFECTION SYMPTOMS: ICD-10-CM

## 2022-10-20 DIAGNOSIS — J06.9 UPPER RESPIRATORY TRACT INFECTION, UNSPECIFIED TYPE: Primary | ICD-10-CM

## 2022-10-20 LAB
BILIRUB BLD-MCNC: NEGATIVE MG/DL
CLARITY, POC: ABNORMAL
COLOR UR: YELLOW
EXPIRATION DATE: ABNORMAL
GLUCOSE UR STRIP-MCNC: NEGATIVE MG/DL
KETONES UR QL: NEGATIVE
LEUKOCYTE EST, POC: ABNORMAL
Lab: ABNORMAL
NITRITE UR-MCNC: NEGATIVE MG/ML
PH UR: 6 [PH] (ref 5–8)
PROT UR STRIP-MCNC: ABNORMAL MG/DL
RBC # UR STRIP: ABNORMAL /UL
SP GR UR: 1.01 (ref 1–1.03)
UROBILINOGEN UR QL: NORMAL

## 2022-10-20 PROCEDURE — 99214 OFFICE O/P EST MOD 30 MIN: CPT | Performed by: INTERNAL MEDICINE

## 2022-10-20 PROCEDURE — 81003 URINALYSIS AUTO W/O SCOPE: CPT | Performed by: INTERNAL MEDICINE

## 2022-10-20 RX ORDER — SULFAMETHOXAZOLE AND TRIMETHOPRIM 800; 160 MG/1; MG/1
1 TABLET ORAL 2 TIMES DAILY
Qty: 14 TABLET | Refills: 0 | Status: SHIPPED | OUTPATIENT
Start: 2022-10-20 | End: 2022-11-06

## 2022-10-20 NOTE — PROGRESS NOTES
Subjective   Jairo Mercedes is a 82 y.o. male.     Chief Complaint   Patient presents with   • URI     2 days; slight cough, SOB, chills, fever (100), congestion       History of Present Illness the patient is located at home and I am in office at [facility] location  2 day cough sob and chill fever loss of appetite congestion, covid home test negative. Fatigue without achy. No wheeze or running nose, no sore throat.tylenol helps with fever .    Urine smell strong , chronic after bladder ca surgery.     Current Outpatient Medications:   •  acetaminophen (TYLENOL) 500 MG tablet, Take  by mouth Every 6 (Six) Hours As Needed., Disp: , Rfl:   •  ALPRAZolam (XANAX) 0.5 MG tablet, Every 12 (Twelve) Hours., Disp: , Rfl:   •  aspirin 81 MG EC tablet, Take 81 mg by mouth Daily., Disp: , Rfl:   •  atorvastatin (LIPITOR) 40 MG tablet, TAKE ONE TABLET BY MOUTH AT BEDTIME, Disp: 90 tablet, Rfl: 3  •  esomeprazole (nexIUM) 40 MG capsule, TAKE ONE CAPSULE BY MOUTH EVERY DAY, Disp: 30 capsule, Rfl: 11  •  fluocinonide (LIDEX) 0.05 % external solution, APPLY TOPICALLY AS DIRECTED AT BEDTIME TO affected area, Disp: , Rfl:   •  Fluticasone-Salmeterol (ADVAIR) 250-50 MCG/ACT DISKUS, Inhale 1 puff 2 (Two) Times a Day., Disp: 250 each, Rfl: 3  •  isosorbide mononitrate (IMDUR) 30 MG 24 hr tablet, TAKE ONE TABLET BY MOUTH EVERY DAY AS DIRECTED, Disp: 90 tablet, Rfl: 3  •  ketoconazole (NIZORAL) 2 % shampoo, Shampoo with a small amount to skin once a day, Disp: , Rfl:   •  losartan (COZAAR) 50 MG tablet, TAKE ONE TABLET BY MOUTH EVERY DAY, Disp: 90 tablet, Rfl: 3  •  metoprolol tartrate (LOPRESSOR) 25 MG tablet, TAKE ONE TABLET BY MOUTH TWICE DAILY, Disp: 180 tablet, Rfl: 3  •  nitroglycerin (NITROSTAT) 0.4 MG SL tablet, Place 0.4 mg under the tongue Every 5 (Five) Minutes As Needed for Chest Pain., Disp: , Rfl:   •  Omega-3 Fatty Acids (FISH OIL) 1000 MG capsule capsule, Take 1,000 mg by mouth Daily With Breakfast., Disp: ,  Rfl:   •  sertraline (ZOLOFT) 25 MG tablet, Take 1 tablet by mouth Daily., Disp: 90 tablet, Rfl: 3  •  Spiriva HandiHaler 18 MCG per inhalation capsule, PLACE A CAPSULE INTO INHALER AND INHALE DAILY, Disp: 90 capsule, Rfl: 3  •  traZODone (DESYREL) 50 MG tablet, Take 0.5-1 tablets by mouth At Night As Needed (insomnia)., Disp: 90 tablet, Rfl: 3  •  triamcinolone (KENALOG) 0.025 % ointment, Apply to the affected areas(s) twice daily, Disp: 80 g, Rfl: 1  •  difluprednate (DUREZOL) 0.05 % ophthalmic emulsion, Administer 1 drop into the left eye 4 (Four) Times a Day. See admin instructions on AVS., Disp: , Rfl:   •  fluticasone-salmeterol (ADVAIR) 250-50 MCG/DOSE DISKUS, Inhale 1 puff by mouth twice a day., Disp: 60 each, Rfl: 3  •  meclizine (ANTIVERT) 25 MG tablet, TAKE ONE TABLET BY MOUTH TWICE DAILY (Patient taking differently: As Needed.), Disp: 180 tablet, Rfl: 3  •  sulfamethoxazole-trimethoprim (Bactrim DS) 800-160 MG per tablet, Take 1 tablet by mouth 2 (Two) Times a Day., Disp: 14 tablet, Rfl: 0    The following portions of the patient's history were reviewed and updated as appropriate: allergies, current medications, past family history, past medical history, past social history, past surgical history and problem list.    Review of Systems   Constitutional: Positive for chills, fatigue and fever.   HENT: Positive for congestion.    Respiratory: Positive for cough and shortness of breath.    Cardiovascular: Negative.    Gastrointestinal: Negative.    Genitourinary:        Smell strong urine    Musculoskeletal: Negative.  Negative for myalgias.   Skin: Negative.    Neurological: Negative.    Psychiatric/Behavioral: Negative.        Objective   Physical Exam  Pulmonary:      Effort: Pulmonary effort is normal.   Neurological:      Mental Status: He is alert.         All tests have been reviewed.    Assessment & Plan   Diagnoses and all orders for this visit:    Upper respiratory tract infection, unspecified  "type  -     sulfamethoxazole-trimethoprim (Bactrim DS) 800-160 MG per tablet; Take 1 tablet by mouth 2 (Two) Times a Day.  Zyrtec mucinex and tylenol     Urinary tract infection symptoms  -     sulfamethoxazole-trimethoprim (Bactrim DS) 800-160 MG per tablet; Take 1 tablet by mouth 2 (Two) Times a Day.        Water and do UA           You have chosen to receive care through a telehealth visit.  Do you consent to use a video/audio connection for your medical care today? Yes    Individuals involved in this encounter:    Jairo Mercedes \"Latoya\"  [address]    "

## 2022-11-06 ENCOUNTER — HOSPITAL ENCOUNTER (INPATIENT)
Facility: HOSPITAL | Age: 82
LOS: 1 days | Discharge: HOME | End: 2022-11-08
Attending: EMERGENCY MEDICINE | Admitting: STUDENT IN AN ORGANIZED HEALTH CARE EDUCATION/TRAINING PROGRAM

## 2022-11-06 ENCOUNTER — APPOINTMENT (OUTPATIENT)
Dept: GENERAL RADIOLOGY | Facility: HOSPITAL | Age: 82
End: 2022-11-06

## 2022-11-06 ENCOUNTER — APPOINTMENT (OUTPATIENT)
Dept: CT IMAGING | Facility: HOSPITAL | Age: 82
End: 2022-11-06

## 2022-11-06 DIAGNOSIS — J44.9 CHRONIC OBSTRUCTIVE PULMONARY DISEASE, UNSPECIFIED COPD TYPE: ICD-10-CM

## 2022-11-06 DIAGNOSIS — J18.9 COMMUNITY ACQUIRED PNEUMONIA OF RIGHT LUNG, UNSPECIFIED PART OF LUNG: ICD-10-CM

## 2022-11-06 DIAGNOSIS — J96.21 ACUTE ON CHRONIC RESPIRATORY FAILURE WITH HYPOXIA: ICD-10-CM

## 2022-11-06 DIAGNOSIS — J18.9 PNEUMONIA DUE TO INFECTIOUS ORGANISM, UNSPECIFIED LATERALITY, UNSPECIFIED PART OF LUNG: Primary | ICD-10-CM

## 2022-11-06 LAB
A-A DO2: 39.6 MMHG
ALBUMIN SERPL-MCNC: 4.1 G/DL (ref 3.5–5.2)
ALBUMIN/GLOB SERPL: 1.4 G/DL
ALP SERPL-CCNC: 157 U/L (ref 39–117)
ALT SERPL W P-5'-P-CCNC: 19 U/L (ref 1–41)
ANION GAP SERPL CALCULATED.3IONS-SCNC: 14.5 MMOL/L (ref 5–15)
ARTERIAL PATENCY WRIST A: POSITIVE
AST SERPL-CCNC: 19 U/L (ref 1–40)
ATMOSPHERIC PRESS: 736 MMHG
BASE EXCESS BLDA CALC-SCNC: 0.4 MMOL/L (ref 0–2)
BDY SITE: ABNORMAL
BILIRUB SERPL-MCNC: 0.8 MG/DL (ref 0–1.2)
BUN SERPL-MCNC: 26 MG/DL (ref 8–23)
BUN/CREAT SERPL: 22 (ref 7–25)
CALCIUM SPEC-SCNC: 9.1 MG/DL (ref 8.6–10.5)
CHLORIDE SERPL-SCNC: 98 MMOL/L (ref 98–107)
CO2 SERPL-SCNC: 24.5 MMOL/L (ref 22–29)
COHGB MFR BLD: 1.2 % (ref 0–2)
CREAT SERPL-MCNC: 1.18 MG/DL (ref 0.76–1.27)
D-LACTATE SERPL-SCNC: 1.8 MMOL/L (ref 0.5–2)
DEPRECATED RDW RBC AUTO: 52.8 FL (ref 37–54)
EGFRCR SERPLBLD CKD-EPI 2021: 62 ML/MIN/1.73
ERYTHROCYTE [DISTWIDTH] IN BLOOD BY AUTOMATED COUNT: 15.8 % (ref 12.3–15.4)
FLUAV RNA RESP QL NAA+PROBE: NOT DETECTED
FLUBV RNA RESP QL NAA+PROBE: NOT DETECTED
GAS FLOW AIRWAY: 2 LPM
GLOBULIN UR ELPH-MCNC: 3 GM/DL
GLUCOSE SERPL-MCNC: 101 MG/DL (ref 65–99)
HCO3 BLDA-SCNC: 23.6 MMOL/L (ref 22–28)
HCT VFR BLD AUTO: 40 % (ref 37.5–51)
HCT VFR BLD CALC: 37 %
HGB BLD-MCNC: 13 G/DL (ref 13–17.7)
HOLD SPECIMEN: NORMAL
HOLD SPECIMEN: NORMAL
LYMPHOCYTES # BLD MANUAL: 1.82 10*3/MM3 (ref 0.7–3.1)
LYMPHOCYTES NFR BLD MANUAL: 6 % (ref 5–12)
Lab: ABNORMAL
MCH RBC QN AUTO: 29.5 PG (ref 26.6–33)
MCHC RBC AUTO-ENTMCNC: 32.5 G/DL (ref 31.5–35.7)
MCV RBC AUTO: 90.9 FL (ref 79–97)
METHGB BLD QL: 0.3 % (ref 0–1.5)
MODALITY: ABNORMAL
MONOCYTES # BLD: 1.82 10*3/MM3 (ref 0.1–0.9)
NEUTROPHILS # BLD AUTO: 26.72 10*3/MM3 (ref 1.7–7)
NEUTROPHILS NFR BLD MANUAL: 79 % (ref 42.7–76)
NEUTS BAND NFR BLD MANUAL: 9 % (ref 0–5)
NOTE: ABNORMAL
NT-PROBNP SERPL-MCNC: 531.3 PG/ML (ref 0–1800)
OXYHGB MFR BLDV: 93.7 % (ref 94–99)
PCO2 BLDA: 32.7 MM HG (ref 35–45)
PCO2 TEMP ADJ BLD: ABNORMAL MM[HG]
PH BLDA: 7.47 PH UNITS (ref 7.35–7.45)
PH, TEMP CORRECTED: ABNORMAL
PLATELET # BLD AUTO: 457 10*3/MM3 (ref 140–450)
PMV BLD AUTO: 10.5 FL (ref 6–12)
PO2 BLDA: 68.2 MM HG (ref 75–100)
PO2 TEMP ADJ BLD: ABNORMAL MM[HG]
POTASSIUM SERPL-SCNC: 5 MMOL/L (ref 3.5–5.2)
PROCALCITONIN SERPL-MCNC: 0.14 NG/ML (ref 0–0.25)
PROT SERPL-MCNC: 7.1 G/DL (ref 6–8.5)
RBC # BLD AUTO: 4.4 10*6/MM3 (ref 4.14–5.8)
RBC MORPH BLD: NORMAL
SAO2 % BLDCOA: 95.1 % (ref 94–100)
SARS-COV-2 RNA RESP QL NAA+PROBE: NOT DETECTED
SCAN SLIDE: NORMAL
SMALL PLATELETS BLD QL SMEAR: ADEQUATE
SODIUM SERPL-SCNC: 137 MMOL/L (ref 136–145)
TOXIC GRANULATION: ABNORMAL
TROPONIN T SERPL-MCNC: 0.03 NG/ML (ref 0–0.03)
VARIANT LYMPHS NFR BLD MANUAL: 6 % (ref 19.6–45.3)
VENTILATOR MODE: ABNORMAL
WBC NRBC COR # BLD: 30.36 10*3/MM3 (ref 3.4–10.8)
WHOLE BLOOD HOLD COAG: NORMAL
WHOLE BLOOD HOLD SPECIMEN: NORMAL

## 2022-11-06 PROCEDURE — 80053 COMPREHEN METABOLIC PANEL: CPT | Performed by: EMERGENCY MEDICINE

## 2022-11-06 PROCEDURE — 87040 BLOOD CULTURE FOR BACTERIA: CPT | Performed by: EMERGENCY MEDICINE

## 2022-11-06 PROCEDURE — 36415 COLL VENOUS BLD VENIPUNCTURE: CPT

## 2022-11-06 PROCEDURE — 82375 ASSAY CARBOXYHB QUANT: CPT

## 2022-11-06 PROCEDURE — 25010000002 AZITHROMYCIN PER 500 MG: Performed by: INTERNAL MEDICINE

## 2022-11-06 PROCEDURE — 99284 EMERGENCY DEPT VISIT MOD MDM: CPT

## 2022-11-06 PROCEDURE — 94760 N-INVAS EAR/PLS OXIMETRY 1: CPT

## 2022-11-06 PROCEDURE — G0378 HOSPITAL OBSERVATION PER HR: HCPCS

## 2022-11-06 PROCEDURE — 25010000002 ENOXAPARIN PER 10 MG: Performed by: INTERNAL MEDICINE

## 2022-11-06 PROCEDURE — 83880 ASSAY OF NATRIURETIC PEPTIDE: CPT | Performed by: EMERGENCY MEDICINE

## 2022-11-06 PROCEDURE — 93005 ELECTROCARDIOGRAM TRACING: CPT | Performed by: EMERGENCY MEDICINE

## 2022-11-06 PROCEDURE — 25010000002 IOPAMIDOL 61 % SOLUTION: Performed by: EMERGENCY MEDICINE

## 2022-11-06 PROCEDURE — 84484 ASSAY OF TROPONIN QUANT: CPT | Performed by: EMERGENCY MEDICINE

## 2022-11-06 PROCEDURE — 94640 AIRWAY INHALATION TREATMENT: CPT

## 2022-11-06 PROCEDURE — 85025 COMPLETE CBC W/AUTO DIFF WBC: CPT | Performed by: EMERGENCY MEDICINE

## 2022-11-06 PROCEDURE — 94799 UNLISTED PULMONARY SVC/PX: CPT

## 2022-11-06 PROCEDURE — 71275 CT ANGIOGRAPHY CHEST: CPT

## 2022-11-06 PROCEDURE — 25010000002 CEFTRIAXONE SODIUM-DEXTROSE 1-3.74 GM-%(50ML) RECONSTITUTED SOLUTION: Performed by: EMERGENCY MEDICINE

## 2022-11-06 PROCEDURE — 85007 BL SMEAR W/DIFF WBC COUNT: CPT | Performed by: EMERGENCY MEDICINE

## 2022-11-06 PROCEDURE — 71045 X-RAY EXAM CHEST 1 VIEW: CPT

## 2022-11-06 PROCEDURE — 84145 PROCALCITONIN (PCT): CPT | Performed by: EMERGENCY MEDICINE

## 2022-11-06 PROCEDURE — 82805 BLOOD GASES W/O2 SATURATION: CPT

## 2022-11-06 PROCEDURE — 99223 1ST HOSP IP/OBS HIGH 75: CPT | Performed by: INTERNAL MEDICINE

## 2022-11-06 PROCEDURE — 83605 ASSAY OF LACTIC ACID: CPT | Performed by: EMERGENCY MEDICINE

## 2022-11-06 PROCEDURE — 36600 WITHDRAWAL OF ARTERIAL BLOOD: CPT

## 2022-11-06 PROCEDURE — 87636 SARSCOV2 & INF A&B AMP PRB: CPT

## 2022-11-06 PROCEDURE — 83050 HGB METHEMOGLOBIN QUAN: CPT

## 2022-11-06 RX ORDER — ALBUTEROL SULFATE 2.5 MG/3ML
2.5 SOLUTION RESPIRATORY (INHALATION)
Status: DISCONTINUED | OUTPATIENT
Start: 2022-11-06 | End: 2022-11-07

## 2022-11-06 RX ORDER — MECLIZINE HYDROCHLORIDE 25 MG/1
25 TABLET ORAL 2 TIMES DAILY PRN
Status: DISCONTINUED | OUTPATIENT
Start: 2022-11-06 | End: 2022-11-08 | Stop reason: HOSPADM

## 2022-11-06 RX ORDER — LOSARTAN POTASSIUM 50 MG/1
50 TABLET ORAL DAILY
Status: DISCONTINUED | OUTPATIENT
Start: 2022-11-06 | End: 2022-11-08 | Stop reason: HOSPADM

## 2022-11-06 RX ORDER — SODIUM CHLORIDE 0.9 % (FLUSH) 0.9 %
10 SYRINGE (ML) INJECTION AS NEEDED
Status: DISCONTINUED | OUTPATIENT
Start: 2022-11-06 | End: 2022-11-08 | Stop reason: HOSPADM

## 2022-11-06 RX ORDER — ASPIRIN 81 MG/1
81 TABLET ORAL DAILY
Status: DISCONTINUED | OUTPATIENT
Start: 2022-11-06 | End: 2022-11-08 | Stop reason: HOSPADM

## 2022-11-06 RX ORDER — TRAZODONE HYDROCHLORIDE 50 MG/1
25 TABLET ORAL NIGHTLY
Status: DISCONTINUED | OUTPATIENT
Start: 2022-11-06 | End: 2022-11-08 | Stop reason: HOSPADM

## 2022-11-06 RX ORDER — NITROGLYCERIN 0.4 MG/1
0.4 TABLET SUBLINGUAL
Status: DISCONTINUED | OUTPATIENT
Start: 2022-11-06 | End: 2022-11-08 | Stop reason: HOSPADM

## 2022-11-06 RX ORDER — CEFTRIAXONE 1 G/50ML
1 INJECTION, SOLUTION INTRAVENOUS ONCE
Status: COMPLETED | OUTPATIENT
Start: 2022-11-06 | End: 2022-11-06

## 2022-11-06 RX ORDER — PANTOPRAZOLE SODIUM 40 MG/1
40 TABLET, DELAYED RELEASE ORAL EVERY MORNING
Status: DISCONTINUED | OUTPATIENT
Start: 2022-11-07 | End: 2022-11-08 | Stop reason: HOSPADM

## 2022-11-06 RX ORDER — SODIUM CHLORIDE 0.9 % (FLUSH) 0.9 %
10 SYRINGE (ML) INJECTION EVERY 12 HOURS SCHEDULED
Status: DISCONTINUED | OUTPATIENT
Start: 2022-11-06 | End: 2022-11-08 | Stop reason: HOSPADM

## 2022-11-06 RX ORDER — BUDESONIDE AND FORMOTEROL FUMARATE DIHYDRATE 160; 4.5 UG/1; UG/1
2 AEROSOL RESPIRATORY (INHALATION)
Status: DISCONTINUED | OUTPATIENT
Start: 2022-11-06 | End: 2022-11-08 | Stop reason: HOSPADM

## 2022-11-06 RX ORDER — ISOSORBIDE MONONITRATE 30 MG/1
30 TABLET, EXTENDED RELEASE ORAL DAILY
Status: DISCONTINUED | OUTPATIENT
Start: 2022-11-06 | End: 2022-11-08 | Stop reason: HOSPADM

## 2022-11-06 RX ORDER — ALBUTEROL SULFATE 2.5 MG/3ML
2.5 SOLUTION RESPIRATORY (INHALATION)
Status: DISCONTINUED | OUTPATIENT
Start: 2022-11-06 | End: 2022-11-06

## 2022-11-06 RX ORDER — ALPRAZOLAM 0.5 MG/1
0.5 TABLET ORAL EVERY 12 HOURS SCHEDULED
Status: DISCONTINUED | OUTPATIENT
Start: 2022-11-06 | End: 2022-11-08 | Stop reason: HOSPADM

## 2022-11-06 RX ORDER — ENOXAPARIN SODIUM 100 MG/ML
40 INJECTION SUBCUTANEOUS NIGHTLY
Status: DISCONTINUED | OUTPATIENT
Start: 2022-11-06 | End: 2022-11-08 | Stop reason: HOSPADM

## 2022-11-06 RX ORDER — ATORVASTATIN CALCIUM 40 MG/1
40 TABLET, FILM COATED ORAL DAILY
Status: DISCONTINUED | OUTPATIENT
Start: 2022-11-06 | End: 2022-11-08 | Stop reason: HOSPADM

## 2022-11-06 RX ORDER — CEFTRIAXONE 1 G/50ML
1 INJECTION, SOLUTION INTRAVENOUS EVERY 24 HOURS
Status: DISCONTINUED | OUTPATIENT
Start: 2022-11-07 | End: 2022-11-08 | Stop reason: HOSPADM

## 2022-11-06 RX ORDER — BETAMETHASONE DIPROPIONATE 0.5 MG/G
CREAM TOPICAL
Status: DISCONTINUED | OUTPATIENT
Start: 2022-11-06 | End: 2022-11-08 | Stop reason: HOSPADM

## 2022-11-06 RX ORDER — ACETAMINOPHEN 500 MG
500 TABLET ORAL EVERY 6 HOURS PRN
Status: DISCONTINUED | OUTPATIENT
Start: 2022-11-06 | End: 2022-11-08 | Stop reason: HOSPADM

## 2022-11-06 RX ADMIN — ALBUTEROL SULFATE 2.5 MG: 2.5 SOLUTION RESPIRATORY (INHALATION) at 20:43

## 2022-11-06 RX ADMIN — SERTRALINE 25 MG: 50 TABLET, FILM COATED ORAL at 20:50

## 2022-11-06 RX ADMIN — METOPROLOL TARTRATE 25 MG: 25 TABLET, FILM COATED ORAL at 20:50

## 2022-11-06 RX ADMIN — SODIUM CHLORIDE 1000 ML: 9 INJECTION, SOLUTION INTRAVENOUS at 12:09

## 2022-11-06 RX ADMIN — ALPRAZOLAM 0.5 MG: 0.5 TABLET ORAL at 20:50

## 2022-11-06 RX ADMIN — BETAMETHASONE DIPROPIONATE: 0.5 CREAM TOPICAL at 20:50

## 2022-11-06 RX ADMIN — Medication 10 ML: at 21:10

## 2022-11-06 RX ADMIN — ENOXAPARIN SODIUM 40 MG: 100 INJECTION SUBCUTANEOUS at 20:50

## 2022-11-06 RX ADMIN — ISOSORBIDE MONONITRATE 30 MG: 30 TABLET, EXTENDED RELEASE ORAL at 20:50

## 2022-11-06 RX ADMIN — IPRATROPIUM BROMIDE 0.5 MG: 0.5 SOLUTION RESPIRATORY (INHALATION) at 20:43

## 2022-11-06 RX ADMIN — CEFTRIAXONE 1 G: 1 INJECTION, SOLUTION INTRAVENOUS at 12:09

## 2022-11-06 RX ADMIN — ASPIRIN 81 MG: 81 TABLET, COATED ORAL at 20:50

## 2022-11-06 RX ADMIN — SODIUM CHLORIDE 500 MG: 900 INJECTION, SOLUTION INTRAVENOUS at 21:10

## 2022-11-06 RX ADMIN — ATORVASTATIN CALCIUM 40 MG: 40 TABLET, FILM COATED ORAL at 20:50

## 2022-11-06 RX ADMIN — TRAZODONE HYDROCHLORIDE 25 MG: 50 TABLET ORAL at 20:50

## 2022-11-06 RX ADMIN — LOSARTAN POTASSIUM 50 MG: 50 TABLET, FILM COATED ORAL at 20:50

## 2022-11-06 RX ADMIN — IOPAMIDOL 100 ML: 612 INJECTION, SOLUTION INTRAVENOUS at 13:43

## 2022-11-06 RX ADMIN — BUDESONIDE AND FORMOTEROL FUMARATE DIHYDRATE 2 PUFF: 160; 4.5 AEROSOL RESPIRATORY (INHALATION) at 20:43

## 2022-11-06 NOTE — H&P
[facility]      Name:  Jairo Mercedes   Age:  82 y.o.  Sex:  male  :  1940  MRN:  [id number]   Visit Number:  [id number]  Admission Date:  2022  Date Of Service:  22  Primary Care Physician:  Maicol Cardenas MD    Chief Complaint:     Shortness of breath    History Of Presenting Illness:      82-year-old gentleman with a history of severe emphysema also with chronic right lung changes including a right-sided effusion and infiltrate.  He presents with worsening shortness of breath.  He has had shortness of breath for several days outpatient and has been on some oral antibiotics by his primary care it looks like he was on Bactrim.  He also had some associated fatigue.  He is followed with a pulmonologist that is at .  However that physician has recently retired and he is looking to establish with a pulmonologist here in the [place] area.  I talked with him about placing a consult and they were in agreement.  Patient has had occasional oxygen at night in the past.  However he required it continuously in the emergency department.  He also was found to have a very high white blood cell count.  He was found to have infiltrates and a right-sided effusion on the CT scan of his lungs.  I cannot compare the new films to the old films from .  However from what I can glean it seems the infiltrates are more pronounced now than they have been in the past.  The patient as well as his daughter at the bedside were agreeable to admission.  He does have an advanced directive on file but requests to be full code as of now but does not want any prolonged life-sustaining measures especially if he is in a vegetative state.    Review Of Systems:    All systems were reviewed and negative except as mentioned in history of presenting illness, assessment and plan.    Past Medical History: Patient  has a past medical history of Abdominal pain, Acute sinusitis, Allergic  (N/A), Anemia, Anxiety, Arthritis, Asthma (N/A), Bladder cancer (HCC) (04/2021), Cataracts, bilateral, COPD (chronic obstructive pulmonary disease) (HCC), Coronary artery disease (N/A), Diabetes mellitus (HCC), Emphysema of lung (HCC), GERD (gastroesophageal reflux disease), Heme positive stool, History of ankle fracture, History of cardiovascular stress test, History of echocardiogram, History of pneumonia, [place] (hard of hearing), Hypertension, Melanoma ([facility]), Oxygen dependent, Presence of urostomy ([facility]) (06/15/2021), Seasonal allergies, Wears glasses, and Wears partial dentures.    Past Surgical History: Patient  has a past surgical history that includes Lymph node biopsy; Cholecystectomy; Mole removal (2005); Thoracotomy (07/28/2015); Splenectomy, total; Sergei hole for subdural hematoma; Liver biopsy; Esophagogastroduodenoscopy; [place] tooth extraction; Tonsillectomy; Bronchoscopy (N/A, 04/16/2018); Cardiac catheterization; Brain surgery (1999); Colonoscopy; Colonoscopy (N/A, 08/02/2018); Bronchoscopy; Transurethral resection of bladder tumor (N/A, 04/13/2021); Cataract extraction w/ intraocular lens implant (Left, 06/23/2022); Other surgical history (Right); and Cataract extraction w/ intraocular lens implant (Right, 7/14/2022).    Social History: Patient  reports that he quit smoking about 23 years ago. His smoking use included cigarettes. He started smoking about 65 years ago. He has a 126.00 pack-year smoking history. He has never used smokeless tobacco. He reports that he does not currently use alcohol. He reports that he does not use drugs.    Family History:  Patient's family history has been reviewed and found to be noncontributory.     Allergies:      Parabens    Home Medications:    Prior to Admission Medications     Prescriptions Last Dose Informant Patient Reported? Taking?    acetaminophen (TYLENOL) 500 MG tablet  Self Yes No    Take  by mouth Every 6 (Six) Hours As Needed.    ALPRAZolam (XANAX) 0.5 MG  tablet  Self Yes No    Every 12 (Twelve) Hours.    aspirin 81 MG EC tablet  Self Yes No    Take 81 mg by mouth Daily.    atorvastatin (LIPITOR) 40 MG tablet  Self No No    TAKE ONE TABLET BY MOUTH AT BEDTIME    esomeprazole (nexIUM) 40 MG capsule  Self No No    TAKE ONE CAPSULE BY MOUTH EVERY DAY    fluocinonide (LIDEX) 0.05 % external solution  Self Yes No    APPLY TOPICALLY AS DIRECTED AT BEDTIME TO affected area    Fluticasone-Salmeterol (ADVAIR) 250-50 MCG/ACT DISKUS   No No    Inhale 1 puff 2 (Two) Times a Day.    fluticasone-salmeterol (ADVAIR) 250-50 MCG/DOSE DISKUS  Self No No    Inhale 1 puff by mouth twice a day.    isosorbide mononitrate (IMDUR) 30 MG 24 hr tablet  Self No No    TAKE ONE TABLET BY MOUTH EVERY DAY AS DIRECTED    losartan (COZAAR) 50 MG tablet   No No    TAKE ONE TABLET BY MOUTH EVERY DAY    meclizine (ANTIVERT) 25 MG tablet  Self No No    TAKE ONE TABLET BY MOUTH TWICE DAILY    Patient taking differently:  As Needed.    metoprolol tartrate (LOPRESSOR) 25 MG tablet  Self No No    TAKE ONE TABLET BY MOUTH TWICE DAILY    nitroglycerin (NITROSTAT) 0.4 MG SL tablet  Self Yes No    Place 0.4 mg under the tongue Every 5 (Five) Minutes As Needed for Chest Pain.    Omega-3 Fatty Acids (FISH OIL) 1000 MG capsule capsule  Self Yes No    Take 1,000 mg by mouth Daily With Breakfast.    sertraline (ZOLOFT) 25 MG tablet  Self No No    Take 1 tablet by mouth Daily.    Spiriva HandiHaler 18 MCG per inhalation capsule  Self No No    PLACE A CAPSULE INTO INHALER AND INHALE DAILY    traZODone (DESYREL) 50 MG tablet  Self No No    Take 0.5-1 tablets by mouth At Night As Needed (insomnia).    triamcinolone (KENALOG) 0.025 % ointment   No No    Apply to the affected areas(s) twice daily        ED Medications:    Medications   sodium chloride 0.9 % flush 10 mL (has no administration in time range)   sodium chloride 0.9 % bolus 1,000 mL (0 mL Intravenous Stopped 11/6/22 7679)   cefTRIAXone (ROCEPHIN) IVPB 1 g/50ml  "dextrose (premix) (0 g Intravenous Stopped 11/6/22 1239)   iopamidol (ISOVUE-300) 61 % injection 100 mL (100 mL Intravenous Given 11/6/22 1343)     Vital Signs:  Temp:  [98.9 °F (37.2 °C)] 98.9 °F (37.2 °C)  Heart Rate:  [66-82] 66  Resp:  [24] 24  BP: (105-133)/(47-69) 108/49        11/06/22  1030   Weight: 74.8 kg (165 lb)     Body mass index is 24.37 kg/m².    Physical Exam:     Most recent vital Signs: /49   Pulse 66   Temp 98.9 °F (37.2 °C) (Oral)   Resp 24   Ht 175.3 cm (69\")   Wt 74.8 kg (165 lb)   SpO2 93%   BMI 24.37 kg/m²     Physical Exam constitutional: Awake, alert  Eyes: PERRLA, sclerae anicteric, no conjunctival injection  HENT: NCAT, mucous membranes moist  Neck: Supple, no thyromegaly, no lymphadenopathy, trachea midline  Respiratory: Audible and clear breath sounds on the left without wheeze the right breath sounds were diminished I did not appreciate any wheeze or rhonchi  Cardiovascular: RRR, 2 out of 6 systolic ejection murmur no rubs, or gallops, palpable pedal pulses bilaterally  Gastrointestinal: Positive bowel sounds, soft, nontender, nondistended  Musculoskeletal: No bilateral ankle edema, no clubbing or cyanosis to extremities  Psychiatric: Appropriate affect, cooperative  Neurologic: Oriented x 3, speech clear  Skin: No rashes    Laboratory data:    I have reviewed the labs done in the emergency room.    Results from last 7 days   Lab Units 11/06/22  1043   SODIUM mmol/L 137   POTASSIUM mmol/L 5.0   CHLORIDE mmol/L 98   CO2 mmol/L 24.5   BUN mg/dL 26*   CREATININE mg/dL 1.18   CALCIUM mg/dL 9.1   BILIRUBIN mg/dL 0.8   ALK PHOS U/L 157*   ALT (SGPT) U/L 19   AST (SGOT) U/L 19   GLUCOSE mg/dL 101*     Results from last 7 days   Lab Units 11/06/22  1043   WBC 10*3/mm3 30.36*   HEMOGLOBIN g/dL 13.0   HEMATOCRIT % 40.0   PLATELETS 10*3/mm3 457*         Results from last 7 days   Lab Units 11/06/22  1043   TROPONIN T ng/mL 0.025     Results from last 7 days   Lab Units " 11/06/22  1043   PROBNP pg/mL 531.3             Results from last 7 days   Lab Units 11/06/22  1141   PH, ARTERIAL pH units 7.467*   PO2 ART mm Hg 68.2*   PCO2, ARTERIAL mm Hg 32.7*   HCO3 ART mmol/L 23.6           Invalid input(s): USDES,  BLOODU, NITRITITE, BACT, EP    Pain Management Panel     Pain Management Panel 4/20/2021    CREATININE           EKG:      Sinus rhythm with a normal axis with no acute ST or T wave changes noted with frequent PVCs    Radiology:    XR Chest 1 View    Result Date: 11/6/2022  PORTABLE CHEST  11/6/2022 11:11 AM  HISTORY: Shortness of breath  COMPARISON:   None  FINDINGS: The cardiac silhouette is normal in size. The mediastinal and hilar contours are unremarkable.  Emphysema with chronic interstitial changes of the lungs. Right basilar  hazy airspace opacity. Right greater than left bibasilar and biapical scarring. There is no pneumothorax. The visualized osseous structures demonstrate no acute abnormalities.      Hazy right basilar airspace opacity may reflect atelectasis or pneumonia in the right clinical setting.      This report was signed and finalized on 11/6/2022 1:00 PM by Mirza Matos MD.    CT Angiogram Chest Pulmonary Embolism    Result Date: 11/6/2022  PROCEDURE: CT ANGIOGRAM CHEST PULMONARY EMBOLISM-  HISTORY: Shortness of breath  TECHNIQUE: Thin section axial CT with IV contrast supplemented with3D reconstructed MIP images.  FINDINGS:  Pulmonary vessels enhance in a normal fashion without evidence of embolic disease. Thoracic aorta is normal in caliber without evidence of aneurysm or dissection.  Severe emphysema with diffuse bilateral bronchial wall thickening. Right basilar consolidation and patchy bilateral tree in bud nodular opacities. Right apical nodule measuring 1.7 cm, previously 1.3 cm (series 5 image 17).  Small right pleural effusion. No pneumothorax..  Normal heart size. No pericardial effusion. Multivessel coronary artery calcifications..       1. No evidence of pulmonary embolism. 2. Multifocal pneumonia. 3. Slight interval increase in size of right apical nodular scarring. Follow-up CT chest in 3 months is recommended.   This report was signed and finalized on 11/6/2022 3:13 PM by Mirza Matos MD.      Assessment:    1. Community-acquired pneumonia  2. Severe emphysema without obvious exacerbation  3. Chronic infiltrates and effusions of the right lung    Plan:    Community-acquired pneumonia  -Rocephin and azithromycin flutter valve consult to pulmonology    Severe emphysema  -Continue long-acting bronchodilator steroid inhaler we will give duo nebs  -Established with pulm    Chronic infiltrates and effusions of the right lung  -Will appreciate pulmonology's viewpoint on how much of the infiltrate and effusion seen in the lungs represents an acute pneumonia versus progression of the chronic lung process that he has had there.    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 Patient has an advance directive in EMR which is still valid.            Kelvin Alfaro DO  11/06/22  17:15 EST    Dictated utilizing Dragon dictation.

## 2022-11-06 NOTE — ED PROVIDER NOTES
TRIAGE CHIEF COMPLAINT:     Nursing and triage notes reviewed    Chief Complaint   Patient presents with   • Shortness of Breath     SOA INCREASED OVER THE LAST 2 DAYS.   OXYGEN DEPENDENT AT ALL TIMES.       HPI: Jairo Mercedes is a 82 y.o. male who presents to the emergency department complaining of a 2-day history of shortness of breath, cough, congestion, sputum production.  Patient has had some generalized fatigue as well.  Patient does have a history of multiple medical conditions including COPD.  Patient states he has oxygen as needed at home and put a few liters on due to increased shortness of breath.  He is not sure that he has had a fever.  She denies chest pain.  He denies vomiting or diarrhea.  He states he recently was on antibiotics for a sinus infection.    REVIEW OF SYSTEMS: All other systems reviewed and are negative     PAST MEDICAL HISTORY:   Past Medical History:   Diagnosis Date   • Abdominal pain    • Acute sinusitis    • Allergic N/A   • Anemia    • Anxiety    • Arthritis    • Asthma N/A   • Bladder cancer ([facility]) 04/2021   • Cataracts, bilateral    • COPD (chronic obstructive pulmonary disease) ([facility])    • Coronary artery disease N/A    Some Blockage   • Diabetes mellitus ([facility])     REPORTS DIET CONTROL ONLY, TAKES NO MEDICATION   • Emphysema of lung ([facility])    • GERD (gastroesophageal reflux disease)    • Heme positive stool    • History of ankle fracture     LEFT    • History of cardiovascular stress test     REPORTS IN HIS LATE 50'S AND THAT ALL WAS WNL'S AT THAT TIME   • History of echocardiogram     REPORTS HAS HAD 2. DOES NOT REPORT ANY HX OF CHEST PAIN.   • History of pneumonia    • Nikolai (hard of hearing)     WEARS HEARING AIDS   • Hypertension    • Melanoma ([facility])     2013   • Oxygen dependent     2 L NC HS   • Presence of urostomy ([facility]) 06/15/2021   • Seasonal allergies    • Wears glasses    • Wears partial dentures         FAMILY HISTORY:   Family History   Problem Relation Age of Onset   •  Alzheimer's disease Mother    • Arthritis Mother    • Heart disease Father    • Hyperlipidemia Father    • Cancer Other    • Cancer Maternal Grandmother         SOCIAL HISTORY:   Social History     Socioeconomic History   • Marital status:    Tobacco Use   • Smoking status: Former     Packs/day: 3.00     Years: 42.00     Pack years: 126.00     Types: Cigarettes     Start date: 1957     Quit date: 1999     Years since quittin.7   • Smokeless tobacco: Never   Vaping Use   • Vaping Use: Never used   Substance and Sexual Activity   • Alcohol use: Not Currently   • Drug use: No   • Sexual activity: Defer     Partners: Female        SURGICAL HISTORY:   Past Surgical History:   Procedure Laterality Date   • BRAIN SURGERY      REPORTS FOR SUBDURAL HEMATOMA    • BRONCHOSCOPY N/A 2018    Procedure: BRONCHOSCOPY DIAGNOSTIC WITH WASHINGS, BRUSHINGS AND BIOPSIES;  Surgeon: Morgan Frias MD;  Location: [facility] OR;  Service: Pulmonary   • BRONCHOSCOPY     • ALEX HOLE FOR SUBDURAL HEMATOMA     • CARDIAC CATHETERIZATION      REPORTS APPROXIMATELY  AND REPORTS NO STENTS WERE PLACED   • CATARACT EXTRACTION W/ INTRAOCULAR LENS IMPLANT Left 2022    Procedure: CATARACT PHACO EXTRACTION WITH INTRAOCULAR LENS IMPLANT LEFT COMPLICATED WITH MALYUGIN RING;  Surgeon: Mack Blake MD;  Location: [facility] OR;  Service: Ophthalmology;  Laterality: Left;   • CATARACT EXTRACTION W/ INTRAOCULAR LENS IMPLANT Right 2022    Procedure: CATARACT PHACO EXTRACTION WITH INTRAOCULAR LENS IMPLANT RIGHT COMPLICATED WITH MALYUGIN RING;  Surgeon: Mack Blake MD;  Location: [facility] OR;  Service: Ophthalmology;  Laterality: Right;   • CHOLECYSTECTOMY     • COLONOSCOPY     • COLONOSCOPY N/A 2018    Procedure: COLONOSCOPY WITH HOT FORCEP POLYPECTOMY X2  ;  Surgeon: Sara Reynoso MD;  Location: [facility] ENDOSCOPY;  Service: Gastroenterology   • ENDOSCOPY     • LIVER BIOPSY     • LYMPH NODE BIOPSY      • MOLE REMOVAL  2005    MELANOMA ON BACK   • OTHER SURGICAL HISTORY Right     UROSTOMY   • SPLENECTOMY     • THORACOTOMY  07/28/2015    Left thoracotomy and stapling of pulmonary blebs. Mechanical pleurodesis.   • TONSILLECTOMY     • TRANSURETHRAL RESECTION OF BLADDER TUMOR N/A 04/13/2021    Procedure: TRANSURETHRAL RESECTION OF BLADDER TUMOR;  Surgeon: Osmani Francois MD;  Location: [facility];  Service: Urology;  Laterality: N/A;   • WISDOM TOOTH EXTRACTION          CURRENT MEDICATIONS:      Medication List      ASK your doctor about these medications    acetaminophen 500 MG tablet  Commonly known as: TYLENOL     ALPRAZolam 0.5 MG tablet  Commonly known as: XANAX     aspirin 81 MG EC tablet     atorvastatin 40 MG tablet  Commonly known as: LIPITOR  TAKE ONE TABLET BY MOUTH AT BEDTIME     difluprednate 0.05 % ophthalmic emulsion  Commonly known as: DUREZOL  Administer 1 drop into the left eye 4 (Four) Times a Day. See admin instructions on AVS.     esomeprazole 40 MG capsule  Commonly known as: nexIUM  TAKE ONE CAPSULE BY MOUTH EVERY DAY     fish oil 1000 MG capsule capsule     fluocinonide 0.05 % external solution  Commonly known as: LIDEX     * fluticasone-salmeterol 250-50 MCG/DOSE DISKUS  Commonly known as: ADVAIR  Inhale 1 puff by mouth twice a day.     * Fluticasone-Salmeterol 250-50 MCG/ACT DISKUS  Commonly known as: ADVAIR/WIXELA  Inhale 1 puff 2 (Two) Times a Day.     isosorbide mononitrate 30 MG 24 hr tablet  Commonly known as: IMDUR  TAKE ONE TABLET BY MOUTH EVERY DAY AS DIRECTED     ketoconazole 2 % shampoo  Commonly known as: NIZORAL     losartan 50 MG tablet  Commonly known as: COZAAR  TAKE ONE TABLET BY MOUTH EVERY DAY     meclizine 25 MG tablet  Commonly known as: ANTIVERT  TAKE ONE TABLET BY MOUTH TWICE DAILY     metoprolol tartrate 25 MG tablet  Commonly known as: LOPRESSOR  TAKE ONE TABLET BY MOUTH TWICE DAILY     nitroglycerin 0.4 MG SL tablet  Commonly known as: NITROSTAT     sertraline  25 MG tablet  Commonly known as: ZOLOFT  Take 1 tablet by mouth Daily.     Spiriva HandiHaler 18 MCG per inhalation capsule  Generic drug: tiotropium  PLACE A CAPSULE INTO INHALER AND INHALE DAILY     sulfamethoxazole-trimethoprim 800-160 MG per tablet  Commonly known as: Bactrim DS  Take 1 tablet by mouth 2 (Two) Times a Day.     traZODone 50 MG tablet  Commonly known as: DESYREL  Take 0.5-1 tablets by mouth At Night As Needed (insomnia).     triamcinolone 0.025 % ointment  Commonly known as: KENALOG  Apply to the affected areas(s) twice daily         * This list has 2 medication(s) that are the same as other medications prescribed for you. Read the directions carefully, and ask your doctor or other care provider to review them with you.                 ALLERGIES: Parabens     PHYSICAL EXAM:   VITAL SIGNS:   Vitals:    11/06/22 1030   BP: 105/69   Pulse: 80   Resp: 24   Temp: 98.9 °F (37.2 °C)   SpO2: 93%      CONSTITUTIONAL: Awake, oriented, appears nontoxic   HENT: Atraumatic, normocephalic, oral mucosa pink and moist, airway patent. Nares patent without drainage. External ears normal.   EYES: Conjunctivae clear   NECK: Trachea midline   CARDIOVASCULAR: Normal heart rate, Normal rhythm, No murmurs, rubs, gallops   PULMONARY/CHEST: Few crackles and coarse lung sounds in the lower lobes bilaterally.  Overall good air movement.  No increased work of breathing.  ABDOMINAL: Nondistended, soft, nontender - no rebound or guarding.  NEUROLOGIC: Nonfocal, moving all four extremities, no gross sensory or motor deficits.   EXTREMITIES: No clubbing, cyanosis, or edema   SKIN: Warm, Dry, No erythema, No rash     ED COURSE / MEDICAL DECISION MAKING:   Jairo Mercedes is a 82 y.o. male who presents to the emergency department for evaluation of shortness of breath and flulike symptoms.  Patient is nontoxic-appearing on arrival in the emergency department.  Vital signs are stable.  Examination does reveal some abnormal lung  sounds.  Will obtain labs and imaging for further evaluation.    EKG interpreted by me reveals sinus rhythm with a rate of 84 bpm.  There are occasional premature ventricular contractions.  This is a normal-appearing EKG.    Chest x-ray per radiology interpretation reveals right lower lobe infiltrate.    Laboratory testing does reveal significantly elevated white blood cell count at 30.  Procalcitonin and lactic acid were within the normal range.  Labs otherwise are largely reassuring.    Patient has been on antibiotics recently.  This could be failure of outpatient therapy.  Patient obtained a CT scan, there is no evidence of PE but there is a patchy right-sided infiltrate consistent with pneumonia.    The patient is currently requiring 2 L of oxygen.  We will make plans to admit the patient to the hospital for further treatment and evaluation.  Blood cultures were drawn.    DECISION TO DISCHARGE/ADMIT: see ED care timeline     FINAL IMPRESSION:   1 --pneumonia  2 --respiratory failure with hypoxia  3 --     Electronically signed by: Jeannie Charles MD, 11/6/2022 10:47 Jeannie Menjivar MD  11/06/22 1647

## 2022-11-07 PROBLEM — J18.9 PNEUMONIA DUE TO INFECTIOUS ORGANISM, UNSPECIFIED LATERALITY, UNSPECIFIED PART OF LUNG: Status: ACTIVE | Noted: 2022-11-07

## 2022-11-07 LAB
ALBUMIN SERPL-MCNC: 2.9 G/DL (ref 3.5–5.2)
ALBUMIN/GLOB SERPL: 0.9 G/DL
ALP SERPL-CCNC: 117 U/L (ref 39–117)
ALT SERPL W P-5'-P-CCNC: 15 U/L (ref 1–41)
ANION GAP SERPL CALCULATED.3IONS-SCNC: 9.8 MMOL/L (ref 5–15)
AST SERPL-CCNC: 13 U/L (ref 1–40)
BASOPHILS # BLD AUTO: 0.06 10*3/MM3 (ref 0–0.2)
BASOPHILS NFR BLD AUTO: 0.3 % (ref 0–1.5)
BILIRUB SERPL-MCNC: 0.5 MG/DL (ref 0–1.2)
BUN SERPL-MCNC: 27 MG/DL (ref 8–23)
BUN/CREAT SERPL: 26.5 (ref 7–25)
CALCIUM SPEC-SCNC: 8.4 MG/DL (ref 8.6–10.5)
CHLORIDE SERPL-SCNC: 104 MMOL/L (ref 98–107)
CO2 SERPL-SCNC: 23.2 MMOL/L (ref 22–29)
CREAT SERPL-MCNC: 1.02 MG/DL (ref 0.76–1.27)
DEPRECATED RDW RBC AUTO: 51.9 FL (ref 37–54)
EGFRCR SERPLBLD CKD-EPI 2021: 73.8 ML/MIN/1.73
EOSINOPHIL # BLD AUTO: 0.13 10*3/MM3 (ref 0–0.4)
EOSINOPHIL NFR BLD AUTO: 0.6 % (ref 0.3–6.2)
ERYTHROCYTE [DISTWIDTH] IN BLOOD BY AUTOMATED COUNT: 15.8 % (ref 12.3–15.4)
GLOBULIN UR ELPH-MCNC: 3.1 GM/DL
GLUCOSE SERPL-MCNC: 92 MG/DL (ref 65–99)
HCT VFR BLD AUTO: 30.7 % (ref 37.5–51)
HGB BLD-MCNC: 10.1 G/DL (ref 13–17.7)
IMM GRANULOCYTES # BLD AUTO: 0.11 10*3/MM3 (ref 0–0.05)
IMM GRANULOCYTES NFR BLD AUTO: 0.5 % (ref 0–0.5)
L PNEUMO1 AG UR QL IA: NEGATIVE
LYMPHOCYTES # BLD AUTO: 2.53 10*3/MM3 (ref 0.7–3.1)
LYMPHOCYTES NFR BLD AUTO: 12 % (ref 19.6–45.3)
MCH RBC QN AUTO: 29.6 PG (ref 26.6–33)
MCHC RBC AUTO-ENTMCNC: 32.9 G/DL (ref 31.5–35.7)
MCV RBC AUTO: 90 FL (ref 79–97)
MONOCYTES # BLD AUTO: 2.31 10*3/MM3 (ref 0.1–0.9)
MONOCYTES NFR BLD AUTO: 11 % (ref 5–12)
NEUTROPHILS NFR BLD AUTO: 15.88 10*3/MM3 (ref 1.7–7)
NEUTROPHILS NFR BLD AUTO: 75.6 % (ref 42.7–76)
NRBC BLD AUTO-RTO: 0 /100 WBC (ref 0–0.2)
PLATELET # BLD AUTO: 381 10*3/MM3 (ref 140–450)
PMV BLD AUTO: 10.2 FL (ref 6–12)
POTASSIUM SERPL-SCNC: 4.4 MMOL/L (ref 3.5–5.2)
PROT SERPL-MCNC: 6 G/DL (ref 6–8.5)
RBC # BLD AUTO: 3.41 10*6/MM3 (ref 4.14–5.8)
S PNEUM AG SPEC QL LA: NEGATIVE
SODIUM SERPL-SCNC: 137 MMOL/L (ref 136–145)
WBC NRBC COR # BLD: 21.02 10*3/MM3 (ref 3.4–10.8)

## 2022-11-07 PROCEDURE — 63710000001 PREDNISONE PER 1 MG: Performed by: INTERNAL MEDICINE

## 2022-11-07 PROCEDURE — 94799 UNLISTED PULMONARY SVC/PX: CPT

## 2022-11-07 PROCEDURE — 86738 MYCOPLASMA ANTIBODY: CPT | Performed by: INTERNAL MEDICINE

## 2022-11-07 PROCEDURE — 87070 CULTURE OTHR SPECIMN AEROBIC: CPT | Performed by: INTERNAL MEDICINE

## 2022-11-07 PROCEDURE — 85025 COMPLETE CBC W/AUTO DIFF WBC: CPT | Performed by: INTERNAL MEDICINE

## 2022-11-07 PROCEDURE — 87899 AGENT NOS ASSAY W/OPTIC: CPT | Performed by: INTERNAL MEDICINE

## 2022-11-07 PROCEDURE — 87205 SMEAR GRAM STAIN: CPT | Performed by: INTERNAL MEDICINE

## 2022-11-07 PROCEDURE — 87449 NOS EACH ORGANISM AG IA: CPT | Performed by: INTERNAL MEDICINE

## 2022-11-07 PROCEDURE — 80053 COMPREHEN METABOLIC PANEL: CPT | Performed by: INTERNAL MEDICINE

## 2022-11-07 PROCEDURE — 99223 1ST HOSP IP/OBS HIGH 75: CPT | Performed by: INTERNAL MEDICINE

## 2022-11-07 PROCEDURE — 99232 SBSQ HOSP IP/OBS MODERATE 35: CPT | Performed by: INTERNAL MEDICINE

## 2022-11-07 PROCEDURE — 94664 DEMO&/EVAL PT USE INHALER: CPT

## 2022-11-07 PROCEDURE — 94761 N-INVAS EAR/PLS OXIMETRY MLT: CPT

## 2022-11-07 PROCEDURE — 25010000002 CEFTRIAXONE SODIUM-DEXTROSE 1-3.74 GM-%(50ML) RECONSTITUTED SOLUTION: Performed by: INTERNAL MEDICINE

## 2022-11-07 PROCEDURE — 25010000002 AZITHROMYCIN PER 500 MG: Performed by: INTERNAL MEDICINE

## 2022-11-07 PROCEDURE — 25010000002 ENOXAPARIN PER 10 MG: Performed by: INTERNAL MEDICINE

## 2022-11-07 RX ORDER — PREDNISONE 20 MG/1
40 TABLET ORAL
Status: DISCONTINUED | OUTPATIENT
Start: 2022-11-07 | End: 2022-11-08 | Stop reason: HOSPADM

## 2022-11-07 RX ORDER — IPRATROPIUM BROMIDE AND ALBUTEROL SULFATE 2.5; .5 MG/3ML; MG/3ML
3 SOLUTION RESPIRATORY (INHALATION)
Status: DISCONTINUED | OUTPATIENT
Start: 2022-11-07 | End: 2022-11-08 | Stop reason: HOSPADM

## 2022-11-07 RX ORDER — SODIUM CHLORIDE FOR INHALATION 3 %
4 VIAL, NEBULIZER (ML) INHALATION EVERY 8 HOURS
Status: DISCONTINUED | OUTPATIENT
Start: 2022-11-07 | End: 2022-11-08 | Stop reason: HOSPADM

## 2022-11-07 RX ADMIN — IPRATROPIUM BROMIDE AND ALBUTEROL SULFATE 3 ML: 2.5; .5 SOLUTION RESPIRATORY (INHALATION) at 06:57

## 2022-11-07 RX ADMIN — IPRATROPIUM BROMIDE AND ALBUTEROL SULFATE 3 ML: 2.5; .5 SOLUTION RESPIRATORY (INHALATION) at 12:30

## 2022-11-07 RX ADMIN — SODIUM CHLORIDE 30 MG/ML INHALATION SOLUTION 4 ML: 30 SOLUTION INHALANT at 18:51

## 2022-11-07 RX ADMIN — IPRATROPIUM BROMIDE AND ALBUTEROL SULFATE 3 ML: 2.5; .5 SOLUTION RESPIRATORY (INHALATION) at 18:51

## 2022-11-07 RX ADMIN — ALPRAZOLAM 0.5 MG: 0.5 TABLET ORAL at 08:25

## 2022-11-07 RX ADMIN — Medication 10 ML: at 08:29

## 2022-11-07 RX ADMIN — PREDNISONE 40 MG: 20 TABLET ORAL at 11:34

## 2022-11-07 RX ADMIN — PANTOPRAZOLE SODIUM 40 MG: 40 TABLET, DELAYED RELEASE ORAL at 06:40

## 2022-11-07 RX ADMIN — SERTRALINE 25 MG: 50 TABLET, FILM COATED ORAL at 08:25

## 2022-11-07 RX ADMIN — ATORVASTATIN CALCIUM 40 MG: 40 TABLET, FILM COATED ORAL at 08:25

## 2022-11-07 RX ADMIN — ALPRAZOLAM 0.5 MG: 0.5 TABLET ORAL at 20:30

## 2022-11-07 RX ADMIN — BUDESONIDE AND FORMOTEROL FUMARATE DIHYDRATE 2 PUFF: 160; 4.5 AEROSOL RESPIRATORY (INHALATION) at 18:51

## 2022-11-07 RX ADMIN — METOPROLOL TARTRATE 25 MG: 25 TABLET, FILM COATED ORAL at 20:30

## 2022-11-07 RX ADMIN — BUDESONIDE AND FORMOTEROL FUMARATE DIHYDRATE 2 PUFF: 160; 4.5 AEROSOL RESPIRATORY (INHALATION) at 06:57

## 2022-11-07 RX ADMIN — ASPIRIN 81 MG: 81 TABLET, COATED ORAL at 08:25

## 2022-11-07 RX ADMIN — ENOXAPARIN SODIUM 40 MG: 100 INJECTION SUBCUTANEOUS at 20:30

## 2022-11-07 RX ADMIN — TRAZODONE HYDROCHLORIDE 25 MG: 50 TABLET ORAL at 20:30

## 2022-11-07 RX ADMIN — CEFTRIAXONE 1 G: 1 INJECTION, SOLUTION INTRAVENOUS at 11:34

## 2022-11-07 RX ADMIN — Medication 10 ML: at 20:30

## 2022-11-07 RX ADMIN — SODIUM CHLORIDE 500 MG: 900 INJECTION, SOLUTION INTRAVENOUS at 21:00

## 2022-11-07 NOTE — CONSULTS
"Date of admission:  11/6/2022    Date of consultation:   November 7, 2022    Requested by:   Hospitalist Service.     PCP: Maicol Cardenas MD    Reason:  Acute respiratory failure.  Pneumonia.    History of Present Illness:  82 y.o. male with past medical history significant for COPD and nocturnal hypoxia who started having a cough and shortness of breath, that started a few days ago. Patient was complaining of subjective chills.  The patient denies any fever. Symptoms started somewhat gradually.      Over the past few days his symptoms have gradually worsened, mostly with regards to fatigue and tiredness.    The patient says that the shortness of breath became worse even at rest.    he was given Bactrim, started on 10/20/2022 and finished it a week before he felt worse again.    he denies any sick contacts.     Used to smoke 2 PPD for about 42 years, but quit in 2015.     He was on the ventilator for pneumonia, back in 2015.     The patient underwent Splenectomy in 2014, due to \"low platelets\".     He was also diagnosed with bladder cancer and underwent an ileal conduit creation.    The patient was admitted to the hospital and pulmonary consultation was requested for further recommendations.     Review of System: All other review of systems negative except indicated in HPI       Past Medical History: Pertinent history reviewed, as appropriate. Negative, except noted below or in HPI.  If this history could not be obtained due to a reason, the reason is listed in the HPI.  Past Medical History:   Diagnosis Date   • Abdominal pain    • Acute sinusitis    • Allergic N/A   • Anemia    • Anxiety    • Arthritis    • Asthma N/A   • Bladder cancer (HCC) 04/2021   • Cataracts, bilateral    • COPD (chronic obstructive pulmonary disease) (HCC)    • Coronary artery disease N/A    Some Blockage   • Diabetes mellitus (HCC)     REPORTS DIET CONTROL ONLY, TAKES NO MEDICATION   • Emphysema of lung (HCC)    • GERD (gastroesophageal " reflux disease)    • Heme positive stool    • History of ankle fracture     LEFT    • History of cardiovascular stress test     REPORTS IN HIS LATE 50'S AND THAT ALL WAS WNL'S AT THAT TIME   • History of echocardiogram     REPORTS HAS HAD 2. DOES NOT REPORT ANY HX OF CHEST PAIN.   • History of pneumonia    • [place] hearing[place]     WEARS HEARING AIDS   • Hypertension    • Melanoma (HCC)     2013   • Oxygen dependent     2 L NC HS   • Presence of urostomy (HCC) 06/15/2021   • Seasonal allergies    • Wears glasses    • Wears partial dentures          Past Surgical History: Pertinent history reviewed, as appropriate. Negative, except noted below or in HPI. If this history could not be obtained due to a reason, the reason is listed in the HPI.  Past Surgical History:   Procedure Laterality Date   • BRAIN SURGERY  1999    REPORTS FOR SUBDURAL HEMATOMA    • BRONCHOSCOPY N/A 04/16/2018    Procedure: BRONCHOSCOPY DIAGNOSTIC WITH WASHINGS, BRUSHINGS AND BIOPSIES;  Surgeon: Morgan Frias MD;  Location: [facility];  Service: Pulmonary   • BRONCHOSCOPY     • ALEX HOLE FOR SUBDURAL HEMATOMA     • CARDIAC CATHETERIZATION      REPORTS APPROXIMATELY 2015 AND REPORTS NO STENTS WERE PLACED   • CATARACT EXTRACTION W/ INTRAOCULAR LENS IMPLANT Left 06/23/2022    Procedure: CATARACT PHACO EXTRACTION WITH INTRAOCULAR LENS IMPLANT LEFT COMPLICATED WITH MALYUGIN RING;  Surgeon: Mack Blake MD;  Location: [facility];  Service: Ophthalmology;  Laterality: Left;   • CATARACT EXTRACTION W/ INTRAOCULAR LENS IMPLANT Right 7/14/2022    Procedure: CATARACT PHACO EXTRACTION WITH INTRAOCULAR LENS IMPLANT RIGHT COMPLICATED WITH MALYUGIN RING;  Surgeon: Mack Blake MD;  Location: [facility];  Service: Ophthalmology;  Laterality: Right;   • CHOLECYSTECTOMY     • COLONOSCOPY     • COLONOSCOPY N/A 08/02/2018    Procedure: COLONOSCOPY WITH HOT FORCEP POLYPECTOMY X2  ;  Surgeon: Sara Reynoso MD;  Location: [facility];  Service:  "Gastroenterology   • ENDOSCOPY     • LIVER BIOPSY     • LYMPH NODE BIOPSY     • MOLE REMOVAL      MELANOMA ON BACK   • OTHER SURGICAL HISTORY Right     UROSTOMY   • SPLENECTOMY     • THORACOTOMY  2015    Left thoracotomy and stapling of pulmonary blebs. Mechanical pleurodesis.   • TONSILLECTOMY     • TRANSURETHRAL RESECTION OF BLADDER TUMOR N/A 2021    Procedure: TRANSURETHRAL RESECTION OF BLADDER TUMOR;  Surgeon: Osmani Francois MD;  Location: [facility];  Service: Urology;  Laterality: N/A;   • WISDOM TOOTH EXTRACTION           Family History: Pertinent history reviewed, as appropriate. Negative, except noted below or in HPI. If this history could not be obtained due to a reason, the reason is listed in the HPI.  Family History   Problem Relation Age of Onset   • Alzheimer's disease Mother    • Arthritis Mother    • Heart disease Father    • Hyperlipidemia Father    • Cancer Other    • Cancer Maternal Grandmother          Social History: Pertinent history reviewed, as appropriate. Negative, except noted below or in HPI. If this history could not be obtained due to a reason, the reason is listed in the HPI.  Social History     Socioeconomic History   • Marital status:    Tobacco Use   • Smoking status: Former     Packs/day: 3.00     Years: 42.00     Pack years: 126.00     Types: Cigarettes     Start date: 1957     Quit date: 1999     Years since quittin.7   • Smokeless tobacco: Never   Vaping Use   • Vaping Use: Never used   Substance and Sexual Activity   • Alcohol use: Not Currently   • Drug use: No   • Sexual activity: Defer     Partners: Female             Physical Exam:  /52 (BP Location: Left arm, Patient Position: Sitting)   Pulse 69   Temp 98.1 °F (36.7 °C) (Oral)   Resp 18   Ht 175.3 cm (69\")   Wt 75.1 kg (165 lb 9.1 oz)   SpO2 97%   BMI 24.45 kg/m²      Constitutional:            Vital signs reviewed                     General: No acute distress " noted. Able to communicate appropriately    Eyes:            Extraocular movement was intact.            Pupils appeared equal            Conjunctiva: Pink    ENT:             Hearing was intact              No nasal erythema noted.              Oropharynx was somewhat dry. No lesions noted.     Neck:             Supple. No obvious JVD noted.              Thyroid gland did not seem to be enlarged    Cardiovascular:              S1 + S2. Regular at this time.    Lungs/Respiratory:            Respiratory effort was minimally labored.            Good Air Entry Bilaterally with right basal egophony and crackles heard.            Scattered bilateral wheezing noted as well.    Abdomen/GI:            Postsurgical changes.  Ileal conduit noted.  Soft             Bowel sounds positive            No obvious organomegaly noted     Musculoskeletal/Extremities:             Gait could not be assessed at this time.             No clubbing, cyanosis noted in the upper extremities.             No edema noted in the lower extremities bilaterally.    Neurologic:             Awake, alert and oriented x 3.             Able to follow simple commands.    Psychiatric:             Affect appeared fair.             Awake, alert and oriented x 3.    Skin:             No rash noted.             Warm and dry        Labs: Reviewed. Pertinent labs were noted.   Results from last 7 days   Lab Units 11/07/22  0639 11/06/22  1043   WBC 10*3/mm3 21.02* 30.36*   HEMOGLOBIN g/dL 10.1* 13.0   HEMATOCRIT % 30.7* 40.0   PLATELETS 10*3/mm3 381 457*   NEUTROPHIL % % 75.6  --    NEUTROS ABS 10*3/mm3 15.88* 26.72*   EOSINOPHIL % % 0.6  --    EOS ABS 10*3/mm3 0.13  --    LYMPHOCYTE % % 12.0*  --    LYMPHS ABS 10*3/mm3 2.53  --            Lab 11/06/22  1154   LACTATE 1.8       Lab Results   Component Value Date    PROCALCITO 0.14 11/06/2022    PROCALCITO 0.21 11/29/2018    PROCALCITO 0.27 (H) 11/28/2018       Lab Results   Component Value Date    CRP 17.40 (H)  11/29/2018       Lab Results   Component Value Date    SEDRATE 11 07/06/2022       Lab Results   Component Value Date    PROBNP 531.3 11/06/2022    PROBNP 1,560.0 (C) 11/27/2018       Results from last 7 days   Lab Units 11/07/22  0639 11/06/22  1043   SODIUM mmol/L 137 137   POTASSIUM mmol/L 4.4 5.0   CHLORIDE mmol/L 104 98   CO2 mmol/L 23.2 24.5   BUN mg/dL 27* 26*   CREATININE mg/dL 1.02 1.18   CALCIUM mg/dL 8.4* 9.1   ANION GAP mmol/L 9.8 14.5   BILIRUBIN mg/dL 0.5 0.8   ALK PHOS U/L 117 157*   ALT (SGPT) U/L 15 19   AST (SGOT) U/L 13 19   GLUCOSE mg/dL 92 101*   TOTAL PROTEIN g/dL 6.0 7.1   ALBUMIN g/dL 2.90* 4.10             Lab Results   Component Value Date    TSH 1.400 05/20/2022    TSH 1.740 05/26/2020    TSH 2.160 03/05/2019       No results found for: FREET4    Lab Results   Component Value Date    INR 0.9 10/13/2021    INR 1.16 (H) 04/16/2018    INR 1.5 (H) 11/10/2015         Micro: As of November 7, 2022   Lab Results   Component Value Date    RESPCX Light growth (2+) Normal Respiratory Taya 11/28/2018    RESPCX No growth 04/16/2018    RESPCX Culture in progress 04/16/2018    RESPCX Light growth (2+) Normal Respiratory Taya 04/16/2018     No results found for: BCIDPCR  Lab Results   Component Value Date    BLOODCX No growth at 24 hours 11/06/2022    BLOODCX No growth at 24 hours 11/06/2022    BLOODCX No growth at 5 days 11/27/2018     Lab Results   Component Value Date    URINECX Final report (A) 04/18/2022    URINECX Final report 08/22/2018    URINECX Final report 08/08/2018     No results found for: MRSACX  No results found for: MRSAPCR  No results found for: URCX  No components found for: LOWRESPCF  No results found for: THROATCX  No results found for: CULTURES  No components found for: STREPBCX  Lab Results   Component Value Date    STREPPNEUAG Negative 11/28/2018     No results found for: LEGIONELLA  No results found for: MYCOPLASCX  No results found for: GCCX  No results found for: WOUNDCX  No  results found for: BODYFLDCX    Lab Results   Component Value Date    FLU Negative 11/27/2018    FLU Negative 11/27/2018       No results found for: ADENOVIRUS  No results found for: QJ936C  No results found for: CVHKU1  No results found for: CVNL63  No results found for: CVOC43  No results found for: HUMETPNEVS  No results found for: HURVEV  Lab Results   Component Value Date    FLUBPCR Not Detected 11/06/2022     No results found for: PARAINFLUE  No results found for: PARAFLUV2  No results found for: PARAFLUV3  No results found for: PARAFLUV4  No results found for: BPERTPCR  No results found for: [id number]  No results found for: CPNEUPCR  No results found for: MPNEUMO  Lab Results   Component Value Date    FLUAPCR Not Detected 11/06/2022     No results found for: FLUAH3  No results found for: FLUAH1  No results found for: RSV  No results found for: BPARAPCR    COVID 19:  Lab Results   Component Value Date    COVID19 Not Detected 11/06/2022       ABG: Reviewed  Recent Labs     11/06/22  1141   PHART 7.467*   XZE2YLV 32.7*   PO2ART 68.2*   FVC8TIS 23.6   BASEEXCESS 0.4         Imaging Study: Latest imaging studies was reviewed personally.   Imaging Results (Last 72 Hours)     Procedure Component Value Units Date/Time    CT Angiogram Chest Pulmonary Embolism [[id number]] Collected: 11/06/22 1508     Updated: 11/06/22 1515    Narrative:      PROCEDURE: CT ANGIOGRAM CHEST PULMONARY EMBOLISM-     HISTORY: Shortness of breath     TECHNIQUE: Thin section axial CT with IV contrast supplemented with3D  reconstructed MIP images.     FINDINGS:     Pulmonary vessels enhance in a normal fashion without evidence of  embolic disease. Thoracic aorta is normal in caliber without evidence of  aneurysm or dissection.     Severe emphysema with diffuse bilateral bronchial wall thickening. Right  basilar consolidation and patchy bilateral tree in bud nodular  opacities. Right apical nodule measuring 1.7 cm, previously 1.3 cm  (series 5  image 17).  Small right pleural effusion. No pneumothorax..      Normal heart size. No pericardial effusion. Multivessel coronary artery  calcifications..       Impression:      1. No evidence of pulmonary embolism.  2. Multifocal pneumonia.  3. Slight interval increase in size of right apical nodular scarring.  Follow-up CT chest in 3 months is recommended.        This report was signed and finalized on 11/6/2022 3:13 PM by Mirza Matos MD.    XR Chest 1 View [[id number]] Collected: 11/06/22 1258     Updated: 11/06/22 1302    Narrative:      PORTABLE CHEST  11/6/2022 11:11 AM     HISTORY: Shortness of breath     COMPARISON:   None     FINDINGS: The cardiac silhouette is normal in size. The mediastinal and  hilar contours are unremarkable.  Emphysema with chronic interstitial  changes of the lungs. Right basilar  hazy airspace opacity. Right  greater than left bibasilar and biapical scarring. There is no  pneumothorax. The visualized osseous structures demonstrate no acute  abnormalities.       Impression:      Hazy right basilar airspace opacity may reflect atelectasis  or pneumonia in the right clinical setting.                 This report was signed and finalized on 11/6/2022 1:00 PM by Mirza Matos MD.            ECHO:  Results for orders placed in visit on 11/13/18    SCANNED - ECHOCARDIOGRAM               Assessment:  1.  Acute respiratory failure   2.  Right sided pneumonia  3.  Significant emphysema/COPD  4.  Baseline nocturnal hypoxemia  5.  History of heavy smoking  6.  Status post splenectomy for hypersplenism  7.  Bladder cancer.  Status post cystectomy.  8.  Pleural effusion    Discussion/Recommendations:   I have reviewed the patient's history and radiological data and does appear that the patient has pneumonia at this time.      I will continue antibiotics.      Repeat laboratory workup will be followed closely.    Respiratory cultures will be obtained, since they do not appear to have  been obtained thus far?.    Antigen levels will be obtained.    I will consider repeating chest x-ray, if clinically appropriate.    I told the patient that he clearly is at risk for infections given history of splenectomy.    I also reminded him that because of splenectomy and advanced age, it is not necessary for him to have all the traditional symptoms such as fever, chills etc.    I will optimize treatment for underlying COPD.    I do not get the sense that the patient has significant exacerbation at this time, but if necessary, we will consider adding Solu-Medrol/prednisone    We will also start the patient on flutter valve.    We will try hypertonic saline, to improve mucosal clearance.    It is somewhat perplexing that the patient procalcitonin level was mostly unremarkable.    The patient's pleural effusion is extremely small and appears to have been reported on an earlier CT scan performed at .    I reviewed the patient's last note from pulmonologist at .  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  It also mentioned episode of spontaneous pneumothorax in 2014 requiring thoracoscopy and pleurodesis.    The note also mention no COPD exacerbation in the last 6 months    The plan was discussed with the patient.  I have also discussed the case with the nursing staff.    Recommendations were also discussed with the referring provider.     I would like to thank you for the opportunity to participate in the care of this patient.  We will communicate changes and recommendations, if and when necessary.      This document was electronically signed by Raunlfo Fagan MD on 11/07/22 at 15:21 EST      Dictated utilizing Dragon dictation.

## 2022-11-07 NOTE — CASE MANAGEMENT/SOCIAL WORK
Discharge Planning Assessment  [facility]     Patient Name: Jairo Mercedes  MRN: [id number]  Today's Date: 2022    Admit Date: 2022    Plan: CM met with pt at bedside. Able to state name, , address, phone numbers. Pt lives with wife and has been at current residence 19 yrs. His primary is Dr. Cardenas and pharmacy is [facility]. Declines meds to bed. His LW and POA are on file. He has not been an inpatient in past 30 days. Home DME includes bp cuff, O2 2L at night from [employer], glucometer, and pulse ox. Pt is independent and drives. He denies financial and d/c concerns. He states he is feeling much better and plans to return home with family transporting.   Discharge Needs Assessment    No documentation.                Discharge Plan     Row Name 22 [id number]       Plan    Plan CM met with pt at bedside. Able to state name, , address, phone numbers. Pt lives with wife and has been at current residence 19 yrs. His primary is Dr. Cardenas and pharmacy is [facility]. Declines meds to bed. His LW and POA are on file. He has not been an inpatient in past 30 days. Home DME includes bp cuff, O2 2L at night from [employer], glucometer, and pulse ox. Pt is independent and drives. He denies financial and d/c concerns. He states he is feeling much better and plans to return home with family transporting.              Continued Care and Services - Admitted Since 2022    Coordination has not been started for this encounter.          Demographic Summary     Row Name 22 [id number]       General Information    Admission Type observation    Required Notices Provided Observation Status Notice    Referral Source admission list    Reason for Consult discharge planning    Preferred Language English       Contact Information    Permission Granted to Share Info With family/designee               Functional Status     Row Name 22 1428       Functional Status    Usual Activity Tolerance good    Current Activity Tolerance fair        Functional Status, IADL    Medications independent    Meal Preparation assistive person    Housekeeping assistive person    Laundry assistive person    Shopping independent    IADL Comments Pt ambulates with no assistance and drives. His wife maintains household tasks.       Mental Status    General Appearance WDL WDL       Mental Status Summary    Recent Changes in Mental Status/Cognitive Functioning no changes       Employment/    Employment Status retired               Psychosocial    No documentation.                Abuse/Neglect    No documentation.                Legal    No documentation.                Substance Abuse    No documentation.                Patient Forms    No documentation.                   Gloria Reich RN

## 2022-11-07 NOTE — PROGRESS NOTES
[facility]    Name:  Jairo Mercedes   Age:  82 y.o.  Sex:  male  :  1940  MRN:  [id number]   Visit Number:  [id number]  Admission Date:  2022  Date Of Service:  22  Primary Care Physician:  Maicol Cardenas MD     LOS: 0 days :    Chief Complaint:      Persistent mucoid cough    Subjective:    Not much improvement yet. Alarm sounding for hypoxia    Hospital Course:    82-year-old gentleman with a history of severe emphysema also with chronic right lung changes including a right-sided effusion and infiltrate.  He presents with worsening shortness of breath.  He has had shortness of breath for several days outpatient and has been on some oral antibiotics by his primary care it looks like he was on Bactrim.  He also had some associated fatigue.  He is followed with a pulmonologist that is at .  However that physician has recently retired and he is looking to establish with a pulmonologist here in the [facility].  I talked with him about placing a consult and they were in agreement.  Patient has had occasional oxygen at night in the past.  However he required it continuously in the emergency department.  He also was found to have a very high white blood cell count.  He was found to have infiltrates and a right-sided effusion on the CT scan of his lungs.  I cannot compare the new films to the old films from .  However from what I can glean it seems the infiltrates are more pronounced now than they have been in the past.  The patient as well as his daughter at the bedside were agreeable to admission.  He does have an advanced directive on file but requests to be full code as of now but does not want any prolonged life-sustaining measures especially if he is in a vegetative state.    Review of Systems:     All systems were reviewed and negative except as mentioned in subjective, assessment and plan.    Vital Signs:    Temp:  [98.1 °F (36.7 °C)-98.7 °F  (37.1 °C)] 98.1 °F (36.7 °C)  Heart Rate:  [63-82] 79  Resp:  [16-20] 16  BP: ()/(44-69) 94/53    Intake and output:    I/O last 3 completed shifts:  In: 1540 [P.O.:240; IV Piggyback:1300]  Out: 800 [Urine:800]  No intake/output data recorded.    Physical Examination:    General Appearance:  Alert and cooperative.    Head:  Atraumatic and normocephalic.   Eyes: Conjunctivae and sclerae normal, no icterus. No pallor.   Throat: No oral lesions, no thrush, oral mucosa moist.   Neck: Supple, trachea midline, no thyromegaly.   Lungs:   Rhonchi and wheeze bilaterally. No Pleural rub or bronchial breathing.   Heart:  Normal S1 and S2, 1/6 vanessa, murmur, no gallop, no rub. No JVD.   Abdomen:   Normal bowel sounds, no masses, no organomegaly. Soft, nontender, nondistended, no rebound tenderness.   Extremities: Supple, no edema, no cyanosis, no clubbing.   Skin: No bleeding or rash.   Neurologic: Alert and oriented x 3. No facial asymmetry. Moves all four limbs. No tremors.      Laboratory results:    Results from last 7 days   Lab Units 11/07/22  0639 11/06/22  1043   SODIUM mmol/L 137 137   POTASSIUM mmol/L 4.4 5.0   CHLORIDE mmol/L 104 98   CO2 mmol/L 23.2 24.5   BUN mg/dL 27* 26*   CREATININE mg/dL 1.02 1.18   CALCIUM mg/dL 8.4* 9.1   BILIRUBIN mg/dL 0.5 0.8   ALK PHOS U/L 117 157*   ALT (SGPT) U/L 15 19   AST (SGOT) U/L 13 19   GLUCOSE mg/dL 92 101*     Results from last 7 days   Lab Units 11/07/22  0639 11/06/22  1043   WBC 10*3/mm3 21.02* 30.36*   HEMOGLOBIN g/dL 10.1* 13.0   HEMATOCRIT % 30.7* 40.0   PLATELETS 10*3/mm3 381 457*         Results from last 7 days   Lab Units 11/06/22  1043   TROPONIN T ng/mL 0.025     Results from last 7 days   Lab Units 11/06/22  1154 11/06/22  1144   BLOODCX  No growth at less than 24 hours No growth at less than 24 hours     Recent Labs     11/06/22  1141   PHART 7.467*   QNB0SZA 32.7*   PO2ART 68.2*   CMH5QJD 23.6   BASEEXCESS 0.4      I have reviewed the patient's laboratory  results.    Radiology results:    XR Chest 1 View    Result Date: 11/6/2022  PORTABLE CHEST  11/6/2022 11:11 AM  HISTORY: Shortness of breath  COMPARISON:   None  FINDINGS: The cardiac silhouette is normal in size. The mediastinal and hilar contours are unremarkable.  Emphysema with chronic interstitial changes of the lungs. Right basilar  hazy airspace opacity. Right greater than left bibasilar and biapical scarring. There is no pneumothorax. The visualized osseous structures demonstrate no acute abnormalities.      Impression: Hazy right basilar airspace opacity may reflect atelectasis or pneumonia in the right clinical setting.      This report was signed and finalized on 11/6/2022 1:00 PM by Mirza Matos MD.    CT Angiogram Chest Pulmonary Embolism    Result Date: 11/6/2022  PROCEDURE: CT ANGIOGRAM CHEST PULMONARY EMBOLISM-  HISTORY: Shortness of breath  TECHNIQUE: Thin section axial CT with IV contrast supplemented with3D reconstructed MIP images.  FINDINGS:  Pulmonary vessels enhance in a normal fashion without evidence of embolic disease. Thoracic aorta is normal in caliber without evidence of aneurysm or dissection.  Severe emphysema with diffuse bilateral bronchial wall thickening. Right basilar consolidation and patchy bilateral tree in bud nodular opacities. Right apical nodule measuring 1.7 cm, previously 1.3 cm (series 5 image 17).  Small right pleural effusion. No pneumothorax..  Normal heart size. No pericardial effusion. Multivessel coronary artery calcifications..      Impression: 1. No evidence of pulmonary embolism. 2. Multifocal pneumonia. 3. Slight interval increase in size of right apical nodular scarring. Follow-up CT chest in 3 months is recommended.   This report was signed and finalized on 11/6/2022 3:13 PM by Mirza Matos MD.    I have reviewed the patient's radiology reports.    Medication Review:     I have reviewed the patient's active and prn medications.     Problem  List:      Community acquired pneumonia    Pneumonia due to infectious organism, unspecified laterality, unspecified part of lung      Assessment:    1. Community-acquired pneumonia  2. Severe emphysema without obvious exacerbation  3. Chronic infiltrates and effusions of the right lung       Plan:    Community-acquired pneumonia  -Rocephin and azithromycin flutter valve consult to pulmonology     Severe emphysema  -Continue long-acting bronchodilator steroid inhaler we will give duo nebs  -Established with pulm  -more wheezing today will add prednisone.     Chronic infiltrates and effusions of the right lung  -Will appreciate pulmonology's viewpoint on how much of the infiltrate and effusion seen in the lungs represents an acute pneumonia versus progression of the chronic lung process that he has had there.     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

## 2022-11-07 NOTE — PLAN OF CARE
Goal Outcome Evaluation:  Plan of Care Reviewed With: patient        Progress: no change  Outcome Evaluation: pleasant patient with no complaint of pain at this time-scheduled IV antibiotics per orders-medications given per Dr. Alfaro's orders-Dr. Fagan to see patient in the morning 11--monitor labs and continue to monitor patient-patient independent with ADL's

## 2022-11-07 NOTE — PAYOR COMM NOTE
"  CLINICALS FOR INPT ADMISSIONS  UR MANAGER; [place] [phone] AND [phone]      Jairo Mercedes (82 y.o. Male)     Date of Birth   1940    Social Security Number       Address   [address] [address]    Home Phone   [phone]    MRN   [id number]       Taoism   Samaritan    Marital Status                               Admission Date   22    Admission Type   Emergency    Admitting Provider   Kerley, Brian Joseph, DO    Attending Provider   Kelvin Alfaro DO    Department, Room/Bed   [facility] MED SURG  3, 318/1       Discharge Date       Discharge Disposition       Discharge Destination                               Attending Provider: Kelvin Alfaro DO    Allergies: Parabens    Isolation: None   Infection: COVID (rule out) (22)   Code Status: CPR    Ht: 175.3 cm (69\")   Wt: 75.1 kg (165 lb 9.1 oz)    Admission Cmt: None   Principal Problem: Community acquired pneumonia [J18.9]                 Active Insurance as of 2022     Primary Coverage     Payor Plan Insurance Group Employer/Plan Group    [facility] MEDICARE REPLACEMENT [facility] MEDICARE REPLACEMENT [id number]     Payor Plan Address Payor Plan Phone Number Payor Plan Fax Number Effective Dates    [address] [address]   2021 - None Entered    [facility] [id number]       Subscriber Name Subscriber Birth Date Member ID       JAIRO MERCEDES 1940 [id number]                 Emergency Contacts      (Rel.) Home Phone Work Phone Mobile Phone    DarenFariba (Spouse) [phone] -- [phone]    Ashley Mendez (Daughter) [phone] -- --               History & Physical      Kelvin Alfaro DO at 22 1715            [facility] HOSPITALIST   HISTORY AND PHYSICAL      Name:  Jairo Mercedes   Age:  82 y.o.  Sex:  male  :  1940  MRN:  [id number]   Visit Number:  [id number]  Admission Date:  2022  Date Of " Service:  11/06/22  Primary Care Physician:  Maicol Cardenas MD    Chief Complaint:     Shortness of breath    History Of Presenting Illness:      82-year-old gentleman with a history of severe emphysema also with chronic right lung changes including a right-sided effusion and infiltrate.  He presents with worsening shortness of breath.  He has had shortness of breath for several days outpatient and has been on some oral antibiotics by his primary care it looks like he was on Bactrim.  He also had some associated fatigue.  He is followed with a pulmonologist that is at .  However that physician has recently retired and he is looking to establish with a pulmonologist here in the [facility].  I talked with him about placing a consult and they were in agreement.  Patient has had occasional oxygen at night in the past.  However he required it continuously in the emergency department.  He also was found to have a very high white blood cell count.  He was found to have infiltrates and a right-sided effusion on the CT scan of his lungs.  I cannot compare the new films to the old films from .  However from what I can glean it seems the infiltrates are more pronounced now than they have been in the past.  The patient as well as his daughter at the bedside were agreeable to admission.  He does have an advanced directive on file but requests to be full code as of now but does not want any prolonged life-sustaining measures especially if he is in a vegetative state.    Review Of Systems:    All systems were reviewed and negative except as mentioned in history of presenting illness, assessment and plan.    Past Medical History: Patient  has a past medical history of Abdominal pain, Acute sinusitis, Allergic (N/A), Anemia, Anxiety, Arthritis, Asthma (N/A), Bladder cancer ([facility]) (04/2021), Cataracts, bilateral, COPD (chronic obstructive pulmonary disease) ([facility]), Coronary artery disease (N/A), Diabetes mellitus ([facility]),  Emphysema of lung (HCC), GERD (gastroesophageal reflux disease), Heme positive stool, History of ankle fracture, History of cardiovascular stress test, History of echocardiogram, History of pneumonia, [place] (hard of hearing), Hypertension, Melanoma (HCC), Oxygen dependent, Presence of urostomy (HCC) (06/15/2021), Seasonal allergies, Wears glasses, and Wears partial dentures.    Past Surgical History: Patient  has a past surgical history that includes Lymph node biopsy; Cholecystectomy; Mole removal (2005); Thoracotomy (07/28/2015); Splenectomy, total; Sergei hole for subdural hematoma; Liver biopsy; Esophagogastroduodenoscopy; [place] tooth extraction; Tonsillectomy; Bronchoscopy (N/A, 04/16/2018); Cardiac catheterization; Brain surgery (1999); Colonoscopy; Colonoscopy (N/A, 08/02/2018); Bronchoscopy; Transurethral resection of bladder tumor (N/A, 04/13/2021); Cataract extraction w/ intraocular lens implant (Left, 06/23/2022); Other surgical history (Right); and Cataract extraction w/ intraocular lens implant (Right, 7/14/2022).    Social History: Patient  reports that he quit smoking about 23 years ago. His smoking use included cigarettes. He started smoking about 65 years ago. He has a 126.00 pack-year smoking history. He has never used smokeless tobacco. He reports that he does not currently use alcohol. He reports that he does not use drugs.    Family History:  Patient's family history has been reviewed and found to be noncontributory.     Allergies:      Parabens    Home Medications:    Prior to Admission Medications     Prescriptions Last Dose Informant Patient Reported? Taking?    acetaminophen (TYLENOL) 500 MG tablet  Self Yes No    Take  by mouth Every 6 (Six) Hours As Needed.    ALPRAZolam (XANAX) 0.5 MG tablet  Self Yes No    Every 12 (Twelve) Hours.    aspirin 81 MG EC tablet  Self Yes No    Take 81 mg by mouth Daily.    atorvastatin (LIPITOR) 40 MG tablet  Self No No    TAKE ONE TABLET BY MOUTH AT BEDTIME     esomeprazole (nexIUM) 40 MG capsule  Self No No    TAKE ONE CAPSULE BY MOUTH EVERY DAY    fluocinonide (LIDEX) 0.05 % external solution  Self Yes No    APPLY TOPICALLY AS DIRECTED AT BEDTIME TO affected area    Fluticasone-Salmeterol (ADVAIR) 250-50 MCG/ACT DISKUS   No No    Inhale 1 puff 2 (Two) Times a Day.    fluticasone-salmeterol (ADVAIR) 250-50 MCG/DOSE DISKUS  Self No No    Inhale 1 puff by mouth twice a day.    isosorbide mononitrate (IMDUR) 30 MG 24 hr tablet  Self No No    TAKE ONE TABLET BY MOUTH EVERY DAY AS DIRECTED    losartan (COZAAR) 50 MG tablet   No No    TAKE ONE TABLET BY MOUTH EVERY DAY    meclizine (ANTIVERT) 25 MG tablet  Self No No    TAKE ONE TABLET BY MOUTH TWICE DAILY    Patient taking differently:  As Needed.    metoprolol tartrate (LOPRESSOR) 25 MG tablet  Self No No    TAKE ONE TABLET BY MOUTH TWICE DAILY    nitroglycerin (NITROSTAT) 0.4 MG SL tablet  Self Yes No    Place 0.4 mg under the tongue Every 5 (Five) Minutes As Needed for Chest Pain.    Omega-3 Fatty Acids (FISH OIL) 1000 MG capsule capsule  Self Yes No    Take 1,000 mg by mouth Daily With Breakfast.    sertraline (ZOLOFT) 25 MG tablet  Self No No    Take 1 tablet by mouth Daily.    Spiriva HandiHaler 18 MCG per inhalation capsule  Self No No    PLACE A CAPSULE INTO INHALER AND INHALE DAILY    traZODone (DESYREL) 50 MG tablet  Self No No    Take 0.5-1 tablets by mouth At Night As Needed (insomnia).    triamcinolone (KENALOG) 0.025 % ointment   No No    Apply to the affected areas(s) twice daily        ED Medications:    Medications   sodium chloride 0.9 % flush 10 mL (has no administration in time range)   sodium chloride 0.9 % bolus 1,000 mL (0 mL Intravenous Stopped 11/6/22 1239)   cefTRIAXone (ROCEPHIN) IVPB 1 g/50ml dextrose (premix) (0 g Intravenous Stopped 11/6/22 1239)   iopamidol (ISOVUE-300) 61 % injection 100 mL (100 mL Intravenous Given 11/6/22 1343)     Vital Signs:  Temp:  [98.9 °F (37.2 °C)] 98.9 °F (37.2  "°C)  Heart Rate:  [66-82] 66  Resp:  [24] 24  BP: (105-133)/(47-69) 108/49        11/06/22  1030   Weight: 74.8 kg (165 lb)     Body mass index is 24.37 kg/m².    Physical Exam:     Most recent vital Signs: /49   Pulse 66   Temp 98.9 °F (37.2 °C) (Oral)   Resp 24   Ht 175.3 cm (69\")   Wt 74.8 kg (165 lb)   SpO2 93%   BMI 24.37 kg/m²     Physical Exam constitutional: Awake, alert  Eyes: PERRLA, sclerae anicteric, no conjunctival injection  HENT: NCAT, mucous membranes moist  Neck: Supple, no thyromegaly, no lymphadenopathy, trachea midline  Respiratory: Audible and clear breath sounds on the left without wheeze the right breath sounds were diminished I did not appreciate any wheeze or rhonchi  Cardiovascular: RRR, 2 out of 6 systolic ejection murmur no rubs, or gallops, palpable pedal pulses bilaterally  Gastrointestinal: Positive bowel sounds, soft, nontender, nondistended  Musculoskeletal: No bilateral ankle edema, no clubbing or cyanosis to extremities  Psychiatric: Appropriate affect, cooperative  Neurologic: Oriented x 3, speech clear  Skin: No rashes    Laboratory data:    I have reviewed the labs done in the emergency room.    Results from last 7 days   Lab Units 11/06/22  1043   SODIUM mmol/L 137   POTASSIUM mmol/L 5.0   CHLORIDE mmol/L 98   CO2 mmol/L 24.5   BUN mg/dL 26*   CREATININE mg/dL 1.18   CALCIUM mg/dL 9.1   BILIRUBIN mg/dL 0.8   ALK PHOS U/L 157*   ALT (SGPT) U/L 19   AST (SGOT) U/L 19   GLUCOSE mg/dL 101*     Results from last 7 days   Lab Units 11/06/22  1043   WBC 10*3/mm3 30.36*   HEMOGLOBIN g/dL 13.0   HEMATOCRIT % 40.0   PLATELETS 10*3/mm3 457*         Results from last 7 days   Lab Units 11/06/22  1043   TROPONIN T ng/mL 0.025     Results from last 7 days   Lab Units 11/06/22  1043   PROBNP pg/mL 531.3             Results from last 7 days   Lab Units 11/06/22  1141   PH, ARTERIAL pH units 7.467*   PO2 ART mm Hg 68.2*   PCO2, ARTERIAL mm Hg 32.7*   HCO3 ART mmol/L 23.6       "     Invalid input(s): USDES,  BLOODU, NITRITITE, BACT, EP    Pain Management Panel     Pain Management Panel 4/20/2021    CREATININE           EKG:      Sinus rhythm with a normal axis with no acute ST or T wave changes noted with frequent PVCs    Radiology:    XR Chest 1 View    Result Date: 11/6/2022  PORTABLE CHEST  11/6/2022 11:11 AM  HISTORY: Shortness of breath  COMPARISON:   None  FINDINGS: The cardiac silhouette is normal in size. The mediastinal and hilar contours are unremarkable.  Emphysema with chronic interstitial changes of the lungs. Right basilar  hazy airspace opacity. Right greater than left bibasilar and biapical scarring. There is no pneumothorax. The visualized osseous structures demonstrate no acute abnormalities.      Hazy right basilar airspace opacity may reflect atelectasis or pneumonia in the right clinical setting.      This report was signed and finalized on 11/6/2022 1:00 PM by Mirza Matos MD.    CT Angiogram Chest Pulmonary Embolism    Result Date: 11/6/2022  PROCEDURE: CT ANGIOGRAM CHEST PULMONARY EMBOLISM-  HISTORY: Shortness of breath  TECHNIQUE: Thin section axial CT with IV contrast supplemented with3D reconstructed MIP images.  FINDINGS:  Pulmonary vessels enhance in a normal fashion without evidence of embolic disease. Thoracic aorta is normal in caliber without evidence of aneurysm or dissection.  Severe emphysema with diffuse bilateral bronchial wall thickening. Right basilar consolidation and patchy bilateral tree in bud nodular opacities. Right apical nodule measuring 1.7 cm, previously 1.3 cm (series 5 image 17).  Small right pleural effusion. No pneumothorax..  Normal heart size. No pericardial effusion. Multivessel coronary artery calcifications..      1. No evidence of pulmonary embolism. 2. Multifocal pneumonia. 3. Slight interval increase in size of right apical nodular scarring. Follow-up CT chest in 3 months is recommended.   This report was signed and  finalized on 11/6/2022 3:13 PM by Mirza Matos MD.      Assessment:    1. Community-acquired pneumonia  2. Severe emphysema without obvious exacerbation  3. Chronic infiltrates and effusions of the right lung    Plan:    Community-acquired pneumonia  -Rocephin and azithromycin flutter valve consult to pulmonology    Severe emphysema  -Continue long-acting bronchodilator steroid inhaler we will give duo nebs  -Established with pulm    Chronic infiltrates and effusions of the right lung  -Will appreciate pulmonology's viewpoint on how much of the infiltrate and effusion seen in the lungs represents an acute pneumonia versus progression of the chronic lung process that he has had there.    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 Patient has an advance directive in EMR which is still valid.           Kelvin Alfaro DO  11/06/22  17:15 EST    Dictated utilizing Dragon dictation.      Electronically signed by Kelvin Alfaro DO at 11/06/22 1726          Emergency Department Notes      Jeannie Charles MD at 11/06/22 1047          TRIAGE CHIEF COMPLAINT:     Nursing and triage notes reviewed    Chief Complaint   Patient presents with   • Shortness of Breath     SOA INCREASED OVER THE LAST 2 DAYS.   OXYGEN DEPENDENT AT ALL TIMES.       HPI: Jairo Mercedes is a 82 y.o. male who presents to the emergency department complaining of a 2-day history of shortness of breath, cough, congestion, sputum production.  Patient has had some generalized fatigue as well.  Patient does have a history of multiple medical conditions including COPD.  Patient states he has oxygen as needed at home and put a few liters on due to increased shortness of breath.  He is not sure that  he has had a fever.  She denies chest pain.  He denies vomiting or diarrhea.  He states he recently was on antibiotics for a sinus infection.    REVIEW OF SYSTEMS: All other systems reviewed and are negative     PAST MEDICAL HISTORY:   Past Medical History:   Diagnosis Date   • Abdominal pain    • Acute sinusitis    • Allergic N/A   • Anemia    • Anxiety    • Arthritis    • Asthma N/A   • Bladder cancer ([facility]) 2021   • Cataracts, bilateral    • COPD (chronic obstructive pulmonary disease) ([facility])    • Coronary artery disease N/A    Some Blockage   • Diabetes mellitus ([facility])     REPORTS DIET CONTROL ONLY, TAKES NO MEDICATION   • Emphysema of lung ([facility])    • GERD (gastroesophageal reflux disease)    • Heme positive stool    • History of ankle fracture     LEFT    • History of cardiovascular stress test     REPORTS IN HIS LATE 50'S AND THAT ALL WAS WNL'S AT THAT TIME   • History of echocardiogram     REPORTS HAS HAD 2. DOES NOT REPORT ANY HX OF CHEST PAIN.   • History of pneumonia    • [place] (hard of hearing)     WEARS HEARING AIDS   • Hypertension    • Melanoma ([facility])        • Oxygen dependent     2 L NC HS   • Presence of urostomy ([facility]) 06/15/2021   • Seasonal allergies    • Wears glasses    • Wears partial dentures         FAMILY HISTORY:   Family History   Problem Relation Age of Onset   • Alzheimer's disease Mother    • Arthritis Mother    • Heart disease Father    • Hyperlipidemia Father    • Cancer Other    • Cancer Maternal Grandmother         SOCIAL HISTORY:   Social History     Socioeconomic History   • Marital status:    Tobacco Use   • Smoking status: Former     Packs/day: 3.00     Years: 42.00     Pack years: 126.00     Types: Cigarettes     Start date: 1957     Quit date: 1999     Years since quittin.7   • Smokeless tobacco: Never   Vaping Use   • Vaping Use: Never used   Substance and Sexual Activity   • Alcohol use: Not Currently   • Drug use: No   • Sexual activity: Defer      Partners: Female        SURGICAL HISTORY:   Past Surgical History:   Procedure Laterality Date   • BRAIN SURGERY  1999    REPORTS FOR SUBDURAL HEMATOMA    • BRONCHOSCOPY N/A 04/16/2018    Procedure: BRONCHOSCOPY DIAGNOSTIC WITH WASHINGS, BRUSHINGS AND BIOPSIES;  Surgeon: Morgan Frias MD;  Location: [facility] OR;  Service: Pulmonary   • BRONCHOSCOPY     • ALEX HOLE FOR SUBDURAL HEMATOMA     • CARDIAC CATHETERIZATION      REPORTS APPROXIMATELY 2015 AND REPORTS NO STENTS WERE PLACED   • CATARACT EXTRACTION W/ INTRAOCULAR LENS IMPLANT Left 06/23/2022    Procedure: CATARACT PHACO EXTRACTION WITH INTRAOCULAR LENS IMPLANT LEFT COMPLICATED WITH MALYUGIN RING;  Surgeon: Mack Blake MD;  Location: [facility] OR;  Service: Ophthalmology;  Laterality: Left;   • CATARACT EXTRACTION W/ INTRAOCULAR LENS IMPLANT Right 7/14/2022    Procedure: CATARACT PHACO EXTRACTION WITH INTRAOCULAR LENS IMPLANT RIGHT COMPLICATED WITH MALYUGIN RING;  Surgeon: Mack Blake MD;  Location: [facility] OR;  Service: Ophthalmology;  Laterality: Right;   • CHOLECYSTECTOMY     • COLONOSCOPY     • COLONOSCOPY N/A 08/02/2018    Procedure: COLONOSCOPY WITH HOT FORCEP POLYPECTOMY X2  ;  Surgeon: Sara Reynoso MD;  Location: [facility] ENDOSCOPY;  Service: Gastroenterology   • ENDOSCOPY     • LIVER BIOPSY     • LYMPH NODE BIOPSY     • MOLE REMOVAL  2005    MELANOMA ON BACK   • OTHER SURGICAL HISTORY Right     UROSTOMY   • SPLENECTOMY     • THORACOTOMY  07/28/2015    Left thoracotomy and stapling of pulmonary blebs. Mechanical pleurodesis.   • TONSILLECTOMY     • TRANSURETHRAL RESECTION OF BLADDER TUMOR N/A 04/13/2021    Procedure: TRANSURETHRAL RESECTION OF BLADDER TUMOR;  Surgeon: Osmani Francois MD;  Location: [facility] OR;  Service: Urology;  Laterality: N/A;   • WISDOM TOOTH EXTRACTION          CURRENT MEDICATIONS:      Medication List      ASK your doctor about these medications    acetaminophen 500 MG tablet  Commonly known as: TYLENOL      ALPRAZolam 0.5 MG tablet  Commonly known as: XANAX     aspirin 81 MG EC tablet     atorvastatin 40 MG tablet  Commonly known as: LIPITOR  TAKE ONE TABLET BY MOUTH AT BEDTIME     difluprednate 0.05 % ophthalmic emulsion  Commonly known as: DUREZOL  Administer 1 drop into the left eye 4 (Four) Times a Day. See admin instructions on AVS.     esomeprazole 40 MG capsule  Commonly known as: nexIUM  TAKE ONE CAPSULE BY MOUTH EVERY DAY     fish oil 1000 MG capsule capsule     fluocinonide 0.05 % external solution  Commonly known as: LIDEX     * fluticasone-salmeterol 250-50 MCG/DOSE DISKUS  Commonly known as: ADVAIR  Inhale 1 puff by mouth twice a day.     * Fluticasone-Salmeterol 250-50 MCG/ACT DISKUS  Commonly known as: ADVAIR/WIXELA  Inhale 1 puff 2 (Two) Times a Day.     isosorbide mononitrate 30 MG 24 hr tablet  Commonly known as: IMDUR  TAKE ONE TABLET BY MOUTH EVERY DAY AS DIRECTED     ketoconazole 2 % shampoo  Commonly known as: NIZORAL     losartan 50 MG tablet  Commonly known as: COZAAR  TAKE ONE TABLET BY MOUTH EVERY DAY     meclizine 25 MG tablet  Commonly known as: ANTIVERT  TAKE ONE TABLET BY MOUTH TWICE DAILY     metoprolol tartrate 25 MG tablet  Commonly known as: LOPRESSOR  TAKE ONE TABLET BY MOUTH TWICE DAILY     nitroglycerin 0.4 MG SL tablet  Commonly known as: NITROSTAT     sertraline 25 MG tablet  Commonly known as: ZOLOFT  Take 1 tablet by mouth Daily.     Spiriva HandiHaler 18 MCG per inhalation capsule  Generic drug: tiotropium  PLACE A CAPSULE INTO INHALER AND INHALE DAILY     sulfamethoxazole-trimethoprim 800-160 MG per tablet  Commonly known as: Bactrim DS  Take 1 tablet by mouth 2 (Two) Times a Day.     traZODone 50 MG tablet  Commonly known as: DESYREL  Take 0.5-1 tablets by mouth At Night As Needed (insomnia).     triamcinolone 0.025 % ointment  Commonly known as: KENALOG  Apply to the affected areas(s) twice daily         * This list has 2 medication(s) that are the same as other  medications prescribed for you. Read the directions carefully, and ask your doctor or other care provider to review them with you.                 ALLERGIES: Parabens     PHYSICAL EXAM:   VITAL SIGNS:   Vitals:    11/06/22 1030   BP: 105/69   Pulse: 80   Resp: 24   Temp: 98.9 °F (37.2 °C)   SpO2: 93%      CONSTITUTIONAL: Awake, oriented, appears nontoxic   HENT: Atraumatic, normocephalic, oral mucosa pink and moist, airway patent. Nares patent without drainage. External ears normal.   EYES: Conjunctivae clear   NECK: Trachea midline   CARDIOVASCULAR: Normal heart rate, Normal rhythm, No murmurs, rubs, gallops   PULMONARY/CHEST: Few crackles and coarse lung sounds in the lower lobes bilaterally.  Overall good air movement.  No increased work of breathing.  ABDOMINAL: Nondistended, soft, nontender - no rebound or guarding.  NEUROLOGIC: Nonfocal, moving all four extremities, no gross sensory or motor deficits.   EXTREMITIES: No clubbing, cyanosis, or edema   SKIN: Warm, Dry, No erythema, No rash     ED COURSE / MEDICAL DECISION MAKING:   Jairo Mercedes is a 82 y.o. male who presents to the emergency department for evaluation of shortness of breath and flulike symptoms.  Patient is nontoxic-appearing on arrival in the emergency department.  Vital signs are stable.  Examination does reveal some abnormal lung sounds.  Will obtain labs and imaging for further evaluation.    EKG interpreted by me reveals sinus rhythm with a rate of 84 bpm.  There are occasional premature ventricular contractions.  This is a normal-appearing EKG.    Chest x-ray per radiology interpretation reveals right lower lobe infiltrate.    Laboratory testing does reveal significantly elevated white blood cell count at 30.  Procalcitonin and lactic acid were within the normal range.  Labs otherwise are largely reassuring.    Patient has been on antibiotics recently.  This could be failure of outpatient therapy.  Patient obtained a CT scan, there is no  evidence of PE but there is a patchy right-sided infiltrate consistent with pneumonia.    The patient is currently requiring 2 L of oxygen.  We will make plans to admit the patient to the hospital for further treatment and evaluation.  Blood cultures were drawn.    DECISION TO DISCHARGE/ADMIT: see ED care timeline     FINAL IMPRESSION:   1 --pneumonia  2 --respiratory failure with hypoxia  3 --     Electronically signed by: Jeannie Charles MD, 11/6/2022 10:47 Jeannie Menjivar MD  11/06/22 1714      Electronically signed by Jeannie Charles MD at 11/06/22 1714         Current Facility-Administered Medications   Medication Dose Route Frequency Provider Last Rate Last Admin   • acetaminophen (TYLENOL) tablet 500 mg  500 mg Oral Q6H PRN Kelvin Alfaro DO       • ALPRAZolam (XANAX) tablet 0.5 mg  0.5 mg Oral Q12H Kelvin Alfaro DO   0.5 mg at 11/07/22 0825   • aspirin EC tablet 81 mg  81 mg Oral Daily Kelvin Alfaro DO   81 mg at 11/07/22 0825   • atorvastatin (LIPITOR) tablet 40 mg  40 mg Oral Daily Kelvin Alfaro DO   40 mg at 11/07/22 0825   • azithromycin (ZITHROMAX) 500 mg 0.9% NaCl (Add-vantage) 250 mL  500 mg Intravenous Q24H Kelvin Alfaro DO   Stopped at 11/06/22 2230   • betamethasone (augmented) (DIPROLENE) 0.05 % cream   Topical Q24H Kelvin Alfaro DO   Given at 11/06/22 2050   • budesonide-formoterol (SYMBICORT) 160-4.5 MCG/ACT inhaler 2 puff  2 puff Inhalation BID - RT Kelvin Alfaro DO   2 puff at 11/07/22 0657   • cefTRIAXone (ROCEPHIN) IVPB 1 g/50ml dextrose (premix)  1 g Intravenous Q24H Kelvin Alfaro DO       • Enoxaparin Sodium (LOVENOX) syringe 40 mg  40 mg Subcutaneous Nightly Kelvin Alfrao DO   40 mg at 11/06/22 2050   • ipratropium-albuterol (DUO-NEB) nebulizer solution 3 mL  3 mL Nebulization Q6H - RT Kelvin Alfaro,    3 mL at 11/07/22 0657   • isosorbide mononitrate (IMDUR) 24 hr tablet 30 mg  30 mg Oral  Daily Kelvin Alfaro DO   30 mg at 11/06/22 2050   • losartan (COZAAR) tablet 50 mg  50 mg Oral Daily Kelvin Alfaro DO   50 mg at 11/06/22 2050   • meclizine (ANTIVERT) tablet 25 mg  25 mg Oral BID PRN Kelvin Alfaro, DO       • metoprolol tartrate (LOPRESSOR) tablet 25 mg  25 mg Oral BID Kelvin Alfaro DO   25 mg at 11/06/22 2050   • nitroglycerin (NITROSTAT) SL tablet 0.4 mg  0.4 mg Sublingual Q5 Min PRN Kelvin Alfaro DO       • pantoprazole (PROTONIX) EC tablet 40 mg  40 mg Oral QAM Kelvin Alfaro DO   40 mg at 11/07/22 0640   • sertraline (ZOLOFT) tablet 25 mg  25 mg Oral Daily Kelvin Alfaro DO   25 mg at 11/07/22 0825   • sodium chloride 0.9 % flush 10 mL  10 mL Intravenous PRN Kelvin Alfaro DO       • sodium chloride 0.9 % flush 10 mL  10 mL Intravenous Q12H Kelvin Alfaro, DO   10 mL at 11/07/22 0829   • sodium chloride 0.9 % flush 10 mL  10 mL Intravenous PRN Kelvin Alfaro DO       • traZODone (DESYREL) tablet 25 mg  25 mg Oral Nightly Kelvin Alfaro DO   25 mg at 11/06/22 2050     Physician Progress Notes (last 24 hours)  Notes from 11/06/22 0945 through 11/07/22 0945   No notes of this type exist for this encounter.         Consult Notes (last 24 hours)  Notes from 11/06/22 0946 through 11/07/22 0946   No notes of this type exist for this encounter.

## 2022-11-08 ENCOUNTER — READMISSION MANAGEMENT (OUTPATIENT)
Dept: CALL CENTER | Facility: HOSPITAL | Age: 82
End: 2022-11-08

## 2022-11-08 ENCOUNTER — APPOINTMENT (OUTPATIENT)
Dept: GENERAL RADIOLOGY | Facility: HOSPITAL | Age: 82
End: 2022-11-08

## 2022-11-08 VITALS
HEIGHT: 69 IN | WEIGHT: 167.11 LBS | RESPIRATION RATE: 16 BRPM | OXYGEN SATURATION: 92 % | HEART RATE: 64 BPM | BODY MASS INDEX: 24.75 KG/M2 | TEMPERATURE: 97.2 F | DIASTOLIC BLOOD PRESSURE: 71 MMHG | SYSTOLIC BLOOD PRESSURE: 137 MMHG

## 2022-11-08 LAB
ANION GAP SERPL CALCULATED.3IONS-SCNC: 11.2 MMOL/L (ref 5–15)
BUN SERPL-MCNC: 32 MG/DL (ref 8–23)
BUN/CREAT SERPL: 30.5 (ref 7–25)
CALCIUM SPEC-SCNC: 8.8 MG/DL (ref 8.6–10.5)
CHLORIDE SERPL-SCNC: 105 MMOL/L (ref 98–107)
CO2 SERPL-SCNC: 22.8 MMOL/L (ref 22–29)
CREAT SERPL-MCNC: 1.05 MG/DL (ref 0.76–1.27)
DEPRECATED RDW RBC AUTO: 51.3 FL (ref 37–54)
EGFRCR SERPLBLD CKD-EPI 2021: 71.3 ML/MIN/1.73
ERYTHROCYTE [DISTWIDTH] IN BLOOD BY AUTOMATED COUNT: 15.6 % (ref 12.3–15.4)
GLUCOSE SERPL-MCNC: 114 MG/DL (ref 65–99)
HCT VFR BLD AUTO: 33.1 % (ref 37.5–51)
HGB BLD-MCNC: 10.8 G/DL (ref 13–17.7)
M PNEUMO IGM SER IA-ACNC: <770 U/ML (ref 0–769)
MCH RBC QN AUTO: 29.3 PG (ref 26.6–33)
MCHC RBC AUTO-ENTMCNC: 32.6 G/DL (ref 31.5–35.7)
MCV RBC AUTO: 89.9 FL (ref 79–97)
PLATELET # BLD AUTO: 439 10*3/MM3 (ref 140–450)
PMV BLD AUTO: 10.6 FL (ref 6–12)
POTASSIUM SERPL-SCNC: 4.7 MMOL/L (ref 3.5–5.2)
PROCALCITONIN SERPL-MCNC: 0.13 NG/ML (ref 0–0.25)
RBC # BLD AUTO: 3.68 10*6/MM3 (ref 4.14–5.8)
SODIUM SERPL-SCNC: 139 MMOL/L (ref 136–145)
WBC NRBC COR # BLD: 21.16 10*3/MM3 (ref 3.4–10.8)

## 2022-11-08 PROCEDURE — 94799 UNLISTED PULMONARY SVC/PX: CPT

## 2022-11-08 PROCEDURE — 63710000001 PREDNISONE PER 1 MG: Performed by: INTERNAL MEDICINE

## 2022-11-08 PROCEDURE — 94761 N-INVAS EAR/PLS OXIMETRY MLT: CPT

## 2022-11-08 PROCEDURE — 94664 DEMO&/EVAL PT USE INHALER: CPT

## 2022-11-08 PROCEDURE — 99232 SBSQ HOSP IP/OBS MODERATE 35: CPT | Performed by: INTERNAL MEDICINE

## 2022-11-08 PROCEDURE — 85027 COMPLETE CBC AUTOMATED: CPT | Performed by: INTERNAL MEDICINE

## 2022-11-08 PROCEDURE — 71045 X-RAY EXAM CHEST 1 VIEW: CPT

## 2022-11-08 PROCEDURE — 80048 BASIC METABOLIC PNL TOTAL CA: CPT | Performed by: INTERNAL MEDICINE

## 2022-11-08 PROCEDURE — 99238 HOSP IP/OBS DSCHRG MGMT 30/<: CPT | Performed by: FAMILY MEDICINE

## 2022-11-08 PROCEDURE — 84145 PROCALCITONIN (PCT): CPT | Performed by: INTERNAL MEDICINE

## 2022-11-08 RX ORDER — CEFDINIR 300 MG/1
300 CAPSULE ORAL 2 TIMES DAILY
Qty: 14 CAPSULE | Refills: 0 | Status: SHIPPED | OUTPATIENT
Start: 2022-11-08 | End: 2022-11-15

## 2022-11-08 RX ADMIN — TIOTROPIUM BROMIDE INHALATION SPRAY 2 PUFF: 3.12 SPRAY, METERED RESPIRATORY (INHALATION) at 06:50

## 2022-11-08 RX ADMIN — ALPRAZOLAM 0.5 MG: 0.5 TABLET ORAL at 08:33

## 2022-11-08 RX ADMIN — ASPIRIN 81 MG: 81 TABLET, COATED ORAL at 08:33

## 2022-11-08 RX ADMIN — PANTOPRAZOLE SODIUM 40 MG: 40 TABLET, DELAYED RELEASE ORAL at 06:00

## 2022-11-08 RX ADMIN — IPRATROPIUM BROMIDE AND ALBUTEROL SULFATE 3 ML: 2.5; .5 SOLUTION RESPIRATORY (INHALATION) at 00:53

## 2022-11-08 RX ADMIN — SODIUM CHLORIDE 30 MG/ML INHALATION SOLUTION 4 ML: 30 SOLUTION INHALANT at 00:52

## 2022-11-08 RX ADMIN — BUDESONIDE AND FORMOTEROL FUMARATE DIHYDRATE 2 PUFF: 160; 4.5 AEROSOL RESPIRATORY (INHALATION) at 06:50

## 2022-11-08 RX ADMIN — SERTRALINE 25 MG: 50 TABLET, FILM COATED ORAL at 08:34

## 2022-11-08 RX ADMIN — METOPROLOL TARTRATE 25 MG: 25 TABLET, FILM COATED ORAL at 08:34

## 2022-11-08 RX ADMIN — ISOSORBIDE MONONITRATE 30 MG: 30 TABLET, EXTENDED RELEASE ORAL at 08:33

## 2022-11-08 RX ADMIN — IPRATROPIUM BROMIDE AND ALBUTEROL SULFATE 3 ML: 2.5; .5 SOLUTION RESPIRATORY (INHALATION) at 06:50

## 2022-11-08 RX ADMIN — LOSARTAN POTASSIUM 50 MG: 50 TABLET, FILM COATED ORAL at 08:34

## 2022-11-08 RX ADMIN — ATORVASTATIN CALCIUM 40 MG: 40 TABLET, FILM COATED ORAL at 08:34

## 2022-11-08 RX ADMIN — PREDNISONE 40 MG: 20 TABLET ORAL at 08:34

## 2022-11-08 RX ADMIN — Medication 10 ML: at 08:36

## 2022-11-08 NOTE — PLAN OF CARE
Goal Outcome Evaluation:  Plan of Care Reviewed With: patient        Progress: improving  Outcome Evaluation: pleasant patient with no complaint of pain at this time-medications given per Dr. Alfaro and Dr. Fagan's orders-scheduled IV antibiotics per orders-monitor labs and continue to monitor patient-patient independent with ADL's

## 2022-11-08 NOTE — CASE MANAGEMENT/SOCIAL WORK
Continued Stay Note   [facility]     Patient Name: Jairo Mercedes  MRN: [id number]  Today's Date: 2022    Admit Date: 2022    Plan: CM met with pt at bedside. Able to state name, , address, phone numbers. Pt lives with wife and has been at current residence 19 yrs. His primary is Dr. Cardenas and pharmacy is [facility]. Declines meds to bed. His LW and POA are on file. He has not been an inpatient in past 30 days. Home DME includes bp cuff, O2 2L at night from [employer], glucometer, and pulse ox. Pt is independent and drives. He denies financial and d/c concerns. He states he is feeling much better and plans to return home with family transporting.   Discharge Plan     Row Name 22 1210       Plan    Final Discharge Disposition Code 01 - home or self-care    Row Name 22 1204       Plan    Plan Comments Spoke to pt he has a portable tank at bedside to  transport  home left voice message with [employer]  for call back regarding new order for oxygen               Discharge Codes    No documentation.               Expected Discharge Date and Time     Expected Discharge Date Expected Discharge Time    2022             Phoebe Means RN

## 2022-11-08 NOTE — DISCHARGE SUMMARY
[facility]   DISCHARGE SUMMARY      Name:  Jairo Mercedes   Age:  82 y.o.  Sex:  male  :  1940  MRN:  [id number]   Visit Number:  [id number]    Admission Date:  2022  Date of Discharge:  2022  Primary Care Physician:  Maicol Cardenas MD    Important issues to note:    Please take antibiotic as directed.  Please wear oxygen as directed.  Please follow-up with pulmonology clinic in 2 weeks as directed.    Discharge Diagnoses:     Community-acquired pneumonia  COPD  Chronic bronchitis  History of splenectomy    Problem List:     Active Hospital Problems    Diagnosis  POA   • **Community acquired pneumonia [J18.9]  Yes   • Pneumonia due to infectious organism, unspecified laterality, unspecified part of lung [J18.9]  Yes      Resolved Hospital Problems   No resolved problems to display.     Presenting Problem:    Chief Complaint   Patient presents with   • Shortness of Breath     SOA INCREASED OVER THE LAST 2 DAYS.   OXYGEN DEPENDENT AT ALL TIMES.       Consults:     Consulting Physician(s)             None          Procedures Performed:        History of presenting illness/Hospital Course:    82-year-old gentleman with a history of chronic lung disease secondary to COPD, emphysema, prior history of thoracotomy and pleurodesis, with chronic nocturnal oxygen use, who presented from home due to increasing shortness of breath, cough.  Initial work-up in the emergency room was concerning for elevated white blood cell count, CT scan concerning for pneumonia and a right-sided effusion.  He was admitted to the hospital service with pulmonology consultation.  He was placed on antibiotic coverage with Rocephin and azithromycin.    He has been stable throughout hospitalization and is improving.  Discussed with pulmonology.  Feels patient is appropriate for discharge today with oral antibiotic treatment and early follow-up in COPD clinic in 2 weeks.  Patient formally was following  with pulmonologist in [place] who has since retired.  Patient is agreeable to this plan of care.  He is agreeable to wearing oxygen, already has oxygen at home as well as portable concentrator.  Plan of care discussed with the patient.    Vital Signs:    Temp:  [97.2 °F (36.2 °C)-98.2 °F (36.8 °C)] 97.2 °F (36.2 °C)  Heart Rate:  [64-95] 64  Resp:  [16-20] 16  BP: (107-146)/(52-82) 137/71    Physical Exam:    General Appearance:  Alert and cooperative.  No acute distress   Head:  Atraumatic and normocephalic.   Eyes: Conjunctivae and sclerae normal, no icterus. No pallor.   Ears:  Ears with no abnormalities noted.   Throat: No oral lesions, no thrush, oral mucosa moist.   Neck: Supple, trachea midline, no thyromegaly.   Back:   No kyphoscoliosis present. No tenderness to palpation.   Lungs:   Breath sounds heard bilaterally equally.  No crackles or wheezing. No Pleural rub or bronchial breathing.   Heart:  Normal S1 and S2, no murmur, no gallop, no rub. No JVD.   Abdomen:   Normal bowel sounds, no masses, no organomegaly. Soft, nontender, nondistended, no rebound tenderness.   Extremities: Supple, no edema, no cyanosis, no clubbing.   Pulses: Pulses palpable bilaterally.   Skin: No bleeding or rash.   Neurologic: Alert and oriented x 3. No facial asymmetry. Moves all four limbs. No tremors.     Pertinent Lab Results:     Results from last 7 days   Lab Units 11/08/22  0527 11/07/22  0639 11/06/22  1043   SODIUM mmol/L 139 137 137   POTASSIUM mmol/L 4.7 4.4 5.0   CHLORIDE mmol/L 105 104 98   CO2 mmol/L 22.8 23.2 24.5   BUN mg/dL 32* 27* 26*   CREATININE mg/dL 1.05 1.02 1.18   CALCIUM mg/dL 8.8 8.4* 9.1   BILIRUBIN mg/dL  --  0.5 0.8   ALK PHOS U/L  --  117 157*   ALT (SGPT) U/L  --  15 19   AST (SGOT) U/L  --  13 19   GLUCOSE mg/dL 114* 92 101*     Results from last 7 days   Lab Units 11/08/22  0527 11/07/22  0639 11/06/22  1043   WBC 10*3/mm3 21.16* 21.02* 30.36*   HEMOGLOBIN g/dL 10.8* 10.1* 13.0   HEMATOCRIT %  33.1* 30.7* 40.0   PLATELETS 10*3/mm3 439 381 457*         Results from last 7 days   Lab Units 11/06/22  1043   TROPONIN T ng/mL 0.025     Results from last 7 days   Lab Units 11/06/22  1043   PROBNP pg/mL 531.3             Results from last 7 days   Lab Units 11/06/22  1141   PH, ARTERIAL pH units 7.467*   PO2 ART mm Hg 68.2*   PCO2, ARTERIAL mm Hg 32.7*   HCO3 ART mmol/L 23.6     Results from last 7 days   Lab Units 11/06/22  1154 11/06/22  1144   BLOODCX  No growth at 24 hours No growth at 24 hours       Pertinent Radiology Results:    Imaging Results (All)     Procedure Component Value Units Date/Time    XR Chest 1 View [[id number]] Collected: 11/08/22 0946     Updated: 11/08/22 0950    Narrative:      CLINICAL INDICATION:    Shortness of breath.     EXAMINATION TECHNIQUE:   XR CHEST 1 VW-     COMPARISON:  CT chest dated 11/06/2022. Chest radiograph dated 11/06/2022.     FINDINGS:  Stable cardiomediastinal silhouette with aortic atherosclerosis.  Mediastinal lipomatosis. Coronary and aortic atherosclerotic disease.  Background emphysema. Grossly unchanged right mid and lower lung  airspace consolidations and blunting of the right costal diaphragmatic  angle, likely due to small right pleural effusion. Ill-defined left lung  basilar opacities. Findings are likely representing multifocal pneumonia  with the small right pleural effusion. No pneumothorax.       Impression:      No significant interval change.     Images personally reviewed, interpreted and dictated by JAY Castillo.                This report was signed and finalized on 11/8/2022 9:48 AM by JAY Castillo.    CT Angiogram Chest Pulmonary Embolism [[id number]] Collected: 11/06/22 1508     Updated: 11/06/22 1515    Narrative:      PROCEDURE: CT ANGIOGRAM CHEST PULMONARY EMBOLISM-     HISTORY: Shortness of breath     TECHNIQUE: Thin section axial CT with IV contrast supplemented with3D  reconstructed MIP images.     FINDINGS:      Pulmonary vessels enhance in a normal fashion without evidence of  embolic disease. Thoracic aorta is normal in caliber without evidence of  aneurysm or dissection.     Severe emphysema with diffuse bilateral bronchial wall thickening. Right  basilar consolidation and patchy bilateral tree in bud nodular  opacities. Right apical nodule measuring 1.7 cm, previously 1.3 cm  (series 5 image 17).  Small right pleural effusion. No pneumothorax..      Normal heart size. No pericardial effusion. Multivessel coronary artery  calcifications..       Impression:      1. No evidence of pulmonary embolism.  2. Multifocal pneumonia.  3. Slight interval increase in size of right apical nodular scarring.  Follow-up CT chest in 3 months is recommended.        This report was signed and finalized on 11/6/2022 3:13 PM by Mirza Matos MD.    XR Chest 1 View [[id number]] Collected: 11/06/22 1258     Updated: 11/06/22 1302    Narrative:      PORTABLE CHEST  11/6/2022 11:11 AM     HISTORY: Shortness of breath     COMPARISON:   None     FINDINGS: The cardiac silhouette is normal in size. The mediastinal and  hilar contours are unremarkable.  Emphysema with chronic interstitial  changes of the lungs. Right basilar  hazy airspace opacity. Right  greater than left bibasilar and biapical scarring. There is no  pneumothorax. The visualized osseous structures demonstrate no acute  abnormalities.       Impression:      Hazy right basilar airspace opacity may reflect atelectasis  or pneumonia in the right clinical setting.                 This report was signed and finalized on 11/6/2022 1:00 PM by Mirza Matos MD.          Echo:    Results for orders placed in visit on 11/13/18    SCANNED - ECHOCARDIOGRAM    Condition on Discharge:      Stable.    Code status during the hospital stay:    Code Status and Medical Interventions:   Ordered at: 11/06/22 1994     Level Of Support Discussed With:    Patient     Code Status (Patient has no  pulse and is not breathing):    CPR (Attempt to Resuscitate)     Medical Interventions (Patient has pulse or is breathing):    Full Support     Discharge Disposition:    Home or Self Care    Discharge Medications:       Discharge Medications      New Medications      Instructions Start Date   cefdinir 300 MG capsule  Commonly known as: OMNICEF   300 mg, Oral, 2 Times Daily         Changes to Medications      Instructions Start Date   Fluticasone-Salmeterol 250-50 MCG/ACT DISKUS  Commonly known as: ADVAIR/WIXELA  What changed: Another medication with the same name was removed. Continue taking this medication, and follow the directions you see here.   1 puff, Inhalation, 2 Times Daily - RT      meclizine 25 MG tablet  Commonly known as: ANTIVERT  What changed:   · how much to take  · how to take this  · when to take this  · reasons to take this   TAKE ONE TABLET BY MOUTH TWICE DAILY         Continue These Medications      Instructions Start Date   acetaminophen 500 MG tablet  Commonly known as: TYLENOL   Oral, Every 6 Hours PRN      ALPRAZolam 0.5 MG tablet  Commonly known as: XANAX   0.5 mg, Oral, Every 12 Hours Scheduled      aspirin 81 MG EC tablet   81 mg, Oral, Daily      atorvastatin 40 MG tablet  Commonly known as: LIPITOR   TAKE ONE TABLET BY MOUTH AT BEDTIME      esomeprazole 40 MG capsule  Commonly known as: nexIUM   TAKE ONE CAPSULE BY MOUTH EVERY DAY      fish oil 1000 MG capsule capsule   1,000 mg, Oral, Daily With Breakfast      fluocinonide 0.05 % external solution  Commonly known as: LIDEX   APPLY TOPICALLY AS DIRECTED AT BEDTIME TO affected area      isosorbide mononitrate 30 MG 24 hr tablet  Commonly known as: IMDUR   TAKE ONE TABLET BY MOUTH EVERY DAY AS DIRECTED      losartan 50 MG tablet  Commonly known as: COZAAR   TAKE ONE TABLET BY MOUTH EVERY DAY      metoprolol tartrate 25 MG tablet  Commonly known as: LOPRESSOR   TAKE ONE TABLET BY MOUTH TWICE DAILY      nitroglycerin 0.4 MG SL  tablet  Commonly known as: NITROSTAT   0.4 mg, Sublingual, Every 5 Minutes PRN, Don't use      sertraline 25 MG tablet  Commonly known as: ZOLOFT   25 mg, Oral, Daily      Spiriva HandiHaler 18 MCG per inhalation capsule  Generic drug: tiotropium   PLACE A CAPSULE INTO INHALER AND INHALE DAILY      traZODone 50 MG tablet  Commonly known as: DESYREL   25-50 mg, Oral, Nightly PRN      triamcinolone 0.025 % ointment  Commonly known as: KENALOG   Apply to the affected areas(s) twice daily           Discharge Diet:   As tolerated    Activity at Discharge:   As tolerated    Follow-up Appointments:    Additional Instructions for the Follow-ups that You Need to Schedule     Ambulatory Referral to Disease State Management   As directed      Follow-up in 14-21 days after discharge.    Order Comments: Follow-up in 14-21 days after discharge.     To dept:  [facility] [[id number]]    What program(s) are you referring for?: COPD    Follow-up needed: No         Discharge Follow-up with Specified Provider: Dr. Fagan OR Veronika Mclean; 4 Months   As directed      To: Dr. Fagan OR Veronika Mclean    Follow Up: 4 Months    Follow Up Details: If being discharged on a weekend, please call our office on the next working day to schedule this appointment.         XR Chest 2 View   Nov 22, 2022      Exam reason: SOA            Follow-up Information     Maicol Cardenas MD Follow up on 11/21/2022.    Specialty: Internal Medicine  Why: @ 2:45 PM with Ivelisse RINALDI  Contact information:  [address] [place] [address] [phone]                       Future Appointments   Date Time Provider Department Center   11/21/2022  2:45 PM Ivelisse Brar APRN [facility]   11/22/2022 11:00 AM [facility]   12/14/2022 11:30 AM Betsy Hopper APRN [facility]   1/9/2023  8:00 AM Maicol Cardenas MD [facility]     Test Results Pending at Discharge:    Pending Labs     Order Current  Status    Mycoplasma Pneumoniae Antibody, IgM - Blood, In process    Blood Culture With CYDNEY - Blood, Hand, Left Preliminary result    Blood Culture With CYDNEY - Blood, Hand, Right Preliminary result    Respiratory Culture - Sputum, Cough Preliminary result             Isabelle Wong,   11/08/22  12:00 EST    Time: I spent 25 minutes on this discharge activity which included: face-to-face encounter with the patient, reviewing the data in the system, coordination of the care with the nursing staff as well as consultants, documentation, and entering orders.     Dictated utilizing Dragon dictation.

## 2022-11-08 NOTE — PROGRESS NOTES
"  CC: Acute respiratory failure.  Pneumonia    S: Continues to have mild cough and shortness of breath.  Feels that the shortness of breath is definitely better, compared to 3-4 days ago.    ROS: Positive for mild cough, mild shortness of breath. Denies chest pain, diarrhea or fever.    O: /81 (BP Location: Left arm, Patient Position: Lying)   Pulse 71   Temp 97.6 °F (36.4 °C) (Oral)   Resp 16   Ht 175.3 cm (69\")   Wt 75.8 kg (167 lb 1.7 oz)   SpO2 92%   BMI 24.68 kg/m²     Vital signs reviewed.  General/Constitutional: No significant respiratory distress noted.  Neck: No obvious JVD   Cardiovascular: S1 + S2.  Regular  Lungs/Respiratory: No obvious wheezing heard.  Right basal crackles noted  Musculoskeletal/Extremities: No significant edema noted.  Neurologic: AAOx3. Was able to follow commands       Labs: Reviewed.   Results from last 7 days   Lab Units 11/08/22 0527 11/07/22 0639 11/06/22  1043   WBC 10*3/mm3 21.16* 21.02* 30.36*   HEMOGLOBIN g/dL 10.8* 10.1* 13.0   HEMATOCRIT % 33.1* 30.7* 40.0   PLATELETS 10*3/mm3 439 381 457*       Lab Results   Component Value Date    CRP 17.40 (H) 11/29/2018       Lab Results   Component Value Date    SEDRATE 11 07/06/2022       Lab Results   Component Value Date    PROBNP 531.3 11/06/2022    PROBNP 1,560.0 (C) 11/27/2018       Results from last 7 days   Lab Units 11/08/22 0527 11/07/22  0639 11/06/22  1043   SODIUM mmol/L 139 137 137   POTASSIUM mmol/L 4.7 4.4 5.0   CHLORIDE mmol/L 105 104 98   CO2 mmol/L 22.8 23.2 24.5   BUN mg/dL 32* 27* 26*   CREATININE mg/dL 1.05 1.02 1.18   CALCIUM mg/dL 8.8 8.4* 9.1   ANION GAP mmol/L 11.2 9.8 14.5   BILIRUBIN mg/dL  --  0.5 0.8   ALK PHOS U/L  --  117 157*   ALT (SGPT) U/L  --  15 19   AST (SGOT) U/L  --  13 19   GLUCOSE mg/dL 114* 92 101*   TOTAL PROTEIN g/dL  --  6.0 7.1   ALBUMIN g/dL  --  2.90* 4.10                   Lab Results   Component Value Date    PROCALCITO 0.13 11/08/2022    PROCALCITO 0.14 11/06/2022    " PROCALCITO 0.21 11/29/2018         Micro: As of November 8, 2022   Lab Results   Component Value Date    RESPCX Growth present, too young to evaluate 11/07/2022    RESPCX Light growth (2+) Normal Respiratory Taya 11/28/2018    RESPCX No growth 04/16/2018     No results found for: BCIDPCR  Lab Results   Component Value Date    BLOODCX No growth at 24 hours 11/06/2022    BLOODCX No growth at 24 hours 11/06/2022    BLOODCX No growth at 5 days 11/27/2018     Lab Results   Component Value Date    URINECX Final report (A) 04/18/2022    URINECX Final report 08/22/2018    URINECX Final report 08/08/2018     No results found for: MRSACX  No results found for: MRSAPCR  No results found for: URCX  No components found for: LOWRESPCF  No results found for: THROATCX  No results found for: CULTURES  No components found for: STREPBCX  Lab Results   Component Value Date    STREPPNEUAG Negative 11/07/2022    STREPPNEUAG Negative 11/28/2018     No results found for: LEGIONELLA  No results found for: MYCOPLASCX  No results found for: GCCX  No results found for: WOUNDCX  No results found for: BODYFLDCX    Lab Results   Component Value Date    FLU Negative 11/27/2018    FLU Negative 11/27/2018       No results found for: ADENOVIRUS  No results found for: HV852I  No results found for: CVHKU1  No results found for: CVNL63  No results found for: CVOC43  No results found for: HUMETPNEVS  No results found for: HURVEV  Lab Results   Component Value Date    FLUBPCR Not Detected 11/06/2022     No results found for: PARAINFLUE  No results found for: PARAFLUV2  No results found for: PARAFLUV3  No results found for: PARAFLUV4  No results found for: BPERTPCR  No results found for: [id number]  No results found for: CPNEUPCR  No results found for: MPNEUMO  Lab Results   Component Value Date    FLUAPCR Not Detected 11/06/2022     No results found for: FLUAH3  No results found for: FLUAH1  No results found for: RSV  No results found for:  BPARAPCR    COVID 19:  Lab Results   Component Value Date    COVID19 Not Detected 11/06/2022       ABG: Reviewed  Recent Labs     11/06/22  1141   PHART 7.467*   YEZ7DMI 32.7*   PO2ART 68.2*   UHZ9PHZ 23.6   BASEEXCESS 0.4                CXRay: Latest imaging study was reviewed personally.   Right-sided infiltrate with blunting of costophrenic angle.  Possibly slightly improved compared to 2 days ago?  Imaging Results (Last 24 Hours)     Procedure Component Value Units Date/Time    XR Chest 1 View [[id number]] Resulted: 11/08/22 0521     Updated: 11/08/22 0521          Assessment & Recommendations/Plan:   1.  Acute respiratory failure  Continues to require oxygen at 2-4 L/min.  The patient can be discharged home on oxygen, whenever clinically appropriate.    2.  Right-sided pneumonia  We will recommend antibiotics for a total of 7 to 10 days, given history of splenectomy.    3.  Emphysema/COPD  Currently on nebulized treatments  Will need to continue home medication upon discharge    4.  Pleural effusion  Appears to be similar to previous findings on CT scan from outside facility.    5.  History of hypersplenism  Status post splenectomy.    6.  Discharge physician  Follow-up with [facility] clinic in 2 weeks. Already ordered.  Chest x-ray in 2 weeks. Already ordered.  Follow-up with Dr. Fagan or Veronika RINALDI in 3-4 months. Already ordered.  PFTs on the day of follow-up in 3-4 months. Already ordered.  Patient to continue Spiriva and Wixela/Advair at home.  Patient to take flutter valve home with him.    We have reviewed patient's current orders and changes, if any, have been suggested to primary care team. Plan was also discussed with nursing staff, as necessary.     This document was electronically signed by Ranulfo Fagan MD on 11/08/22 at 09:44 EST      Dictated utilizing Dragon dictation.

## 2022-11-08 NOTE — CASE MANAGEMENT/SOCIAL WORK
Continued Stay Note  SHENA Figueroa     Patient Name: Jairo Mercedes  MRN: [id number]  Today's Date: 2022    Admit Date: 2022    Plan: CM met with pt at bedside. Able to state name, , address, phone numbers. Pt lives with wife and has been at current residence 19 yrs. His primary is Dr. Cardenas and pharmacy is [facility]. Declines meds to bed. His LW and POA are on file. He has not been an inpatient in past 30 days. Home DME includes bp cuff, O2 2L at night from [employer], glucometer, and pulse ox. Pt is independent and drives. He denies financial and d/c concerns. He states he is feeling much better and plans to return home with family transporting.   Discharge Plan     Row Name 22 1204       Plan    Plan Comments Spoke to pt he has a portable tank at bedside to  transport  home left voice message with [employer]  for call back regarding new order for oxygen               Discharge Codes    No documentation.               Expected Discharge Date and Time     Expected Discharge Date Expected Discharge Time    2022             Phoebe Means RN

## 2022-11-09 ENCOUNTER — TRANSITIONAL CARE MANAGEMENT TELEPHONE ENCOUNTER (OUTPATIENT)
Dept: CALL CENTER | Facility: HOSPITAL | Age: 82
End: 2022-11-09

## 2022-11-09 LAB
BACTERIA SPEC RESP CULT: NORMAL
GRAM STN SPEC: NORMAL

## 2022-11-09 NOTE — OUTREACH NOTE
Call Center TCM Note    Flowsheet Row Responses   [facility] facility patient discharged from? [facility]   Does the patient have one of the following disease processes/diagnoses(primary or secondary)? Pneumonia   TCM attempt successful? No   Unsuccessful attempts Attempt 2          Sofie Watson RN    11/9/2022, 12:07 EST

## 2022-11-09 NOTE — OUTREACH NOTE
Call Center TCM Note    Flowsheet Row Responses   [facility] patient discharged from? [facility]   Does the patient have one of the following disease processes/diagnoses(primary or secondary)? COPD   TCM attempt successful? No  [no verbal]   Unsuccessful attempts Attempt 1   Comments HOSP DC FU appt 11/21/22 @ 2:45 pm   Does the patient have an appointment with their PCP within 7 days of discharge? Yes          Sofie Watson RN    11/9/2022, 10:49 EST

## 2022-11-09 NOTE — OUTREACH NOTE
Prep Survey    Flowsheet Row Responses   [facility] patient discharged from? [place]   Is LACE score < 7 ? No   Emergency Room discharge w/ pulse ox? No   Eligibility [facility]   Date of Admission 11/06/22   Date of Discharge 11/08/22   Discharge Disposition Home or Self Care   Discharge diagnosis Community-acquired pneumonia   Does the patient have one of the following disease processes/diagnoses(primary or secondary)? COPD   Does the patient have Home health ordered? No   Is there a DME ordered? Yes   What DME was ordered? [employer] [facility]   Prep survey completed? Yes          HALIMA CAMPBELL - Registered Nurse

## 2022-11-10 ENCOUNTER — TRANSITIONAL CARE MANAGEMENT TELEPHONE ENCOUNTER (OUTPATIENT)
Dept: CALL CENTER | Facility: HOSPITAL | Age: 82
End: 2022-11-10

## 2022-11-10 NOTE — PROGRESS NOTES
"If you have any questions about this query contact me at: [email or web address]    Enter Query Response Below      Query Response:     No Electronically signed by Isabelle Wong, , 11/10/22, 7:11 AM EST.           If applicable, please update the problem list.         Dr. Wong,    Patient with hx. of COPD, on nightly home oxygen was admitted with Pneumonia and diagnosed with Acute respiratory failure per Pulmonary consult. O2 sat 93% on room air in ER. Patient reported he has as needed oxygen at home and had placed himself on a \"few liters\" prior to admission. No increased work of breathing was documented per ER Physician, however, 2L/NC was used in ER. ABGs revealed pH 7.46, pCO2 32, pO2 68, HCO3 23, sat 95% on 2L/NC. Pulmonary consult diagnosed \"Acute respiratory failure\" with documentation of \"Respiratory effort was minimally labored\". Patient continued on 2L/NC with O2 sats maintained above 90% during the admission. Further treatment included IV Rocephin, IV Azithromycin, Duonebs and po Prednisone. After study, is the diagnosis of acute respiratory failure clinically supported?    Yes, please provide additional clinical supporting indicators_____________________________  No  Other, ____________________________  Unable to be clinically determined    By submitting this query, we are merely seeking further clarification of documentation to accurately reflect all conditions that you are monitoring, evaluating, treating or that extend the hospitalization or utilize additional resources of care. Please utilize your independent clinical judgment when addressing the question(s) above.     This query and your response, once completed, will be entered into the legal medical record.    Sincerely,  Vee Carney RN  Clinical Documentation Integrity Program     "

## 2022-11-10 NOTE — OUTREACH NOTE
Call Center TCM Note    Flowsheet Row Responses   [facility] facility patient discharged from? [facility]   Does the patient have one of the following disease processes/diagnoses(primary or secondary)? Pneumonia   TCM attempt successful? No   Unsuccessful attempts Attempt 3  [No updated verbal release on file from PCP group ]          Xiomara Blount RN    11/10/2022, 11:14 EST

## 2022-11-11 LAB
BACTERIA SPEC AEROBE CULT: NORMAL
BACTERIA SPEC AEROBE CULT: NORMAL

## 2022-11-21 ENCOUNTER — HOSPITAL ENCOUNTER (OUTPATIENT)
Dept: GENERAL RADIOLOGY | Facility: HOSPITAL | Age: 82
Discharge: HOME | End: 2022-11-21
Admitting: NURSE PRACTITIONER

## 2022-11-21 ENCOUNTER — OFFICE VISIT (OUTPATIENT)
Dept: INTERNAL MEDICINE | Facility: CLINIC | Age: 82
End: 2022-11-21

## 2022-11-21 VITALS
OXYGEN SATURATION: 96 % | HEIGHT: 69 IN | TEMPERATURE: 97.9 F | SYSTOLIC BLOOD PRESSURE: 100 MMHG | WEIGHT: 167 LBS | BODY MASS INDEX: 24.73 KG/M2 | HEART RATE: 63 BPM | DIASTOLIC BLOOD PRESSURE: 56 MMHG | RESPIRATION RATE: 18 BRPM

## 2022-11-21 DIAGNOSIS — Z09 HOSPITAL DISCHARGE FOLLOW-UP: Primary | ICD-10-CM

## 2022-11-21 DIAGNOSIS — E11.65 TYPE 2 DIABETES MELLITUS WITH HYPERGLYCEMIA, WITHOUT LONG-TERM CURRENT USE OF INSULIN: ICD-10-CM

## 2022-11-21 DIAGNOSIS — J18.9 COMMUNITY ACQUIRED PNEUMONIA OF RIGHT LUNG, UNSPECIFIED PART OF LUNG: ICD-10-CM

## 2022-11-21 LAB
EXPIRATION DATE: NORMAL
HBA1C MFR BLD: 5.9 %
Lab: NORMAL

## 2022-11-21 PROCEDURE — 99495 TRANSJ CARE MGMT MOD F2F 14D: CPT | Performed by: NURSE PRACTITIONER

## 2022-11-21 PROCEDURE — 83036 HEMOGLOBIN GLYCOSYLATED A1C: CPT | Performed by: NURSE PRACTITIONER

## 2022-11-21 PROCEDURE — 71046 X-RAY EXAM CHEST 2 VIEWS: CPT

## 2022-11-22 ENCOUNTER — DISEASE STATE MANAGEMENT VISIT (OUTPATIENT)
Dept: PHARMACY | Facility: HOSPITAL | Age: 82
End: 2022-11-22

## 2022-11-22 VITALS
HEART RATE: 70 BPM | OXYGEN SATURATION: 95 % | DIASTOLIC BLOOD PRESSURE: 56 MMHG | WEIGHT: 167 LBS | HEIGHT: 69 IN | BODY MASS INDEX: 24.73 KG/M2 | SYSTOLIC BLOOD PRESSURE: 131 MMHG

## 2022-11-22 DIAGNOSIS — J96.11 CHRONIC RESPIRATORY FAILURE WITH HYPOXIA: ICD-10-CM

## 2022-11-22 DIAGNOSIS — J44.9 CHRONIC OBSTRUCTIVE PULMONARY DISEASE, UNSPECIFIED COPD TYPE: Primary | ICD-10-CM

## 2022-11-22 DIAGNOSIS — R93.89 ABNORMAL CHEST CT: ICD-10-CM

## 2022-11-22 PROCEDURE — 94664 DEMO&/EVAL PT USE INHALER: CPT | Performed by: NURSE PRACTITIONER

## 2022-11-22 PROCEDURE — 99214 OFFICE O/P EST MOD 30 MIN: CPT | Performed by: NURSE PRACTITIONER

## 2022-11-22 RX ORDER — PREDNISONE 20 MG/1
TABLET ORAL
Qty: 10 TABLET | Refills: 0 | Status: SHIPPED | OUTPATIENT
Start: 2022-11-22 | End: 2022-12-14

## 2022-11-22 NOTE — PROGRESS NOTES
Transitional Care Follow Up Visit  Subjective     Jairo Mercedes is a 82 y.o. male who presents for a transitional care management visit.    Within 48 business hours after discharge our office contacted him via telephone to coordinate his care and needs.      I reviewed and discussed the details of that call along with the discharge summary, hospital problems, inpatient lab results, inpatient diagnostic studies, and consultation reports with Jairo.     Current outpatient and discharge medications have been reconciled for the patient.  Reviewed by: GENTRY Khan      Date of TCM Phone Call 11/8/2022   [facility]   Date of Admission 11/6/2022   Date of Discharge 11/8/2022   Discharge Disposition Home or Self Care     Risk for Readmission (LACE) Score: 11 (11/8/2022  6:00 AM)      History of Present Illness   Course During Hospital Stay:      82-year-old with history of chronic lung disease secondary to COPD, emphysema, prior history of thoracotomy and pleurodesis, with chronic nocturnal oxygen use, who presented from home due to increasing shortness of breath, cough.  Initial work-up in the emergency room was concerning for elevated white blood cell count, CT scan concerning for pneumonia and a right-sided effusion.  He was admitted to the hospital service with pulmonology consultation.  He was placed on antibiotic coverage with Rocephin and azithromycin. He was stable throughout hospitalization and began improving.  Discussed with pulmonology. Patient was discharged on omnicef and has completed course. He has follow up with pulmonology tomorrow. Was told on discharge per daughter and patient he is to be on O2 2-4L PRN. He typically wears it throughout the night during sleep.     The following portions of the patient's history were reviewed and updated as appropriate: allergies, current medications, past family history, past medical history, past social history, past surgical history and  problem list.    Review of Systems   Constitutional: Negative for chills, fatigue and fever.   Eyes: Negative for visual disturbance.   Respiratory: Positive for shortness of breath (chronic). Negative for cough, chest tightness and wheezing.    Cardiovascular: Negative.    Musculoskeletal: Negative for myalgias.   Neurological: Positive for weakness. Negative for dizziness, light-headedness and headaches.   All other systems reviewed and are negative.      Objective   Physical Exam  Constitutional:       General: He is not in acute distress.     Appearance: Normal appearance. He is ill-appearing (chronically). He is not toxic-appearing or diaphoretic.   HENT:      Right Ear: Tympanic membrane, ear canal and external ear normal. Decreased hearing (hearing aids) noted.      Left Ear: Tympanic membrane, ear canal and external ear normal. Decreased hearing (hearing aids) noted.      Nose: Rhinorrhea present. Rhinorrhea is clear.      Mouth/Throat:      Lips: Pink.      Mouth: Mucous membranes are moist.      Pharynx: Oropharynx is clear. Uvula midline.   Cardiovascular:      Rate and Rhythm: Normal rate and regular rhythm.   Pulmonary:      Effort: Pulmonary effort is normal. No respiratory distress.      Breath sounds: Normal breath sounds. No stridor. No wheezing or rhonchi.      Comments: Wearing 2 L O2  Musculoskeletal:         General: Normal range of motion.      Cervical back: Normal range of motion and neck supple.   Lymphadenopathy:      Cervical: No cervical adenopathy.   Skin:     General: Skin is warm and dry.      Capillary Refill: Capillary refill takes less than 2 seconds.   Neurological:      Mental Status: He is alert and oriented to person, place, and time.   Psychiatric:         Mood and Affect: Mood normal.       XR Chest 1 View    Result Date: 11/8/2022  No significant interval change.  Images personally reviewed, interpreted and dictated by JAY Castillo.       This report was signed and  finalized on 11/8/2022 9:48 AM by JAY Castillo.    XR Chest 1 View    Result Date: 11/6/2022  Hazy right basilar airspace opacity may reflect atelectasis or pneumonia in the right clinical setting.      This report was signed and finalized on 11/6/2022 1:00 PM by Mirza Matos MD.    CT Angiogram Chest Pulmonary Embolism    Result Date: 11/6/2022  1. No evidence of pulmonary embolism. 2. Multifocal pneumonia. 3. Slight interval increase in size of right apical nodular scarring. Follow-up CT chest in 3 months is recommended.   This report was signed and finalized on 11/6/2022 3:13 PM by Mirza Matos MD.    XR Chest PA & Lateral    Result Date: 11/21/2022  Unchanged appearance of the chest since 2018. This includes a chronic small right pleural effusion and chronic areas of consolidation and hazy density.  This report was finalized on 11/21/2022 4:40 PM by Devin Christianson MD.        Assessment & Plan   Diagnoses and all orders for this visit:    1. Hospital discharge follow-up (Primary)    2. Community acquired pneumonia of right lung, unspecified part of lung  -     XR Chest PA & Lateral  - Follow up Pulmonology Disease State Management tomorrow as scheduled. Continue O2 as directed by Pulmonology. Notify if any increased demands, worsening SOA, fever, productive cough. Chest xray repeated and is stable. Strict ER precautions given.     3. Type 2 diabetes mellitus with hyperglycemia, without long-term current use of insulin ([facility])  -     POC Glycosylated Hemoglobin (Hb A1C)  - Controlled at 5.9%. Continue diabetic diet, exercise as tolerated, check feet, stay UTD on eye exams. Patient is currently not on medication regimen for diabetes.         \Ivelisse Brar, APRN

## 2022-11-29 ENCOUNTER — TELEPHONE (OUTPATIENT)
Dept: INTERNAL MEDICINE | Facility: CLINIC | Age: 82
End: 2022-11-29

## 2022-11-29 NOTE — TELEPHONE ENCOUNTER
WIFE HAS BEEN COMMUNICATING VIA Scoopinion IN HER ACCOUNT ABOUT PATIENT.  PATIENT HAS COVID.  ALSO HAS COPD AND EMPHYSEMA AND JUST RELEASED FROM HOSPITAL WITH PNEUMONIA.  THEY WOULD LIKE INFORMATION ABOUT ANTIVIRAL MEDICATION.      PLEASE CALL [phone]

## 2022-12-02 ENCOUNTER — TELEMEDICINE (OUTPATIENT)
Dept: INTERNAL MEDICINE | Facility: CLINIC | Age: 82
End: 2022-12-02

## 2022-12-02 DIAGNOSIS — U07.1 COVID-19: Primary | ICD-10-CM

## 2022-12-02 DIAGNOSIS — E78.5 HYPERLIPIDEMIA, UNSPECIFIED HYPERLIPIDEMIA TYPE: ICD-10-CM

## 2022-12-02 DIAGNOSIS — J84.10 PULMONARY FIBROSIS: ICD-10-CM

## 2022-12-02 DIAGNOSIS — E11.40 TYPE 2 DIABETES MELLITUS WITH DIABETIC NEUROPATHY, WITHOUT LONG-TERM CURRENT USE OF INSULIN: ICD-10-CM

## 2022-12-02 DIAGNOSIS — I25.10 CORONARY ARTERIOSCLEROSIS IN NATIVE ARTERY: ICD-10-CM

## 2022-12-02 DIAGNOSIS — I10 ESSENTIAL HYPERTENSION: ICD-10-CM

## 2022-12-02 PROCEDURE — 99214 OFFICE O/P EST MOD 30 MIN: CPT | Performed by: NURSE PRACTITIONER

## 2022-12-02 NOTE — PROGRESS NOTES
You have chosen to receive care through a telehealth visit.  Do you consent to use a video/audio connection for your medical care today? Yes  Active parties: Joy RINALDI, Charity Stevenson CMA, Jairo Mercedes

## 2022-12-02 NOTE — PROGRESS NOTES
Mode of Visit: Video  Location of patient: home  You have chosen to receive care through a telehealth visit.  Does the patient consent to use a video/audio connection for your medical care today? Yes  The visit included audio and video interaction. No technical issues occurred during this visit.    Date: 2022  Name: Jairo Mercedes  : 1940         Chief Complaint:   Chief Complaint   Patient presents with   • Covid-19 Home Monitoring Video Visit     Positive for covid, want appt to follow up, pt has COPD         HPI:  Jairo Mercedes is a 82 y.o. male presents for video visit in regard to COVID.  Tested positive with home test 4 days ago.  He has has headache, sinus pressure, fatigue, cough.  He has pulmonary fibrosis, T2DM, HTN, hyperlipidemia, CAD.  Admitted to [facility] r/t pneumonia less than a month ago.  Completed antibiotics, steroids as prescribed.  Recently finished a short course of prednisone prescribed by pulmonology.  Using Trelegy inhaler daily as prescribed as well as albuterol inhaler when needed.  Monitoring O2 sats: > 91% on room air when checked.  Has oxygen at home, if needed.  No reported fever, chills, dizziness, earache, loss of smell or taste, sore throat, n/v/d, myalgia, rash.      History: The following portions of the patient's history were reviewed and updated as appropriate: allergies, current medications, past medical history, family history, surgical history, social history and problem list.        PE:  Physical Exam   Constitutional: He appears well-developed and well-nourished. No distress.   HENT:   Head: Normocephalic.   Right Ear: External ear normal.   Left Ear: External ear normal.   Nose: Right sinus exhibits frontal sinus tenderness. Left sinus exhibits frontal sinus tenderness.   Eyes: Pupils are equal, round, and reactive to light. Conjunctivae are normal.   Neck: Neck normal appearance.  Pulmonary/Chest: Effort normal.   Neurological: He is alert.   Oriented x 3    Skin: No pallor.   Psychiatric: He has a normal mood and affect. His speech is normal and behavior is normal. Thought content normal. His affect is normal.          Assessment/Plan:  Diagnoses and all orders for this visit:    1. COVID-19 (Primary)  -     Nirmatrelvir&Ritonavir 300/100 (PAXLOVID) 20 x 150 MG & 10 x 100MG tablet therapy pack tablet; Take 2 tablets by mouth 2 (Two) Times a Day for 5 days.  Dispense: 20 tablet; Refill: 0.  Advised to hold atorvastatin while taking this medication and for a week afterward  - Acetaminophen, per package directions, as needed for headache, mild pain, fever > 100.4  - Take Delsym, mucinex per package directions.   - Rest as needed  - Reviewed CDC isolation guidelines     2. Essential hypertension  3. Hyperlipidemia, unspecified hyperlipidemia type  4. Coronary arteriosclerosis in native artery        - Follow heart healthy diet.  Keep sodium intake < 1500 mg per day.  Avoid processed & fast foods.          - Exercise as tolerated, with a goal of 30 minutes of moderate exercise most days.         - Take medications as prescribed.    5. Type 2 diabetes mellitus with diabetic neuropathy, without long-term current use of insulin (HCC)        - Follow diabetic diet        - See eye doctor annually or as discussed        - Wear protective foot wear/no bare feet        - Check feet regularly for calluses or ulcers        - Discussed risk of poorly controlled diabetes and long-term complications        - Exercise as tolerated for 30-45 minutes most days of the week. Gradually increase daily exercise if not currently active.        - Take all medications as prescribed    6. Pulmonary fibrosis (HCC)       - Continue inhalers as prescribed         Return if symptoms worsen or fail to improve.  Patient was given instructions and counseling regarding her condition or for health maintenance advice. Please see specific information pulled into the AVS if appropriate.

## 2022-12-12 NOTE — PROGRESS NOTES
DATE OF VISIT: 12/14/2022    REASON FOR VISIT: Followup for iron deficiency anemia     PROBLEM LIST:  1. 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 Thrombocythemia  3. Leukocytosis  4. S/P splenectomy   5. HTN  6.  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  Status post radical cystectomy done by Dr. Osmani Powell at  June 2021    HISTORY OF PRESENT ILLNESS: The patient is a very pleasant 82 y.o. male  with past medical history significant for iron deficiency anemia with thrombocythemia diagnosed December 2017. The patient is here today for follow-up    SUBJECTIVE: The patient is here today with his wife.  He is doing okay.  He was recently hospitalized with pneumonia and is currently on portable oxygen.  Weight has been stable since bladder surgery.  He continues to see surgeon once every 4 months with scans.  Denies any obvious bleeding.    Past History:  Medical History: has a past medical history of Abdominal pain, Acute sinusitis, Allergic (N/A), Anemia, Anxiety, Arthritis, Asthma (N/A), Bladder cancer (HCC) (04/2021), Cataracts, bilateral, COPD (chronic obstructive pulmonary disease) ([facility]), Coronary artery disease (N/A), Diabetes mellitus (HCC), Emphysema of lung ([facility]), GERD (gastroesophageal reflux disease), Heme positive stool, History of ankle fracture, History of cardiovascular stress test, History of echocardiogram, History of pneumonia, [place] (hard of hearing), Hypertension, Melanoma (HCC), Oxygen dependent, Presence of urostomy ([facility]) (06/15/2021), Seasonal allergies, Wears glasses, and Wears partial dentures.   Surgical History: has a past surgical history that includes Lymph node biopsy; Cholecystectomy; Mole removal (2005); Thoracotomy (07/28/2015); Splenectomy, total; [place] hole for subdural hematoma; Liver biopsy; Esophagogastroduodenoscopy; [place] tooth extraction; Tonsillectomy; Bronchoscopy (N/A, 04/16/2018); Cardiac catheterization; Brain surgery (1999);  "Colonoscopy; Colonoscopy (N/A, 08/02/2018); Bronchoscopy; Transurethral resection of bladder tumor (N/A, 04/13/2021); Cataract extraction w/ intraocular lens implant (Left, 06/23/2022); Other surgical history (Right); and Cataract extraction w/ intraocular lens implant (Right, 7/14/2022).   Family History: family history includes Alzheimer's disease in his mother; Arthritis in his mother; Cancer in his maternal grandmother and another family member; Heart disease in his father; Hyperlipidemia in his father.   Social History: reports that he quit smoking about 23 years ago. His smoking use included cigarettes. He started smoking about 65 years ago. He has a 84.00 pack-year smoking history. He has never used smokeless tobacco. He reports that he does not currently use alcohol. He reports that he does not use drugs.    (Not in a hospital admission)     Allergies: Parabens     Review of Systems   HENT: Negative.    Respiratory: Negative.    Cardiovascular: Negative.    Gastrointestinal: Negative.    Musculoskeletal: Negative.    Skin: Negative.    Neurological: Negative.    Psychiatric/Behavioral: The patient is nervous/anxious.    All other systems reviewed and are negative.      PHYSICAL EXAMINATION:   /64   Pulse 63   Temp 97.8 °F (36.6 °C) (Temporal)   Resp 16   Ht 175.3 cm (69\")   Wt 74.8 kg (165 lb)   SpO2 94%   BMI 24.37 kg/m²    Pain Score    12/14/22 1117   PainSc: 0-No pain                     ECOG Performance Status: 1 - Symptomatic but completely ambulatory      General Appearance:      alert, cooperative, no apparent distress and appears stated age   Lungs:   Clear to auscultation bilaterally; respirations regular, even, and unlabored bilaterally   Heart:  Regular rate and rhythm, no murmurs appreciated   Abdomen:   Soft, non-tender, non-distended and no organomegaly                 Office Visit on 12/14/2022   Component Date Value Ref Range Status   • Glucose 12/14/2022 81  65 - 99 mg/dL Final "   • BUN 12/14/2022 30 (H)  8 - 23 mg/dL Final   • Creatinine 12/14/2022 0.91  0.76 - 1.27 mg/dL Final   • Sodium 12/14/2022 138  136 - 145 mmol/L Final   • Potassium 12/14/2022 5.3 (H)  3.5 - 5.2 mmol/L Final   • Chloride 12/14/2022 106  98 - 107 mmol/L Final   • CO2 12/14/2022 21.9 (L)  22.0 - 29.0 mmol/L Final   • Calcium 12/14/2022 8.9  8.6 - 10.5 mg/dL Final   • Total Protein 12/14/2022 7.2  6.0 - 8.5 g/dL Final   • Albumin 12/14/2022 3.60  3.50 - 5.20 g/dL Final   • ALT (SGPT) 12/14/2022 39  1 - 41 U/L Final   • AST (SGOT) 12/14/2022 22  1 - 40 U/L Final   • Alkaline Phosphatase 12/14/2022 143 (H)  39 - 117 U/L Final   • Total Bilirubin 12/14/2022 0.3  0.0 - 1.2 mg/dL Final   • Globulin 12/14/2022 3.6  gm/dL Final   • A/G Ratio 12/14/2022 1.0  g/dL Final   • BUN/Creatinine Ratio 12/14/2022 33.0 (H)  7.0 - 25.0 Final   • Anion Gap 12/14/2022 10.1  5.0 - 15.0 mmol/L Final   • eGFR 12/14/2022 84.7  >60.0 mL/min/1.73 Final    National Kidney Foundation and American Society of Nephrology (ASN) Task Force recommended calculation based on the Chronic Kidney Disease Epidemiology Collaboration (CKD-EPI) equation refit without adjustment for race.   • Iron 12/14/2022 27 (L)  59 - 158 mcg/dL Final   • Iron Saturation 12/14/2022 11 (L)  20 - 50 % Final   • Transferrin 12/14/2022 165 (L)  200 - 360 mg/dL Final   • TIBC 12/14/2022 246 (L)  298 - 536 mcg/dL Final        XR Chest PA & Lateral    Result Date: 11/21/2022  Narrative: Examination: XR CHEST PA AND LATERAL-  Date of Exam: 11/21/2022 3:34 PM  Indication: pneumonia follow up; J18.9-Pneumonia, unspecified organism.  Comparison: 03/02/2018. 01/27/2016.  Technique: 2 radiographic views of the chest were obtained.  Findings: Chronic small right pleural effusion is unchanged. There is stable right apical capping. There is consolidation in the right lung apex and right lung base with right midlung airspace disease. There is hazy lung density throughout the left mid and  lower lung, also unchanged from 2018. There is rightward shift of mediastinum with presumed volume loss in the right lung. No pneumothorax. There is suture material in the left upper lung. There are no acute osseous abnormalities. No high-grade thoracic degenerative changes. The patient is status post cholecystectomy.      Impression: Unchanged appearance of the chest since 2018. This includes a chronic small right pleural effusion and chronic areas of consolidation and hazy density.  This report was finalized on 11/21/2022 4:40 PM by Devin Christianson MD.        ASSESSMENT: The patient is a very pleasant 82 y.o. male  with iron deficiency anemia with thrombocythemia      PLAN:    1.  Iron deficiency anemia secondary to chronic blood loss   A.I did go over the blood work results with the patient From November 2022 his hemoglobin was stable at 10.8.  His platelet count are stable for 439 and white cells slightly elevated but stable 21.1.  B. I will follow up on the CBC from today and call with any significant abnormalities.  C.The patient follow-up with us in 1 year with repeat CBC and iron profile as well as labs are stable.  D.  I will arrange for another round of IV iron if ferritin goes below 30 or saturation below 10%.     2.  Thrombocythemia  A.  I did go over the blood work results with the patient from November 8, 2022 and was stable at 439.    3.  Hypertension  A.  He will continue Imdur, Cozaar and Lopressor for his blood pressure.  If it remains elevated he will notify his PCP    4.  Bladder cancer  A.  We will continue to follow-up with Dr. Powell at  for surveillance and keep scheduled appointment for scans.      FOLLOW UP: 1 year    Addendum. I discussed with the patient that iron saturation is slightly low at 11. We will check labs in 3 months to ensure iron levels are stable.     Betsy Hopper, APRN  12/14/2022

## 2022-12-14 ENCOUNTER — LAB (OUTPATIENT)
Dept: LAB | Facility: HOSPITAL | Age: 82
End: 2022-12-14

## 2022-12-14 ENCOUNTER — OFFICE VISIT (OUTPATIENT)
Dept: ONCOLOGY | Facility: CLINIC | Age: 82
End: 2022-12-14

## 2022-12-14 VITALS
WEIGHT: 165 LBS | TEMPERATURE: 97.8 F | HEART RATE: 63 BPM | DIASTOLIC BLOOD PRESSURE: 64 MMHG | SYSTOLIC BLOOD PRESSURE: 140 MMHG | HEIGHT: 69 IN | BODY MASS INDEX: 24.44 KG/M2 | OXYGEN SATURATION: 94 % | RESPIRATION RATE: 16 BRPM

## 2022-12-14 DIAGNOSIS — E61.1 IRON DEFICIENCY: Primary | ICD-10-CM

## 2022-12-14 DIAGNOSIS — K90.9 IRON MALABSORPTION: ICD-10-CM

## 2022-12-14 DIAGNOSIS — D75.839 THROMBOCYTHEMIA: ICD-10-CM

## 2022-12-14 LAB
ALBUMIN SERPL-MCNC: 3.6 G/DL (ref 3.5–5.2)
ALBUMIN/GLOB SERPL: 1 G/DL
ALP SERPL-CCNC: 143 U/L (ref 39–117)
ALT SERPL W P-5'-P-CCNC: 39 U/L (ref 1–41)
ANION GAP SERPL CALCULATED.3IONS-SCNC: 10.1 MMOL/L (ref 5–15)
AST SERPL-CCNC: 22 U/L (ref 1–40)
BASOPHILS # BLD AUTO: 0.08 10*3/MM3 (ref 0–0.2)
BASOPHILS NFR BLD AUTO: 0.4 % (ref 0–1.5)
BILIRUB SERPL-MCNC: 0.3 MG/DL (ref 0–1.2)
BUN SERPL-MCNC: 30 MG/DL (ref 8–23)
BUN/CREAT SERPL: 33 (ref 7–25)
CALCIUM SPEC-SCNC: 8.9 MG/DL (ref 8.6–10.5)
CHLORIDE SERPL-SCNC: 106 MMOL/L (ref 98–107)
CO2 SERPL-SCNC: 21.9 MMOL/L (ref 22–29)
CREAT SERPL-MCNC: 0.91 MG/DL (ref 0.76–1.27)
DEPRECATED RDW RBC AUTO: 52.9 FL (ref 37–54)
EGFRCR SERPLBLD CKD-EPI 2021: 84.7 ML/MIN/1.73
EOSINOPHIL # BLD AUTO: 0.24 10*3/MM3 (ref 0–0.4)
EOSINOPHIL NFR BLD AUTO: 1.2 % (ref 0.3–6.2)
ERYTHROCYTE [DISTWIDTH] IN BLOOD BY AUTOMATED COUNT: 16.4 % (ref 12.3–15.4)
FERRITIN SERPL-MCNC: 645.4 NG/ML (ref 30–400)
GLOBULIN UR ELPH-MCNC: 3.6 GM/DL
GLUCOSE SERPL-MCNC: 81 MG/DL (ref 65–99)
HCT VFR BLD AUTO: 36.5 % (ref 37.5–51)
HGB BLD-MCNC: 11.9 G/DL (ref 13–17.7)
IMM GRANULOCYTES # BLD AUTO: 0.48 10*3/MM3 (ref 0–0.05)
IMM GRANULOCYTES NFR BLD AUTO: 2.5 % (ref 0–0.5)
IRON 24H UR-MRATE: 27 MCG/DL (ref 59–158)
IRON SATN MFR SERPL: 11 % (ref 20–50)
LYMPHOCYTES # BLD AUTO: 2.46 10*3/MM3 (ref 0.7–3.1)
LYMPHOCYTES NFR BLD AUTO: 12.8 % (ref 19.6–45.3)
MCH RBC QN AUTO: 28.8 PG (ref 26.6–33)
MCHC RBC AUTO-ENTMCNC: 32.6 G/DL (ref 31.5–35.7)
MCV RBC AUTO: 88.4 FL (ref 79–97)
MONOCYTES # BLD AUTO: 1.67 10*3/MM3 (ref 0.1–0.9)
MONOCYTES NFR BLD AUTO: 8.7 % (ref 5–12)
NEUTROPHILS NFR BLD AUTO: 14.31 10*3/MM3 (ref 1.7–7)
NEUTROPHILS NFR BLD AUTO: 74.4 % (ref 42.7–76)
NRBC BLD AUTO-RTO: 0 /100 WBC (ref 0–0.2)
PLATELET # BLD AUTO: 600 10*3/MM3 (ref 140–450)
PMV BLD AUTO: 10 FL (ref 6–12)
POTASSIUM SERPL-SCNC: 5.3 MMOL/L (ref 3.5–5.2)
PROT SERPL-MCNC: 7.2 G/DL (ref 6–8.5)
RBC # BLD AUTO: 4.13 10*6/MM3 (ref 4.14–5.8)
SODIUM SERPL-SCNC: 138 MMOL/L (ref 136–145)
TIBC SERPL-MCNC: 246 MCG/DL (ref 298–536)
TRANSFERRIN SERPL-MCNC: 165 MG/DL (ref 200–360)
WBC NRBC COR # BLD: 19.24 10*3/MM3 (ref 3.4–10.8)

## 2022-12-14 PROCEDURE — 83540 ASSAY OF IRON: CPT | Performed by: NURSE PRACTITIONER

## 2022-12-14 PROCEDURE — 85025 COMPLETE CBC W/AUTO DIFF WBC: CPT

## 2022-12-14 PROCEDURE — 84466 ASSAY OF TRANSFERRIN: CPT | Performed by: NURSE PRACTITIONER

## 2022-12-14 PROCEDURE — 82728 ASSAY OF FERRITIN: CPT | Performed by: NURSE PRACTITIONER

## 2022-12-14 PROCEDURE — 99214 OFFICE O/P EST MOD 30 MIN: CPT | Performed by: NURSE PRACTITIONER

## 2022-12-14 PROCEDURE — 80053 COMPREHEN METABOLIC PANEL: CPT | Performed by: NURSE PRACTITIONER

## 2022-12-14 PROCEDURE — 36415 COLL VENOUS BLD VENIPUNCTURE: CPT | Performed by: NURSE PRACTITIONER

## 2022-12-27 RX ORDER — ISOSORBIDE MONONITRATE 30 MG/1
TABLET, EXTENDED RELEASE ORAL
Qty: 90 TABLET | Refills: 3 | Status: SHIPPED | OUTPATIENT
Start: 2022-12-27

## 2022-12-27 NOTE — TELEPHONE ENCOUNTER
Rx Refill Note  Requested Prescriptions     Pending Prescriptions Disp Refills   • isosorbide mononitrate (IMDUR) 30 MG 24 hr tablet [Pharmacy Med Name: isosorbide mononitrate ER 30 mg tablet,extended release 24 hr] 90 tablet 3     Sig: TAKE ONE TABLET BY MOUTH EVERY DAY AS DIRECTED      Last office visit with prescribing clinician: 7/13/2022   Last telemedicine visit with prescribing clinician: 1/9/2023   Next office visit with prescribing clinician: 1/9/2023                         Would you like a call back once the refill request has been completed: [] Yes [] No    If the office needs to give you a call back, can they leave a voicemail: [] Yes [] No    Maylin Akins MA  12/27/22, 10:58 EST

## 2023-01-19 DIAGNOSIS — F41.9 ANXIETY: ICD-10-CM

## 2023-01-19 RX ORDER — SERTRALINE HYDROCHLORIDE 25 MG/1
TABLET, FILM COATED ORAL
Qty: 90 TABLET | Refills: 3 | Status: SHIPPED | OUTPATIENT
Start: 2023-01-19

## 2023-01-19 NOTE — TELEPHONE ENCOUNTER
"Rx Refill Note  Requested Prescriptions     Pending Prescriptions Disp Refills   • sertraline (ZOLOFT) 25 MG tablet [Pharmacy Med Name: sertraline 25 mg tablet] 90 tablet 3     Sig: TAKE ONE TABLET BY MOUTH EVERY DAY      Last office visit with prescribing clinician: 7/13/2022   Last telemedicine visit with prescribing clinician:   Next office visit with prescribing clinician: Visit date not found       Maylin Akins MA  01/19/23, 14:01 EST     Pt canceled last apt so I called to reschedule and get Rx refill done. The pt stated that he canceled the last apt because \"I do not agree with the physical that is preformed, but at no fault of Dr. Cardenas\" Pt said he would explain to you if you would like to discuss but he would not like the physical.     Please advise   "

## 2023-02-21 ENCOUNTER — OFFICE VISIT (OUTPATIENT)
Dept: INTERNAL MEDICINE | Facility: CLINIC | Age: 83
End: 2023-02-21
Payer: MEDICARE

## 2023-02-21 VITALS
TEMPERATURE: 98 F | OXYGEN SATURATION: 91 % | HEIGHT: 69 IN | DIASTOLIC BLOOD PRESSURE: 80 MMHG | SYSTOLIC BLOOD PRESSURE: 124 MMHG | HEART RATE: 60 BPM | BODY MASS INDEX: 25.33 KG/M2 | WEIGHT: 171 LBS

## 2023-02-21 DIAGNOSIS — J18.9 PNEUMONIA OF LEFT LOWER LOBE DUE TO INFECTIOUS ORGANISM: ICD-10-CM

## 2023-02-21 DIAGNOSIS — E11.40 TYPE 2 DIABETES MELLITUS WITH DIABETIC NEUROPATHY, WITHOUT LONG-TERM CURRENT USE OF INSULIN: ICD-10-CM

## 2023-02-21 DIAGNOSIS — E78.2 MIXED HYPERLIPIDEMIA: Primary | ICD-10-CM

## 2023-02-21 DIAGNOSIS — I25.10 CORONARY ARTERIOSCLEROSIS IN NATIVE ARTERY: ICD-10-CM

## 2023-02-21 DIAGNOSIS — I10 ESSENTIAL HYPERTENSION: ICD-10-CM

## 2023-02-21 PROCEDURE — 99213 OFFICE O/P EST LOW 20 MIN: CPT | Performed by: INTERNAL MEDICINE

## 2023-02-21 RX ORDER — IPRATROPIUM BROMIDE AND ALBUTEROL SULFATE 2.5; .5 MG/3ML; MG/3ML
3 SOLUTION RESPIRATORY (INHALATION) EVERY 4 HOURS PRN
Qty: 360 ML | Refills: 1 | Status: SHIPPED | OUTPATIENT
Start: 2023-02-21

## 2023-02-21 NOTE — PROGRESS NOTES
Subjective   Jairo Mercedes is a 82 y.o. male.     Chief Complaint   Patient presents with   • Follow-up   • URI     Recent antibiotics; symptoms improved       History of Present Illness   Patient complains upper respiratory infection went to urgent care received antibiotics symptoms improved but patient still has some short of breath denies any significant cough.  X-ray showed atelectasis versus pneumonia.  O2 sat 91% on room air.  Patient is on oxygen at nighttime and a driving.  Diabetes is stable now.  CAD stable without chest pain.    Current Outpatient Medications:   •  acetaminophen (TYLENOL) 500 MG tablet, Take  by mouth Every 6 (Six) Hours As Needed., Disp: , Rfl:   •  ALPRAZolam (XANAX) 0.5 MG tablet, Take 1 tablet by mouth Every 12 (Twelve) Hours., Disp: , Rfl:   •  aspirin 81 MG EC tablet, Take 81 mg by mouth Daily., Disp: , Rfl:   •  atorvastatin (LIPITOR) 40 MG tablet, TAKE ONE TABLET BY MOUTH AT BEDTIME, Disp: 90 tablet, Rfl: 3  •  benzonatate (TESSALON) 200 MG capsule, Take 1 capsule by mouth 3 (Three) Times a Day As Needed for Cough., Disp: 15 capsule, Rfl: 0  •  doxycycline (MONODOX) 100 MG capsule, Take 1 capsule by mouth Every 12 (Twelve) Hours for 7 days., Disp: 14 capsule, Rfl: 0  •  esomeprazole (nexIUM) 40 MG capsule, TAKE ONE CAPSULE BY MOUTH EVERY DAY, Disp: 30 capsule, Rfl: 11  •  fluocinonide (LIDEX) 0.05 % external solution, APPLY TOPICALLY AS DIRECTED AT BEDTIME TO affected area, Disp: , Rfl:   •  Fluticasone-Umeclidin-Vilant (TRELEGY) 100-62.5-25 MCG/ACT inhaler, Inhale 1 puff Daily., Disp: 1 each, Rfl: 5  •  isosorbide mononitrate (IMDUR) 30 MG 24 hr tablet, TAKE ONE TABLET BY MOUTH EVERY DAY AS DIRECTED, Disp: 90 tablet, Rfl: 3  •  losartan (COZAAR) 50 MG tablet, TAKE ONE TABLET BY MOUTH EVERY DAY, Disp: 90 tablet, Rfl: 3  •  meclizine (ANTIVERT) 25 MG tablet, TAKE ONE TABLET BY MOUTH TWICE DAILY (Patient taking differently: As Needed.), Disp: 180 tablet, Rfl: 3  •  metoprolol  tartrate (LOPRESSOR) 25 MG tablet, TAKE ONE TABLET BY MOUTH TWICE DAILY, Disp: 180 tablet, Rfl: 3  •  nitroglycerin (NITROSTAT) 0.4 MG SL tablet, Place 1 tablet under the tongue Every 5 (Five) Minutes As Needed for Chest Pain. Don't use, Disp: , Rfl:   •  Omega-3 Fatty Acids (FISH OIL) 1000 MG capsule capsule, Take 1,000 mg by mouth Daily With Breakfast., Disp: , Rfl:   •  predniSONE (DELTASONE) 20 MG tablet, Take 3 pills po for 2 days, 2 pills po for 2 days, 1 pill po for 2 days, then 1/2 pill po for 2 days. Take in the morning with food., Disp: 13 tablet, Rfl: 0  •  sertraline (ZOLOFT) 25 MG tablet, TAKE ONE TABLET BY MOUTH EVERY DAY, Disp: 90 tablet, Rfl: 3  •  traZODone (DESYREL) 50 MG tablet, Take 0.5-1 tablets by mouth At Night As Needed (insomnia)., Disp: 90 tablet, Rfl: 3  •  triamcinolone (KENALOG) 0.025 % ointment, Apply to the affected areas(s) twice daily, Disp: 80 g, Rfl: 1  •  ipratropium-albuterol (DUO-NEB) 0.5-2.5 mg/3 ml nebulizer, Take 3 mL by nebulization Every 4 (Four) Hours As Needed for Wheezing., Disp: 360 mL, Rfl: 1    The following portions of the patient's history were reviewed and updated as appropriate: allergies, current medications, past family history, past medical history, past social history, past surgical history and problem list.    Review of Systems   Constitutional: Positive for fatigue.   Respiratory: Positive for shortness of breath.    Cardiovascular: Negative.    Gastrointestinal: Negative.    Musculoskeletal: Negative.    Skin: Negative.    Neurological: Negative.    Psychiatric/Behavioral: Negative.        Objective   Physical Exam  Cardiovascular:      Rate and Rhythm: Normal rate and regular rhythm.      Heart sounds: Normal heart sounds.   Pulmonary:      Effort: Pulmonary effort is normal.      Breath sounds: Normal breath sounds.   Abdominal:      General: Bowel sounds are normal.   Musculoskeletal:      Cervical back: Neck supple.   Skin:     General: Skin is warm.    Neurological:      Mental Status: He is alert and oriented to person, place, and time.         All tests have been reviewed.    Assessment & Plan   Diagnoses and all orders for this visit:    Mixed hyperlipidemia stable on Lipitor 40 mg daily continue medication    Essential hypertension stable on losartan 50 mg daily continue medication    Coronary arteriosclerosis in native artery continue medication    Type 2 diabetes mellitus with diabetic neuropathy, without long-term current use of insulin (HCC) continue medication    Pneumonia of left lower lobe due to infectious organism O2 sat 91% RA nebs initiate DuoNeb nebulizer.  Informed patient to go to emergency room if O2 sat is less than 90 on room air.  Continue doxycycline Tessalon Perle and a steroid    1 month follow-up           Answers for HPI/ROS submitted by the patient on 2/20/2023  What is the primary reason for your visit?: Other  Please describe your symptoms.: Follow up.  Have you had these symptoms before?: Yes  How long have you been having these symptoms?: 1-4 days  Please list any medications you are currently taking for this condition.: Doxycycline, Benzonatate, Predisone  Please describe any probable cause for these symptoms. : None

## 2023-03-07 ENCOUNTER — LAB (OUTPATIENT)
Dept: LAB | Facility: HOSPITAL | Age: 83
End: 2023-03-07
Payer: MEDICARE

## 2023-03-07 DIAGNOSIS — K90.9 IRON MALABSORPTION: ICD-10-CM

## 2023-03-07 DIAGNOSIS — J18.9 PNEUMONIA OF LEFT LOWER LOBE DUE TO INFECTIOUS ORGANISM: ICD-10-CM

## 2023-03-07 DIAGNOSIS — D75.839 THROMBOCYTHEMIA: ICD-10-CM

## 2023-03-07 DIAGNOSIS — E61.1 IRON DEFICIENCY: Primary | ICD-10-CM

## 2023-03-07 DIAGNOSIS — E61.1 IRON DEFICIENCY: ICD-10-CM

## 2023-03-07 LAB
BASOPHILS # BLD AUTO: 0.04 10*3/MM3 (ref 0–0.2)
BASOPHILS NFR BLD AUTO: 0.2 % (ref 0–1.5)
DEPRECATED RDW RBC AUTO: 56.7 FL (ref 37–54)
EOSINOPHIL # BLD AUTO: 0.23 10*3/MM3 (ref 0–0.4)
EOSINOPHIL NFR BLD AUTO: 1.4 % (ref 0.3–6.2)
ERYTHROCYTE [DISTWIDTH] IN BLOOD BY AUTOMATED COUNT: 16.6 % (ref 12.3–15.4)
HCT VFR BLD AUTO: 41.3 % (ref 37.5–51)
HGB BLD-MCNC: 12.8 G/DL (ref 13–17.7)
IMM GRANULOCYTES # BLD AUTO: 0.1 10*3/MM3 (ref 0–0.05)
IMM GRANULOCYTES NFR BLD AUTO: 0.6 % (ref 0–0.5)
LYMPHOCYTES # BLD AUTO: 2.61 10*3/MM3 (ref 0.7–3.1)
LYMPHOCYTES NFR BLD AUTO: 16 % (ref 19.6–45.3)
MCH RBC QN AUTO: 28.8 PG (ref 26.6–33)
MCHC RBC AUTO-ENTMCNC: 31 G/DL (ref 31.5–35.7)
MCV RBC AUTO: 92.8 FL (ref 79–97)
MONOCYTES # BLD AUTO: 1.87 10*3/MM3 (ref 0.1–0.9)
MONOCYTES NFR BLD AUTO: 11.4 % (ref 5–12)
NEUTROPHILS NFR BLD AUTO: 11.51 10*3/MM3 (ref 1.7–7)
NEUTROPHILS NFR BLD AUTO: 70.4 % (ref 42.7–76)
NRBC BLD AUTO-RTO: 0 /100 WBC (ref 0–0.2)
PLATELET # BLD AUTO: 425 10*3/MM3 (ref 140–450)
PMV BLD AUTO: 11.2 FL (ref 6–12)
RBC # BLD AUTO: 4.45 10*6/MM3 (ref 4.14–5.8)
WBC NRBC COR # BLD: 16.36 10*3/MM3 (ref 3.4–10.8)

## 2023-03-07 PROCEDURE — 85025 COMPLETE CBC W/AUTO DIFF WBC: CPT

## 2023-03-07 PROCEDURE — 36415 COLL VENOUS BLD VENIPUNCTURE: CPT

## 2023-03-07 RX ORDER — IPRATROPIUM BROMIDE AND ALBUTEROL SULFATE 2.5; .5 MG/3ML; MG/3ML
3 SOLUTION RESPIRATORY (INHALATION) EVERY 4 HOURS PRN
Qty: 360 ML | Refills: 1 | OUTPATIENT
Start: 2023-03-07

## 2023-03-07 NOTE — TELEPHONE ENCOUNTER
You may see a message in your [employer] account that your medication refill has been denied. Your medication has not been denied but we received multiple requests to fill this medication. We cannot send each of these requests to the pharmacy, one of these requests has been sent to your doctor and the other requests have been denied. Your doctor may need a bit more time to send the approved medication to your pharmacy but there is no need to call or message us about the denial at this time.   Thanks,

## 2023-03-08 ENCOUNTER — TELEPHONE (OUTPATIENT)
Dept: INTERNAL MEDICINE | Facility: CLINIC | Age: 83
End: 2023-03-08
Payer: MEDICARE

## 2023-03-08 NOTE — TELEPHONE ENCOUNTER
US Abdomen Complete [[id number]] (Order [id number])  Order  Date: 1/6/2022 Department: [facility] PRIMARY CARE Ordering/Authorizing: Maicol Cardenas MD     . This order is too old to be used and has been cancelled.

## 2023-03-09 DIAGNOSIS — D75.839 THROMBOCYTHEMIA: ICD-10-CM

## 2023-03-09 DIAGNOSIS — E61.1 IRON DEFICIENCY: Primary | ICD-10-CM

## 2023-03-09 DIAGNOSIS — K90.9 IRON MALABSORPTION: ICD-10-CM

## 2023-03-13 ENCOUNTER — OFFICE VISIT (OUTPATIENT)
Dept: PULMONOLOGY | Facility: CLINIC | Age: 83
End: 2023-03-13
Payer: MEDICARE

## 2023-03-13 VITALS
SYSTOLIC BLOOD PRESSURE: 120 MMHG | WEIGHT: 170 LBS | DIASTOLIC BLOOD PRESSURE: 66 MMHG | OXYGEN SATURATION: 88 % | BODY MASS INDEX: 25.18 KG/M2 | RESPIRATION RATE: 14 BRPM | HEART RATE: 53 BPM | HEIGHT: 69 IN

## 2023-03-13 DIAGNOSIS — J41.0 CHRONIC BRONCHITIS, SIMPLE: ICD-10-CM

## 2023-03-13 DIAGNOSIS — J44.9 CHRONIC OBSTRUCTIVE PULMONARY DISEASE, UNSPECIFIED COPD TYPE: Primary | ICD-10-CM

## 2023-03-13 DIAGNOSIS — R09.02 HYPOXIA: ICD-10-CM

## 2023-03-13 DIAGNOSIS — J18.9 COMMUNITY ACQUIRED PNEUMONIA OF RIGHT LUNG, UNSPECIFIED PART OF LUNG: ICD-10-CM

## 2023-03-13 DIAGNOSIS — J96.21 ACUTE ON CHRONIC RESPIRATORY FAILURE WITH HYPOXIA: ICD-10-CM

## 2023-03-13 DIAGNOSIS — J44.9 CHRONIC OBSTRUCTIVE PULMONARY DISEASE, UNSPECIFIED COPD TYPE: ICD-10-CM

## 2023-03-13 PROCEDURE — 3074F SYST BP LT 130 MM HG: CPT | Performed by: INTERNAL MEDICINE

## 2023-03-13 PROCEDURE — 94729 DIFFUSING CAPACITY: CPT | Performed by: INTERNAL MEDICINE

## 2023-03-13 PROCEDURE — 94726 PLETHYSMOGRAPHY LUNG VOLUMES: CPT | Performed by: INTERNAL MEDICINE

## 2023-03-13 PROCEDURE — 3078F DIAST BP <80 MM HG: CPT | Performed by: INTERNAL MEDICINE

## 2023-03-13 PROCEDURE — 99214 OFFICE O/P EST MOD 30 MIN: CPT | Performed by: INTERNAL MEDICINE

## 2023-03-13 PROCEDURE — 94060 EVALUATION OF WHEEZING: CPT | Performed by: INTERNAL MEDICINE

## 2023-03-13 NOTE — PROGRESS NOTES
"  Chief Complaint   Patient presents with   • Breathing Problem   • Follow-up       Subjective   Jairo Mercedes is a 82 y.o. male.     History of Present Illness   Patient was evaluated today for follow up of shortness of breath, hypoxia and COPD.     Patient says that his symptoms have been stable since the last clinic visit. he reports no recent exacerbations.     Patient is using Trelegy, as prescribed. Exercise tolerance has also remained stable.     Quit smoking in 1999.    The patient says that he is using oxygen as prescribed and feels that it appears to be helping some of his symptoms.    He undergoes CT surveillance per [facility], every 6 months.     The following portions of the patient's history were reviewed and updated as appropriate: allergies, current medications, past family history, past medical history, past social history and past surgical history.    Review of Systems   HENT: Negative for sinus pressure, sneezing and sore throat.    Respiratory: Negative for cough, chest tightness, shortness of breath and wheezing.        Objective   Visit Vitals  /66   Pulse 53   Resp 14   Ht 175.3 cm (69\") Comment: PT REPORTED   Wt 77.1 kg (170 lb)   SpO2 (!) 88% Comment: on RA at rest   BMI 25.10 kg/m²   94% on 2 L/min at rest.    Physical Exam  Vitals reviewed.   Constitutional:       Appearance: He is well-developed.   HENT:      Head: Normocephalic and atraumatic.   Eyes:      Extraocular Movements: Extraocular movements intact.   Cardiovascular:      Rate and Rhythm: Normal rate.   Pulmonary:      Comments: Somewhat hyperresonant to percussion.  Somewhat decreased air entry.  No obvious wheezing noted.   Musculoskeletal:      Cervical back: Neck supple.   Neurological:      Mental Status: He is alert.           Assessment & Plan   Diagnoses and all orders for this visit:    1. Chronic obstructive pulmonary disease, unspecified COPD type (HCC) (Primary)    2. Chronic bronchitis, simple (HCC)    3. " Hypoxia           Return in about 6 months (around 9/13/2023) for Recheck, For Blanca Quiroz), ....Also 12 mths w/ Dr. Fagan.    DISCUSSION (if any):  Last CT scan was reviewed in great detail with the patient. Images reviewed personally.   Results for orders placed during the hospital encounter of 11/06/22    CT Angiogram Chest Pulmonary Embolism    Narrative  PROCEDURE: CT ANGIOGRAM CHEST PULMONARY EMBOLISM-    HISTORY: Shortness of breath    TECHNIQUE: Thin section axial CT with IV contrast supplemented with3D  reconstructed MIP images.    FINDINGS:    Pulmonary vessels enhance in a normal fashion without evidence of  embolic disease. Thoracic aorta is normal in caliber without evidence of  aneurysm or dissection.    Severe emphysema with diffuse bilateral bronchial wall thickening. Right  basilar consolidation and patchy bilateral tree in bud nodular  opacities. Right apical nodule measuring 1.7 cm, previously 1.3 cm  (series 5 image 17).  Small right pleural effusion. No pneumothorax..    Normal heart size. No pericardial effusion. Multivessel coronary artery  calcifications..    Impression  1. No evidence of pulmonary embolism.  2. Multifocal pneumonia.  3. Slight interval increase in size of right apical nodular scarring.  Follow-up CT chest in 3 months is recommended.      This report was signed and finalized on 11/6/2022 3:13 PM by Mirza Matos MD.    The patient actually had a CT of the chest performed on 19 December 2022 at [facility] and it mentioned no evidence of disease progression within the chest.    Laboratory data was reviewed with him.   Lab Results   Component Value Date    AFPTM 188 07/22/2015     Lab Results   Component Value Date    P5VFKXSR MM 07/22/2015     PFTs were reviewed.  Consistent with mixed defect with moderate obstruction and restriction noted. Performed today.    We have reviewed his pulmonary medications in great detail.    Compliance with medications  stressed.     Side effects of prescribed medications discussed with the patient    He was asked to continue close follow-up with [facility] as they repeat his CT of the chest every 6 months or so.    The patient was advised to continue using oxygen 24/7.      Dictated utilizing Dragon dictation.    This document was electronically signed by Ranulfo Fagan MD on 03/13/23 at 16:00 EDT

## 2023-04-25 ENCOUNTER — OFFICE VISIT (OUTPATIENT)
Dept: ORTHOPEDIC SURGERY | Facility: CLINIC | Age: 83
End: 2023-04-25
Payer: MEDICARE

## 2023-04-25 VITALS
BODY MASS INDEX: 25.33 KG/M2 | DIASTOLIC BLOOD PRESSURE: 68 MMHG | HEIGHT: 69 IN | WEIGHT: 171 LBS | SYSTOLIC BLOOD PRESSURE: 92 MMHG

## 2023-04-25 DIAGNOSIS — M75.52 BURSITIS OF LEFT SHOULDER: ICD-10-CM

## 2023-04-25 DIAGNOSIS — M75.122 NONTRAUMATIC COMPLETE TEAR OF LEFT ROTATOR CUFF: Primary | ICD-10-CM

## 2023-04-25 DIAGNOSIS — M75.42 IMPINGEMENT SYNDROME OF LEFT SHOULDER: ICD-10-CM

## 2023-04-25 DIAGNOSIS — M25.512 LEFT SHOULDER PAIN, UNSPECIFIED CHRONICITY: ICD-10-CM

## 2023-04-25 PROCEDURE — 3074F SYST BP LT 130 MM HG: CPT | Performed by: ORTHOPAEDIC SURGERY

## 2023-04-25 PROCEDURE — 3078F DIAST BP <80 MM HG: CPT | Performed by: ORTHOPAEDIC SURGERY

## 2023-04-25 PROCEDURE — 99204 OFFICE O/P NEW MOD 45 MIN: CPT | Performed by: ORTHOPAEDIC SURGERY

## 2023-04-25 RX ORDER — TRIAMCINOLONE ACETONIDE 0.25 MG/G
OINTMENT TOPICAL
COMMUNITY

## 2023-04-25 NOTE — PROGRESS NOTES
"                                                                    [facility] [place] - Teddy Balderas MD    Office Visit       Patient Name: Jairo Mercedes    Chief Complaint:   Chief Complaint   Patient presents with   • Left Shoulder - Pain       Referring Physician: Referring, Self    History of Present Illness:   Jairo Mercedes is a 82 y.o. male who presents with left body part: shoulder Reason: pain.  Onset:Onset: twisting injury. The issue has been ongoing for 1 month(s). Pain is a 5/10 on the pain scale. Pain is described as Pain Characterization: aching. Associated symptoms include Symptoms: pain. The pain is worse with sleeping; pain medication and/or NSAID improve the pain. Previous treatments have included: nothing.  I have reviewed the patient's history of present illness as noted/entered above.    I have reviewed the patient's past medical history, surgical history, social history, family history, medications, and allergies as noted in the electronic medical record and as noted/entered.  I have reviewed the patient's review of systems as noted/enter and updated as noted in the patient's HPI.    Left shoulder pain  Twisting injury worsening over the last 1 month  Treated with NSAIDs  Significant medical comorbidities noted    Henry    Known rotator cuff tear in the early 1990s; treated nonop for \"1cm or 2cm tear\"    Senior workouts 3x/week    Enjoys: golf -- [employer] [place], know Belia Belloilsa Blas despite known chronic rotator cuff pathology, supportive wife and daughter present.      82 y.o. male  Body mass index is 25.24 kg/m².    Subjective   Subjective      Review of Systems   Constitutional: Negative.  Negative for chills, fatigue and fever.   HENT: Negative.  Negative for congestion and dental problem.    Eyes: Negative.  Negative for blurred vision.   Respiratory: Negative.  Negative for shortness of breath.    Cardiovascular: Negative.  Negative for leg " swelling.   Gastrointestinal: Negative.  Negative for abdominal pain.   Endocrine: Negative.  Negative for polyuria.   Genitourinary: Negative.  Negative for difficulty urinating.   Musculoskeletal: Positive for arthralgias.   Skin: Negative.    Allergic/Immunologic: Negative.    Neurological: Negative.    Hematological: Negative.  Negative for adenopathy.   Psychiatric/Behavioral: Negative.  Negative for behavioral problems.        Past Medical History:   Past Medical History:   Diagnosis Date   • Abdominal pain    • Acute sinusitis    • Allergic N/A   • Anemia    • Anxiety    • Arthritis    • Asthma N/A   • Bladder cancer 04/2021   • Cataracts, bilateral    • COPD (chronic obstructive pulmonary disease)    • Coronary artery disease N/A    Some Blockage   • Diabetes mellitus     REPORTS DIET CONTROL ONLY, TAKES NO MEDICATION   • Emphysema of lung    • GERD (gastroesophageal reflux disease)    • Heme positive stool    • History of ankle fracture     LEFT    • History of cardiovascular stress test     REPORTS IN HIS LATE 50'S AND THAT ALL WAS WNL'S AT THAT TIME   • History of echocardiogram     REPORTS HAS HAD 2. DOES NOT REPORT ANY HX OF CHEST PAIN.   • History of pneumonia    • [place] (hard of hearing)     WEARS HEARING AIDS   • Hypertension    • Impingement syndrome of left shoulder 4/25/2023   • Melanoma     2013   • Oxygen dependent     2 L NC HS   • Presence of urostomy 06/15/2021   • Rotator cuff syndrome    • Seasonal allergies    • Wears glasses    • Wears partial dentures        Past Surgical History:   Past Surgical History:   Procedure Laterality Date   • BRAIN SURGERY  1999    REPORTS FOR SUBDURAL HEMATOMA    • BRONCHOSCOPY N/A 04/16/2018    Procedure: BRONCHOSCOPY DIAGNOSTIC WITH WASHINGS, BRUSHINGS AND BIOPSIES;  Surgeon: Morgan Frias MD;  Location: [facility];  Service: Pulmonary   • BRONCHOSCOPY     • ALEX HOLE FOR SUBDURAL HEMATOMA     • CARDIAC CATHETERIZATION      REPORTS APPROXIMATELY 2015 AND  REPORTS NO STENTS WERE PLACED   • CATARACT EXTRACTION W/ INTRAOCULAR LENS IMPLANT Left 2022    Procedure: CATARACT PHACO EXTRACTION WITH INTRAOCULAR LENS IMPLANT LEFT COMPLICATED WITH MALYUGIN RING;  Surgeon: Mack Blake MD;  Location: [facility] OR;  Service: Ophthalmology;  Laterality: Left;   • CATARACT EXTRACTION W/ INTRAOCULAR LENS IMPLANT Right 2022    Procedure: CATARACT PHACO EXTRACTION WITH INTRAOCULAR LENS IMPLANT RIGHT COMPLICATED WITH MALYUGIN RING;  Surgeon: Mack Blake MD;  Location: [facility] OR;  Service: Ophthalmology;  Laterality: Right;   • CHOLECYSTECTOMY     • COLONOSCOPY     • COLONOSCOPY N/A 2018    Procedure: COLONOSCOPY WITH HOT FORCEP POLYPECTOMY X2  ;  Surgeon: Sara Reynoso MD;  Location: [facility] ENDOSCOPY;  Service: Gastroenterology   • ENDOSCOPY     • LIVER BIOPSY     • LYMPH NODE BIOPSY     • MOLE REMOVAL  2005    MELANOMA ON BACK   • OTHER SURGICAL HISTORY Right     UROSTOMY   • SPLENECTOMY     • THORACOTOMY  2015    Left thoracotomy and stapling of pulmonary blebs. Mechanical pleurodesis.   • TONSILLECTOMY     • TRANSURETHRAL RESECTION OF BLADDER TUMOR N/A 2021    Procedure: TRANSURETHRAL RESECTION OF BLADDER TUMOR;  Surgeon: Osmani Francois MD;  Location: [facility] OR;  Service: Urology;  Laterality: N/A;   • WISDOM TOOTH EXTRACTION         Family History:   Family History   Problem Relation Age of Onset   • Alzheimer's disease Mother    • Arthritis Mother    • Anesthesia problems Mother    • Heart disease Father    • Hyperlipidemia Father    • Cancer Other    • Cancer Maternal Grandmother        Social History:   Social History     Socioeconomic History   • Marital status:    Tobacco Use   • Smoking status: Former     Packs/day: 2.00     Years: 42.00     Pack years: 84.00     Types: Cigarettes     Start date: 1957     Quit date: 1999     Years since quittin.1   • Smokeless tobacco: Never   Vaping Use   • Vaping Use:  Never used   Substance and Sexual Activity   • Alcohol use: Not Currently   • Drug use: No   • Sexual activity: Not Currently     Partners: Female       Medications:   Current Outpatient Medications:   •  acetaminophen (TYLENOL) 500 MG tablet, Take  by mouth Every 6 (Six) Hours As Needed., Disp: , Rfl:   •  ALPRAZolam (XANAX) 0.5 MG tablet, Take 1 tablet by mouth Every 12 (Twelve) Hours., Disp: , Rfl:   •  aspirin 81 MG EC tablet, Take 1 tablet by mouth Daily., Disp: , Rfl:   •  atorvastatin (LIPITOR) 40 MG tablet, TAKE ONE TABLET BY MOUTH AT BEDTIME, Disp: 90 tablet, Rfl: 3  •  esomeprazole (nexIUM) 40 MG capsule, TAKE ONE CAPSULE BY MOUTH EVERY DAY, Disp: 30 capsule, Rfl: 11  •  fluocinonide (LIDEX) 0.05 % external solution, APPLY TOPICALLY AS DIRECTED AT BEDTIME TO affected area, Disp: , Rfl:   •  Fluticasone-Umeclidin-Vilant (TRELEGY) 100-62.5-25 MCG/ACT inhaler, Inhale 1 puff Daily., Disp: 1 each, Rfl: 5  •  isosorbide mononitrate (IMDUR) 30 MG 24 hr tablet, TAKE ONE TABLET BY MOUTH EVERY DAY AS DIRECTED, Disp: 90 tablet, Rfl: 3  •  losartan (COZAAR) 50 MG tablet, TAKE ONE TABLET BY MOUTH EVERY DAY, Disp: 90 tablet, Rfl: 3  •  meclizine (ANTIVERT) 25 MG tablet, TAKE ONE TABLET BY MOUTH TWICE DAILY (Patient taking differently: As Needed.), Disp: 180 tablet, Rfl: 3  •  metoprolol tartrate (LOPRESSOR) 25 MG tablet, TAKE ONE TABLET BY MOUTH TWICE DAILY, Disp: 180 tablet, Rfl: 3  •  nitroglycerin (NITROSTAT) 0.4 MG SL tablet, Place 1 tablet under the tongue Every 5 (Five) Minutes As Needed for Chest Pain. Don't use, Disp: , Rfl:   •  Omega-3 Fatty Acids (FISH OIL) 1000 MG capsule capsule, Take 1 capsule by mouth Daily With Breakfast., Disp: , Rfl:   •  sertraline (ZOLOFT) 25 MG tablet, TAKE ONE TABLET BY MOUTH EVERY DAY, Disp: 90 tablet, Rfl: 3  •  traZODone (DESYREL) 50 MG tablet, Take 0.5-1 tablets by mouth At Night As Needed (insomnia)., Disp: 90 tablet, Rfl: 3  •  triamcinolone (KENALOG) 0.025 % ointment, Apply  "to the affected areas(s) twice daily, Disp: 80 g, Rfl: 1  •  triamcinolone (KENALOG) 0.025 % ointment, Apply  topically to the appropriate area as directed., Disp: , Rfl:     Allergies:   Allergies   Allergen Reactions   • Parabens Rash       The following portions of the patient's history were reviewed and updated as appropriate: allergies, current medications, past family history, past medical history, past social history, past surgical history and problem list.        Objective    Objective      Vital Signs:   Vitals:    04/25/23 1401   BP: 92/68   Weight: 77.6 kg (171 lb)   Height: 175.3 cm (69.02\")       Ortho Exam:  LEFT SHOULDER  Stoic, excellent deltoid compensation  General: no acute distress, comfortable  Vitals reviewed in chart    Musculoskeletal Exam    SIDE: LEFT SHOULDER  Shoulder Exam:    Tenderness: rotator cuff, deltoid insertion    Range of motion measurements (degrees)  Forward flexion/Abduction/External rotation at side/ER at 90/IR at 90/IR position  Active: excellent compensation 150/150/50  Passive: 150/150/50    Painful arc of motion: yes  No evidence of septic joint  Pain with forward flexion and abduction greater: 90 degrees  Impingement testing Neer's test - positive/painful  Impingement testing Hawkin's test - positive/painful    Rotator Cuff Testing:  Tenderness to palpation at rotator cuff - YES  Rotator cuff testing Desirae's test - positive  Rotator cuff testing External rotation - no  Rotator cuff testing Lag signs - no  Rotator cuff testing Belly press - no  Pain with abduction great than 90 degrees - yes    Scapular dyskinesis - present, abnormal scapular motion    Long head of the biceps testing:  Swain's test for biceps & Speed's test - mild  Bicipital groove tenderness to palpation/tenderness to palpation of biceps tendon - mild        Results Review:   Imaging Results (Last 24 Hours)     Procedure Component Value Units Date/Time    XR Shoulder 2+ View Left [[id number]] Resulted: " 04/25/23 1434     Updated: 04/25/23 1435    Narrative:      Imaging: shoulder x-rays 2 views - AP and axillary x-ray views    Side: LEFT SHOULDER    Indication for shoulder x-ray 2 views: shoulder pain    Comparison: no comparison views available    Findings: No acute bony pathology.  There is evidence of narrowing of the   acromiohumeral index and superior humeral head migration along with a   superior loose body.        I personally reviewed the above x-rays.            Procedures             Assessment / Plan      Assessment/Plan:   Problem List Items Addressed This Visit        Musculoskeletal and Injuries    Nontraumatic complete tear of left rotator cuff - Primary    Relevant Orders    Ambulatory Referral to Physical Therapy Evaluate and treat, Ortho    Impingement syndrome of left shoulder    Relevant Orders    Ambulatory Referral to Physical Therapy Evaluate and treat, Ortho    Bursitis of left shoulder    Relevant Orders    Ambulatory Referral to Physical Therapy Evaluate and treat, Ortho   Other Visit Diagnoses     Left shoulder pain, unspecified chronicity        Relevant Orders    XR Shoulder 2+ View Left (Completed)    Ambulatory Referral to Physical Therapy Evaluate and treat, Ortho          LEFT SHOULDER  Stoic despite chronic massive rotator cuff tearing.  Patient desires to stick with conservative course which I agree.  PT Rx home program provided along with physical therapy.  He has tried anti-inflammatories.  Desires to hold on injection at this time.  Did discuss possible operative treatment option in the future and he desires to avoid surgery and I agree.    Follow Up: He will keep me updated pending progress over the next couple of months        Teddy Balderas MD, FAAOS  Orthopedic Surgeon  Fellowship Trained Shoulder and Elbow Surgeon  [facility]  [address] [place] [address], [address]  [place] [address]    04/25/23  14:57 EDT

## 2023-05-16 ENCOUNTER — OFFICE VISIT (OUTPATIENT)
Dept: INTERNAL MEDICINE | Facility: CLINIC | Age: 83
End: 2023-05-16
Payer: MEDICARE

## 2023-05-16 VITALS
DIASTOLIC BLOOD PRESSURE: 66 MMHG | TEMPERATURE: 96.9 F | OXYGEN SATURATION: 96 % | SYSTOLIC BLOOD PRESSURE: 126 MMHG | WEIGHT: 170 LBS | HEART RATE: 51 BPM | BODY MASS INDEX: 25.18 KG/M2 | HEIGHT: 69 IN

## 2023-05-16 DIAGNOSIS — R93.6 ABNORMAL FINDINGS ON DIAGNOSTIC IMAGING OF LIMBS: ICD-10-CM

## 2023-05-16 DIAGNOSIS — Z90.81 HISTORY OF SPLENECTOMY: ICD-10-CM

## 2023-05-16 DIAGNOSIS — I10 ESSENTIAL HYPERTENSION: ICD-10-CM

## 2023-05-16 DIAGNOSIS — K21.9 GASTROESOPHAGEAL REFLUX DISEASE WITHOUT ESOPHAGITIS: ICD-10-CM

## 2023-05-16 DIAGNOSIS — E11.40 TYPE 2 DIABETES MELLITUS WITH DIABETIC NEUROPATHY, WITHOUT LONG-TERM CURRENT USE OF INSULIN: ICD-10-CM

## 2023-05-16 DIAGNOSIS — I25.10 CORONARY ARTERIOSCLEROSIS IN NATIVE ARTERY: ICD-10-CM

## 2023-05-16 DIAGNOSIS — E78.2 MIXED HYPERLIPIDEMIA: Primary | ICD-10-CM

## 2023-05-16 DIAGNOSIS — L08.1 ERYTHRASMA: ICD-10-CM

## 2023-05-16 DIAGNOSIS — D49.4 BLADDER TUMOR: ICD-10-CM

## 2023-05-16 DIAGNOSIS — K76.0 STEATOSIS OF LIVER: ICD-10-CM

## 2023-05-16 DIAGNOSIS — F41.9 ANXIETY: ICD-10-CM

## 2023-05-16 DIAGNOSIS — D50.8 IRON DEFICIENCY ANEMIA SECONDARY TO INADEQUATE DIETARY IRON INTAKE: ICD-10-CM

## 2023-05-16 DIAGNOSIS — K90.9 IRON MALABSORPTION: ICD-10-CM

## 2023-05-16 DIAGNOSIS — Z87.891 PERSONAL HISTORY OF NICOTINE DEPENDENCE: ICD-10-CM

## 2023-05-16 DIAGNOSIS — J18.9 PNEUMONIA DUE TO INFECTIOUS ORGANISM, UNSPECIFIED LATERALITY, UNSPECIFIED PART OF LUNG: ICD-10-CM

## 2023-05-16 DIAGNOSIS — H90.6 MIXED CONDUCTIVE AND SENSORINEURAL HEARING LOSS OF BOTH EARS: ICD-10-CM

## 2023-05-16 DIAGNOSIS — J84.10 PULMONARY FIBROSIS: ICD-10-CM

## 2023-05-16 PROBLEM — M75.122 NONTRAUMATIC COMPLETE TEAR OF LEFT ROTATOR CUFF: Status: RESOLVED | Noted: 2023-04-25 | Resolved: 2023-05-16

## 2023-05-16 PROBLEM — M75.42 IMPINGEMENT SYNDROME OF LEFT SHOULDER: Status: RESOLVED | Noted: 2023-04-25 | Resolved: 2023-05-16

## 2023-05-16 PROBLEM — M75.52 BURSITIS OF LEFT SHOULDER: Status: RESOLVED | Noted: 2023-04-25 | Resolved: 2023-05-16

## 2023-05-16 PROCEDURE — G0439 PPPS, SUBSEQ VISIT: HCPCS | Performed by: INTERNAL MEDICINE

## 2023-05-16 PROCEDURE — 3078F DIAST BP <80 MM HG: CPT | Performed by: INTERNAL MEDICINE

## 2023-05-16 PROCEDURE — 1170F FXNL STATUS ASSESSED: CPT | Performed by: INTERNAL MEDICINE

## 2023-05-16 PROCEDURE — 3074F SYST BP LT 130 MM HG: CPT | Performed by: INTERNAL MEDICINE

## 2023-05-16 RX ORDER — LOSARTAN POTASSIUM 25 MG/1
25 TABLET ORAL DAILY
Qty: 30 TABLET | Refills: 3 | Status: SHIPPED | OUTPATIENT
Start: 2023-05-16

## 2023-05-16 RX ORDER — ESOMEPRAZOLE MAGNESIUM 40 MG/1
40 CAPSULE, DELAYED RELEASE ORAL DAILY
Qty: 90 CAPSULE | Refills: 3 | Status: SHIPPED | OUTPATIENT
Start: 2023-05-16

## 2023-05-16 RX ORDER — TRIAMCINOLONE ACETONIDE 0.25 MG/G
OINTMENT TOPICAL
Qty: 80 G | Refills: 1 | Status: SHIPPED | OUTPATIENT
Start: 2023-05-16

## 2023-05-16 RX ORDER — ATORVASTATIN CALCIUM 40 MG/1
40 TABLET, FILM COATED ORAL
Qty: 90 TABLET | Refills: 3 | Status: SHIPPED | OUTPATIENT
Start: 2023-05-16

## 2023-05-16 NOTE — PROGRESS NOTES
Subjective   Jairo Mercedes is a 82 y.o. male and is here for a comprehensive physical exam.     Do you take any herbs or supplements that were not prescribed by a doctor? no  Are you taking calcium supplements? no  Are you taking aspirin daily? no      The following portions of the patient's history were reviewed and updated as appropriate: allergies, current medications, past family history, past medical history, past social history, past surgical history and problem list.    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    Assessment & Plan          1. Patient Counseling:  --Nutrition: Stressed importance of moderation in sodium/caffeine intake, saturated fat and cholesterol, caloric balance, sufficient intake of fresh fruits, vegetables, fiber, calcium and iron.  --Exercise: Stressed the importance of regular exercise.   --Injury prevention: Discussed safety belts, safety helmets, smoke detector, smoking near bedding or upholstery.   --Dental health: Discussed importance of regular tooth brushing, flossing, and dental visits.  --Immunizations reviewed.  --Discussed benefits of screening colonoscopy.  --After hours service discussed with  patient    2. Discussed the patient's BMI with him.               Assessment & Plan   Diagnoses and all orders for this visit:    Mixed hyperlipidemia  -     atorvastatin (LIPITOR) 40 MG tablet; Take 1 tablet by mouth every night at bedtime.  -     Comprehensive Metabolic Panel  -     Lipid Panel  -     Cancel: PSA DIAGNOSTIC  -     TSH    Essential hypertension  -     CBC & Differential    Coronary arteriosclerosis in native artery    Type 2 diabetes mellitus with diabetic neuropathy, without long-term current use of insulin  -     Urinalysis With Microscopic If Indicated (No Culture) - Urine, Clean Catch  -     MicroAlbumin, Urine, Random - Urine, Clean Catch  -     Hemoglobin A1c    Steatosis of liver    Iron malabsorption    Gastroesophageal reflux disease without esophagitis  -     esomeprazole (nexIUM) 40 MG capsule; Take 1 capsule by mouth Daily.    Anxiety    Pulmonary fibrosis    Bladder tumor    Personal history of nicotine dependence    History of splenectomy    Iron deficiency anemia secondary to inadequate dietary iron intake    Mixed conductive and sensorineural hearing loss of both ears    Erythrasma  -     triamcinolone (KENALOG) 0.025 % ointment; Apply to the affected areas(s) twice daily    Abnormal findings on diagnostic imaging of limbs  -     Cancel: PSA DIAGNOSTIC

## 2023-05-16 NOTE — PROGRESS NOTES
The ABCs of the Annual Wellness Visit  Subsequent Medicare Wellness Visit    Subjective      Jairo Mercedes is a 82 y.o. male who presents for a Subsequent Medicare Wellness Visit.    The following portions of the patient's history were reviewed and   updated as appropriate: allergies, current medications, past family history, past medical history, past social history, past surgical history and problem list.    Compared to one year ago, the patient feels his physical   health is the same.    Compared to one year ago, the patient feels his mental   health is the same.    Recent Hospitalizations:  This patient has had a [facility] admission record on file within the last 365 days.    Current Medical Providers:  Patient Care Team:  Maicol Cardenas MD as PCP - General (Internal Medicine)  Sara Reynoso MD as Consulting Physician (General Surgery)    Outpatient Medications Prior to Visit   Medication Sig Dispense Refill   • acetaminophen (TYLENOL) 500 MG tablet Take  by mouth Every 6 (Six) Hours As Needed.     • ALPRAZolam (XANAX) 0.5 MG tablet Take 1 tablet by mouth Every 12 (Twelve) Hours.     • aspirin 81 MG EC tablet Take 1 tablet by mouth Daily.     • fluocinonide (LIDEX) 0.05 % external solution APPLY TOPICALLY AS DIRECTED AT BEDTIME TO affected area     • Fluticasone-Umeclidin-Vilant (TRELEGY) 100-62.5-25 MCG/ACT inhaler Inhale 1 puff Daily. 1 each 5   • isosorbide mononitrate (IMDUR) 30 MG 24 hr tablet TAKE ONE TABLET BY MOUTH EVERY DAY AS DIRECTED 90 tablet 3   • losartan (COZAAR) 50 MG tablet TAKE ONE TABLET BY MOUTH EVERY DAY 90 tablet 3   • meclizine (ANTIVERT) 25 MG tablet TAKE ONE TABLET BY MOUTH TWICE DAILY (Patient taking differently: As Needed.) 180 tablet 3   • metoprolol tartrate (LOPRESSOR) 25 MG tablet TAKE ONE TABLET BY MOUTH TWICE DAILY 180 tablet 3   • nitroglycerin (NITROSTAT) 0.4 MG SL tablet Place 1 tablet under the tongue Every 5 (Five) Minutes As Needed for Chest Pain. Don't use     •  Omega-3 Fatty Acids (FISH OIL) 1000 MG capsule capsule Take 1 capsule by mouth Daily With Breakfast.     • sertraline (ZOLOFT) 25 MG tablet TAKE ONE TABLET BY MOUTH EVERY DAY 90 tablet 3   • traZODone (DESYREL) 50 MG tablet Take 0.5-1 tablets by mouth At Night As Needed (insomnia). 90 tablet 3   • atorvastatin (LIPITOR) 40 MG tablet TAKE ONE TABLET BY MOUTH AT BEDTIME 90 tablet 3   • esomeprazole (nexIUM) 40 MG capsule TAKE ONE CAPSULE BY MOUTH EVERY DAY 30 capsule 11   • triamcinolone (KENALOG) 0.025 % ointment Apply to the affected areas(s) twice daily 80 g 1   • triamcinolone (KENALOG) 0.025 % ointment Apply  topically to the appropriate area as directed. (Patient not taking: Reported on 5/16/2023)       No facility-administered medications prior to visit.       No opioid medication identified on active medication list. I have reviewed chart for other potential  high risk medication/s and harmful drug interactions in the elderly.          Aspirin is on active medication list. Aspirin use is indicated based on review of current medical condition/s. Pros and cons of this therapy have been discussed today. Benefits of this medication outweigh potential harm.  Patient has been encouraged to continue taking this medication.  .      Patient Active Problem List   Diagnosis   • Anxiety   • Coronary arteriosclerosis in native artery   • Essential hypertension   • Pulmonary fibrosis   • Type 2 diabetes mellitus with diabetic neuropathy, without long-term current use of insulin   • Steatosis of liver   • Gastroesophageal reflux disease without esophagitis   • Hyperlipidemia   • Iron deficiency   • Thrombocythemia   • Iron deficiency anemia   • Iron malabsorption   • Mixed conductive and sensorineural hearing loss   • Bladder tumor   • Personal history of nicotine dependence   • History of traumatic subdural hematoma   • 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  ACP discussion was held with the patient during this visit. Patient has an advance directive in EMR which is still valid.      Objective    Vitals:    23 0932   BP: 126/66   Pulse: 51   Temp: 96.9 °F (36.1 °C)   TempSrc: Infrared   SpO2: 96%   Weight: 77.1 kg (170 lb)   Height: 175.3 cm (69.02\")     Estimated body mass index is 25.09 kg/m² as calculated from the following:    Height as of this encounter: 175.3 cm (69.02\").    Weight as of this encounter: 77.1 kg (170 lb).    BMI is >= 25 and <30. (Overweight) The following options were offered after discussion;: exercise counseling/recommendations and nutrition counseling/recommendations      Does the patient have evidence of cognitive impairment?   No            HEALTH RISK ASSESSMENT    Smoking Status:  Social History     Tobacco Use   Smoking Status Former   • Packs/day: 2.00   • Years: 42.00   • Pack years: 84.00   • Types: Cigarettes   • Start date: 1957   • Quit date: 1999   • Years since quittin.2   Smokeless Tobacco Never     Alcohol Consumption:  Social History     Substance and Sexual Activity   Alcohol Use Not Currently     Fall Risk Screen:    BYRON Fall Risk Assessment was completed, and patient is at LOW risk for falls.Assessment completed on:2023    Depression Screenin/16/2023     9:28 AM   PHQ-2/PHQ-9 Depression Screening   Little Interest or Pleasure in Doing Things 0-->not at all   Feeling Down, Depressed or Hopeless 0-->not at all   PHQ-9: Brief Depression Severity Measure Score 0       Health Habits and Functional and Cognitive Screenin/16/2023     9:29 AM   Functional & Cognitive Status   Do you have difficulty preparing food and eating? No   Do you have difficulty bathing yourself, getting dressed or grooming yourself? No   Do you have difficulty using " the toilet? No   Do you have difficulty moving around from place to place? No   Do you have trouble with steps or getting out of a bed or a chair? No   Current Diet Well Balanced Diet   Dental Exam Up to date   Eye Exam Up to date   Exercise (times per week) 3 times per week   Current Exercises Include Cardiovascular Workout   Do you need help using the phone?  No   Are you deaf or do you have serious difficulty hearing?  No   Do you need help with transportation? No   Do you need help shopping? No   Do you need help preparing meals?  No   Do you need help with housework?  No   Do you need help with laundry? No   Do you need help taking your medications? No   Do you need help managing money? No   Do you ever drive or ride in a car without wearing a seat belt? No   Have you felt unusual stress, anger or loneliness in the last month? Yes   Who do you live with? Spouse   If you need help, do you have trouble finding someone available to you? No   Do you have difficulty concentrating, remembering or making decisions? No       Age-appropriate Screening Schedule:  Refer to the list below for future screening recommendations based on patient's age, sex and/or medical conditions. Orders for these recommended tests are listed in the plan section. The patient has been provided with a written plan.    Health Maintenance   Topic Date Due   • DIABETIC EYE EXAM  10/12/2022   • LIPID PANEL  05/20/2023   • URINE MICROALBUMIN  05/20/2023   • HEMOGLOBIN A1C  05/21/2023   • INFLUENZA VACCINE  08/01/2023   • TDAP/TD VACCINES (2 - Td or Tdap) 11/13/2023   • ANNUAL WELLNESS VISIT  05/16/2024   • COLORECTAL CANCER SCREENING  08/02/2028   • COVID-19 Vaccine  Completed   • Pneumococcal Vaccine 65+  Completed   • ZOSTER VACCINE  Completed                  CMS Preventative Services Quick Reference  Risk Factors Identified During Encounter:    None Identified    The above risks/problems have been discussed with the patient.  Pertinent  information has been shared with the patient in the After Visit Summary.    Diagnoses and all orders for this visit:    1. Mixed hyperlipidemia (Primary) stable on medication below continue medication  -     atorvastatin (LIPITOR) 40 MG tablet; Take 1 tablet by mouth every night at bedtime.  Dispense: 90 tablet; Refill: 3  -     Comprehensive Metabolic Panel  -     Lipid Panel  -     Cancel: PSA DIAGNOSTIC  -     TSH    2. Essential hypertension patient is only taking losartan 25 mg daily blood pressure stable continue medication  -     CBC & Differential    3. Coronary arteriosclerosis in native artery    4. Type 2 diabetes mellitus with diabetic neuropathy, without long-term current use of insulin  -     Urinalysis With Microscopic If Indicated (No Culture) - Urine, Clean Catch  -     MicroAlbumin, Urine, Random - Urine, Clean Catch  -     Hemoglobin A1c    5. Steatosis of liver continue to watch check lab    6. Iron malabsorption patient is seeing hematologist history of iron infusion    7. Gastroesophageal reflux disease without esophagitis  -     esomeprazole (nexIUM) 40 MG capsule; Take 1 capsule by mouth Daily.  Dispense: 90 capsule; Refill: 3    8. Anxiety stable now    9. Pulmonary fibrosis follow-up with pulmonology    10. Bladder tumor follow-up with urology    11. Personal history of nicotine dependence    12. History of splenectomy    13. Iron deficiency anemia secondary to inadequate dietary iron intake follow-up with hematology    14. Mixed conductive and sensorineural hearing loss of both ears encouraged hearing aids    15. Erythrasma refill medication  -     triamcinolone (KENALOG) 0.025 % ointment; Apply to the affected areas(s) twice daily  Dispense: 80 g; Refill: 1    17. Pneumonia due to infectious organism, unspecified laterality, unspecified part of lung follow-up with pulmonology        Follow Up:   Next Medicare Wellness visit to be scheduled in 6 months.      An After Visit Summary and PPPS  were made available to the patient.    Below is to historical records for reference only:  #1: Adenopathy, intra-abdominal. biopsy, reactive changes only follow up : Hematology/oncology. history of ITP s/p splenectomy, higher WBC and plts, now Hb low , watch--  #2: copd, continue medicine night time O2  #3: fatty liver, s/p cholecystectomy. history of gall stone, follow up with GI, liver enzymes normal now after cutting Lipitor 80 mg. Asked patient to cut down Lipitor 40 mg daily, discontinue acetaminophen, weight loss.alkaline phosphatase is elevated watch for now  #4: Coronary heart disease,blockage unable to stent. continue medicine prn NTG,metoprolol 25 bid, f/u cardio  #5:  diabetic, patient refuses medicines  DM neuropathy watch for now  Leukocytosis thrombocytosis lymphocytosis monocytosis history of splenectomy for possible thrombocytopenia. continue to watch--  Vocal cord paralysis follow up with ENT  right thumb XR severe arthritis.  hemorroid continue medicine   renal cyst 3.3 cm, MRI November 2014 showed a 2 cm. Repeat normal  low iron normal  s/p iron transfusion, follow hematologist  aaa screen 3cm repeated 12/2017 smaller now, repeat 12/2019  Lung nodule, repeat in 7/2018 showed locular effusion, by lung doctor.   Colon 8/2/18 colon polyp , repeat 5 years, patient refuses to repeat anymore , explained riskes, 5/16/23  Onychomycosis s/p Lamisil  resolved  Sleep apnea patient refuses cpap   Hx of bladder ca s/p surgery removed bladder and prostate and follow uro

## 2023-05-31 DIAGNOSIS — K21.9 GASTROESOPHAGEAL REFLUX DISEASE WITHOUT ESOPHAGITIS: ICD-10-CM

## 2023-05-31 DIAGNOSIS — J44.9 CHRONIC OBSTRUCTIVE PULMONARY DISEASE, UNSPECIFIED COPD TYPE: ICD-10-CM

## 2023-05-31 RX ORDER — FLUTICASONE FUROATE, UMECLIDINIUM BROMIDE AND VILANTEROL TRIFENATATE 100; 62.5; 25 UG/1; UG/1; UG/1
POWDER RESPIRATORY (INHALATION)
Qty: 60 EACH | Refills: 5 | Status: SHIPPED | OUTPATIENT
Start: 2023-05-31

## 2023-05-31 RX ORDER — ESOMEPRAZOLE MAGNESIUM 40 MG/1
CAPSULE, DELAYED RELEASE ORAL
Qty: 90 CAPSULE | Refills: 1 | Status: SHIPPED | OUTPATIENT
Start: 2023-05-31

## 2023-07-11 NOTE — DISCHARGE INSTRUCTIONS
Please follow all post op instructions and follow up appointment time from your physician's office included in your discharge packet.    No pushing, pulling, tugging,  heavy lifting, or strenuous activity.  No major decision making, driving, or drinking alcoholic beverages for 24 hours. ( due to the medications you have  received)  Always use good hand hygiene/washing techniques.  NO driving while taking pain medications.  .   To assist you in voiding:  Drink plenty of fluids  Listen to running water while attempting to void.    If you are unable to urinate and you have an uncomfortable urge to void or it has been   6 hours since you were discharged, return to the Emergency Room         Unable to reach patient for [facility] Outreach Care Transitions program follow up call today. Left VM to call back   - - -

## 2023-08-11 ENCOUNTER — TELEPHONE (OUTPATIENT)
Dept: SURGERY | Facility: CLINIC | Age: 83
End: 2023-08-11
Payer: MEDICARE

## 2023-08-11 NOTE — TELEPHONE ENCOUNTER
CALLED TO [facility] PT 5 YEAR COLON RECALL NO ANSWER/VM. MAILING PT RECALL LETTER. LAST COLON WAS 08/02/2018.

## 2023-08-15 DIAGNOSIS — I10 ESSENTIAL HYPERTENSION: ICD-10-CM

## 2023-08-15 NOTE — TELEPHONE ENCOUNTER
Rx Refill Note  Requested Prescriptions     Pending Prescriptions Disp Refills    losartan (COZAAR) 25 MG tablet 30 tablet 3     Sig: Take 1 tablet by mouth Daily.      Last office visit with prescribing clinician: 5/16/23  Next office visit with prescribing clinician: 8/22/2023    Patient has office visit on 8/22/23 with you, please advise    Maylin Akins MA  08/15/23, 11:30 EDT

## 2023-08-16 RX ORDER — LOSARTAN POTASSIUM 25 MG/1
25 TABLET ORAL DAILY
Qty: 30 TABLET | Refills: 0 | Status: SHIPPED | OUTPATIENT
Start: 2023-08-16

## 2023-08-16 NOTE — TELEPHONE ENCOUNTER
Patient has labs ordered in May, 2023. We need to make sure kidney function is normal, at least, before sending more refills.  Please ask him to have labs done for further refills.

## 2023-08-19 LAB
ALBUMIN SERPL-MCNC: 4.2 G/DL (ref 3.5–5.2)
ALBUMIN/GLOB SERPL: 2 G/DL
ALP SERPL-CCNC: 164 U/L (ref 39–117)
ALT SERPL-CCNC: 13 U/L (ref 1–41)
APPEARANCE UR: ABNORMAL
AST SERPL-CCNC: 19 U/L (ref 1–40)
BACTERIA #/AREA URNS HPF: ABNORMAL /HPF
BASOPHILS # BLD AUTO: 0.06 10*3/MM3 (ref 0–0.2)
BASOPHILS NFR BLD AUTO: 0.5 % (ref 0–1.5)
BILIRUB SERPL-MCNC: 0.4 MG/DL (ref 0–1.2)
BILIRUB UR QL STRIP: NEGATIVE
BUN SERPL-MCNC: 26 MG/DL (ref 8–23)
BUN/CREAT SERPL: 22 (ref 7–25)
CALCIUM SERPL-MCNC: 9.6 MG/DL (ref 8.6–10.5)
CASTS URNS MICRO: ABNORMAL
CHLORIDE SERPL-SCNC: 106 MMOL/L (ref 98–107)
CHOLEST SERPL-MCNC: 108 MG/DL (ref 0–200)
CO2 SERPL-SCNC: 24.3 MMOL/L (ref 22–29)
COLOR UR: YELLOW
CREAT SERPL-MCNC: 1.18 MG/DL (ref 0.76–1.27)
EGFRCR SERPLBLD CKD-EPI 2021: 62 ML/MIN/1.73
EOSINOPHIL # BLD AUTO: 0.29 10*3/MM3 (ref 0–0.4)
EOSINOPHIL NFR BLD AUTO: 2.2 % (ref 0.3–6.2)
EPI CELLS #/AREA URNS HPF: ABNORMAL /HPF
ERYTHROCYTE [DISTWIDTH] IN BLOOD BY AUTOMATED COUNT: 15 % (ref 12.3–15.4)
GLOBULIN SER CALC-MCNC: 2.1 GM/DL
GLUCOSE SERPL-MCNC: 99 MG/DL (ref 65–99)
GLUCOSE UR QL STRIP: NEGATIVE
HBA1C MFR BLD: 6 % (ref 4.8–5.6)
HCT VFR BLD AUTO: 35.8 % (ref 37.5–51)
HDLC SERPL-MCNC: 33 MG/DL (ref 40–60)
HGB BLD-MCNC: 12 G/DL (ref 13–17.7)
HGB UR QL STRIP: NEGATIVE
IMM GRANULOCYTES # BLD AUTO: 0.13 10*3/MM3 (ref 0–0.05)
IMM GRANULOCYTES NFR BLD AUTO: 1 % (ref 0–0.5)
KETONES UR QL STRIP: NEGATIVE
LDLC SERPL CALC-MCNC: 49 MG/DL (ref 0–100)
LEUKOCYTE ESTERASE UR QL STRIP: ABNORMAL
LYMPHOCYTES # BLD AUTO: 2.51 10*3/MM3 (ref 0.7–3.1)
LYMPHOCYTES NFR BLD AUTO: 19.4 % (ref 19.6–45.3)
MCH RBC QN AUTO: 29.2 PG (ref 26.6–33)
MCHC RBC AUTO-ENTMCNC: 33.5 G/DL (ref 31.5–35.7)
MCV RBC AUTO: 87.1 FL (ref 79–97)
MICROALBUMIN UR-MCNC: 30.6 UG/ML
MONOCYTES # BLD AUTO: 1.33 10*3/MM3 (ref 0.1–0.9)
MONOCYTES NFR BLD AUTO: 10.3 % (ref 5–12)
NEUTROPHILS # BLD AUTO: 8.65 10*3/MM3 (ref 1.7–7)
NEUTROPHILS NFR BLD AUTO: 66.6 % (ref 42.7–76)
NITRITE UR QL STRIP: POSITIVE
NRBC BLD AUTO-RTO: 0 /100 WBC (ref 0–0.2)
PH UR STRIP: 7 [PH] (ref 5–8)
PLATELET # BLD AUTO: 487 10*3/MM3 (ref 140–450)
POTASSIUM SERPL-SCNC: 5.2 MMOL/L (ref 3.5–5.2)
PROT SERPL-MCNC: 6.3 G/DL (ref 6–8.5)
PROT UR QL STRIP: ABNORMAL
RBC # BLD AUTO: 4.11 10*6/MM3 (ref 4.14–5.8)
RBC #/AREA URNS HPF: ABNORMAL /HPF
SODIUM SERPL-SCNC: 141 MMOL/L (ref 136–145)
SP GR UR STRIP: 1.01 (ref 1–1.03)
TRIGL SERPL-MCNC: 148 MG/DL (ref 0–150)
TSH SERPL DL<=0.005 MIU/L-ACNC: 1.6 UIU/ML (ref 0.27–4.2)
UROBILINOGEN UR STRIP-MCNC: ABNORMAL MG/DL
VLDLC SERPL CALC-MCNC: 26 MG/DL (ref 5–40)
WBC # BLD AUTO: 12.97 10*3/MM3 (ref 3.4–10.8)
WBC #/AREA URNS HPF: ABNORMAL /HPF

## 2023-08-22 ENCOUNTER — OFFICE VISIT (OUTPATIENT)
Dept: INTERNAL MEDICINE | Facility: CLINIC | Age: 83
End: 2023-08-22
Payer: MEDICARE

## 2023-08-22 ENCOUNTER — HOSPITAL ENCOUNTER (OUTPATIENT)
Dept: GENERAL RADIOLOGY | Facility: HOSPITAL | Age: 83
Discharge: HOME | End: 2023-08-22
Admitting: NURSE PRACTITIONER
Payer: MEDICARE

## 2023-08-22 VITALS
SYSTOLIC BLOOD PRESSURE: 110 MMHG | HEIGHT: 69 IN | TEMPERATURE: 98.7 F | BODY MASS INDEX: 24.73 KG/M2 | WEIGHT: 167 LBS | OXYGEN SATURATION: 97 % | DIASTOLIC BLOOD PRESSURE: 78 MMHG | HEART RATE: 80 BPM

## 2023-08-22 DIAGNOSIS — E11.40 TYPE 2 DIABETES MELLITUS WITH DIABETIC NEUROPATHY, WITHOUT LONG-TERM CURRENT USE OF INSULIN: Primary | ICD-10-CM

## 2023-08-22 DIAGNOSIS — M87.00 AVASCULAR NECROSIS: ICD-10-CM

## 2023-08-22 DIAGNOSIS — I10 ESSENTIAL HYPERTENSION: ICD-10-CM

## 2023-08-22 DIAGNOSIS — M25.552 LEFT HIP PAIN: ICD-10-CM

## 2023-08-22 DIAGNOSIS — G89.29 CHRONIC LEFT SHOULDER PAIN: ICD-10-CM

## 2023-08-22 DIAGNOSIS — M25.512 CHRONIC LEFT SHOULDER PAIN: ICD-10-CM

## 2023-08-22 PROCEDURE — 3074F SYST BP LT 130 MM HG: CPT | Performed by: NURSE PRACTITIONER

## 2023-08-22 PROCEDURE — 1160F RVW MEDS BY RX/DR IN RCRD: CPT | Performed by: NURSE PRACTITIONER

## 2023-08-22 PROCEDURE — 1159F MED LIST DOCD IN RCRD: CPT | Performed by: NURSE PRACTITIONER

## 2023-08-22 PROCEDURE — 3078F DIAST BP <80 MM HG: CPT | Performed by: NURSE PRACTITIONER

## 2023-08-22 PROCEDURE — 99214 OFFICE O/P EST MOD 30 MIN: CPT | Performed by: NURSE PRACTITIONER

## 2023-08-22 PROCEDURE — 73502 X-RAY EXAM HIP UNI 2-3 VIEWS: CPT

## 2023-08-22 RX ORDER — CICLOPIROX 1 G/100ML
SHAMPOO TOPICAL
COMMUNITY

## 2023-08-22 NOTE — PROGRESS NOTES
Office Visit      Patient Name: Jairo Mercedes  : 1940   MRN: [id number]     Chief Complaint:    Chief Complaint   Patient presents with    Hypertension    Diabetes       History of Present Illness: Jairo Mercedes is a 82 y.o. male who is here today for follow up of HTN, T2DM.      Left shoulder pain.  Saw ortho this past summer. Used a pole saw to cut branches and shoulder now hurts more than it did before.  Previous known rotator cuff tear in the early .  Body recall workouts 3 times a week and enjoys golf.    Aching pain in left hip muscle.  Worse after playing golf, riding golf cart.  If he stands up for a long time, hip hurts and he feels his leg will go out.  Does not recall injury to left hip.  Left leg/hip does not swell.  Denies numbness, tingling, burning, weakness.    Hypertension, hyperlipidemia: Taking ASA, atorvastatin, isosorbide, losartan, metoprolol, omega-3 fatty acids as prescribed.  Does not monitor blood pressure at home often. Denies chest pain, dyspnea, orthopnea, palpitations, lower extremity edema, confusion, headaches, weakness, visual disturbances.    T2DM: Diet controlled not currently taking medication.  A1c 5.9 on 2022. Patient denies foot ulcerations, hyperglycemia, hypoglycemia, nausea, paresthesia of the feet, polydipsia, polyuria, polyphagia, visual disturbances and weight loss.     Subjective      I have reviewed and the following portions of the patient's history were updated as appropriate: past family history, past medical history, past social history, past surgical history and problem list.      Current Outpatient Medications:     acetaminophen (TYLENOL) 500 MG tablet, Take  by mouth Every 6 (Six) Hours As Needed., Disp: , Rfl:     ALPRAZolam (XANAX) 0.5 MG tablet, Take 1 tablet by mouth Every 12 (Twelve) Hours., Disp: , Rfl:     aspirin 81 MG EC tablet, Take 1 tablet by mouth Daily., Disp: , Rfl:     atorvastatin (LIPITOR) 40 MG tablet, Take 1  "tablet by mouth every night at bedtime., Disp: 90 tablet, Rfl: 3    ciclopirox (LOPROX) 1 % shampoo, ciclopirox 1 % shampoo  APPLY TOPICALLY AS DIRECTED THREE TIMES A WEEK, Disp: , Rfl:     esomeprazole (nexIUM) 40 MG capsule, TAKE ONE CAPSULE BY MOUTH EVERY DAY, Disp: 90 capsule, Rfl: 1    fluocinonide (LIDEX) 0.05 % external solution, APPLY TOPICALLY AS DIRECTED AT BEDTIME TO affected area, Disp: , Rfl:     isosorbide mononitrate (IMDUR) 30 MG 24 hr tablet, TAKE ONE TABLET BY MOUTH EVERY DAY AS DIRECTED, Disp: 90 tablet, Rfl: 3    losartan (COZAAR) 25 MG tablet, Take 1 tablet by mouth Daily., Disp: 30 tablet, Rfl: 0    meclizine (ANTIVERT) 25 MG tablet, TAKE ONE TABLET BY MOUTH TWICE DAILY (Patient taking differently: As Needed.), Disp: 180 tablet, Rfl: 3    metoprolol tartrate (LOPRESSOR) 25 MG tablet, TAKE ONE TABLET BY MOUTH TWICE DAILY, Disp: 180 tablet, Rfl: 3    nitroglycerin (NITROSTAT) 0.4 MG SL tablet, Place 1 tablet under the tongue Every 5 (Five) Minutes As Needed for Chest Pain. Don't use, Disp: , Rfl:     Omega-3 Fatty Acids (FISH OIL) 1000 MG capsule capsule, Take 1 capsule by mouth Daily With Breakfast., Disp: , Rfl:     sertraline (ZOLOFT) 25 MG tablet, TAKE ONE TABLET BY MOUTH EVERY DAY, Disp: 90 tablet, Rfl: 3    traZODone (DESYREL) 50 MG tablet, Take 0.5-1 tablets by mouth At Night As Needed (insomnia)., Disp: 90 tablet, Rfl: 3    Trelegy Ellipta 100-62.5-25 MCG/ACT inhaler, INHALE ONE PUFF BY MOUTH EVERY DAY, Disp: 60 each, Rfl: 5    triamcinolone (KENALOG) 0.025 % ointment, Apply to the affected areas(s) twice daily, Disp: 80 g, Rfl: 1    Allergies   Allergen Reactions    Parabens Rash       Objective     Physical Exam:  Vital Signs:   Vitals:    08/22/23 0840   BP: 110/78   Pulse: 80   Temp: 98.7 øF (37.1 øC)   SpO2: 97%   Weight: 75.8 kg (167 lb)   Height: 175.3 cm (69.02\")     Body mass index is 24.65 kg/mý.    Physical Exam  Constitutional:       Appearance: He is not " ill-appearing.   HENT:      Head: Normocephalic.      Right Ear: External ear normal.      Left Ear: External ear normal.   Eyes:      Conjunctiva/sclera: Conjunctivae normal.      Pupils: Pupils are equal, round, and reactive to light.   Cardiovascular:      Rate and Rhythm: Normal rate and regular rhythm.      Pulses:           Radial pulses are 2+ on the right side and 2+ on the left side.        Dorsalis pedis pulses are 2+ on the right side and 2+ on the left side.      Heart sounds: Normal heart sounds.   Pulmonary:      Effort: Pulmonary effort is normal.      Breath sounds: Normal breath sounds.   Musculoskeletal:      Left shoulder: No crepitus. Decreased range of motion. Normal strength.      Cervical back: Normal range of motion and neck supple.      Left hip: Tenderness and crepitus present. No deformity. Normal range of motion. Normal strength.   Skin:     General: Skin is warm.      Capillary Refill: Capillary refill takes less than 2 seconds.   Neurological:      Mental Status: He is alert and oriented to person, place, and time.      Coordination: Coordination normal.      Gait: Gait normal.   Psychiatric:         Attention and Perception: Attention normal.         Mood and Affect: Mood and affect normal.         Speech: Speech normal.         Behavior: Behavior normal.           Assessment / Plan      Assessment/Plan:   Diagnoses and all orders for this visit:    1. Type 2 diabetes mellitus with diabetic neuropathy, without long-term current use of insulin (Primary)        - Follow diabetic diet        - See eye doctor annually or as discussed        - Wear protective foot wear/no bare feet        - Check feet regularly for calluses or ulcers        - Exercise as tolerated for 30-45 minutes most days of the week. Gradually increase daily exercise if not currently active.    2. Essential hypertension        - Follow heart healthy diet.  Keep sodium intake < 1500 mg per day.  Avoid processed & fast  foods.          - Exercise as tolerated, with a goal of 30 minutes of moderate exercise most days.         - Take medications as prescribed.    3. Chronic left shoulder pain  -     Ambulatory Referral to Orthopedic Surgery  - Take acetaminophen per package directions as needed for pain    4. Left hip pain  -     XR hip w or wo pelvis 2-3 view left  -     Ambulatory Referral to Orthopedic Surgery    5. Avascular necrosis  -     Ambulatory Referral to Orthopedic Surgery             Follow Up:   Return in about 6 months (around 2/22/2024) for Next scheduled follow up.    Patient was given instructions and counseling regarding his condition or for health maintenance advice. Please see specific information pulled into the AVS if appropriate.       Primary [facility] [address] [facility]     Please note that portions of this note may have been completed with a voice recognition program. Efforts were made to edit dictation, but occasionally words are mistranscribed.

## 2023-08-29 ENCOUNTER — TELEPHONE (OUTPATIENT)
Dept: ORTHOPEDIC SURGERY | Facility: CLINIC | Age: 83
End: 2023-08-29

## 2023-08-29 ENCOUNTER — OFFICE VISIT (OUTPATIENT)
Dept: ORTHOPEDIC SURGERY | Facility: CLINIC | Age: 83
End: 2023-08-29
Payer: MEDICARE

## 2023-08-29 VITALS
SYSTOLIC BLOOD PRESSURE: 112 MMHG | WEIGHT: 168 LBS | DIASTOLIC BLOOD PRESSURE: 78 MMHG | BODY MASS INDEX: 24.88 KG/M2 | HEIGHT: 69 IN

## 2023-08-29 DIAGNOSIS — M87.052 AVASCULAR NECROSIS OF BONE OF LEFT HIP: ICD-10-CM

## 2023-08-29 DIAGNOSIS — S76.012A STRAIN OF HIP FLEXOR, LEFT, INITIAL ENCOUNTER: Primary | ICD-10-CM

## 2023-08-29 NOTE — PROGRESS NOTES
Orthopaedic Clinic Note: Hip New Patient    Chief Complaint   Patient presents with    Left Hip - Pain        HPI  Consult from: JHONNY Pablo BRAD Mercedes is a 82 y.o. male who presents with left hip pain for 1 month(s). Onset atraumatic and gradual in nature. Pain is localized to anterolateral hip and is a 5/10 on the pain scale.Pain is described as aching. Associated symptoms include pain.  The pain is worse with walking and standing; resting improve the pain. Previous treatments have included: NSAIDS since symptom onset. Although some transient relief was reported with these interventions, these conservative measures have failed and symptoms have persisted. The patient is limited in daily activities and has had a significant decrease in quality of life as a result. He denies fevers, chills, or constitutional symptoms.    I have reviewed the following portions of the patient's history:History of Present Illness    Past Medical History:   Diagnosis Date    Abdominal pain     Acute sinusitis     Allergic N/A    Anemia     Anxiety     Arthritis     Asthma N/A    Bladder cancer 04/2021    Cataracts, bilateral     COPD (chronic obstructive pulmonary disease)     Coronary artery disease N/A    Some Blockage    Diabetes mellitus     REPORTS DIET CONTROL ONLY, TAKES NO MEDICATION    Emphysema of lung     GERD (gastroesophageal reflux disease)     Heme positive stool     History of ankle fracture     LEFT     History of cardiovascular stress test     REPORTS IN HIS LATE 50'S AND THAT ALL WAS WNL'S AT THAT TIME    History of echocardiogram     REPORTS HAS HAD 2. DOES NOT REPORT ANY HX OF CHEST PAIN.    History of pneumonia     [place] (hard of hearing)     WEARS HEARING AIDS    Hypertension     Impingement syndrome of left shoulder 04/25/2023    Melanoma     2013    Oxygen dependent     2 L NC HS    Pneumonia     Presence of urostomy 06/15/2021    Rotator cuff syndrome     Seasonal allergies     Wears glasses      Wears partial dentures       Past Surgical History:   Procedure Laterality Date    BRAIN SURGERY  1999    REPORTS FOR SUBDURAL HEMATOMA     BRONCHOSCOPY N/A 04/16/2018    Procedure: BRONCHOSCOPY DIAGNOSTIC WITH WASHINGS, BRUSHINGS AND BIOPSIES;  Surgeon: Morgan Frias MD;  Location: [facility] OR;  Service: Pulmonary    BRONCHOSCOPY      ALEX HOLE FOR SUBDURAL HEMATOMA      CARDIAC CATHETERIZATION      REPORTS APPROXIMATELY 2015 AND REPORTS NO STENTS WERE PLACED    CATARACT EXTRACTION W/ INTRAOCULAR LENS IMPLANT Left 06/23/2022    Procedure: CATARACT PHACO EXTRACTION WITH INTRAOCULAR LENS IMPLANT LEFT COMPLICATED WITH MALYUGIN RING;  Surgeon: Mack Blake MD;  Location: [facility] OR;  Service: Ophthalmology;  Laterality: Left;    CATARACT EXTRACTION W/ INTRAOCULAR LENS IMPLANT Right 07/14/2022    Procedure: CATARACT PHACO EXTRACTION WITH INTRAOCULAR LENS IMPLANT RIGHT COMPLICATED WITH MALYUGIN RING;  Surgeon: Mack Blake MD;  Location: [facility] OR;  Service: Ophthalmology;  Laterality: Right;    CHOLECYSTECTOMY      COLONOSCOPY      COLONOSCOPY N/A 08/02/2018    Procedure: COLONOSCOPY WITH HOT FORCEP POLYPECTOMY X2  ;  Surgeon: Sara Reynoso MD;  Location: [facility] ENDOSCOPY;  Service: Gastroenterology    ENDOSCOPY      LIVER BIOPSY      LYMPH NODE BIOPSY      MOLE REMOVAL  2005    MELANOMA ON BACK    OTHER SURGICAL HISTORY Right     UROSTOMY    SPLENECTOMY      THORACOTOMY  07/28/2015    Left thoracotomy and stapling of pulmonary blebs. Mechanical pleurodesis.    TONSILLECTOMY      TRANSURETHRAL RESECTION OF BLADDER TUMOR N/A 04/13/2021    Procedure: TRANSURETHRAL RESECTION OF BLADDER TUMOR;  Surgeon: Osmani Francois MD;  Location: [facility] OR;  Service: Urology;  Laterality: N/A;    WISDOM TOOTH EXTRACTION        Family History   Problem Relation Age of Onset    Alzheimer's disease Mother     Arthritis Mother     Anesthesia problems Mother     Heart disease Father     Hyperlipidemia Father      Cancer Other     Cancer Maternal Grandmother      Social History     Socioeconomic History    Marital status:    Tobacco Use    Smoking status: Former     Packs/day: 2.00     Years: 42.00     Pack years: 84.00     Types: Cigarettes     Start date: 1957     Quit date: 1999     Years since quittin.5    Smokeless tobacco: Never   Vaping Use    Vaping Use: Never used   Substance and Sexual Activity    Alcohol use: Not Currently    Drug use: No    Sexual activity: Not Currently     Partners: Female      Current Outpatient Medications on File Prior to Visit   Medication Sig Dispense Refill    acetaminophen (TYLENOL) 500 MG tablet Take  by mouth Every 6 (Six) Hours As Needed.      ALPRAZolam (XANAX) 0.5 MG tablet Take 1 tablet by mouth Every 12 (Twelve) Hours.      aspirin 81 MG EC tablet Take 1 tablet by mouth Daily.      atorvastatin (LIPITOR) 40 MG tablet Take 1 tablet by mouth every night at bedtime. 90 tablet 3    ciclopirox (LOPROX) 1 % shampoo ciclopirox 1 % shampoo   APPLY TOPICALLY AS DIRECTED THREE TIMES A WEEK      esomeprazole (nexIUM) 40 MG capsule TAKE ONE CAPSULE BY MOUTH EVERY DAY 90 capsule 1    fluocinonide (LIDEX) 0.05 % external solution APPLY TOPICALLY AS DIRECTED AT BEDTIME TO affected area      isosorbide mononitrate (IMDUR) 30 MG 24 hr tablet TAKE ONE TABLET BY MOUTH EVERY DAY AS DIRECTED 90 tablet 3    losartan (COZAAR) 25 MG tablet Take 1 tablet by mouth Daily. 30 tablet 0    meclizine (ANTIVERT) 25 MG tablet TAKE ONE TABLET BY MOUTH TWICE DAILY (Patient taking differently: As Needed.) 180 tablet 3    metoprolol tartrate (LOPRESSOR) 25 MG tablet TAKE ONE TABLET BY MOUTH TWICE DAILY 180 tablet 3    nitroglycerin (NITROSTAT) 0.4 MG SL tablet Place 1 tablet under the tongue Every 5 (Five) Minutes As Needed for Chest Pain. Don't use      Omega-3 Fatty Acids (FISH OIL) 1000 MG capsule capsule Take 1 capsule by mouth Daily With Breakfast.      sertraline (ZOLOFT) 25 MG tablet TAKE  "ONE TABLET BY MOUTH EVERY DAY 90 tablet 3    traZODone (DESYREL) 50 MG tablet Take 0.5-1 tablets by mouth At Night As Needed (insomnia). 90 tablet 3    Trelegy Ellipta 100-62.5-25 MCG/ACT inhaler INHALE ONE PUFF BY MOUTH EVERY DAY 60 each 5    triamcinolone (KENALOG) 0.025 % ointment Apply to the affected areas(s) twice daily 80 g 1     No current facility-administered medications on file prior to visit.      Allergies   Allergen Reactions    Parabens Rash        Review of Systems   Constitutional: Negative.    HENT: Negative.     Eyes: Negative.    Respiratory: Negative.     Cardiovascular: Negative.    Gastrointestinal: Negative.    Endocrine: Negative.    Genitourinary: Negative.    Musculoskeletal:  Positive for arthralgias.   Skin: Negative.    Allergic/Immunologic: Negative.    Neurological: Negative.    Hematological: Negative.    Psychiatric/Behavioral: Negative.        The patient's Review of Systems was personally reviewed and confirmed as accurate.    The following portions of the patient's history were reviewed and updated as appropriate: allergies, current medications, past family history, past medical history, past social history, past surgical history, and problem list.    Physical Exam  Blood pressure 112/78, height 175.3 cm (69.02\"), weight 76.2 kg (168 lb).    Body mass index is 24.8 kg/mý.    GENERAL APPEARANCE: awake, alert & oriented x 3, in no acute distress and well developed, well nourished  PSYCH: normal affect  LUNGS:  breathing nonlabored  EYES: sclera anicteric  CARDIOVASCULAR: palpable dorsalis pedis, palpable posterior tibial bilaterally. Capillary refill less than 2 seconds  EXTREMITIES: no clubbing, cyanosis  GAIT:  Normal           Right Hip Exam:  RANGE OF MOTION:   FLEXION CONTRACTURE: None   FLEXION: 110 degrees   INTERNAL ROTATION: 20 degrees at 90 degrees of flexion   EXTERNAL ROTATION: 40 degrees at 90 degrees of flexion    PAIN WITH HIP MOTION: no  PAIN WITH LOGROLL: " no  STINCHFIELD TEST: negative    KNEE EXAM: full knee ROM (0-120 degrees), stable to varus and valgus stress at terminal extension and 30 degrees flexion     STRENGTH:  5/5 hip adduction, abduction, flexion. 5/5 strength knee flexion, extension. 5/5 strength ankle dorsiflexion and plantarflexion.     GREATER TROCHANTER BURSAL PAIN:  no     REFLEXES:   PATELLAR 2+/4   ACHILLES 2+/4    CLONUS: negative  STRAIGHT LEG TEST:   negative    SENSATION TO LIGHT TOUCH:  DEEP PERONEAL/SUPERFICIAL PERONEAL/SURAL/SAPHENOUS/TIBIAL:  intact    EDEMA:   no  ERYTHEMA:  no  WOUNDS/INCISIONS: no overlying skin problems.      Left Hip Exam:   RANGE OF MOTION:   FLEXION CONTRACTURE: None   FLEXION: 110 degrees   INTERNAL ROTATION: 20 degrees at 90 degrees of flexion   EXTERNAL ROTATION: 40 degrees at 90 degrees of flexion    PAIN WITH HIP MOTION: no  PAIN WITH LOGROLL: no  STINCHFIELD TEST: negative    KNEE EXAM: full knee ROM (0-120 degrees), stable to varus and valgus stress at terminal extension and 30 degrees flexion     STRENGTH:  5/5 hip adduction, abduction, flexion. 5/5 strength knee flexion, extension. 5/5 strength ankle dorsiflexion and plantarflexion.     GREATER TROCHANTER BURSAL PAIN:  no  Tender to palpation about the hip flexors     REFLEXES:   PATELLAR 2+/4   ACHILLES 2+/4    CLONUS: negative  STRAIGHT LEG TEST:   negative    SENSATION TO LIGHT TOUCH:  DEEP PERONEAL/SUPERFICIAL PERONEAL/SURAL/SAPHENOUS/TIBIAL:  intact    EDEMA:   no  ERYTHEMA:  no  WOUNDS/INCISIONS: no overlying skin problems.      ------------------------------------------------------------------    LEG LENGTHS:  equal  _____________________________________________________  _____________________________________________________    RADIOGRAPHIC FINDINGS:   Left hip radiographs from 8/22/2023 were personally interpreted.  Radiographs demonstrate bilateral hip avascular necrosis findings with serpiginous sclerosis of the bilateral femoral heads.  No  evidence of subchondral collapse.  Comparison of this imaging to January 15, 2020 CT scan of the abdomen and pelvis reveals no significant change in appearance of the AVN.    Personal review of laboratory studies from 8/18/2023.  A1c is 6.0.  Creatinine 1.18, BUN 26.    Assessment/Plan:   Diagnosis Plan   1. Strain of hip flexor, left, initial encounter  Ambulatory Referral to Physical Therapy Evaluate and treat, Ortho      2. 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  However his avascular necrosis is asymptomatic and stable dating at least back to 2020.  He is primarily suffering from a hip muscle strain.  I discussed treatment options with him.  He is agreeable to outpatient physical therapy.  Referral placed.  I recommend at least 6 to 8 weeks of physical therapy.  If his symptoms fail to improve, he has been instructed to call for follow-up appointment.  An MRI may be warranted to evaluate for soft tissue pathology if his pain fails to improve with therapy.  Otherwise, he will follow-up as needed.    Sid Martell MD  08/29/23  08:20 EDT

## 2023-08-29 NOTE — LETTER
August 29, 2023       No Recipients    Patient: Jairo Mercedes   YOB: 1940   Date of Visit: 8/29/2023       Dear GENTRY Pablo:    Thank you for referring Jairo Mercedes to me for evaluation. Below are the relevant portions of my assessment and plan of care.    Encounter Diagnosis and Orders:  Diagnoses and all orders for this visit:    1. Strain of hip flexor, left, initial encounter (Primary)  -     Ambulatory Referral to Physical Therapy Evaluate and treat, Ortho    2. Avascular necrosis of bone of left hip        If you have questions, please do not hesitate to call me. I look forward to following Jairo along with you.         Sincerely,        Sid Martell MD        CC:   No Recipients

## 2023-08-29 NOTE — TELEPHONE ENCOUNTER
Physical Therapy Discharge Summary    Reason for therapy discharge:    Discharged to home with outpatient therapy.    Progress towards therapy goal(s). See goals on Care Plan in [facility] electronic health record for goal details.  Goals partially met.  Barriers to achieving goals:   discharge from facility.    Therapy recommendation(s):    Continued therapy is recommended.  Rationale/Recommendations:  Continue OP PT as appropriate per COVID.       Spoke with patient. Confirmed that he received a physical copy of his physical therapy order today after his appointment. Informed him that the paper does not state a specific location because it is generic so that he can take it to any location that accepts his insurance or has availability. Informed him he will take that paper to [address]. Patient verbalized understanding.

## 2023-08-29 NOTE — TELEPHONE ENCOUNTER
Caller: KITA   Relationship to Patient: SELF    Phone Number: [phone]  Reason for Call: PATIENT CALLING STATING THAT HE WOULD LIKE TO GO TO [address] FOR REHAB [phone]

## 2023-09-15 ENCOUNTER — OFFICE VISIT (OUTPATIENT)
Dept: PULMONOLOGY | Facility: CLINIC | Age: 83
End: 2023-09-15
Payer: MEDICARE

## 2023-09-15 VITALS
DIASTOLIC BLOOD PRESSURE: 60 MMHG | SYSTOLIC BLOOD PRESSURE: 112 MMHG | HEIGHT: 69 IN | HEART RATE: 63 BPM | WEIGHT: 167.8 LBS | RESPIRATION RATE: 18 BRPM | BODY MASS INDEX: 24.85 KG/M2 | OXYGEN SATURATION: 95 %

## 2023-09-15 DIAGNOSIS — J30.9 ALLERGIC RHINITIS, UNSPECIFIED SEASONALITY, UNSPECIFIED TRIGGER: ICD-10-CM

## 2023-09-15 DIAGNOSIS — J44.9 CHRONIC OBSTRUCTIVE PULMONARY DISEASE, UNSPECIFIED COPD TYPE: Primary | ICD-10-CM

## 2023-09-15 RX ORDER — CLOBETASOL PROPIONATE 0.46 MG/ML
SOLUTION TOPICAL
COMMUNITY
Start: 2023-09-05

## 2023-09-15 RX ORDER — FLUTICASONE FUROATE, UMECLIDINIUM BROMIDE AND VILANTEROL TRIFENATATE 100; 62.5; 25 UG/1; UG/1; UG/1
1 POWDER RESPIRATORY (INHALATION)
Qty: 60 EACH | Refills: 5 | Status: SHIPPED | OUTPATIENT
Start: 2023-09-15

## 2023-09-15 NOTE — PROGRESS NOTES
.     Follow Up Office Visit      Patient Name: Jairo Mercedes    Chief Complaint:    Chief Complaint   Patient presents with    Follow-up     COPD       History of Present Illness: Jairo Mercedes is a 82 y.o. male who is here today for follow up of COPD. Since last visit, his breathing has been stable. He denies having any exacerbations. He reports compliance with Trelegy. Only uses albuterol prior to playing golf. He is in PT for left hip pain. He is tolerating PT well. He also participates in senior exercise classes.    Supplemental Oxygen: 2L qhs and PRN    Subjective      Review of Systems:  Review of Systems   Constitutional:  Negative for fever and unexpected weight change.   Respiratory:  Positive for shortness of breath. Negative for cough and wheezing.    Cardiovascular:  Negative for chest pain and leg swelling.   Allergic/Immunologic: Positive for environmental allergies.      Past Medical History:   Past Medical History:   Diagnosis Date    Abdominal pain     Acute sinusitis     Allergic N/A    Anemia     Anxiety     Arthritis     Asthma N/A    Bladder cancer 04/2021    Cataracts, bilateral     COPD (chronic obstructive pulmonary disease)     Coronary artery disease N/A    Some Blockage    Diabetes mellitus     REPORTS DIET CONTROL ONLY, TAKES NO MEDICATION    Emphysema of lung     GERD (gastroesophageal reflux disease)     Heme positive stool     History of ankle fracture     LEFT     History of cardiovascular stress test     REPORTS IN HIS LATE 50'S AND THAT ALL WAS WNL'S AT THAT TIME    History of echocardiogram     REPORTS HAS HAD 2. DOES NOT REPORT ANY HX OF CHEST PAIN.    History of pneumonia     [place] (hard of hearing)     WEARS HEARING AIDS    Hypertension     Impingement syndrome of left shoulder 04/25/2023    Melanoma     2013    Oxygen dependent     2 L NC HS    Pneumonia     Presence of urostomy 06/15/2021    Rotator cuff syndrome     Seasonal allergies     Wears glasses     Wears partial  dentures        Past Surgical History:   Past Surgical History:   Procedure Laterality Date    BRAIN SURGERY  1999    REPORTS FOR SUBDURAL HEMATOMA     BRONCHOSCOPY N/A 04/16/2018    Procedure: BRONCHOSCOPY DIAGNOSTIC WITH WASHINGS, BRUSHINGS AND BIOPSIES;  Surgeon: Morgan Frias MD;  Location: [facility] OR;  Service: Pulmonary    BRONCHOSCOPY      ALEX HOLE FOR SUBDURAL HEMATOMA      CARDIAC CATHETERIZATION      REPORTS APPROXIMATELY 2015 AND REPORTS NO STENTS WERE PLACED    CATARACT EXTRACTION W/ INTRAOCULAR LENS IMPLANT Left 06/23/2022    Procedure: CATARACT PHACO EXTRACTION WITH INTRAOCULAR LENS IMPLANT LEFT COMPLICATED WITH MALYUGIN RING;  Surgeon: Mack Blake MD;  Location: [facility] OR;  Service: Ophthalmology;  Laterality: Left;    CATARACT EXTRACTION W/ INTRAOCULAR LENS IMPLANT Right 07/14/2022    Procedure: CATARACT PHACO EXTRACTION WITH INTRAOCULAR LENS IMPLANT RIGHT COMPLICATED WITH MALYUGIN RING;  Surgeon: Mack Blake MD;  Location: [facility] OR;  Service: Ophthalmology;  Laterality: Right;    CHOLECYSTECTOMY      COLONOSCOPY      COLONOSCOPY N/A 08/02/2018    Procedure: COLONOSCOPY WITH HOT FORCEP POLYPECTOMY X2  ;  Surgeon: Sara Reynoso MD;  Location: [facility] ENDOSCOPY;  Service: Gastroenterology    ENDOSCOPY      LIVER BIOPSY      LYMPH NODE BIOPSY      MOLE REMOVAL  2005    MELANOMA ON BACK    OTHER SURGICAL HISTORY Right     UROSTOMY    SPLENECTOMY      THORACOTOMY  07/28/2015    Left thoracotomy and stapling of pulmonary blebs. Mechanical pleurodesis.    TONSILLECTOMY      TRANSURETHRAL RESECTION OF BLADDER TUMOR N/A 04/13/2021    Procedure: TRANSURETHRAL RESECTION OF BLADDER TUMOR;  Surgeon: Osmani Francois MD;  Location: [facility];  Service: Urology;  Laterality: N/A;    WISDOM TOOTH EXTRACTION         Family History:   Family History   Problem Relation Age of Onset    Alzheimer's disease Mother     Arthritis Mother     Anesthesia problems Mother     Heart disease Father      Hyperlipidemia Father     Cancer Other     Cancer Maternal Grandmother        Social History:   Social History     Socioeconomic History    Marital status:    Tobacco Use    Smoking status: Former     Packs/day: 2.00     Years: 42.00     Pack years: 84.00     Types: Cigarettes     Start date: 1957     Quit date: 1999     Years since quittin.5    Smokeless tobacco: Never   Vaping Use    Vaping Use: Never used   Substance and Sexual Activity    Alcohol use: Not Currently    Drug use: No    Sexual activity: Not Currently     Partners: Female       Current Medications:     Current Outpatient Medications:     acetaminophen (TYLENOL) 500 MG tablet, Take  by mouth Every 6 (Six) Hours As Needed., Disp: , Rfl:     ALPRAZolam (XANAX) 0.5 MG tablet, Take 1 tablet by mouth Every 12 (Twelve) Hours., Disp: , Rfl:     aspirin 81 MG EC tablet, Take 1 tablet by mouth Daily., Disp: , Rfl:     atorvastatin (LIPITOR) 40 MG tablet, Take 1 tablet by mouth every night at bedtime., Disp: 90 tablet, Rfl: 3    ciclopirox (LOPROX) 1 % shampoo, ciclopirox 1 % shampoo  APPLY TOPICALLY AS DIRECTED THREE TIMES A WEEK, Disp: , Rfl:     clobetasol (TEMOVATE) 0.05 % external solution, APPLY TO THE AFFECTED AREA(S) TOPICALLY AS DIRECTED TWICE DAILY, Disp: , Rfl:     esomeprazole (nexIUM) 40 MG capsule, TAKE ONE CAPSULE BY MOUTH EVERY DAY, Disp: 90 capsule, Rfl: 1    fluocinonide (LIDEX) 0.05 % external solution, APPLY TOPICALLY AS DIRECTED AT BEDTIME TO affected area, Disp: , Rfl:     isosorbide mononitrate (IMDUR) 30 MG 24 hr tablet, TAKE ONE TABLET BY MOUTH EVERY DAY AS DIRECTED, Disp: 90 tablet, Rfl: 3    losartan (COZAAR) 25 MG tablet, Take 1 tablet by mouth Daily., Disp: 30 tablet, Rfl: 0    meclizine (ANTIVERT) 25 MG tablet, TAKE ONE TABLET BY MOUTH TWICE DAILY (Patient taking differently: As Needed.), Disp: 180 tablet, Rfl: 3    metoprolol tartrate (LOPRESSOR) 25 MG tablet, TAKE ONE TABLET BY MOUTH TWICE DAILY, Disp: 180  "tablet, Rfl: 3    nitroglycerin (NITROSTAT) 0.4 MG SL tablet, Place 1 tablet under the tongue Every 5 (Five) Minutes As Needed for Chest Pain. Don't use, Disp: , Rfl:     Omega-3 Fatty Acids (FISH OIL) 1000 MG capsule capsule, Take 1 capsule by mouth Daily With Breakfast., Disp: , Rfl:     sertraline (ZOLOFT) 25 MG tablet, TAKE ONE TABLET BY MOUTH EVERY DAY, Disp: 90 tablet, Rfl: 3    traZODone (DESYREL) 50 MG tablet, Take 0.5-1 tablets by mouth At Night As Needed (insomnia)., Disp: 90 tablet, Rfl: 3    Trelegy Ellipta 100-62.5-25 MCG/ACT inhaler, INHALE ONE PUFF BY MOUTH EVERY DAY, Disp: 60 each, Rfl: 5    triamcinolone (KENALOG) 0.025 % ointment, Apply to the affected areas(s) twice daily, Disp: 80 g, Rfl: 1     Allergies:   Allergies   Allergen Reactions    Parabens Rash       Objective     Physical Exam:  Vital Signs:   Vitals:    09/15/23 0849   BP: 112/60   Pulse: 63   Resp: 18   SpO2: 95%   Weight: 76.1 kg (167 lb 12.8 oz)   Height: 175.3 cm (69.02\")     Body mass index is 24.77 kg/m².    Physical Exam  Vitals reviewed.   Constitutional:       General: He is not in acute distress.     Appearance: He is not toxic-appearing.   HENT:      Head: Normocephalic and atraumatic.      Mouth/Throat:      Mouth: Mucous membranes are moist.   Eyes:      Extraocular Movements: Extraocular movements intact.      Conjunctiva/sclera: Conjunctivae normal.      Pupils: Pupils are equal, round, and reactive to light.   Cardiovascular:      Rate and Rhythm: Normal rate.      Heart sounds: Normal heart sounds.   Pulmonary:      Effort: Pulmonary effort is normal.      Breath sounds: Normal breath sounds.   Abdominal:      General: There is no distension.      Palpations: Abdomen is soft.   Musculoskeletal:         General: No swelling.      Cervical back: Neck supple.   Skin:     General: Skin is warm and dry.      Findings: No rash.   Neurological:      General: No focal deficit present.      Mental Status: He is alert and " oriented to person, place, and time.   Psychiatric:         Mood and Affect: Mood normal.         Behavior: Behavior normal.     Results Review:   June 2023 chest CT with contrast showed No evidence of metastatic disease in the chest.     Assessment / Plan      Assessment/Plan:   Diagnoses and all orders for this visit:    1. Chronic obstructive pulmonary disease, unspecified COPD type (Primary)  -     Fluticasone-Umeclidin-Vilant (Trelegy Ellipta) 100-62.5-25 MCG/ACT inhaler; Inhale 1 puff Daily. Rinse mouth out after use  Dispense: 60 each; Refill: 5  Good control on current inhaled regimen. We discussed the risk and benefits of inhaled corticosteroids. Patient instructed to take them on a regular basis as prescribed. Patient instructed to rinse their mouth out after each use.     2. Allergic rhinitis, unspecified seasonality, unspecified trigger  Use over-the-counter oral antihistamine and/or Flonase as needed.       Follow Up:   March 2024  The patient was counseled on diagnostic results, risks and benefits of treatment options, risk factor modifications and the importance of treatment compliance. The patient was advised to contact the clinic with concerns or worsening symptoms.     GENTRY Ramirez   Pulmonary Medicine [place]

## 2023-09-19 RX ORDER — TRAZODONE HYDROCHLORIDE 50 MG/1
TABLET ORAL
Qty: 90 TABLET | Refills: 3 | Status: SHIPPED | OUTPATIENT
Start: 2023-09-19

## 2023-11-22 DIAGNOSIS — J44.9 CHRONIC OBSTRUCTIVE PULMONARY DISEASE, UNSPECIFIED COPD TYPE: ICD-10-CM

## 2023-11-22 RX ORDER — FLUTICASONE FUROATE, UMECLIDINIUM BROMIDE AND VILANTEROL TRIFENATATE 100; 62.5; 25 UG/1; UG/1; UG/1
POWDER RESPIRATORY (INHALATION)
Qty: 60 EACH | Refills: 5 | OUTPATIENT
Start: 2023-11-22

## 2023-11-29 DIAGNOSIS — K21.9 GASTROESOPHAGEAL REFLUX DISEASE WITHOUT ESOPHAGITIS: ICD-10-CM

## 2023-11-29 RX ORDER — ESOMEPRAZOLE MAGNESIUM 40 MG/1
CAPSULE, DELAYED RELEASE ORAL
Qty: 90 CAPSULE | Refills: 1 | Status: SHIPPED | OUTPATIENT
Start: 2023-11-29

## 2023-12-04 DIAGNOSIS — J44.9 CHRONIC OBSTRUCTIVE PULMONARY DISEASE, UNSPECIFIED COPD TYPE: ICD-10-CM

## 2023-12-04 RX ORDER — FLUTICASONE FUROATE, UMECLIDINIUM BROMIDE AND VILANTEROL TRIFENATATE 100; 62.5; 25 UG/1; UG/1; UG/1
1 POWDER RESPIRATORY (INHALATION)
Qty: 180 EACH | Refills: 3 | Status: SHIPPED | OUTPATIENT
Start: 2023-12-04

## 2024-01-02 RX ORDER — ISOSORBIDE MONONITRATE 30 MG/1
TABLET, EXTENDED RELEASE ORAL
Qty: 90 TABLET | Refills: 3 | OUTPATIENT
Start: 2024-01-02

## 2024-01-03 ENCOUNTER — LAB (OUTPATIENT)
Dept: LAB | Facility: HOSPITAL | Age: 84
End: 2024-01-03
Payer: MEDICARE

## 2024-01-03 ENCOUNTER — OFFICE VISIT (OUTPATIENT)
Dept: ONCOLOGY | Facility: CLINIC | Age: 84
End: 2024-01-03
Payer: MEDICARE

## 2024-01-03 ENCOUNTER — TELEPHONE (OUTPATIENT)
Dept: ONCOLOGY | Facility: CLINIC | Age: 84
End: 2024-01-03

## 2024-01-03 VITALS
HEIGHT: 69 IN | DIASTOLIC BLOOD PRESSURE: 51 MMHG | OXYGEN SATURATION: 94 % | RESPIRATION RATE: 16 BRPM | HEART RATE: 60 BPM | BODY MASS INDEX: 24.73 KG/M2 | SYSTOLIC BLOOD PRESSURE: 104 MMHG | TEMPERATURE: 97.5 F | WEIGHT: 167 LBS

## 2024-01-03 DIAGNOSIS — E61.1 IRON DEFICIENCY: ICD-10-CM

## 2024-01-03 DIAGNOSIS — D75.839 THROMBOCYTHEMIA: ICD-10-CM

## 2024-01-03 DIAGNOSIS — K90.9 IRON MALABSORPTION: ICD-10-CM

## 2024-01-03 DIAGNOSIS — C67.9 UROTHELIAL CARCINOMA OF BLADDER WITH INVASION OF MUSCLE: Primary | ICD-10-CM

## 2024-01-03 LAB
BASOPHILS # BLD AUTO: 0.04 10*3/MM3 (ref 0–0.2)
BASOPHILS NFR BLD AUTO: 0.3 % (ref 0–1.5)
DEPRECATED RDW RBC AUTO: 51.5 FL (ref 37–54)
EOSINOPHIL # BLD AUTO: 0.19 10*3/MM3 (ref 0–0.4)
EOSINOPHIL NFR BLD AUTO: 1.4 % (ref 0.3–6.2)
ERYTHROCYTE [DISTWIDTH] IN BLOOD BY AUTOMATED COUNT: 15.4 % (ref 12.3–15.4)
FERRITIN SERPL-MCNC: 272.8 NG/ML (ref 30–400)
HCT VFR BLD AUTO: 40 % (ref 37.5–51)
HGB BLD-MCNC: 12.8 G/DL (ref 13–17.7)
IMM GRANULOCYTES # BLD AUTO: 0.14 10*3/MM3 (ref 0–0.05)
IMM GRANULOCYTES NFR BLD AUTO: 1 % (ref 0–0.5)
IRON 24H UR-MRATE: 49 MCG/DL (ref 59–158)
IRON SATN MFR SERPL: 17 % (ref 20–50)
LYMPHOCYTES # BLD AUTO: 2.11 10*3/MM3 (ref 0.7–3.1)
LYMPHOCYTES NFR BLD AUTO: 15.8 % (ref 19.6–45.3)
MCH RBC QN AUTO: 29.3 PG (ref 26.6–33)
MCHC RBC AUTO-ENTMCNC: 32 G/DL (ref 31.5–35.7)
MCV RBC AUTO: 91.5 FL (ref 79–97)
MONOCYTES # BLD AUTO: 1.38 10*3/MM3 (ref 0.1–0.9)
MONOCYTES NFR BLD AUTO: 10.3 % (ref 5–12)
NEUTROPHILS NFR BLD AUTO: 71.2 % (ref 42.7–76)
NEUTROPHILS NFR BLD AUTO: 9.48 10*3/MM3 (ref 1.7–7)
NRBC BLD AUTO-RTO: 0 /100 WBC (ref 0–0.2)
PLATELET # BLD AUTO: 437 10*3/MM3 (ref 140–450)
PMV BLD AUTO: 11.4 FL (ref 6–12)
RBC # BLD AUTO: 4.37 10*6/MM3 (ref 4.14–5.8)
TIBC SERPL-MCNC: 295 MCG/DL (ref 298–536)
TRANSFERRIN SERPL-MCNC: 198 MG/DL (ref 200–360)
WBC NRBC COR # BLD AUTO: 13.34 10*3/MM3 (ref 3.4–10.8)

## 2024-01-03 PROCEDURE — 85025 COMPLETE CBC W/AUTO DIFF WBC: CPT

## 2024-01-03 PROCEDURE — 3078F DIAST BP <80 MM HG: CPT | Performed by: INTERNAL MEDICINE

## 2024-01-03 PROCEDURE — 84466 ASSAY OF TRANSFERRIN: CPT

## 2024-01-03 PROCEDURE — 1126F AMNT PAIN NOTED NONE PRSNT: CPT | Performed by: INTERNAL MEDICINE

## 2024-01-03 PROCEDURE — 83540 ASSAY OF IRON: CPT

## 2024-01-03 PROCEDURE — 99214 OFFICE O/P EST MOD 30 MIN: CPT | Performed by: INTERNAL MEDICINE

## 2024-01-03 PROCEDURE — 82728 ASSAY OF FERRITIN: CPT | Performed by: NURSE PRACTITIONER

## 2024-01-03 PROCEDURE — 3074F SYST BP LT 130 MM HG: CPT | Performed by: INTERNAL MEDICINE

## 2024-01-03 NOTE — PROGRESS NOTES
DATE OF VISIT: 1/3/2024    REASON FOR VISIT: Followup for iron deficiency anemia     PROBLEM LIST:  1. 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 Thrombocythemia  3. Leukocytosis  4. S/P splenectomy   5. HTN  6.  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  Status post radical cystectomy done by Dr. Osmani Powell at  June 2021    HISTORY OF PRESENT ILLNESS: The patient is a very pleasant 83 y.o. male  with past medical history significant for iron deficiency anemia with thrombocythemia diagnosed December 2017. The patient is here today for follow-up    SUBJECTIVE: Latoya is here today by himself.  All in all he is doing fair.  Is complaining of mild fatigue.  Does have abdominal hernia that was started after his cystectomy.  He continues to follow-up with  for his history of bladder cancer.    Past History:  Medical History: has a past medical history of Abdominal pain, Acute sinusitis, Allergic (N/A), Anemia, Anxiety, Arthritis, Asthma (N/A), Bladder cancer (04/2021), Cataracts, bilateral, COPD (chronic obstructive pulmonary disease), Coronary artery disease (N/A), Diabetes mellitus, Emphysema of lung, GERD (gastroesophageal reflux disease), Heme positive stool, History of ankle fracture, History of cardiovascular stress test, History of echocardiogram, History of pneumonia, [place] (hard of hearing), Hypertension, Impingement syndrome of left shoulder (04/25/2023), Melanoma, Oxygen dependent, Pneumonia, Presence of urostomy (06/15/2021), Rotator cuff syndrome, Seasonal allergies, Wears glasses, and Wears partial dentures.   Surgical History: has a past surgical history that includes Lymph node biopsy; Cholecystectomy; Mole removal (2005); Thoracotomy (07/28/2015); Splenectomy, total; Sergei hole for subdural hematoma; Liver biopsy; Esophagogastroduodenoscopy; Varney tooth extraction; Tonsillectomy; Bronchoscopy (N/A, 04/16/2018); Cardiac catheterization; Brain surgery (1999);  "Colonoscopy; Colonoscopy (N/A, 08/02/2018); Bronchoscopy; Transurethral resection of bladder tumor (N/A, 04/13/2021); Cataract extraction w/ intraocular lens implant (Left, 06/23/2022); Other surgical history (Right); and Cataract extraction w/ intraocular lens implant (Right, 07/14/2022).   Family History: family history includes Alzheimer's disease in his mother; Anesthesia problems in his mother; Arthritis in his mother; Cancer in his maternal grandmother and another family member; Heart disease in his father; Hyperlipidemia in his father.   Social History: reports that he quit smoking about 24 years ago. His smoking use included cigarettes. He started smoking about 67 years ago. He has a 84.00 pack-year smoking history. He has never used smokeless tobacco. He reports that he does not currently use alcohol. He reports that he does not use drugs.    (Not in a hospital admission)     Allergies: Parabens     Review of Systems   Constitutional:  Positive for fatigue.   HENT: Negative.     Respiratory: Negative.     Cardiovascular: Negative.    Gastrointestinal: Negative.    Musculoskeletal: Negative.    Skin: Negative.    Neurological: Negative.    Psychiatric/Behavioral:  The patient is nervous/anxious.        PHYSICAL EXAMINATION:   /51   Pulse 60   Temp 97.5 °F (36.4 °C)   Resp 16   Ht 175.3 cm (69\")   Wt 75.8 kg (167 lb)   SpO2 94%   BMI 24.66 kg/m²    Pain Score    01/03/24 1104   PainSc: 0-No pain                       ECOG Performance Status: 1 - Symptomatic but completely ambulatory      General Appearance:      alert, cooperative, no apparent distress and appears stated age   Lungs:   Clear to auscultation bilaterally; respirations regular, even, and unlabored bilaterally   Heart:  Regular rate and rhythm, no murmurs appreciated   Abdomen:   Soft, non-tender, non-distended, and no organomegaly                 No visits with results within 2 Week(s) from this visit.   Latest known visit with " results is:   Office Visit on 05/16/2023   Component Date Value Ref Range Status    WBC 08/18/2023 12.97 (H)  3.40 - 10.80 10*3/mm3 Final    RBC 08/18/2023 4.11 (L)  4.14 - 5.80 10*6/mm3 Final    Hemoglobin 08/18/2023 12.0 (L)  13.0 - 17.7 g/dL Final    Hematocrit 08/18/2023 35.8 (L)  37.5 - 51.0 % Final    MCV 08/18/2023 87.1  79.0 - 97.0 fL Final    MCH 08/18/2023 29.2  26.6 - 33.0 pg Final    MCHC 08/18/2023 33.5  31.5 - 35.7 g/dL Final    RDW 08/18/2023 15.0  12.3 - 15.4 % Final    Platelets 08/18/2023 487 (H)  140 - 450 10*3/mm3 Final    Neutrophil Rel % 08/18/2023 66.6  42.7 - 76.0 % Final    Lymphocyte Rel % 08/18/2023 19.4 (L)  19.6 - 45.3 % Final    Monocyte Rel % 08/18/2023 10.3  5.0 - 12.0 % Final    Eosinophil Rel % 08/18/2023 2.2  0.3 - 6.2 % Final    Basophil Rel % 08/18/2023 0.5  0.0 - 1.5 % Final    Neutrophils Absolute 08/18/2023 8.65 (H)  1.70 - 7.00 10*3/mm3 Final    Lymphocytes Absolute 08/18/2023 2.51  0.70 - 3.10 10*3/mm3 Final    Monocytes Absolute 08/18/2023 1.33 (H)  0.10 - 0.90 10*3/mm3 Final    Eosinophils Absolute 08/18/2023 0.29  0.00 - 0.40 10*3/mm3 Final    Basophils Absolute 08/18/2023 0.06  0.00 - 0.20 10*3/mm3 Final    Immature Granulocyte Rel % 08/18/2023 1.0 (H)  0.0 - 0.5 % Final    Immature Grans Absolute 08/18/2023 0.13 (H)  0.00 - 0.05 10*3/mm3 Final    nRBC 08/18/2023 0.0  0.0 - 0.2 /100 WBC Final    Glucose 08/18/2023 99  65 - 99 mg/dL Final    BUN 08/18/2023 26 (H)  8 - 23 mg/dL Final    Creatinine 08/18/2023 1.18  0.76 - 1.27 mg/dL Final    EGFR Result 08/18/2023 62.0  >60.0 mL/min/1.73 Final    Comment: GFR Normal >60  Chronic Kidney Disease <60  Kidney Failure <15  The GFR formula is only valid for adults with stable renal  function between ages 18 and 70.      BUN/Creatinine Ratio 08/18/2023 22.0  7.0 - 25.0 Final    Sodium 08/18/2023 141  136 - 145 mmol/L Final    Potassium 08/18/2023 5.2  3.5 - 5.2 mmol/L Final    Chloride 08/18/2023 106  98 - 107 mmol/L Final     Total CO2 08/18/2023 24.3  22.0 - 29.0 mmol/L Final    Calcium 08/18/2023 9.6  8.6 - 10.5 mg/dL Final    Total Protein 08/18/2023 6.3  6.0 - 8.5 g/dL Final    Albumin 08/18/2023 4.2  3.5 - 5.2 g/dL Final    Globulin 08/18/2023 2.1  gm/dL Final    A/G Ratio 08/18/2023 2.0  g/dL Final    Total Bilirubin 08/18/2023 0.4  0.0 - 1.2 mg/dL Final    Alkaline Phosphatase 08/18/2023 164 (H)  39 - 117 U/L Final    AST (SGOT) 08/18/2023 19  1 - 40 U/L Final    ALT (SGPT) 08/18/2023 13  1 - 41 U/L Final    Total Cholesterol 08/18/2023 108  0 - 200 mg/dL Final    Comment: Cholesterol Reference Ranges  (U.S. Department of Health and Human Services ATP III  Classifications)  Desirable          <200 mg/dL  Borderline High    200-239 mg/dL  High Risk          >240 mg/dL  Triglyceride Reference Ranges  (U.S. Department of Health and Human Services ATP III  Classifications)  Normal           <150 mg/dL  Borderline High  150-199 mg/dL  High             200-499 mg/dL  Very High        >500 mg/dL  HDL Reference Ranges  (U.S. Department of Health and Human Services ATP III  Classifications)  Low     <40 mg/dl (major risk factor for CHD)  High    >60 mg/dl ('negative' risk factor for CHD)  LDL Reference Ranges  (U.S. Department of Health and Human Services ATP III  Classifications)  Optimal          <100 mg/dL  Near Optimal     100-129 mg/dL  Borderline High  130-159 mg/dL  High             160-189 mg/dL  Very High        >189 mg/dL      Triglycerides 08/18/2023 148  0 - 150 mg/dL Final    HDL Cholesterol 08/18/2023 33 (L)  40 - 60 mg/dL Final    VLDL Cholesterol Dionicio 08/18/2023 26  5 - 40 mg/dL Final    LDL Chol Calc (NIH) 08/18/2023 49  0 - 100 mg/dL Final    TSH 08/18/2023 1.600  0.270 - 4.200 uIU/mL Final    Specific Gravity, UA 08/18/2023 1.011  1.005 - 1.030 Final    pH,  08/18/2023 7.0  5.0 - 8.0 Final    Color,  08/18/2023 Yellow   Final    REFERENCE RANGE: Yellow, Straw    Appearance,  08/18/2023 Cloudy (A)  Clear Final     Leukocytes, UA 08/18/2023 See below: (A)  Negative Final    Large (3+)    Protein 08/18/2023 Trace (A)  Negative Final    Glucose, UA 08/18/2023 Negative  Negative Final    Ketones 08/18/2023 Negative  Negative Final    Blood, UA 08/18/2023 Negative  Negative Final    Bilirubin, UA 08/18/2023 Negative  Negative Final    Urobilinogen, UA 08/18/2023 Comment   Final    Comment: 1.0 E.U./dL  REFERENCE RANGE: 0.2 - 1.0 E.U./dL      Nitrite, UA 08/18/2023 Positive (A)  Negative Final    Microalbumin, Urine 08/18/2023 30.6  Not Estab. ug/mL Final    Hemoglobin A1C 08/18/2023 6.00 (H)  4.80 - 5.60 % Final    Comment: Hemoglobin A1C Ranges:  Increased Risk for Diabetes  5.7% to 6.4%  Diabetes                     >= 6.5%  Diabetic Goal                < 7.0%      WBC, UA 08/18/2023 13-20 (A)  /HPF Final    REFERENCE RANGE: None Seen, 0-2    RBC, UA 08/18/2023 0-2  /HPF Final    REFERENCE RANGE: None Seen, 0-2    Epithelial Cells (non renal) 08/18/2023 Comment  /HPF Final    Comment: None Seen  REFERENCE RANGE: None Seen, 0-2      Cast Type 08/18/2023 Comment   Final    HYALINE CASTS, UA            None Seen        /LPF       None Seen  N    Bacteria, UA 08/18/2023 4+ (A)  None Seen /HPF Final        CT Chest With Contrast Diagnostic    Result Date: 12/13/2023  Narrative: CLINICAL INDICATION: Bladder cancer, invasive, monitor TECHNIQUE: Multiple pre-contrast axial CT images were obtained from lower chest through pubic symphysis, followed by multiple axial CT images of abdomen and pelvis following split bolus administration of IV contrast, Omnipaque 300, 150 mL. Reformatted images in the coronal and sagittal planes were generated from the axial data set to facilitate diagnostic accuracy. Additionally, multiple axial CT images were obtained from lung apices to lung bases after the administration of intravenous contrast material. Total DLP (Dose-Length Product): 866.17 mGy.cm. Please note: The reported value represents the total  of one or more individual components during the CT acquisition on this date and at this time, and as such, the same value may appear in more than one CT report depending on the interpreting/reporting physicians. COMPARISON: CT chest/urogram 6/21/2023, CT urogram 10/6/2021 FINDINGS: Chest: Lymph Nodes and Mediastinum: No lymphadenopathy by CT size criteria. No mediastinal mass lesions. No suspicious thyroid findings. Cardiovascular: The heart is normal in caliber. Thoracic great vessels are patent. Atherosclerotic calcifications of the aortic root and aortic arch. Partially imaged coronary artery calcifications. Lungs and Pleura: Redemonstrated diffuse centrilobular emphysema with area of pleural parenchymal scarring in the right lung apex. Although this area is stable compared with CT chest from 6/21/2023, when compared over multiple prior exams it has enlarged. Stable 7 mm right upper lobe nodule (series 3 image 22). Stable area of round atelectasis and surrounding chronic pleural thickening at the right lung base. New 8 mm nodules in the lingula (series 3 image 51) and posterior left lower lobe (series 3 image 62) with multiple, unchanged sub-5 mm nodules in the posterior left lower lobe (annotated by arrows on series 3), all of which are nonspecific. No pleural effusions. Musculoskeletal and Body Wall: No clearly aggressive bone lesions. Probable hemangioma of the T3 vertebral body is unchanged. Abdomen/pelvis: Kidneys, Ureters, Bladder:Precontrast imaging demonstrates a 3 mm, nonobstructing left lower pole renal calculus. After the administration of intravenous contrast material both kidneys concentrate and excrete contrast promptly and symmetrically. The bilateral pelvicalyceal systems opacify normally without evidence soft tissue filling defects. No suspicious renal parenchymal mass lesions. The left ureter is normal in course and caliber. Similar appearance of circumferential soft tissue thickening involving  the distal right ureter with focal dilatation of the ureteral segment proximal to the thickened portion. Postsurgical changes associated with prior cystoprostatectomy, with ileoconduit urinary diversion in the right lower quadrant. Redemonstrated parastomal hernia which now contains additional loops of nonobstructive small bowel and a larger volume of mesenteric fat. Remaining Abdominal and Pelvic Solid Organs: Homogenous enhancement of the liver parenchyma without suspicious focal lesions. Prior cholecystectomy. No biliary tree dilatation. The pancreas and adrenal glands are unremarkable. The spleen is surgically absent. Residual splenosis in the left upper quadrant adjacent to the splenic flexure measuring up to 2 cm. Gi Tract/Mesentery/Peritoneum: The large and small bowel appear normal in caliber without evidence of inflammation. Free Fluid: No ascites. Lymph Nodes and Vasculature: Multiple, previously described enlarged retroperitoneal lymph nodes are again noted. Some which is increased in size, while others have decreased. For example an index retrocaval node measures 7 mm (series 6 image 70), previously measuring 11 mm. Another index left periaortic lymph node now measures 12 mm (series 6 image 82), previously measuring 10 mm. An additional left para-aortic lymph node is noted measuring 11 mm (series 6 image 74), previously 5 mm. Unchanged ectasia of the mid abdominal aorta measuring 2.9 cm (series 6 image 123). Musculoskeletal and Body Wall: No clearly aggressive bone lesions or significant body wall findings. Stable lipoma adjacent to the right hip measuring 5 cm. Multilevel degenerative change of the lumbar spine, most pronounced vertebral body levels L3-L4, with loss of disc space height, superior and inferior endplate sclerosis and cystic change. Similar appearance of sclerosis involving the bilateral femoral heads consistent with avascular necrosis, without femoral head collapse.    Impression: Chest:  Redemonstrated area of pleural-parenchymal scarring in the right lung apex appears stable back to 6/21/2023, however when compared over multiple prior exams has slowly increased in size. Given its stable increase in size, a PET/CT or biopsy can be considered for further characterization. Abdomen/pelvis: Stable minimal thickening of the distal right ureter with proximal dilatation, which may represent stricture formation. Increase in size and number of multiple retroperitoneal lymph nodes as above. Recommend continued attention on follow-up or follow-up PET/CT. CRITICAL RESULT: No. COMMUNICATION: Per this written report. By electronically signing this report, I, the attending physician, attest that I have personally reviewed the images/data for the above examination(s) and agree with the final edited report. Drafted by Saul Hensley DO on 12/13/2023 1:39 PM Final report signed by Andre Daugherty MD on 12/13/2023 3:50 PM    CT urogram    Result Date: 12/13/2023  Narrative: CLINICAL INDICATION: Bladder cancer, invasive, monitor TECHNIQUE: Multiple pre-contrast axial CT images were obtained from lower chest through pubic symphysis, followed by multiple axial CT images of abdomen and pelvis following split bolus administration of IV contrast, Omnipaque 300, 150 mL. Reformatted images in the coronal and sagittal planes were generated from the axial data set to facilitate diagnostic accuracy. Additionally, multiple axial CT images were obtained from lung apices to lung bases after the administration of intravenous contrast material. Total DLP (Dose-Length Product): 866.17 mGy.cm. Please note: The reported value represents the total of one or more individual components during the CT acquisition on this date and at this time, and as such, the same value may appear in more than one CT report depending on the interpreting/reporting physicians. COMPARISON: CT chest/urogram 6/21/2023, CT urogram 10/6/2021 FINDINGS: Chest: Lymph  Nodes and Mediastinum: No lymphadenopathy by CT size criteria. No mediastinal mass lesions. No suspicious thyroid findings. Cardiovascular: The heart is normal in caliber. Thoracic great vessels are patent. Atherosclerotic calcifications of the aortic root and aortic arch. Partially imaged coronary artery calcifications. Lungs and Pleura: Redemonstrated diffuse centrilobular emphysema with area of pleural parenchymal scarring in the right lung apex. Although this area is stable compared with CT chest from 6/21/2023, when compared over multiple prior exams it has enlarged. Stable 7 mm right upper lobe nodule (series 3 image 22). Stable area of round atelectasis and surrounding chronic pleural thickening at the right lung base. New 8 mm nodules in the lingula (series 3 image 51) and posterior left lower lobe (series 3 image 62) with multiple, unchanged sub-5 mm nodules in the posterior left lower lobe (annotated by arrows on series 3), all of which are nonspecific. No pleural effusions. Musculoskeletal and Body Wall: No clearly aggressive bone lesions. Probable hemangioma of the T3 vertebral body is unchanged. Abdomen/pelvis: Kidneys, Ureters, Bladder:Precontrast imaging demonstrates a 3 mm, nonobstructing left lower pole renal calculus. After the administration of intravenous contrast material both kidneys concentrate and excrete contrast promptly and symmetrically. The bilateral pelvicalyceal systems opacify normally without evidence soft tissue filling defects. No suspicious renal parenchymal mass lesions. The left ureter is normal in course and caliber. Similar appearance of circumferential soft tissue thickening involving the distal right ureter with focal dilatation of the ureteral segment proximal to the thickened portion. Postsurgical changes associated with prior cystoprostatectomy, with ileoconduit urinary diversion in the right lower quadrant. Redemonstrated parastomal hernia which now contains additional  loops of nonobstructive small bowel and a larger volume of mesenteric fat. Remaining Abdominal and Pelvic Solid Organs: Homogenous enhancement of the liver parenchyma without suspicious focal lesions. Prior cholecystectomy. No biliary tree dilatation. The pancreas and adrenal glands are unremarkable. The spleen is surgically absent. Residual splenosis in the left upper quadrant adjacent to the splenic flexure measuring up to 2 cm. Gi Tract/Mesentery/Peritoneum: The large and small bowel appear normal in caliber without evidence of inflammation. Free Fluid: No ascites. Lymph Nodes and Vasculature: Multiple, previously described enlarged retroperitoneal lymph nodes are again noted. Some which is increased in size, while others have decreased. For example an index retrocaval node measures 7 mm (series 6 image 70), previously measuring 11 mm. Another index left periaortic lymph node now measures 12 mm (series 6 image 82), previously measuring 10 mm. An additional left para-aortic lymph node is noted measuring 11 mm (series 6 image 74), previously 5 mm. Unchanged ectasia of the mid abdominal aorta measuring 2.9 cm (series 6 image 123). Musculoskeletal and Body Wall: No clearly aggressive bone lesions or significant body wall findings. Stable lipoma adjacent to the right hip measuring 5 cm. Multilevel degenerative change of the lumbar spine, most pronounced vertebral body levels L3-L4, with loss of disc space height, superior and inferior endplate sclerosis and cystic change. Similar appearance of sclerosis involving the bilateral femoral heads consistent with avascular necrosis, without femoral head collapse.    Impression: Chest: Redemonstrated area of pleural-parenchymal scarring in the right lung apex appears stable back to 6/21/2023, however when compared over multiple prior exams has slowly increased in size. Given its stable increase in size, a PET/CT or biopsy can be considered for further characterization.  Abdomen/pelvis: Stable minimal thickening of the distal right ureter with proximal dilatation, which may represent stricture formation. Increase in size and number of multiple retroperitoneal lymph nodes as above. Recommend continued attention on follow-up or follow-up PET/CT. CRITICAL RESULT: No. COMMUNICATION: Per this written report. By electronically signing this report, I, the attending physician, attest that I have personally reviewed the images/data for the above examination(s) and agree with the final edited report. Drafted by Saul Hensley DO on 12/13/2023 1:39 PM Final report signed by Andre Daugherty MD on 12/13/2023 3:50 PM       ASSESSMENT: The patient is a very pleasant 83 y.o. male  with iron deficiency anemia with thrombocythemia      PLAN:    1.  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  I will repeat his CBC and iron profile today.  B. I will follow up on the CBC from today and call with any significant abnormalities.  C.The patient follow-up with us in 1 year with repeat CBC and iron profile as well as labs are stable.  D.  I will arrange for another round of IV iron if ferritin goes below 30 or saturation below 10%.     2.  Thrombocythemia  A.  I did go over the blood work results with the patient from November 8, 2022 and was stable at 439.    3.  Hypertension  A.  He will continue Imdur, Cozaar and Lopressor for his blood pressure.  If it remains elevated he will notify his PCP    FOLLOW UP: 1 year with CBC and iron profile.    Yocasta Harris MD  1/3/2024

## 2024-01-03 NOTE — TELEPHONE ENCOUNTER
RN called patient to let him know that based off his lab results from today, he does not need IV iron and we will keep our follow-up as scheduled. Patient v/u.

## 2024-01-05 RX ORDER — ISOSORBIDE MONONITRATE 30 MG/1
TABLET, EXTENDED RELEASE ORAL
Qty: 90 TABLET | Refills: 3 | Status: SHIPPED | OUTPATIENT
Start: 2024-01-05

## 2024-01-15 DIAGNOSIS — I10 ESSENTIAL HYPERTENSION: ICD-10-CM

## 2024-01-15 RX ORDER — LOSARTAN POTASSIUM 25 MG/1
25 TABLET ORAL DAILY
Qty: 90 TABLET | Refills: 0 | Status: SHIPPED | OUTPATIENT
Start: 2024-01-15

## 2024-01-15 NOTE — TELEPHONE ENCOUNTER
Rx Refill Note  Requested Prescriptions     Pending Prescriptions Disp Refills    losartan (COZAAR) 25 MG tablet [Pharmacy Med Name: losartan 25 mg tablet] 30 tablet 0     Sig: TAKE ONE TABLET BY MOUTH EVERY DAY      Last office visit with prescribing clinician: 8/22/2023   Next office visit with prescribing clinician: 02/23/2024       Burt Escudero MA  01/15/24, 13:04 EST

## 2024-01-21 DIAGNOSIS — F41.9 ANXIETY: ICD-10-CM

## 2024-01-22 RX ORDER — SERTRALINE HYDROCHLORIDE 25 MG/1
TABLET, FILM COATED ORAL
Qty: 90 TABLET | Refills: 3 | Status: SHIPPED | OUTPATIENT
Start: 2024-01-22

## 2024-02-13 DIAGNOSIS — I10 ESSENTIAL HYPERTENSION: ICD-10-CM

## 2024-02-13 RX ORDER — LOSARTAN POTASSIUM 25 MG/1
25 TABLET ORAL DAILY
Qty: 90 TABLET | Refills: 0 | Status: SHIPPED | OUTPATIENT
Start: 2024-02-13

## 2024-02-23 ENCOUNTER — OFFICE VISIT (OUTPATIENT)
Dept: INTERNAL MEDICINE | Facility: CLINIC | Age: 84
End: 2024-02-23
Payer: MEDICARE

## 2024-02-23 VITALS
WEIGHT: 167 LBS | DIASTOLIC BLOOD PRESSURE: 56 MMHG | TEMPERATURE: 98.2 F | BODY MASS INDEX: 24.73 KG/M2 | HEART RATE: 58 BPM | HEIGHT: 69 IN | SYSTOLIC BLOOD PRESSURE: 108 MMHG | OXYGEN SATURATION: 97 %

## 2024-02-23 DIAGNOSIS — M75.102 TEAR OF LEFT ROTATOR CUFF, UNSPECIFIED TEAR EXTENT, UNSPECIFIED WHETHER TRAUMATIC: ICD-10-CM

## 2024-02-23 DIAGNOSIS — L08.1 ERYTHRASMA: ICD-10-CM

## 2024-02-23 DIAGNOSIS — I10 ESSENTIAL HYPERTENSION: ICD-10-CM

## 2024-02-23 DIAGNOSIS — H90.6 MIXED CONDUCTIVE AND SENSORINEURAL HEARING LOSS OF BOTH EARS: ICD-10-CM

## 2024-02-23 DIAGNOSIS — E11.40 TYPE 2 DIABETES MELLITUS WITH DIABETIC NEUROPATHY, WITHOUT LONG-TERM CURRENT USE OF INSULIN: Primary | ICD-10-CM

## 2024-02-23 DIAGNOSIS — E78.2 MIXED HYPERLIPIDEMIA: ICD-10-CM

## 2024-02-23 DIAGNOSIS — K21.9 GASTROESOPHAGEAL REFLUX DISEASE WITHOUT ESOPHAGITIS: ICD-10-CM

## 2024-02-23 DIAGNOSIS — I25.10 CORONARY ARTERIOSCLEROSIS IN NATIVE ARTERY: ICD-10-CM

## 2024-02-23 LAB
EXPIRATION DATE: NORMAL
HBA1C MFR BLD: 5.7 % (ref 4.5–5.7)
Lab: NORMAL

## 2024-02-23 RX ORDER — TRIAMCINOLONE ACETONIDE 0.25 MG/G
OINTMENT TOPICAL
Qty: 80 G | Refills: 1 | Status: SHIPPED | OUTPATIENT
Start: 2024-02-23

## 2024-02-23 NOTE — PROGRESS NOTES
Office Visit      Patient Name: Jairo Mercedes  : 1940   MRN: [id number]     Chief Complaint:    Chief Complaint   Patient presents with    Diabetes       History of Present Illness: Jairo Mercedes is a 83 y.o. male who is here today for follow up T2DM.      Following [facility] for left shoulder rotator cuff tear that has worsened over the past few months.  Was walking between his car and a large truck in a parking lot and hit his shoulder against the mirror.  Received one injection, worked well for over 3 months.      T2DM: Taking no medication at this time. Patient denies foot ulcerations, hyperglycemia, hypoglycemia, nausea, paresthesia of the feet, polydipsia, polyuria, polyphagia, visual disturbances and weight loss.     HTN, CAD, hyperlipidemia: taking isosorbide mononitrate, metoprolol, ASA, atorvastatin, omega-3 fatty acids.  Tries to eat a healthy diet. Denies chest pain, dyspnea, orthopnea, palpitations, lower extremity edema, confusion, headaches, weakness, visual disturbances.    Hearing loss.  Hearing aids don't work well.  Does not want to have them calibrated.      GERD: continues to take nexium. Works well.      Erythrasma: Requesting refill of ointment.    Subjective      I have reviewed and the following portions of the patient's history were updated as appropriate: past family history, past medical history, past social history, past surgical history and problem list.      Current Outpatient Medications:     triamcinolone (KENALOG) 0.025 % ointment, Apply to the affected areas(s) twice daily, Disp: 80 g, Rfl: 1    acetaminophen (TYLENOL) 500 MG tablet, Take  by mouth Every 6 (Six) Hours As Needed., Disp: , Rfl:     ALPRAZolam (XANAX) 0.5 MG tablet, Take 1 tablet by mouth Every 12 (Twelve) Hours., Disp: , Rfl:     aspirin 81 MG EC tablet, Take 1 tablet by mouth Daily., Disp: , Rfl:     atorvastatin (LIPITOR) 40 MG tablet, Take 1 tablet by mouth every night at bedtime., Disp: 90 tablet, Rfl:  "3    ciclopirox (LOPROX) 1 % shampoo, ciclopirox 1 % shampoo  APPLY TOPICALLY AS DIRECTED THREE TIMES A WEEK, Disp: , Rfl:     clobetasol (TEMOVATE) 0.05 % external solution, APPLY TO THE AFFECTED AREA(S) TOPICALLY AS DIRECTED TWICE DAILY, Disp: , Rfl:     esomeprazole (nexIUM) 40 MG capsule, TAKE ONE CAPSULE BY MOUTH EVERY DAY, Disp: 90 capsule, Rfl: 1    fluocinonide (LIDEX) 0.05 % external solution, APPLY TOPICALLY AS DIRECTED AT BEDTIME TO affected area, Disp: , Rfl:     Fluticasone-Umeclidin-Vilant (Trelegy Ellipta) 100-62.5-25 MCG/ACT inhaler, Inhale 1 puff Daily. Rinse mouth out after use, Disp: 180 each, Rfl: 3    isosorbide mononitrate (IMDUR) 30 MG 24 hr tablet, TAKE ONE TABLET BY MOUTH EVERY DAY AS DIRECTED, Disp: 90 tablet, Rfl: 3    meclizine (ANTIVERT) 25 MG tablet, TAKE ONE TABLET BY MOUTH TWICE DAILY (Patient taking differently: As Needed.), Disp: 180 tablet, Rfl: 3    metoprolol tartrate (LOPRESSOR) 25 MG tablet, TAKE ONE TABLET BY MOUTH TWICE DAILY, Disp: 180 tablet, Rfl: 3    nitroglycerin (NITROSTAT) 0.4 MG SL tablet, Place 1 tablet under the tongue Every 5 (Five) Minutes As Needed for Chest Pain. Don't use, Disp: , Rfl:     Omega-3 Fatty Acids (FISH OIL) 1000 MG capsule capsule, Take 1 capsule by mouth Daily With Breakfast., Disp: , Rfl:     sertraline (ZOLOFT) 25 MG tablet, TAKE ONE TABLET BY MOUTH EVERY DAY, Disp: 90 tablet, Rfl: 3    traZODone (DESYREL) 50 MG tablet, TAKE 1/2 TO 1 TABLET BY MOUTH EVERY NIGHT AS NEEDED, Disp: 90 tablet, Rfl: 3    Allergies   Allergen Reactions    Parabens Rash       Objective     Physical Exam:  Vital Signs:   Vitals:    02/23/24 0754   BP: 108/56   Pulse: 58   Temp: 98.2 °F (36.8 °C)   SpO2: 97%   Weight: 75.8 kg (167 lb)   Height: 175.3 cm (69.02\")     Body mass index is 24.65 kg/m².  BMI is within normal parameters. No other follow-up for BMI required.       Physical Exam  Constitutional:       Appearance: He is not ill-appearing.   HENT:      Head: " Normocephalic.      Right Ear: External ear normal.      Left Ear: External ear normal.   Eyes:      Conjunctiva/sclera: Conjunctivae normal.      Pupils: Pupils are equal, round, and reactive to light.   Cardiovascular:      Rate and Rhythm: Normal rate and regular rhythm.      Pulses:           Radial pulses are 2+ on the right side and 2+ on the left side.        Dorsalis pedis pulses are 2+ on the right side and 2+ on the left side.      Heart sounds: Normal heart sounds.   Pulmonary:      Effort: Pulmonary effort is normal.      Breath sounds: Normal breath sounds.   Musculoskeletal:      Cervical back: Normal range of motion and neck supple.   Skin:     General: Skin is warm.      Capillary Refill: Capillary refill takes less than 2 seconds.      Comments: Skin is erythematous, rough.   Neurological:      Mental Status: He is alert and oriented to person, place, and time.      Coordination: Coordination normal.      Gait: Gait normal.   Psychiatric:         Attention and Perception: Attention normal.         Mood and Affect: Mood and affect normal.         Speech: Speech normal.         Behavior: Behavior normal.       Lab Results   Component Value Date    HGBA1C 5.7 02/23/2024    HGBA1C 6.00 (H) 08/18/2023    HGBA1C 5.9 11/21/2022         Assessment / Plan      Assessment/Plan:   Diagnoses and all orders for this visit:    1. Type 2 diabetes mellitus with diabetic neuropathy, without long-term current use of insulin (Primary)  -     POC Glycosylated Hemoglobin (Hb A1C)        - Follow diabetic diet        - See eye doctor annually or as discussed        - Wear protective foot wear/no bare feet        - Check feet regularly for calluses or ulcers        - Discussed risk of poorly controlled diabetes and long-term complications        - Exercise as tolerated for 30-45 minutes most days of the week. Gradually increase daily exercise if not currently active.    2. Erythrasma  -     triamcinolone (KENALOG) 0.025 %  ointment; Apply to the affected areas(s) twice daily  Dispense: 80 g; Refill: 1    3. Coronary arteriosclerosis in native artery  4. Essential hypertension  5. Mixed hyperlipidemia        - Follow heart healthy diet.  Keep sodium intake < 1500 mg per day.  Avoid processed & fast foods.          - Exercise as tolerated, with a goal of 30 minutes of moderate exercise most days.         - Take medications as prescribed.    6. Mixed conductive and sensorineural hearing loss of both ears        - Encouraged to get hearing aids checked     7. Gastroesophageal reflux disease without esophagitis        - Avoid triggers such as spicy or greasy food, alcohol, chocolate, caffeine, carbonated beverages, tomatoes and tomato sauces, onions, smoking        - May raise HOB 30 degrees with risers or blocks, if desired        - Do not eat within 2-3 hours of lying down        - Avoid clothing, belts that constrict midsection        - Losing weight may reduce symptoms        - Continue nexium as prescribed.     8. Tear of left rotator cuff, unspecified tear extent, unspecified whether traumatic        - Continue seeing orthopedics for evaluation and treatment.       Follow Up:   Return in about 6 months (around 8/23/2024) for Medicare Wellness.    Patient was given instructions and counseling regarding his condition or for health maintenance advice. Please see specific information pulled into the AVS if appropriate.       Primary Care [facility]     Please note that portions of this note may have been completed with a voice recognition program. Efforts were made to edit dictation, but occasionally words are mistranscribed.

## 2024-03-14 ENCOUNTER — OFFICE VISIT (OUTPATIENT)
Dept: PULMONOLOGY | Facility: CLINIC | Age: 84
End: 2024-03-14
Payer: MEDICARE

## 2024-03-14 VITALS
HEIGHT: 69 IN | WEIGHT: 169 LBS | BODY MASS INDEX: 25.03 KG/M2 | OXYGEN SATURATION: 88 % | HEART RATE: 55 BPM | RESPIRATION RATE: 18 BRPM | DIASTOLIC BLOOD PRESSURE: 62 MMHG | SYSTOLIC BLOOD PRESSURE: 118 MMHG

## 2024-03-14 DIAGNOSIS — J30.9 ALLERGIC RHINITIS, UNSPECIFIED SEASONALITY, UNSPECIFIED TRIGGER: ICD-10-CM

## 2024-03-14 DIAGNOSIS — R91.1 LUNG NODULE, SOLITARY: ICD-10-CM

## 2024-03-14 DIAGNOSIS — R09.02 HYPOXIA: ICD-10-CM

## 2024-03-14 DIAGNOSIS — J44.9 CHRONIC OBSTRUCTIVE PULMONARY DISEASE, UNSPECIFIED COPD TYPE: Primary | ICD-10-CM

## 2024-03-14 DIAGNOSIS — R93.89 ABNORMAL CT OF THE CHEST: ICD-10-CM

## 2024-03-14 PROCEDURE — 99214 OFFICE O/P EST MOD 30 MIN: CPT | Performed by: INTERNAL MEDICINE

## 2024-03-14 PROCEDURE — 3078F DIAST BP <80 MM HG: CPT | Performed by: INTERNAL MEDICINE

## 2024-03-14 PROCEDURE — 3074F SYST BP LT 130 MM HG: CPT | Performed by: INTERNAL MEDICINE

## 2024-03-14 RX ORDER — LOSARTAN POTASSIUM 25 MG/1
TABLET ORAL
COMMUNITY
Start: 2024-03-13

## 2024-03-14 RX ORDER — FLUTICASONE FUROATE, UMECLIDINIUM BROMIDE AND VILANTEROL TRIFENATATE 100; 62.5; 25 UG/1; UG/1; UG/1
1 POWDER RESPIRATORY (INHALATION)
Qty: 180 EACH | Refills: 3 | Status: SHIPPED | OUTPATIENT
Start: 2024-03-14

## 2024-03-14 NOTE — PROGRESS NOTES
"  Chief Complaint   Patient presents with    Breathing Problem    Follow-up         Subjective   Jairo Mercedes is a 83 y.o. male.   Patient was evaluated today for follow up of shortness of breath, hypoxia and COPD.     Patient says that his symptoms have been stable since the last clinic visit. he reports no recent exacerbations.     Patient is using Trelegy, as prescribed. Exercise tolerance has also remained stable but limited due to need to use oxygen.     Quit smoking in 1999.    Patient says that he has been using his nasal sprays on a seasonal basis and describes no significant ongoing issues other than occasional congestion.     The patient says that he is using oxygen as prescribed and feels that it appears to be helping some of his symptoms.    He is being followed by Heme/Onc at  and [facility].     The following portions of the patient's history were reviewed and updated as appropriate: allergies, current medications, past family history, past medical history, past social history, and past surgical history.    Review of Systems   HENT:  Positive for rhinorrhea. Negative for sinus pressure, sneezing and sore throat.    Respiratory:  Negative for cough, chest tightness, shortness of breath and wheezing.        Objective   Visit Vitals  /62   Pulse 55   Resp 18   Ht 175.3 cm (69\") Comment: pt reported   Wt 76.7 kg (169 lb)   SpO2 (!) 88% Comment: on room air (REST)   BMI 24.96 kg/m²   O2:96% on 2LPM on rest.     BMI Readings from Last 3 Encounters:   03/14/24 24.96 kg/m²   02/23/24 24.65 kg/m²   01/03/24 24.66 kg/m²       Physical Exam  Vitals reviewed.   Constitutional:       Appearance: He is well-developed.   HENT:      Head: Normocephalic and atraumatic.   Eyes:      Extraocular Movements: Extraocular movements intact.   Cardiovascular:      Rate and Rhythm: Normal rate.   Pulmonary:      Comments: Somewhat hyperresonant to percussion.  Somewhat decreased air entry.  No obvious wheezing noted. "   Musculoskeletal:      Cervical back: Neck supple.   Neurological:      Mental Status: He is alert.         Assessment & Plan   Diagnoses and all orders for this visit:    1. Chronic obstructive pulmonary disease, unspecified COPD type (Primary)  -     Fluticasone-Umeclidin-Vilant (Trelegy Ellipta) 100-62.5-25 MCG/ACT inhaler; Inhale 1 puff Daily. Rinse mouth out after use  Dispense: 180 each; Refill: 3    2. Allergic rhinitis, unspecified seasonality, unspecified trigger    3. Abnormal CT of the chest    4. Lung nodule, solitary    5. Hypoxia           Return in about 6 months (around 9/14/2024) for Recheck, For Blanca Quiroz).    DISCUSSION (if any):  Last CT scan results was reviewed in great detail with the patient.  Results for orders placed during the hospital encounter of 11/06/22    CT Angiogram Chest Pulmonary Embolism    Narrative  PROCEDURE: CT ANGIOGRAM CHEST PULMONARY EMBOLISM-    HISTORY: Shortness of breath    TECHNIQUE: Thin section axial CT with IV contrast supplemented with3D  reconstructed MIP images.    FINDINGS:    Pulmonary vessels enhance in a normal fashion without evidence of  embolic disease. Thoracic aorta is normal in caliber without evidence of  aneurysm or dissection.    Severe emphysema with diffuse bilateral bronchial wall thickening. Right  basilar consolidation and patchy bilateral tree in bud nodular  opacities. Right apical nodule measuring 1.7 cm, previously 1.3 cm  (series 5 image 17).  Small right pleural effusion. No pneumothorax..    Normal heart size. No pericardial effusion. Multivessel coronary artery  calcifications..    Impression  1. No evidence of pulmonary embolism.  2. Multifocal pneumonia.  3. Slight interval increase in size of right apical nodular scarring.  Follow-up CT chest in 3 months is recommended.      This report was signed and finalized on 11/6/2022 3:13 PM by Mirza Matos MD.      Laboratory data was reviewed with him.   Lab Results    Component Value Date    AFPTM 188 07/22/2015     Lab Results   Component Value Date    [id number] MM 07/22/2015     Latest available PFTs were reviewed.  Consistent with moderate obstruction with restriction.    We have reviewed his pulmonary medications in great detail.    Compliance with medications stressed.     Side effects of prescribed medications discussed with the patient    The patient was advised to continue using oxygen 24/7.    Patient was advised to continue his nasal spray, especially given improvement in symptoms overall.    The patient is being followed by [facility]oncology and is aware of his PET scan results and their plan to repeat CT in 3-4 months after PET scan.    Vaccination status addressed.    Up-to-date with influenza vaccinations.     Up-to-date with pneumonia vaccinations.     It was recommended that the patient consider Prevnar-20 vaccination and discuss it with his PCP, if not already obtained.       Dictated utilizing Dragon dictation.    This document was electronically signed by Ranulfo Fagan MD on 03/14/24 at 12:25 EDT

## 2024-03-28 ENCOUNTER — HOSPITAL ENCOUNTER (EMERGENCY)
Facility: HOSPITAL | Age: 84
Discharge: HOME | End: 2024-03-28
Attending: EMERGENCY MEDICINE
Payer: MEDICARE

## 2024-03-28 ENCOUNTER — APPOINTMENT (OUTPATIENT)
Dept: GENERAL RADIOLOGY | Facility: HOSPITAL | Age: 84
End: 2024-03-28
Payer: MEDICARE

## 2024-03-28 VITALS
DIASTOLIC BLOOD PRESSURE: 82 MMHG | HEIGHT: 69 IN | HEART RATE: 70 BPM | SYSTOLIC BLOOD PRESSURE: 175 MMHG | TEMPERATURE: 98 F | RESPIRATION RATE: 18 BRPM | OXYGEN SATURATION: 92 % | BODY MASS INDEX: 24.73 KG/M2 | WEIGHT: 167 LBS

## 2024-03-28 DIAGNOSIS — J06.9 UPPER RESPIRATORY TRACT INFECTION, UNSPECIFIED TYPE: ICD-10-CM

## 2024-03-28 DIAGNOSIS — R05.1 ACUTE COUGH: Primary | ICD-10-CM

## 2024-03-28 LAB
FLUAV SUBTYP SPEC NAA+PROBE: NOT DETECTED
FLUBV RNA ISLT QL NAA+PROBE: NOT DETECTED
SARS-COV-2 RNA RESP QL NAA+PROBE: NOT DETECTED

## 2024-03-28 PROCEDURE — 71046 X-RAY EXAM CHEST 2 VIEWS: CPT

## 2024-03-28 PROCEDURE — 99283 EMERGENCY DEPT VISIT LOW MDM: CPT

## 2024-03-28 PROCEDURE — 87636 SARSCOV2 & INF A&B AMP PRB: CPT | Performed by: PHYSICIAN ASSISTANT

## 2024-03-28 RX ORDER — BENZONATATE 200 MG/1
200 CAPSULE ORAL 3 TIMES DAILY PRN
Qty: 30 CAPSULE | Refills: 0 | Status: SHIPPED | OUTPATIENT
Start: 2024-03-28

## 2024-03-28 NOTE — ED PROVIDER NOTES
Subjective  History of Present Illness:    Chief Complaint:   Chief Complaint   Patient presents with    Flu Symptoms    Nasal Congestion    Cough     Patient has a cough with grey and green tinted sputum.      History of Present Illness: Jairo Mercedes is a 83 y.o. male who presents to the emergency department complaining of cough.  Patient states symptoms started a few days prior to Monday however he was seen on Monday urgent treatment, believes he was given a prescription for an antitussive and steroid and was discharged home without any nasal swabs or imaging of his chest.  He states he continues to have a productive cough with yellow sputum.  No chest pain.  History of COPD/emphysema uses oxygen while driving and at night.  Has not had to use any increased oxygen recently.  No fevers or chills.  No body aches.  Does have some URI congestion and rhinorrhea.  He is overall well-appearing states his oxygen is usually in the low 90s at home.  Used one of his wife's Tessalon Perles last night which seemed to help significantly with his coughing.  Onset: Over the weekend  Duration: Ongoing  Exacerbating / Alleviating factors: Nothing helped with Tessalon Perles  Associated symptoms: None, no chest pain, no fever, no chills, no bodyaches      Nurses Notes reviewed and agree, including vitals, allergies, social history and prior medical history.     Review of Systems   Constitutional: Negative.  Negative for fever.   HENT:  Positive for congestion.    Eyes: Negative.    Respiratory:  Positive for cough.    Cardiovascular: Negative.  Negative for chest pain.   Gastrointestinal: Negative.    Genitourinary: Negative.    Musculoskeletal: Negative.    Skin: Negative.    Allergic/Immunologic: Negative.    Neurological: Negative.    Psychiatric/Behavioral: Negative.     All other systems reviewed and are negative.      Past Medical History:   Diagnosis Date    Abdominal pain     Acute sinusitis     Allergic N/A    Anemia      Anxiety     Arthritis     Asthma N/A    Bladder cancer 04/2021    Cataracts, bilateral     COPD (chronic obstructive pulmonary disease)     Coronary artery disease N/A    Some Blockage    Diabetes mellitus     REPORTS DIET CONTROL ONLY, TAKES NO MEDICATION    Emphysema of lung     GERD (gastroesophageal reflux disease)     Heme positive stool     History of ankle fracture     LEFT     History of cardiovascular stress test     REPORTS IN HIS LATE 50'S AND THAT ALL WAS WNL'S AT THAT TIME    History of echocardiogram     REPORTS HAS HAD 2. DOES NOT REPORT ANY HX OF CHEST PAIN.    History of pneumonia     [place] (hard of hearing)     WEARS HEARING AIDS    Hypertension     Impingement syndrome of left shoulder 04/25/2023    Melanoma     2013    Oxygen dependent     2 L [facility]    Pneumonia     Presence of urostomy 06/15/2021    Rotator cuff syndrome     Seasonal allergies     Wears glasses     Wears partial dentures        Allergies:    Parabens      Past Surgical History:   Procedure Laterality Date    BRAIN SURGERY  1999    REPORTS FOR SUBDURAL HEMATOMA     BRONCHOSCOPY N/A 04/16/2018    Procedure: BRONCHOSCOPY DIAGNOSTIC WITH WASHINGS, BRUSHINGS AND BIOPSIES;  Surgeon: Morgan Frias MD;  Location: [facility] OR;  Service: Pulmonary    BRONCHOSCOPY      ALEX HOLE FOR SUBDURAL HEMATOMA      CARDIAC CATHETERIZATION      REPORTS APPROXIMATELY 2015 AND REPORTS NO STENTS WERE PLACED    CATARACT EXTRACTION W/ INTRAOCULAR LENS IMPLANT Left 06/23/2022    Procedure: CATARACT PHACO EXTRACTION WITH INTRAOCULAR LENS IMPLANT LEFT COMPLICATED WITH MALYUGIN RING;  Surgeon: Mack Blake MD;  Location: [facility] OR;  Service: Ophthalmology;  Laterality: Left;    CATARACT EXTRACTION W/ INTRAOCULAR LENS IMPLANT Right 07/14/2022    Procedure: CATARACT PHACO EXTRACTION WITH INTRAOCULAR LENS IMPLANT RIGHT COMPLICATED WITH MALYUGIN RING;  Surgeon: Mack Blake MD;  Location: [facility] OR;  Service: Ophthalmology;  Laterality: Right;     "CHOLECYSTECTOMY      COLONOSCOPY      COLONOSCOPY N/A 2018    Procedure: COLONOSCOPY WITH HOT FORCEP POLYPECTOMY X2  ;  Surgeon: Sara eRynoso MD;  Location: [facility] ENDOSCOPY;  Service: Gastroenterology    ENDOSCOPY      LIVER BIOPSY      LYMPH NODE BIOPSY      MOLE REMOVAL      MELANOMA ON BACK    OTHER SURGICAL HISTORY Right     UROSTOMY    SPLENECTOMY      THORACOTOMY  2015    Left thoracotomy and stapling of pulmonary blebs. Mechanical pleurodesis.    TONSILLECTOMY      TRANSURETHRAL RESECTION OF BLADDER TUMOR N/A 2021    Procedure: TRANSURETHRAL RESECTION OF BLADDER TUMOR;  Surgeon: Osmani Francois MD;  Location: [facility] OR;  Service: Urology;  Laterality: N/A;    WISDOM TOOTH EXTRACTION           Social History     Socioeconomic History    Marital status:    Tobacco Use    Smoking status: Former     Current packs/day: 0.00     Average packs/day: 2.0 packs/day for 42.1 years (84.3 ttl pk-yrs)     Types: Cigarettes     Start date: 1957     Quit date: 1999     Years since quittin.1    Smokeless tobacco: Never   Vaping Use    Vaping status: Never Used   Substance and Sexual Activity    Alcohol use: Not Currently    Drug use: No    Sexual activity: Not Currently     Partners: Female         Family History   Problem Relation Age of Onset    Alzheimer's disease Mother     Arthritis Mother     Anesthesia problems Mother     Heart disease Father     Hyperlipidemia Father     Cancer Other     Cancer Maternal Grandmother        Objective  Physical Exam:  /82 (BP Location: Left arm, Patient Position: Sitting)   Pulse 70   Temp 98 °F (36.7 °C) (Oral)   Resp 18   Ht 175.3 cm (69\")   Wt 75.8 kg (167 lb)   SpO2 91%   BMI 24.66 kg/m²      Physical Exam  Vitals and nursing note reviewed.   Constitutional:       General: He is not in acute distress.     Appearance: Normal appearance. He is normal weight. He is not ill-appearing, toxic-appearing or diaphoretic. "   HENT:      Head: Normocephalic and atraumatic.   Eyes:      Extraocular Movements: Extraocular movements intact.   Cardiovascular:      Rate and Rhythm: Normal rate and regular rhythm.      Heart sounds: Normal heart sounds.   Pulmonary:      Effort: Pulmonary effort is normal. No respiratory distress.      Breath sounds: Normal breath sounds. No stridor. No wheezing, rhonchi or rales.   Abdominal:      General: Abdomen is flat.   Musculoskeletal:         General: No swelling. Normal range of motion.      Cervical back: Normal range of motion.      Right lower leg: No edema.      Left lower leg: No edema.   Skin:     General: Skin is warm and dry.   Neurological:      General: No focal deficit present.      Mental Status: He is alert.   Psychiatric:         Mood and Affect: Mood normal.         Behavior: Behavior normal.           Procedures    ED Course:    ED Course as of 03/28/24 1217   Thu Mar 28, 2024   1159 COVID19: Not Detected [TM]   1159 Influenza A PCR: Not Detected [TM]   1159 Influenza B PCR: Not Detected [TM]      ED Course User Index  [TM] Maurilio Arciniega PA-C       Lab Results (last 24 hours)       Procedure Component Value Units Date/Time    COVID-19 and FLU A/B PCR, 1 HR TAT - Swab, Nasopharynx [[id number]]  (Normal) Collected: 03/28/24 1133    Specimen: Swab from Nasopharynx Updated: 03/28/24 1158     COVID19 Not Detected     Influenza A PCR Not Detected     Influenza B PCR Not Detected    Narrative:      Fact sheet for providers: https://www.fda.gov/media/622168/download    Fact sheet for patients: https://www.fda.gov/media/095380/download    Test performed by PCR.             XR Chest 2 View    Result Date: 3/28/2024  PROCEDURE: XR CHEST 2 VW-   3/28/2024 11:30 AM  HISTORY: productive cough, congestion for 1 week  COMPARISON: February 19, 2023.  FINDINGS: The cardiac silhouette is proper size. The aortic contours are normal. The mediastinal and hilar structures are unremarkable. There  is stable pleural-parenchymal scarring bilaterally. This is most prominent on the lateral view with a triangular area of increased density posteriorly and inferiorly. On the PA view there is stable pleural thickening and adjacent parenchymal disease inferiorly and laterally in the right lower lobe, stable. Blunting of both costophrenic angles is stable. There is no pneumothorax.      Impression: Stable chest.    This report was signed and finalized on 3/28/2024 11:54 AM by Arelis Sahu MD.                             Medical Decision Making  Amount and/or Complexity of Data Reviewed  Labs:  Decision-making details documented in ED Course.  Radiology: ordered.              Jairo Mercedes is a 83 y.o. male who presents to the emergency department for evaluation of cough and URI congestion    Differential diagnosis includes COVID, flu, viral illness, bronchitis, pneumonia among other etiologies.    Chest x-ray, COVID and flu swab ordered for further evaluation of the patient's presentation.    Chart review if available included outside testing, previous visits, prior labs, prior imaging, available notes from prior evaluations or visits with specialists, medication list, allergies, past medical history, past surgical history when applicable.    Patient was treated with Medications - No data to display    Patient's lungs are clear to auscultation without any wheezing or diminished breath sounds.  I do not feel this is a significant COPD exacerbation.  He believes he has been on prednisone.  Given the fact he does have some nasal congestion and URI symptoms as well as the cough symptoms would fit with a viral URI but family is concerned for pneumonia therefore we will obtain a chest x-ray to evaluate further as well as add a COVID and flu swab.    Patient has no edema in his lower extremities, after discussion he does not feel as though he picked up the prednisone from the pharmacy and has not been taking it, wife will  call to the pharmacy to inquire about its location or if they can pick it up.  Chest x-ray read by radiologist shows no acute process.  No signs of pneumonia.  He is requesting Tessalon Perles.  Suspect viral respiratory infection with possible bronchitis.  Did give strict return to care precautions.    Plan for disposition is discharged home.  Patient/family comfortable with and understanding of the plan.      Final diagnoses:   Acute cough   Upper respiratory tract infection, unspecified type          Maurilio Arciniega PA-C  03/28/24 7822

## 2024-04-08 RX ORDER — ISOSORBIDE MONONITRATE 30 MG/1
30 TABLET, EXTENDED RELEASE ORAL DAILY
Qty: 90 TABLET | Refills: 3 | Status: SHIPPED | OUTPATIENT
Start: 2024-04-08

## 2024-05-13 ENCOUNTER — TELEPHONE (OUTPATIENT)
Dept: INTERNAL MEDICINE | Facility: CLINIC | Age: 84
End: 2024-05-13

## 2024-05-13 NOTE — TELEPHONE ENCOUNTER
"  Caller: Jairo Mercedes \"Latoya\"    Relationship to patient: Self    Best call back number: [phone]     Chief complaint: FU UTC SKIN TEAR    Type of visit: FOLLOWUP    Requested date: FRIDAY     If rescheduling, when is the original appointment:      Additional notes: THERE WERE NO APPOINTMENTS AVAILABLE ON FRIDAY.  COULD HE GET A TEXT?        "

## 2024-05-20 ENCOUNTER — OFFICE VISIT (OUTPATIENT)
Dept: INTERNAL MEDICINE | Facility: CLINIC | Age: 84
End: 2024-05-20
Payer: MEDICARE

## 2024-05-20 VITALS
WEIGHT: 165 LBS | OXYGEN SATURATION: 93 % | BODY MASS INDEX: 24.44 KG/M2 | DIASTOLIC BLOOD PRESSURE: 76 MMHG | SYSTOLIC BLOOD PRESSURE: 118 MMHG | TEMPERATURE: 98 F | HEIGHT: 69 IN | HEART RATE: 56 BPM

## 2024-05-20 DIAGNOSIS — S41.112D SKIN TEAR OF LEFT UPPER ARM WITHOUT COMPLICATION, SUBSEQUENT ENCOUNTER: Primary | ICD-10-CM

## 2024-05-20 PROBLEM — S41.112A SKIN TEAR OF LEFT UPPER ARM WITHOUT COMPLICATION: Status: ACTIVE | Noted: 2024-05-20

## 2024-05-20 PROCEDURE — 99213 OFFICE O/P EST LOW 20 MIN: CPT | Performed by: FAMILY MEDICINE

## 2024-05-20 PROCEDURE — 3078F DIAST BP <80 MM HG: CPT | Performed by: FAMILY MEDICINE

## 2024-05-20 PROCEDURE — 1126F AMNT PAIN NOTED NONE PRSNT: CPT | Performed by: FAMILY MEDICINE

## 2024-05-20 PROCEDURE — 3074F SYST BP LT 130 MM HG: CPT | Performed by: FAMILY MEDICINE

## 2024-05-20 PROCEDURE — 1159F MED LIST DOCD IN RCRD: CPT | Performed by: FAMILY MEDICINE

## 2024-05-20 PROCEDURE — 1160F RVW MEDS BY RX/DR IN RCRD: CPT | Performed by: FAMILY MEDICINE

## 2024-05-20 NOTE — PROGRESS NOTES
Jairo Mercedes is a 83 y.o. male.    Chief Complaint   Patient presents with    skin tear     Left arm, skin tear.        HPI     Jairo Mercedes is an 83-year-old male who presents today to follow up from recent urgent care visit for a skin tear of his left arm.    The patient describes injuring his arm attempting to place his automobile seatbelt on him and is uncertain on what he hit. He has a self-inflicted injury from removing the bandages, and the initial skin tear injury. He cresencio to  and tegaderm was applied.   He currently denies pain. He inquires if he will be able to take a shower.      The following portions of the patient's history were reviewed and updated as appropriate: allergies, current medications, past family history, past medical history, past social history, past surgical history and problem list.     Allergies   Allergen Reactions    Parabens Rash         Current Outpatient Medications:     acetaminophen (TYLENOL) 500 MG tablet, Take  by mouth Every 6 (Six) Hours As Needed., Disp: , Rfl:     ALPRAZolam (XANAX) 0.5 MG tablet, Take 1 tablet by mouth Every 12 (Twelve) Hours., Disp: , Rfl:     aspirin 81 MG EC tablet, Take 1 tablet by mouth Daily., Disp: , Rfl:     atorvastatin (LIPITOR) 40 MG tablet, Take 1 tablet by mouth every night at bedtime., Disp: 90 tablet, Rfl: 3    azelastine (ASTELIN) 0.1 % nasal spray, 1-2 sprays each nostril bid, Disp: 1 each, Rfl: 0    ciclopirox (LOPROX) 1 % shampoo, ciclopirox 1 % shampoo  APPLY TOPICALLY AS DIRECTED THREE TIMES A WEEK, Disp: , Rfl:     clobetasol (TEMOVATE) 0.05 % external solution, APPLY TO THE AFFECTED AREA(S) TOPICALLY AS DIRECTED TWICE DAILY, Disp: , Rfl:     esomeprazole (nexIUM) 40 MG capsule, TAKE ONE CAPSULE BY MOUTH EVERY DAY, Disp: 90 capsule, Rfl: 1    fluocinonide (LIDEX) 0.05 % external solution, APPLY TOPICALLY AS DIRECTED AT BEDTIME TO affected area, Disp: , Rfl:     Fluticasone-Umeclidin-Vilant (Trelegy Ellipta) 100-62.5-25 MCG/ACT  "inhaler, Inhale 1 puff Daily. Rinse mouth out after use, Disp: 180 each, Rfl: 3    isosorbide mononitrate (IMDUR) 30 MG 24 hr tablet, Take 1 tablet by mouth Daily., Disp: 90 tablet, Rfl: 3    losartan (COZAAR) 25 MG tablet, , Disp: , Rfl:     meclizine (ANTIVERT) 25 MG tablet, TAKE ONE TABLET BY MOUTH TWICE DAILY (Patient taking differently: As Needed.), Disp: 180 tablet, Rfl: 3    metoprolol tartrate (LOPRESSOR) 25 MG tablet, TAKE ONE TABLET BY MOUTH TWICE DAILY, Disp: 180 tablet, Rfl: 3    nitroglycerin (NITROSTAT) 0.4 MG SL tablet, Place 1 tablet under the tongue Every 5 (Five) Minutes As Needed for Chest Pain. Don't use, Disp: , Rfl:     Omega-3 Fatty Acids (FISH OIL) 1000 MG capsule capsule, Take 1 capsule by mouth Daily With Breakfast., Disp: , Rfl:     sertraline (ZOLOFT) 25 MG tablet, TAKE ONE TABLET BY MOUTH EVERY DAY, Disp: 90 tablet, Rfl: 3    traZODone (DESYREL) 50 MG tablet, TAKE 1/2 TO 1 TABLET BY MOUTH EVERY NIGHT AS NEEDED, Disp: 90 tablet, Rfl: 3    triamcinolone (KENALOG) 0.025 % ointment, Apply to the affected areas(s) twice daily, Disp: 80 g, Rfl: 1    ROS    Review of Systems   Skin:  Positive for wound.       Vitals:    05/20/24 1424   BP: 118/76   BP Location: Right arm   Patient Position: Sitting   Cuff Size: Adult   Pulse: 56   Temp: 98 °F (36.7 °C)   SpO2: 93%   Weight: 74.8 kg (165 lb)   Height: 175.3 cm (69\")   PainSc: 0-No pain     Body mass index is 24.37 kg/m².      Physical Exam     Physical Exam  Constitutional:       General: He is not in acute distress.     Appearance: He is well-developed.   HENT:      Head: Normocephalic and atraumatic.      Right Ear: External ear normal.      Left Ear: External ear normal.   Eyes:      Extraocular Movements: Extraocular movements intact.      Conjunctiva/sclera: Conjunctivae normal.   Cardiovascular:      Rate and Rhythm: Normal rate.   Pulmonary:      Effort: Pulmonary effort is normal. No respiratory distress.   Skin:     General: Skin is " warm and dry.      Findings: Laceration and lesion present.      Comments: There is an approximately 3 cm diameter skin tear on the left upper arm.   Neurological:      Mental Status: He is alert and oriented to person, place, and time.      Cranial Nerves: No cranial nerve deficit.   Psychiatric:         Mood and Affect: Mood normal.         Assessment/Plan    Diagnoses and all orders for this visit:    1. Skin tear of left upper arm without complication, subsequent encounter (Primary)  Assessment & Plan:  - The dressing has been changed. Tegaderm was removed, dressed with Oil Emulsion gauze with an overlying gentle bandage, and wrapped with Coban as well due to concerns about adhesion with Oil Emulsion gauze. Encouraged to change dressing in 2 days. Allow warm gentle soapy water to rinse over the area and pat dry. He has been provided with additional dressing supplies for home.        Urgent care notes reviewed today.      No orders of the defined types were placed in this encounter.      No orders of the defined types were placed in this encounter.      No follow-ups on file.    Nicki Pelaez DO       Transcribed from ambient dictation for Nicki Pelaez DO by Michell Akins.  05/20/24   15:28 EDT    Patient or patient representative verbalized consent to the visit recording.  I have personally performed the services described in this document as transcribed by the above individual, and it is both accurate and complete.  Nicki Pelaez DO  5/20/2024  21:41 EDT

## 2024-05-20 NOTE — ASSESSMENT & PLAN NOTE
- The dressing has been changed. Tegaderm was removed, dressed with Oil Emulsion gauze with an overlying gentle bandage, and wrapped with Coban as well due to concerns about adhesion with Oil Emulsion gauze. Encouraged to change dressing in 2 days. Allow warm gentle soapy water to rinse over the area and pat dry. He has been provided with additional dressing supplies for home.

## 2024-06-19 ENCOUNTER — OFFICE VISIT (OUTPATIENT)
Dept: INTERNAL MEDICINE | Facility: CLINIC | Age: 84
End: 2024-06-19
Payer: MEDICARE

## 2024-06-19 VITALS
HEIGHT: 69 IN | SYSTOLIC BLOOD PRESSURE: 120 MMHG | WEIGHT: 163 LBS | OXYGEN SATURATION: 97 % | BODY MASS INDEX: 24.14 KG/M2 | DIASTOLIC BLOOD PRESSURE: 68 MMHG | HEART RATE: 57 BPM | TEMPERATURE: 98.2 F

## 2024-06-19 DIAGNOSIS — R79.9 ELEVATED BUN: ICD-10-CM

## 2024-06-19 DIAGNOSIS — R79.89 ABNORMAL CBC: ICD-10-CM

## 2024-06-19 DIAGNOSIS — Z72.820 POOR SLEEP: ICD-10-CM

## 2024-06-19 DIAGNOSIS — J30.2 SEASONAL ALLERGIC RHINITIS, UNSPECIFIED TRIGGER: ICD-10-CM

## 2024-06-19 DIAGNOSIS — R79.89 ELEVATED SERUM CREATININE: ICD-10-CM

## 2024-06-19 DIAGNOSIS — K21.9 GASTROESOPHAGEAL REFLUX DISEASE WITHOUT ESOPHAGITIS: ICD-10-CM

## 2024-06-19 DIAGNOSIS — E11.40 TYPE 2 DIABETES MELLITUS WITH DIABETIC NEUROPATHY, WITHOUT LONG-TERM CURRENT USE OF INSULIN: Primary | ICD-10-CM

## 2024-06-19 DIAGNOSIS — I10 ESSENTIAL HYPERTENSION: ICD-10-CM

## 2024-06-19 DIAGNOSIS — J06.9 VIRAL URI WITH COUGH: ICD-10-CM

## 2024-06-19 DIAGNOSIS — R06.83 SNORING: ICD-10-CM

## 2024-06-19 DIAGNOSIS — F41.9 ANXIETY: ICD-10-CM

## 2024-06-19 DIAGNOSIS — E78.2 MIXED HYPERLIPIDEMIA: ICD-10-CM

## 2024-06-19 DIAGNOSIS — Z13.0 SCREENING FOR DISORDER OF BLOOD AND BLOOD-FORMING ORGANS: ICD-10-CM

## 2024-06-19 DIAGNOSIS — I25.10 CORONARY ARTERIOSCLEROSIS IN NATIVE ARTERY: ICD-10-CM

## 2024-06-19 LAB
EXPIRATION DATE: ABNORMAL
HBA1C MFR BLD: 6 % (ref 4.5–5.7)
Lab: ABNORMAL

## 2024-06-19 PROCEDURE — G2211 COMPLEX E/M VISIT ADD ON: HCPCS | Performed by: NURSE PRACTITIONER

## 2024-06-19 PROCEDURE — 83036 HEMOGLOBIN GLYCOSYLATED A1C: CPT | Performed by: NURSE PRACTITIONER

## 2024-06-19 PROCEDURE — 3044F HG A1C LEVEL LT 7.0%: CPT | Performed by: NURSE PRACTITIONER

## 2024-06-19 PROCEDURE — 1159F MED LIST DOCD IN RCRD: CPT | Performed by: NURSE PRACTITIONER

## 2024-06-19 PROCEDURE — 1160F RVW MEDS BY RX/DR IN RCRD: CPT | Performed by: NURSE PRACTITIONER

## 2024-06-19 PROCEDURE — 3074F SYST BP LT 130 MM HG: CPT | Performed by: NURSE PRACTITIONER

## 2024-06-19 PROCEDURE — 99214 OFFICE O/P EST MOD 30 MIN: CPT | Performed by: NURSE PRACTITIONER

## 2024-06-19 PROCEDURE — 3078F DIAST BP <80 MM HG: CPT | Performed by: NURSE PRACTITIONER

## 2024-06-19 PROCEDURE — 1126F AMNT PAIN NOTED NONE PRSNT: CPT | Performed by: NURSE PRACTITIONER

## 2024-06-19 RX ORDER — SERTRALINE HYDROCHLORIDE 25 MG/1
25 TABLET, FILM COATED ORAL DAILY
Qty: 90 TABLET | Refills: 3 | Status: SHIPPED | OUTPATIENT
Start: 2024-06-19

## 2024-06-19 RX ORDER — ESOMEPRAZOLE MAGNESIUM 40 MG/1
40 CAPSULE, DELAYED RELEASE ORAL DAILY
Qty: 90 CAPSULE | Refills: 1 | Status: SHIPPED | OUTPATIENT
Start: 2024-06-19

## 2024-06-19 RX ORDER — AZELASTINE 1 MG/ML
SPRAY, METERED NASAL
Qty: 3 EACH | Refills: 1 | Status: SHIPPED | OUTPATIENT
Start: 2024-06-19

## 2024-06-19 RX ORDER — ATORVASTATIN CALCIUM 40 MG/1
40 TABLET, FILM COATED ORAL
Qty: 90 TABLET | Refills: 3 | Status: SHIPPED | OUTPATIENT
Start: 2024-06-19 | End: 2024-07-05

## 2024-06-19 NOTE — PROGRESS NOTES
"     Office Visit      Patient Name: Jairo Mercedes  : 1940   MRN: [id number]     Chief Complaint:    Chief Complaint   Patient presents with    Diabetes       History of Present Illness: Jairo Mercedes is a 83 y.o. male who is here today for follow up of T2DM, HTN, hyperlipids.     Snores.  Does not feel rested when he wakes up.  Unable to nap, even when tired in the afternoon.  Wears a watch. Showed he slept \"light\" for 7 hours last night with 2 hours of good sleep. Not currently diagnosed with ROBERT.      He currently has more nasal congestion than he typically experiences with allergies.  No fever, chills, earache, sore throat, myalgia, rash, n/v.  Close contacts have similar nasal congestion.      T2DM.  Taking medications as prescribed.  Patient denies foot ulcerations, hyperglycemia, hypoglycemia, nausea, paresthesia of the feet, polydipsia, polyuria, polyphagia, visual disturbances and weight loss.     HTN, hyperlipidemia. Taking medications as prescribed.  Does not monitor BP at home.  Denies chest pain, dyspnea, orthopnea, palpitations, lower extremity edema, confusion, headaches, weakness, visual disturbances.    Subjective      I have reviewed and the following portions of the patient's history were updated as appropriate: past family history, past medical history, past social history, past surgical history and problem list.      Current Outpatient Medications:     atorvastatin (LIPITOR) 40 MG tablet, Take 1 tablet by mouth every night at bedtime., Disp: 90 tablet, Rfl: 3    azelastine (ASTELIN) 0.1 % nasal spray, 1-2 sprays each nostril bid, Disp: 3 each, Rfl: 1    esomeprazole (nexIUM) 40 MG capsule, Take 1 capsule by mouth Daily., Disp: 90 capsule, Rfl: 1    sertraline (ZOLOFT) 25 MG tablet, Take 1 tablet by mouth Daily., Disp: 90 tablet, Rfl: 3    acetaminophen (TYLENOL) 500 MG tablet, Take  by mouth Every 6 (Six) Hours As Needed., Disp: , Rfl:     ALPRAZolam (XANAX) 0.5 MG tablet, Take 1 " "tablet by mouth Every 12 (Twelve) Hours., Disp: , Rfl:     aspirin 81 MG EC tablet, Take 1 tablet by mouth Daily., Disp: , Rfl:     ciclopirox (LOPROX) 1 % shampoo, ciclopirox 1 % shampoo  APPLY TOPICALLY AS DIRECTED THREE TIMES A WEEK, Disp: , Rfl:     clobetasol (TEMOVATE) 0.05 % external solution, APPLY TO THE AFFECTED AREA(S) TOPICALLY AS DIRECTED TWICE DAILY, Disp: , Rfl:     fluocinonide (LIDEX) 0.05 % external solution, APPLY TOPICALLY AS DIRECTED AT BEDTIME TO affected area, Disp: , Rfl:     Fluticasone-Umeclidin-Vilant (Trelegy Ellipta) 100-62.5-25 MCG/ACT inhaler, Inhale 1 puff Daily. Rinse mouth out after use, Disp: 180 each, Rfl: 3    isosorbide mononitrate (IMDUR) 30 MG 24 hr tablet, Take 1 tablet by mouth Daily., Disp: 90 tablet, Rfl: 3    losartan (COZAAR) 25 MG tablet, , Disp: , Rfl:     meclizine (ANTIVERT) 25 MG tablet, TAKE ONE TABLET BY MOUTH TWICE DAILY (Patient taking differently: As Needed.), Disp: 180 tablet, Rfl: 3    metoprolol tartrate (LOPRESSOR) 25 MG tablet, TAKE ONE TABLET BY MOUTH TWICE DAILY, Disp: 180 tablet, Rfl: 3    nitroglycerin (NITROSTAT) 0.4 MG SL tablet, Place 1 tablet under the tongue Every 5 (Five) Minutes As Needed for Chest Pain. Don't use, Disp: , Rfl:     Omega-3 Fatty Acids (FISH OIL) 1000 MG capsule capsule, Take 1 capsule by mouth Daily With Breakfast., Disp: , Rfl:     traZODone (DESYREL) 50 MG tablet, TAKE 1/2 TO 1 TABLET BY MOUTH EVERY NIGHT AS NEEDED, Disp: 90 tablet, Rfl: 3    triamcinolone (KENALOG) 0.025 % ointment, Apply to the affected areas(s) twice daily, Disp: 80 g, Rfl: 1    Allergies   Allergen Reactions    Parabens Rash       Objective     Physical Exam:  Vital Signs:   Vitals:    06/19/24 0841   BP: 120/68   Pulse: 57   Temp: 98.2 °F (36.8 °C)   SpO2: 97%   Weight: 73.9 kg (163 lb)   Height: 175.3 cm (69.02\")     Body mass index is 24.06 kg/m².  BMI is within normal parameters. No other follow-up for BMI required.       Physical Exam  Constitutional: "       Appearance: He is not ill-appearing.   HENT:      Head: Normocephalic.      Right Ear: External ear normal.      Left Ear: External ear normal.      Nose: Mucosal edema and congestion present. No nasal tenderness.   Eyes:      Conjunctiva/sclera: Conjunctivae normal.      Pupils: Pupils are equal, round, and reactive to light.   Cardiovascular:      Rate and Rhythm: Normal rate and regular rhythm.      Pulses:           Radial pulses are 2+ on the right side and 2+ on the left side.        Dorsalis pedis pulses are 2+ on the right side and 2+ on the left side.      Heart sounds: Normal heart sounds.   Pulmonary:      Effort: Pulmonary effort is normal.      Breath sounds: Normal breath sounds.   Musculoskeletal:      Cervical back: Normal range of motion and neck supple.   Skin:     General: Skin is warm.      Capillary Refill: Capillary refill takes less than 2 seconds.   Neurological:      Mental Status: He is alert and oriented to person, place, and time.      Coordination: Coordination normal.      Gait: Gait normal.   Psychiatric:         Attention and Perception: Attention normal.         Mood and Affect: Mood and affect normal.         Speech: Speech normal.         Behavior: Behavior normal.       Lab Results   Component Value Date    HGBA1C 6.0 (A) 06/19/2024    HGBA1C 5.7 02/23/2024    HGBA1C 6.00 (H) 08/18/2023         Assessment / Plan      Assessment/Plan:   Diagnoses and all orders for this visit:    1. Type 2 diabetes mellitus with diabetic neuropathy, without long-term current use of insulin (Primary)  -     POC Glycosylated Hemoglobin (Hb A1C)        - Follow diabetic diet        - Monitor blood sugars as discussed, to better assess trends and glycemic response to certain food, drink, activities.  Advised fasting blood glucose should be < 130, 2 hours post-prandial should be < 180        - See eye doctor annually or as discussed        - Wear protective foot wear/no bare feet        - Check  feet regularly for calluses or ulcers        - Discussed risk of poorly controlled diabetes and long-term complications        - Exercise as tolerated for 30-45 minutes most days of the week. Gradually increase daily exercise if not currently active.        - Take all medications as prescribed    2. Mixed hyperlipidemia  -     atorvastatin (LIPITOR) 40 MG tablet; Take 1 tablet by mouth every night at bedtime.  Dispense: 90 tablet; Refill: 3  -     Lipid Panel        - Follow heart healthy diet.  Keep sodium intake < 1500 mg per day.  Avoid processed & fast foods.          - Exercise as tolerated, with a goal of 30 minutes of moderate exercise most days.         - Take medications as prescribed.    3. Gastroesophageal reflux disease without esophagitis  -     esomeprazole (nexIUM) 40 MG capsule; Take 1 capsule by mouth Daily.  Dispense: 90 capsule; Refill: 1        - Avoid triggers such as spicy or greasy food, alcohol, chocolate, caffeine, carbonated beverages, tomatoes and tomato sauces, onions, smoking        - May raise HOB 30 degrees with risers or blocks, if desired        - Do not eat within 2-3 hours of lying down        - Avoid clothing, belts that constrict midsection        - Losing weight may reduce symptoms    4. Viral URI with cough  -     azelastine (ASTELIN) 0.1 % nasal spray; 1-2 sprays each nostril bid  Dispense: 3 each; Refill: 1  5. Seasonal allergic rhinitis, unspecified trigger  -     azelastine (ASTELIN) 0.1 % nasal spray; 1-2 sprays each nostril bid  Dispense: 3 each; Refill: 1    6. Anxiety  -     sertraline (ZOLOFT) 25 MG tablet; Take 1 tablet by mouth Daily.  Dispense: 90 tablet; Refill: 3        - Encouraged to take part in daily physical exercise.          - Eat healthy, well balanced diet; avoid sugary foods or beverages        - Limit alcohol intake        - Ensure good night's sleep by creating calm space in bedroom, avoiding screen time 1-2 hours before bed, no caffeine after 5 pm         - Talk to supportive family and friends, as needed        - Consider journaling, other creative way to express feelings, if needed    7. Snoring  -     Ambulatory Referral to Sleep Medicine    8. Poor sleep  -     Ambulatory Referral to Sleep Medicine    9. Coronary arteriosclerosis in native artery  -     Lipid Panel    10. Essential hypertension  -     Comprehensive Metabolic Panel        - Follow heart healthy diet.  Keep sodium intake < 1500 mg per day.  Avoid processed & fast foods.          - Exercise as tolerated, with a goal of 30 minutes of moderate exercise most days.         - Take medications as prescribed.    11. Screening for disorder of blood and blood-forming organs  -     CBC & Differential    12. Elevated BUN  -     Basic metabolic panel    13. Elevated serum creatinine  -     Basic metabolic panel               Follow Up:   Return for Medicare Wellness.    Patient was given instructions and counseling regarding his condition or for health maintenance advice. Please see specific information pulled into the AVS if appropriate.       [facility] [address] [facility]     Please note that portions of this note may have been completed with a voice recognition program. Efforts were made to edit dictation, but occasionally words are mistranscribed.

## 2024-07-01 LAB
ALBUMIN SERPL-MCNC: 4.1 G/DL (ref 3.5–5.2)
ALBUMIN/GLOB SERPL: 1.5 G/DL
ALP SERPL-CCNC: 141 U/L (ref 39–117)
ALT SERPL-CCNC: 27 U/L (ref 1–41)
AST SERPL-CCNC: 32 U/L (ref 1–40)
BASOPHILS # BLD AUTO: 0.04 10*3/MM3 (ref 0–0.2)
BASOPHILS NFR BLD AUTO: 0.2 % (ref 0–1.5)
BILIRUB SERPL-MCNC: 0.4 MG/DL (ref 0–1.2)
BUN SERPL-MCNC: 38 MG/DL (ref 8–23)
BUN/CREAT SERPL: 27 (ref 7–25)
CALCIUM SERPL-MCNC: 9.6 MG/DL (ref 8.6–10.5)
CHLORIDE SERPL-SCNC: 102 MMOL/L (ref 98–107)
CHOLEST SERPL-MCNC: 115 MG/DL (ref 0–200)
CO2 SERPL-SCNC: 24.3 MMOL/L (ref 22–29)
CREAT SERPL-MCNC: 1.41 MG/DL (ref 0.76–1.27)
EGFRCR SERPLBLD CKD-EPI 2021: 49.8 ML/MIN/1.73
EOSINOPHIL # BLD AUTO: 0.16 10*3/MM3 (ref 0–0.4)
EOSINOPHIL NFR BLD AUTO: 1 % (ref 0.3–6.2)
ERYTHROCYTE [DISTWIDTH] IN BLOOD BY AUTOMATED COUNT: 15.1 % (ref 12.3–15.4)
GLOBULIN SER CALC-MCNC: 2.8 GM/DL
GLUCOSE SERPL-MCNC: 97 MG/DL (ref 65–99)
HCT VFR BLD AUTO: 41.5 % (ref 37.5–51)
HDLC SERPL-MCNC: 35 MG/DL (ref 40–60)
HGB BLD-MCNC: 13.3 G/DL (ref 13–17.7)
IMM GRANULOCYTES # BLD AUTO: 0.11 10*3/MM3 (ref 0–0.05)
IMM GRANULOCYTES NFR BLD AUTO: 0.7 % (ref 0–0.5)
LDLC SERPL CALC-MCNC: 56 MG/DL (ref 0–100)
LYMPHOCYTES # BLD AUTO: 2.09 10*3/MM3 (ref 0.7–3.1)
LYMPHOCYTES NFR BLD AUTO: 12.7 % (ref 19.6–45.3)
MCH RBC QN AUTO: 29.1 PG (ref 26.6–33)
MCHC RBC AUTO-ENTMCNC: 32 G/DL (ref 31.5–35.7)
MCV RBC AUTO: 90.8 FL (ref 79–97)
MONOCYTES # BLD AUTO: 2.04 10*3/MM3 (ref 0.1–0.9)
MONOCYTES NFR BLD AUTO: 12.4 % (ref 5–12)
NEUTROPHILS # BLD AUTO: 11.97 10*3/MM3 (ref 1.7–7)
NEUTROPHILS NFR BLD AUTO: 73 % (ref 42.7–76)
NRBC BLD AUTO-RTO: 0 /100 WBC (ref 0–0.2)
PLATELET # BLD AUTO: 439 10*3/MM3 (ref 140–450)
POTASSIUM SERPL-SCNC: 4.6 MMOL/L (ref 3.5–5.2)
PROT SERPL-MCNC: 6.9 G/DL (ref 6–8.5)
RBC # BLD AUTO: 4.57 10*6/MM3 (ref 4.14–5.8)
SODIUM SERPL-SCNC: 140 MMOL/L (ref 136–145)
TRIGL SERPL-MCNC: 134 MG/DL (ref 0–150)
VLDLC SERPL CALC-MCNC: 24 MG/DL (ref 5–40)
WBC # BLD AUTO: 16.41 10*3/MM3 (ref 3.4–10.8)

## 2024-07-02 NOTE — PROGRESS NOTES
Both BUN and creatinine are elevated.  Metabolic panel needs to be rechecked in 10-14 days to make sure renal function is returning to normal.  May be due to recent illness, dehydration, any number of issues.    Alkaline phosphatase continues to be slightly elevated.  Remainder of liver function tests are normal.  Will continue to monitor.   HDL (good) cholesterol is low. Include more foods that have omega 3 fatty acids in diet, such as salmon, tuna, [place] sprouts, lima beans, walnuts, almonds, hazelnuts, kidney beans.  Continue medications as prescribed.    White blood cell count is higher than normal, may be due to recent acute illness.  Have been consistently elevated over the past 8 years, per chart review.  Will obtain peripheral smear with metabolic panel.

## 2024-07-04 DIAGNOSIS — E78.2 MIXED HYPERLIPIDEMIA: ICD-10-CM

## 2024-07-05 ENCOUNTER — OFFICE VISIT (OUTPATIENT)
Dept: INTERNAL MEDICINE | Facility: CLINIC | Age: 84
End: 2024-07-05
Payer: MEDICARE

## 2024-07-05 ENCOUNTER — HOSPITAL ENCOUNTER (OUTPATIENT)
Dept: GENERAL RADIOLOGY | Facility: HOSPITAL | Age: 84
Discharge: HOME | End: 2024-07-05
Payer: MEDICARE

## 2024-07-05 VITALS
HEART RATE: 62 BPM | BODY MASS INDEX: 23.55 KG/M2 | SYSTOLIC BLOOD PRESSURE: 122 MMHG | RESPIRATION RATE: 16 BRPM | HEIGHT: 69 IN | DIASTOLIC BLOOD PRESSURE: 60 MMHG | WEIGHT: 159 LBS | TEMPERATURE: 98.7 F | OXYGEN SATURATION: 92 %

## 2024-07-05 DIAGNOSIS — R68.89 SENSATION OF FEELING COLD: ICD-10-CM

## 2024-07-05 DIAGNOSIS — R63.4 WEIGHT LOSS: ICD-10-CM

## 2024-07-05 DIAGNOSIS — I25.10 CORONARY ARTERIOSCLEROSIS IN NATIVE ARTERY: ICD-10-CM

## 2024-07-05 DIAGNOSIS — R06.89 DECREASED BREATH SOUNDS AT RIGHT LUNG BASE: ICD-10-CM

## 2024-07-05 DIAGNOSIS — D72.829 LEUKOCYTOSIS, UNSPECIFIED TYPE: ICD-10-CM

## 2024-07-05 DIAGNOSIS — H90.6 MIXED CONDUCTIVE AND SENSORINEURAL HEARING LOSS OF BOTH EARS: ICD-10-CM

## 2024-07-05 DIAGNOSIS — E11.40 TYPE 2 DIABETES MELLITUS WITH DIABETIC NEUROPATHY, WITHOUT LONG-TERM CURRENT USE OF INSULIN: ICD-10-CM

## 2024-07-05 DIAGNOSIS — E78.2 MIXED HYPERLIPIDEMIA: ICD-10-CM

## 2024-07-05 DIAGNOSIS — I10 ESSENTIAL HYPERTENSION: ICD-10-CM

## 2024-07-05 DIAGNOSIS — Z00.00 ANNUAL PHYSICAL EXAM: ICD-10-CM

## 2024-07-05 DIAGNOSIS — Z00.00 ENCOUNTER FOR SUBSEQUENT ANNUAL WELLNESS VISIT (AWV) IN MEDICARE PATIENT: Primary | ICD-10-CM

## 2024-07-05 DIAGNOSIS — R79.9 ELEVATED BUN: ICD-10-CM

## 2024-07-05 DIAGNOSIS — R79.89 ELEVATED SERUM CREATININE: ICD-10-CM

## 2024-07-05 DIAGNOSIS — J44.9 CHRONIC OBSTRUCTIVE PULMONARY DISEASE, UNSPECIFIED COPD TYPE: ICD-10-CM

## 2024-07-05 PROCEDURE — 71046 X-RAY EXAM CHEST 2 VIEWS: CPT

## 2024-07-05 RX ORDER — ATORVASTATIN CALCIUM 40 MG/1
40 TABLET, FILM COATED ORAL
Qty: 90 TABLET | Refills: 3 | Status: SHIPPED | OUTPATIENT
Start: 2024-07-05

## 2024-07-05 NOTE — PROGRESS NOTES
The ABCs of the Annual Wellness Visit  Subsequent Medicare Wellness Visit    Subjective    Jairo Mercedes is a 83 y.o. male who presents for a Subsequent Medicare Wellness Visit.    The following portions of the patient's history were reviewed and   updated as appropriate: allergies, current medications, past family history, past medical history, past social history, past surgical history, and problem list.    Compared to one year ago, the patient feels his physical   health is worse.    Compared to one year ago, the patient feels his mental   health is the same.    Recent Hospitalizations:  He was not admitted to the hospital during the last year.       Current Medical Providers:  Patient Care Team:  Cierra Mclean APRN as PCP - General (Family Medicine)  Sara Reynoso MD as Consulting Physician (General Surgery)    Outpatient Medications Prior to Visit   Medication Sig Dispense Refill    acetaminophen (TYLENOL) 500 MG tablet Take  by mouth Every 6 (Six) Hours As Needed.      ALPRAZolam (XANAX) 0.5 MG tablet Take 1 tablet by mouth Every 12 (Twelve) Hours.      aspirin 81 MG EC tablet Take 1 tablet by mouth Daily.      atorvastatin (LIPITOR) 40 MG tablet TAKE ONE TABLET BY MOUTH EVERY NIGHT AT BEDTIME 90 tablet 3    azelastine (ASTELIN) 0.1 % nasal spray 1-2 sprays each nostril bid 3 each 1    ciclopirox (LOPROX) 1 % shampoo ciclopirox 1 % shampoo   APPLY TOPICALLY AS DIRECTED THREE TIMES A WEEK      clobetasol (TEMOVATE) 0.05 % external solution APPLY TO THE AFFECTED AREA(S) TOPICALLY AS DIRECTED TWICE DAILY      esomeprazole (nexIUM) 40 MG capsule Take 1 capsule by mouth Daily. 90 capsule 1    fluocinonide (LIDEX) 0.05 % external solution APPLY TOPICALLY AS DIRECTED AT BEDTIME TO affected area      isosorbide mononitrate (IMDUR) 30 MG 24 hr tablet Take 1 tablet by mouth Daily. 90 tablet 3    losartan (COZAAR) 25 MG tablet       meclizine (ANTIVERT) 25 MG tablet TAKE ONE TABLET BY MOUTH TWICE DAILY  (Patient taking differently: As Needed.) 180 tablet 3    metoprolol tartrate (LOPRESSOR) 25 MG tablet TAKE ONE TABLET BY MOUTH TWICE DAILY 180 tablet 3    nitroglycerin (NITROSTAT) 0.4 MG SL tablet Place 1 tablet under the tongue Every 5 (Five) Minutes As Needed for Chest Pain. Don't use      Omega-3 Fatty Acids (FISH OIL) 1000 MG capsule capsule Take 1 capsule by mouth Daily With Breakfast.      sertraline (ZOLOFT) 25 MG tablet Take 1 tablet by mouth Daily. 90 tablet 3    traZODone (DESYREL) 50 MG tablet TAKE 1/2 TO 1 TABLET BY MOUTH EVERY NIGHT AS NEEDED 90 tablet 3    triamcinolone (KENALOG) 0.025 % ointment Apply to the affected areas(s) twice daily 80 g 1    Fluticasone-Umeclidin-Vilant (Trelegy Ellipta) 100-62.5-25 MCG/ACT inhaler Inhale 1 puff Daily. Rinse mouth out after use 180 each 3     No facility-administered medications prior to visit.       No opioid medication identified on active medication list. I have reviewed chart for other potential  high risk medication/s and harmful drug interactions in the elderly.        Aspirin is on active medication list. Aspirin use is indicated based on review of current medical condition/s. Pros and cons of this therapy have been discussed today. Benefits of this medication outweigh potential harm.  Patient has been encouraged to continue taking this medication.  .      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  ACP discussion was held with the patient during this visit. Patient has an advance directive in EMR which is still valid.      Objective    Vitals:    24 1301   BP: 122/60   Pulse: 62   Resp: 16   Temp: 98.7 °F (37.1 °C)   SpO2: 92%   Weight: 72.1 kg (159 lb)   Height: 175.3 cm (69.02\")   PainSc: 0-No pain     Estimated body mass index is 23.47 kg/m² as calculated from the following:    Height as of this encounter: 175.3 cm (69.02\").    Weight as of this encounter: 72.1 kg (159 lb).    BMI is within normal parameters. No other follow-up for BMI required.      Does the patient have evidence of cognitive impairment? No    Lab Results   Component Value Date    CHLPL 115 2024    TRIG 134 2024    HDL 35 (L) 2024    LDL 56 2024    VLDL 24 2024    HGBA1C 6.0 (A) 2024        HEALTH RISK ASSESSMENT    Smoking Status:  Social History     Tobacco Use   Smoking Status Former    Current packs/day: 0.00    Average packs/day: 2.0 packs/day for 42.1 years (84.3 ttl pk-yrs)    Types: Cigarettes    Start date: 1957    Quit date: 1999    Years since quittin.4    Passive exposure: Past   Smokeless Tobacco Never     Alcohol Consumption:  Social History     Substance and Sexual Activity   Alcohol Use Not Currently     Fall Risk Screen:    STEADI Fall Risk Assessment was completed, and patient is at LOW risk for falls.Assessment completed on:2024    Depression Screenin/20/2024     2:26 PM   PHQ-2/PHQ-9 Depression Screening   Little Interest or Pleasure in Doing Things 0-->not at all   Feeling Down, Depressed or Hopeless 0-->not at all   PHQ-9: Brief Depression Severity Measure Score 0       Health Habits " and Functional and Cognitive Screenin/5/2024     1:04 PM   Functional & Cognitive Status   Do you have difficulty preparing food and eating? No   Do you have difficulty bathing yourself, getting dressed or grooming yourself? No   Do you have difficulty using the toilet? No   Do you have difficulty moving around from place to place? No   Do you have trouble with steps or getting out of a bed or a chair? No   Current Diet Other   Dental Exam Up to date   Eye Exam Up to date   Exercise (times per week) 7 times per week   Current Exercises Include Walking        Exercise Comment senior exercise program and golfing   Do you need help using the phone?  No   Are you deaf or do you have serious difficulty hearing?  Yes   Do you need help to go to places out of walking distance? No   Do you need help shopping? No   Do you need help preparing meals?  No   Do you need help with housework?  No   Do you need help with laundry? No   Do you need help taking your medications? No   Do you need help managing money? No   Do you ever drive or ride in a car without wearing a seat belt? No   Have you felt unusual stress, anger or loneliness in the last month? No   Who do you live with? Spouse   If you need help, do you have trouble finding someone available to you? No   Have you been bothered in the last four weeks by sexual problems? No   Do you have difficulty concentrating, remembering or making decisions? No       Age-appropriate Screening Schedule:  Refer to the list below for future screening recommendations based on patient's age, sex and/or medical conditions. Orders for these recommended tests are listed in the plan section. The patient has been provided with a written plan.    Health Maintenance   Topic Date Due    DIABETIC EYE EXAM  2024 (Originally 10/12/2022)    COVID-19 Vaccine (2023- season) 2025 (Originally 3/7/2024)    TDAP/TD VACCINES (2 - Td or Tdap) 2025 (Originally 2023)     INFLUENZA VACCINE  08/01/2024    URINE MICROALBUMIN  08/18/2024    HEMOGLOBIN A1C  12/19/2024    LIPID PANEL  07/01/2025    ANNUAL WELLNESS VISIT  07/05/2025    COLORECTAL CANCER SCREENING  08/02/2028    RSV Vaccine - Adults  Completed    Pneumococcal Vaccine 65+  Completed    ZOSTER VACCINE  Completed    LUNG CANCER SCREENING  Discontinued                  CMS Preventative Services Quick Reference  Risk Factors Identified During Encounter  Depression/Dysphoria: Current medication is effective, no change recommended  Hearing Problem:  continue to follow up with Audiologist, wear hearing aids daily  Immunizations Discussed/Encouraged: Influenza and RSV (Respiratory Syncytial Virus)  Inactivity/Sedentary: Patient was advised to exercise at least 150 minutes a week per CDC recommendations.  Polypharmacy: Medication List reviewed and Medications are appropriate for patient  The above risks/problems have been discussed with the patient.  Pertinent information has been shared with the patient in the After Visit Summary.  An After Visit Summary and PPPS were made available to the patient.    Follow Up:   Next Medicare Wellness visit to be scheduled in 1 year.       Additional E&M Note during same encounter follows:  Patient has multiple medical problems which are significant and separately identifiable that require additional work above and beyond the Medicare Wellness Visit.      Chief Complaint  Medicare Wellness-subsequent    Subjective        HPI  Jairo Mercedes is also being seen today for annual physical.  Endorses feeling cold all the time, not normal for him.  Not eating well, appetite is decreased. He has lost 4 pounds in the past 2 weeks.  Bowel habits have not changed.  Feels full all the time.      HTN, CAD.  Taking medications as prescribed.  Denies chest pain, dyspnea, orthopnea, palpitations, lower extremity edema, confusion, headaches, weakness, visual disturbances.    T2DM: taking medications as  "prescribed.  Patient denies foot ulcerations, hyperglycemia, hypoglycemia, nausea, paresthesia of the feet, polydipsia, polyuria, polyphagia, visual disturbances and weight loss.     COPD. Using Trelegy daily, has not needed albuterol inhaler recently.  Not coughing up sputum, fever, increased SOA or wheezing.      Review of Systems   All other systems reviewed and are negative.      Objective   Vital Signs:  /60   Pulse 62   Temp 98.7 °F (37.1 °C)   Resp 16   Ht 175.3 cm (69.02\")   Wt 72.1 kg (159 lb)   SpO2 92%   BMI 23.47 kg/m²     Physical Exam  Constitutional:       Appearance: He is well-developed. He is not ill-appearing.   HENT:      Head: Normocephalic.      Right Ear: Tympanic membrane, ear canal and external ear normal.      Left Ear: Tympanic membrane, ear canal and external ear normal.      Nose: Nose normal.      Mouth/Throat:      Mouth: Mucous membranes are moist.      Pharynx: Oropharynx is clear. Uvula midline.   Eyes:      Extraocular Movements: Extraocular movements intact.      Conjunctiva/sclera: Conjunctivae normal.      Pupils: Pupils are equal, round, and reactive to light.   Neck:      Thyroid: No thyromegaly.      Vascular: No carotid bruit.   Cardiovascular:      Rate and Rhythm: Normal rate and regular rhythm.      Pulses:           Radial pulses are 2+ on the right side and 2+ on the left side.        Dorsalis pedis pulses are 2+ on the right side and 2+ on the left side.      Heart sounds: Normal heart sounds.   Pulmonary:      Effort: Pulmonary effort is normal.      Breath sounds: Examination of the left-lower field reveals decreased breath sounds. Wheezing present. No rales.   Abdominal:      General: Bowel sounds are normal.      Palpations: Abdomen is soft.      Tenderness: There is no abdominal tenderness.   Musculoskeletal:         General: No tenderness or deformity. Normal range of motion.      Cervical back: Full passive range of motion without pain, normal range " of motion and neck supple.      Right lower leg: No edema.      Left lower leg: No edema.   Lymphadenopathy:      Cervical: No cervical adenopathy.   Skin:     General: Skin is warm.      Capillary Refill: Capillary refill takes less than 2 seconds.   Neurological:      Mental Status: He is alert and oriented to person, place, and time.      Sensory: No sensory deficit.      Coordination: Coordination normal.      Gait: Gait normal.      Comments: CN II-XII normal   Psychiatric:         Attention and Perception: Attention normal.         Mood and Affect: Mood and affect normal.         Speech: Speech normal.         Behavior: Behavior normal.         Thought Content: Thought content normal.                         Assessment and Plan   Diagnoses and all orders for this visit:    1. Encounter for subsequent annual wellness visit (AWV) in Medicare patient (Primary)    2. Annual physical exam    3. Mixed hyperlipidemia  4. Coronary arteriosclerosis in native artery  5. Essential hypertension        - Follow heart healthy diet.  Keep sodium intake < 1500 mg per day.  Avoid processed & fast foods.          - Exercise as tolerated, with a goal of 30 minutes of moderate exercise most days.         - Take medications as prescribed.    6. Decreased breath sounds at right lung base  -     XR Chest PA & Lateral  7. Chronic obstructive pulmonary disease, unspecified COPD type        - Continue using inhalers as prescribed.  Continue to abstain from smoking     8. Type 2 diabetes mellitus with diabetic neuropathy, without long-term current use of insulin        - Follow diabetic diet        - Monitor blood sugars as discussed, to better assess trends and glycemic response to certain food, drink, activities.  Advised fasting blood glucose should be < 130, 2 hours post-prandial should be < 180        - See eye doctor annually or as discussed        - Wear protective foot wear/no bare feet        - Check feet regularly for calluses  or ulcers        - Discussed risk of poorly controlled diabetes and long-term complications        - Exercise as tolerated for 30-45 minutes most days of the week. Gradually increase daily exercise if not currently active.        - Take all medications as prescribed    9. Leukocytosis, unspecified type  -     Peripheral Blood Smear  -     XR Chest PA & Lateral    10. Elevated BUN  -     Basic Metabolic Panel    11. Elevated serum creatinine  -     Basic Metabolic Panel    12. Sensation of feeling cold  -     T4, Free  -     TSH  -     Triiodothyronine (T3) Total & Free    13. Weight loss  -     T4, Free  -     TSH  -     Triiodothyronine (T3) Total & Free    14. Mixed conductive and sensorineural hearing loss of both ears        - Continue wearing hearing aids.  Follow up with hearing specialist as scheduled.             Follow Up   No follow-ups on file.  Patient was given instructions and counseling regarding his condition or for health maintenance advice. Please see specific information pulled into the AVS if appropriate.

## 2024-07-06 DIAGNOSIS — J44.9 CHRONIC OBSTRUCTIVE PULMONARY DISEASE, UNSPECIFIED COPD TYPE: ICD-10-CM

## 2024-07-08 RX ORDER — FLUTICASONE FUROATE, UMECLIDINIUM BROMIDE AND VILANTEROL TRIFENATATE 100; 62.5; 25 UG/1; UG/1; UG/1
1 POWDER RESPIRATORY (INHALATION)
Qty: 180 EACH | Refills: 3 | OUTPATIENT
Start: 2024-07-08

## 2024-07-08 NOTE — PROGRESS NOTES
Chest xray shows a focal consolidation in the right posterior lower lobe.  Similar to previously noted changes.  Has spoken to oncology about PET scan in January, 2024.

## 2024-07-09 ENCOUNTER — PATIENT MESSAGE (OUTPATIENT)
Dept: INTERNAL MEDICINE | Facility: CLINIC | Age: 84
End: 2024-07-09
Payer: MEDICARE

## 2024-07-09 DIAGNOSIS — N28.9 FUNCTION KIDNEY DECREASED: Primary | ICD-10-CM

## 2024-07-09 DIAGNOSIS — J44.9 CHRONIC OBSTRUCTIVE PULMONARY DISEASE, UNSPECIFIED COPD TYPE: ICD-10-CM

## 2024-07-09 LAB
BASOPHILS # BLD AUTO: 0.1 X10E3/UL (ref 0–0.2)
BASOPHILS NFR BLD AUTO: 0 %
BUN SERPL-MCNC: 38 MG/DL (ref 8–23)
BUN/CREAT SERPL: 27.9 (ref 7–25)
CALCIUM SERPL-MCNC: 9.3 MG/DL (ref 8.6–10.5)
CHLORIDE SERPL-SCNC: 96 MMOL/L (ref 98–107)
CO2 SERPL-SCNC: 23.7 MMOL/L (ref 22–29)
CREAT SERPL-MCNC: 1.36 MG/DL (ref 0.76–1.27)
EGFRCR SERPLBLD CKD-EPI 2021: 52 ML/MIN/1.73
EOSINOPHIL # BLD AUTO: 0.2 X10E3/UL (ref 0–0.4)
EOSINOPHIL NFR BLD AUTO: 1 %
ERYTHROCYTE [DISTWIDTH] IN BLOOD BY AUTOMATED COUNT: 14.6 % (ref 11.6–15.4)
GLUCOSE SERPL-MCNC: 88 MG/DL (ref 65–99)
HCT VFR BLD AUTO: 40.1 % (ref 37.5–51)
HGB BLD-MCNC: 12.7 G/DL (ref 13–17.7)
IMM GRANULOCYTES # BLD AUTO: 0.7 X10E3/UL (ref 0–0.1)
IMM GRANULOCYTES NFR BLD AUTO: 3 %
LYMPHOCYTES # BLD AUTO: 3.2 X10E3/UL (ref 0.7–3.1)
LYMPHOCYTES NFR BLD AUTO: 13 %
MCH RBC QN AUTO: 28.9 PG (ref 26.6–33)
MCHC RBC AUTO-ENTMCNC: 31.7 G/DL (ref 31.5–35.7)
MCV RBC AUTO: 91 FL (ref 79–97)
MONOCYTES # BLD AUTO: 2.6 X10E3/UL (ref 0.1–0.9)
MONOCYTES NFR BLD AUTO: 11 %
NEUTROPHILS # BLD AUTO: 17.2 X10E3/UL (ref 1.4–7)
NEUTROPHILS NFR BLD AUTO: 72 %
PATH INTERP BLD-IMP: ABNORMAL
PATH REV BLD -IMP: ABNORMAL
PATHOLOGIST NAME: ABNORMAL
PLATELET # BLD AUTO: 492 X10E3/UL (ref 150–450)
POTASSIUM SERPL-SCNC: 5 MMOL/L (ref 3.5–5.2)
RBC # BLD AUTO: 4.39 X10E6/UL (ref 4.14–5.8)
SODIUM SERPL-SCNC: 137 MMOL/L (ref 136–145)
T3 SERPL-MCNC: 82 NG/DL (ref 71–180)
T3FREE SERPL-MCNC: 1.9 PG/ML (ref 2–4.4)
T4 FREE SERPL-MCNC: 1.2 NG/DL (ref 0.92–1.68)
TSH SERPL DL<=0.005 MIU/L-ACNC: 1.54 UIU/ML (ref 0.27–4.2)
WBC # BLD AUTO: 24 X10E3/UL (ref 3.4–10.8)

## 2024-07-09 NOTE — PROGRESS NOTES
Kidney function continues to be decreased.  Recommend referral to nephrology, if he agrees.  Thyroid function is normal, 1 value just outside lab parameters, nothing concerning at this time.  White blood cell count continues to be very high.  He has been seen by hematology/oncology, Dr Harris, in the past. Will consult with him regarding abnormal WBC count.

## 2024-07-09 NOTE — TELEPHONE ENCOUNTER
"    Caller: Jairo Mercedes \"Latoya\"    Relationship: Self    Best call back number: [phone]     Requested Prescriptions:   Requested Prescriptions     Pending Prescriptions Disp Refills    Fluticasone-Umeclidin-Vilant (Trelegy Ellipta) 100-62.5-25 MCG/ACT inhaler 180 each 3     Sig: Inhale 1 puff Daily. Rinse mouth out after use        Pharmacy where request should be sent: [facility] [address] [phone] [facility] [phone]      Last office visit with prescribing clinician: 7/5/2024   Last telemedicine visit with prescribing clinician: Visit date not found   Next office visit with prescribing clinician: Visit date not found     Additional details provided by patient: PATIENT HAS 5 DAYS REMAINING AND RECEIVED NOTIFICATION THAT HE COULD NOT FILL PRESCRIPTION DUE TO IT BEING TOO SOON. PLEASE CALL PATIENT AND ADVISE.    Does the patient have less than a 3 day supply:  [] Yes  [x] No      Susanna Georges Rep   07/09/24 12:49 EDT       "

## 2024-07-10 RX ORDER — FLUTICASONE FUROATE, UMECLIDINIUM BROMIDE AND VILANTEROL TRIFENATATE 100; 62.5; 25 UG/1; UG/1; UG/1
1 POWDER RESPIRATORY (INHALATION)
Qty: 180 EACH | Refills: 3 | Status: SHIPPED | OUTPATIENT
Start: 2024-07-10

## 2024-07-10 NOTE — TELEPHONE ENCOUNTER
Rx Refill Note  Requested Prescriptions     Pending Prescriptions Disp Refills    Fluticasone-Umeclidin-Vilant (Trelegy Ellipta) 100-62.5-25 MCG/ACT inhaler 180 each 3     Sig: Inhale 1 puff Daily. Rinse mouth out after use      Last office visit with prescribing clinician: 7/5/2024   Last telemedicine visit with prescribing clinician: Visit date not found   Next office visit with prescribing clinician: Visit date not found                         Would you like a call back once the refill request has been completed: [] Yes [] No    If the office needs to give you a call back, can they leave a voicemail: [] Yes [] No    Brynn Fernandez MA  07/10/24, 08:06 EDT

## 2024-07-10 NOTE — PROGRESS NOTES
Please let patient know Dr Harris agrees no further testing is needed at this time. If he continues to lose weight we will consider repeat PET scan.

## 2024-07-10 NOTE — TELEPHONE ENCOUNTER
From: Becky MANDUJANO  To: Jairo Mercedes  Sent: 7/9/2024 1:34 PM EDT  Subject: Results    Per Cierra Mclean:  Kidney function continues to be decreased. Recommend referral to nephrology, if he agrees.  Thyroid function is normal, 1 value just outside lab parameters, nothing concerning at this time.  White blood cell count continues to be very high. He has been seen by hematology/oncology, Dr Harris, in the past. Will consult with him regarding abnormal WBC count.

## 2024-09-13 ENCOUNTER — OFFICE VISIT (OUTPATIENT)
Dept: PULMONOLOGY | Facility: CLINIC | Age: 84
End: 2024-09-13
Payer: MEDICARE

## 2024-09-13 VITALS
DIASTOLIC BLOOD PRESSURE: 76 MMHG | HEART RATE: 54 BPM | OXYGEN SATURATION: 95 % | SYSTOLIC BLOOD PRESSURE: 132 MMHG | WEIGHT: 164 LBS | BODY MASS INDEX: 24.29 KG/M2 | HEIGHT: 69 IN

## 2024-09-13 DIAGNOSIS — J44.9 CHRONIC OBSTRUCTIVE PULMONARY DISEASE, UNSPECIFIED COPD TYPE: Primary | ICD-10-CM

## 2024-09-13 DIAGNOSIS — R91.1 LUNG NODULE, SOLITARY: ICD-10-CM

## 2024-09-13 RX ORDER — ALBUTEROL SULFATE 90 UG/1
2 AEROSOL, METERED RESPIRATORY (INHALATION) EVERY 4 HOURS PRN
COMMUNITY
End: 2024-09-13 | Stop reason: SDUPTHER

## 2024-09-13 RX ORDER — ALBUTEROL SULFATE 90 UG/1
2 AEROSOL, METERED RESPIRATORY (INHALATION) EVERY 4 HOURS PRN
Qty: 8 G | Refills: 2 | Status: SHIPPED | OUTPATIENT
Start: 2024-09-13

## 2024-09-13 NOTE — PROGRESS NOTES
Follow Up Office Visit      Patient Name: Jairo Mercedes    Chief Complaint:    Chief Complaint   Patient presents with    Breathing Problem       History of Present Illness: Jairo Mercedes is a 83 y.o. male who is here today for follow up of COPD.  Since last visit, he notes that his dyspnea has been stable.  No exacerbations.  Reports compliance with Trelegy.  Only uses albuterol prior to playing golf.  He has surveillance chest imaging at  [facility] for his history of bladder cancer.    Supplemental Oxygen: 2L NC, has an Inogen POC    Subjective      Review of Systems:  Review of Systems   Constitutional:  Negative for fever and unexpected weight change.   Respiratory:  Positive for shortness of breath. Negative for cough and wheezing.    Cardiovascular:  Negative for chest pain and leg swelling.        Past Medical History:   Past Medical History:   Diagnosis Date    Abdominal pain     Acute sinusitis     Allergic N/A    Anemia     Anxiety     Arthritis     Asthma N/A    Bladder cancer 04/2021    Cataracts, bilateral     COPD (chronic obstructive pulmonary disease)     Coronary artery disease N/A    Some Blockage    Diabetes mellitus     REPORTS DIET CONTROL ONLY, TAKES NO MEDICATION    Emphysema of lung     GERD (gastroesophageal reflux disease)     Heme positive stool     History of ankle fracture     LEFT     History of cardiovascular stress test     REPORTS IN HIS LATE 50'S AND THAT ALL WAS WNL'S AT THAT TIME    History of echocardiogram     REPORTS HAS HAD 2. DOES NOT REPORT ANY HX OF CHEST PAIN.    History of pneumonia     [place] (hard of hearing)     WEARS HEARING AIDS    Hypertension     Impingement syndrome of left shoulder 04/25/2023    Melanoma     2013    Oxygen dependent     2 L NC HS    Pneumonia     Presence of urostomy 06/15/2021    Rotator cuff syndrome     Seasonal allergies     Urinary tract infection Bladder Cancer 6/15/2021    Wears glasses     Wears partial dentures        Past  Surgical History:   Past Surgical History:   Procedure Laterality Date    BRAIN SURGERY  1999    REPORTS FOR SUBDURAL HEMATOMA     BRONCHOSCOPY N/A 04/16/2018    Procedure: BRONCHOSCOPY DIAGNOSTIC WITH WASHINGS, BRUSHINGS AND BIOPSIES;  Surgeon: Morgan Frias MD;  Location: [facility] OR;  Service: Pulmonary    BRONCHOSCOPY      ALEX HOLE FOR SUBDURAL HEMATOMA      CARDIAC CATHETERIZATION      REPORTS APPROXIMATELY 2015 AND REPORTS NO STENTS WERE PLACED    CATARACT EXTRACTION W/ INTRAOCULAR LENS IMPLANT Left 06/23/2022    Procedure: CATARACT PHACO EXTRACTION WITH INTRAOCULAR LENS IMPLANT LEFT COMPLICATED WITH MALYUGIN RING;  Surgeon: Mack Blake MD;  Location: [facility] OR;  Service: Ophthalmology;  Laterality: Left;    CATARACT EXTRACTION W/ INTRAOCULAR LENS IMPLANT Right 07/14/2022    Procedure: CATARACT PHACO EXTRACTION WITH INTRAOCULAR LENS IMPLANT RIGHT COMPLICATED WITH MALYUGIN RING;  Surgeon: Mack Blake MD;  Location: [facility] OR;  Service: Ophthalmology;  Laterality: Right;    CHOLECYSTECTOMY      COLONOSCOPY      COLONOSCOPY N/A 08/02/2018    Procedure: COLONOSCOPY WITH HOT FORCEP POLYPECTOMY X2  ;  Surgeon: Sara Reynoso MD;  Location: [facility] ENDOSCOPY;  Service: Gastroenterology    ENDOSCOPY      EYE SURGERY  Both Eyes Cateract    LIVER BIOPSY      LYMPH NODE BIOPSY      MOLE REMOVAL  2005    MELANOMA ON BACK    OTHER SURGICAL HISTORY Right     UROSTOMY    SPLENECTOMY      THORACOTOMY  07/28/2015    Left thoracotomy and stapling of pulmonary blebs. Mechanical pleurodesis.    TONSILLECTOMY      TRANSURETHRAL RESECTION OF BLADDER TUMOR N/A 04/13/2021    Procedure: TRANSURETHRAL RESECTION OF BLADDER TUMOR;  Surgeon: Osmani Francois MD;  Location: [facility] OR;  Service: Urology;  Laterality: N/A;    WISDOM TOOTH EXTRACTION         Family History:   Family History   Problem Relation Age of Onset    Alzheimer's disease Mother     Arthritis Mother     Anesthesia problems Mother     Heart  disease Father     Hyperlipidemia Father     Cancer Other     Cancer Maternal Grandmother        Social History:   Social History     Socioeconomic History    Marital status:    Tobacco Use    Smoking status: Former     Current packs/day: 0.00     Average packs/day: 2.0 packs/day for 42.1 years (84.3 ttl pk-yrs)     Types: Cigarettes     Start date: 1957     Quit date: 1999     Years since quittin.5     Passive exposure: Past    Smokeless tobacco: Never   Vaping Use    Vaping status: Never Used   Substance and Sexual Activity    Alcohol use: Not Currently    Drug use: No    Sexual activity: Not Currently     Partners: Female     Birth control/protection: Partner of same sex       Current Medications:     Current Outpatient Medications:     acetaminophen (TYLENOL) 500 MG tablet, Take  by mouth Every 6 (Six) Hours As Needed., Disp: , Rfl:     aspirin 81 MG EC tablet, Take 1 tablet by mouth Daily., Disp: , Rfl:     atorvastatin (LIPITOR) 40 MG tablet, TAKE ONE TABLET BY MOUTH EVERY NIGHT AT BEDTIME, Disp: 90 tablet, Rfl: 3    azelastine (ASTELIN) 0.1 % nasal spray, 1-2 sprays each nostril bid, Disp: 3 each, Rfl: 1    ciclopirox (LOPROX) 1 % shampoo, ciclopirox 1 % shampoo  APPLY TOPICALLY AS DIRECTED THREE TIMES A WEEK, Disp: , Rfl:     clobetasol (TEMOVATE) 0.05 % external solution, APPLY TO THE AFFECTED AREA(S) TOPICALLY AS DIRECTED TWICE DAILY, Disp: , Rfl:     esomeprazole (nexIUM) 40 MG capsule, Take 1 capsule by mouth Daily., Disp: 90 capsule, Rfl: 1    fluocinonide (LIDEX) 0.05 % external solution, APPLY TOPICALLY AS DIRECTED AT BEDTIME TO affected area, Disp: , Rfl:     Fluticasone-Umeclidin-Vilant (Trelegy Ellipta) 100-62.5-25 MCG/ACT inhaler, Inhale 1 puff Daily. Rinse mouth out after use, Disp: 180 each, Rfl: 3    isosorbide mononitrate (IMDUR) 30 MG 24 hr tablet, Take 1 tablet by mouth Daily., Disp: 90 tablet, Rfl: 3    losartan (COZAAR) 25 MG tablet, , Disp: , Rfl:     meclizine  "(ANTIVERT) 25 MG tablet, TAKE ONE TABLET BY MOUTH TWICE DAILY (Patient taking differently: As Needed.), Disp: 180 tablet, Rfl: 3    metoprolol tartrate (LOPRESSOR) 25 MG tablet, TAKE ONE TABLET BY MOUTH TWICE DAILY, Disp: 180 tablet, Rfl: 3    nitroglycerin (NITROSTAT) 0.4 MG SL tablet, Place 1 tablet under the tongue Every 5 (Five) Minutes As Needed for Chest Pain. Don't use, Disp: , Rfl:     Omega-3 Fatty Acids (FISH OIL) 1000 MG capsule capsule, Take 1 capsule by mouth Daily With Breakfast., Disp: , Rfl:     sertraline (ZOLOFT) 25 MG tablet, Take 1 tablet by mouth Daily., Disp: 90 tablet, Rfl: 3    traZODone (DESYREL) 50 MG tablet, TAKE 1/2 TO 1 TABLET BY MOUTH EVERY NIGHT AS NEEDED, Disp: 90 tablet, Rfl: 3    triamcinolone (KENALOG) 0.025 % ointment, Apply to the affected areas(s) twice daily, Disp: 80 g, Rfl: 1    albuterol sulfate  (90 Base) MCG/ACT inhaler, Inhale 2 puffs Every 4 (Four) Hours As Needed for Wheezing or Shortness of Air., Disp: , Rfl:      Allergies:   Allergies   Allergen Reactions    Parabens Rash    Anesthetics, Amide Rash    Mixed Ragweed Other (See Comments)       Objective     Physical Exam:  Vital Signs:   Vitals:    09/13/24 0902   BP: 132/76   Pulse: 54   SpO2: 95%   Weight: 74.4 kg (164 lb)   Height: 175.3 cm (69\")     Body mass index is 24.22 kg/m².    Physical Exam  Vitals reviewed.   Constitutional:       General: He is not in acute distress.     Appearance: He is not toxic-appearing.   HENT:      Head: Normocephalic and atraumatic.      Mouth/Throat:      Mouth: Mucous membranes are moist.   Eyes:      Conjunctiva/sclera: Conjunctivae normal.   Cardiovascular:      Rate and Rhythm: Normal rate.      Heart sounds: Normal heart sounds.   Pulmonary:      Effort: Pulmonary effort is normal.      Breath sounds: Normal breath sounds.   Abdominal:      General: There is no distension.      Palpations: Abdomen is soft.   Musculoskeletal:         General: No swelling.      Cervical " back: Neck supple.   Skin:     General: Skin is warm and dry.      Findings: No rash.   Neurological:      General: No focal deficit present.      Mental Status: He is alert and oriented to person, place, and time.   Psychiatric:         Mood and Affect: Mood normal.         Behavior: Behavior normal.       Results Review:   July 2024 chest x-ray showed focal consolidation in the posterior right lower lobe with right-sided pleural effusion/thickening; while findings are abnormal, appearance is similar dating back to 2022 and thus suggestive of chronic changes, less likely superimposed/recurrent acute disease. Background of emphysematous changes noted.    June 2024 chest CT scan showed interval resolution of previously described lingular nodule and decrease size of left lower lobe nodule.  Unchanged right apical spiculated nodule.  No new suspicious pulmonary findings.    Assessment / Plan      Assessment/Plan:   Diagnoses and all orders for this visit:    1. Chronic obstructive pulmonary disease, unspecified COPD type (Primary)  -     albuterol sulfate  (90 Base) MCG/ACT inhaler; Inhale 2 puffs Every 4 (Four) Hours As Needed for Wheezing or Shortness of Air.  Dispense: 8 g; Refill: 2  Stable symptoms.  Continue current inhaled regimen. We discussed the risk and benefits of inhaled corticosteroids. Patient instructed to take them on a regular basis as prescribed. Patient instructed to rinse their mouth out after each use.     2. Lung nodule, solitary  Stable right apical nodule and improvement of other nodules noted on most recent chest CT scan, ongoing surveillance scans via oncology.       Follow Up:   Return in about 6 months (around 3/13/2025) for Recheck.  The patient was counseled on diagnostic results, risks and benefits of treatment options, risk factor modifications and the importance of treatment compliance. The patient was advised to contact the clinic with concerns or worsening symptoms.      GENTRY Ramirez   [facility]     This document has been electronically signed by GENTRY Ramirez  September 13, 2024

## 2024-09-26 ENCOUNTER — TRANSCRIBE ORDERS (OUTPATIENT)
Dept: ADMINISTRATIVE | Facility: HOSPITAL | Age: 84
End: 2024-09-26
Payer: MEDICARE

## 2024-09-26 ENCOUNTER — TELEPHONE (OUTPATIENT)
Dept: INTERNAL MEDICINE | Facility: CLINIC | Age: 84
End: 2024-09-26
Payer: MEDICARE

## 2024-09-26 DIAGNOSIS — N18.31 CHRONIC KIDNEY DISEASE (CKD) STAGE G3A/A1, MODERATELY DECREASED GLOMERULAR FILTRATION RATE (GFR) BETWEEN 45-59 ML/MIN/1.73 SQUARE METER AND ALBUMINURIA CREATININE RATIO LESS THAN 30 MG/G (CMS/H*: Primary | ICD-10-CM

## 2024-11-20 RX ORDER — TRAZODONE HYDROCHLORIDE 50 MG/1
TABLET, FILM COATED ORAL
Qty: 90 TABLET | Refills: 3 | Status: SHIPPED | OUTPATIENT
Start: 2024-11-20

## 2024-11-21 ENCOUNTER — HOSPITAL ENCOUNTER (OUTPATIENT)
Dept: ULTRASOUND IMAGING | Facility: HOSPITAL | Age: 84
Discharge: HOME | End: 2024-11-21
Payer: MEDICARE

## 2024-11-21 DIAGNOSIS — N18.31 CHRONIC KIDNEY DISEASE (CKD) STAGE G3A/A1, MODERATELY DECREASED GLOMERULAR FILTRATION RATE (GFR) BETWEEN 45-59 ML/MIN/1.73 SQUARE METER AND ALBUMINURIA CREATININE RATIO LESS THAN 30 MG/G (CMS/H*: ICD-10-CM

## 2024-11-21 PROCEDURE — 76775 US EXAM ABDO BACK WALL LIM: CPT

## 2024-12-08 DIAGNOSIS — K21.9 GASTROESOPHAGEAL REFLUX DISEASE WITHOUT ESOPHAGITIS: ICD-10-CM

## 2024-12-09 DIAGNOSIS — K21.9 GASTROESOPHAGEAL REFLUX DISEASE WITHOUT ESOPHAGITIS: ICD-10-CM

## 2024-12-09 RX ORDER — ESOMEPRAZOLE MAGNESIUM 40 MG/1
40 CAPSULE, DELAYED RELEASE ORAL DAILY
Qty: 90 CAPSULE | Refills: 1 | Status: SHIPPED | OUTPATIENT
Start: 2024-12-09 | End: 2024-12-09 | Stop reason: SDUPTHER

## 2024-12-09 RX ORDER — ESOMEPRAZOLE MAGNESIUM 40 MG/1
40 CAPSULE, DELAYED RELEASE ORAL DAILY
Qty: 90 CAPSULE | Refills: 1 | Status: SHIPPED | OUTPATIENT
Start: 2024-12-09

## 2025-01-02 ENCOUNTER — OFFICE VISIT (OUTPATIENT)
Dept: ONCOLOGY | Facility: CLINIC | Age: 85
End: 2025-01-02
Payer: MEDICARE

## 2025-01-02 ENCOUNTER — LAB (OUTPATIENT)
Dept: LAB | Facility: HOSPITAL | Age: 85
End: 2025-01-02
Payer: MEDICARE

## 2025-01-02 VITALS
WEIGHT: 159.5 LBS | HEIGHT: 69 IN | HEART RATE: 56 BPM | TEMPERATURE: 98 F | RESPIRATION RATE: 16 BRPM | OXYGEN SATURATION: 91 % | SYSTOLIC BLOOD PRESSURE: 119 MMHG | DIASTOLIC BLOOD PRESSURE: 58 MMHG | BODY MASS INDEX: 23.62 KG/M2

## 2025-01-02 DIAGNOSIS — E61.1 IRON DEFICIENCY: ICD-10-CM

## 2025-01-02 DIAGNOSIS — E61.1 IRON DEFICIENCY: Primary | ICD-10-CM

## 2025-01-02 LAB
ALBUMIN SERPL-MCNC: 3.9 G/DL (ref 3.5–5.2)
ALBUMIN/GLOB SERPL: 1.3 G/DL
ALP SERPL-CCNC: 161 U/L (ref 39–117)
ALT SERPL W P-5'-P-CCNC: 20 U/L (ref 1–41)
ANION GAP SERPL CALCULATED.3IONS-SCNC: 12.2 MMOL/L (ref 5–15)
AST SERPL-CCNC: 20 U/L (ref 1–40)
BASOPHILS # BLD AUTO: 0.05 10*3/MM3 (ref 0–0.2)
BASOPHILS NFR BLD AUTO: 0.2 % (ref 0–1.5)
BILIRUB SERPL-MCNC: 0.3 MG/DL (ref 0–1.2)
BUN SERPL-MCNC: 37 MG/DL (ref 8–23)
BUN/CREAT SERPL: 30.8 (ref 7–25)
CALCIUM SPEC-SCNC: 9.3 MG/DL (ref 8.6–10.5)
CHLORIDE SERPL-SCNC: 102 MMOL/L (ref 98–107)
CO2 SERPL-SCNC: 22.8 MMOL/L (ref 22–29)
CREAT SERPL-MCNC: 1.2 MG/DL (ref 0.76–1.27)
DEPRECATED RDW RBC AUTO: 52.6 FL (ref 37–54)
EGFRCR SERPLBLD CKD-EPI 2021: 60 ML/MIN/1.73
EOSINOPHIL # BLD AUTO: 0.14 10*3/MM3 (ref 0–0.4)
EOSINOPHIL NFR BLD AUTO: 0.7 % (ref 0.3–6.2)
ERYTHROCYTE [DISTWIDTH] IN BLOOD BY AUTOMATED COUNT: 15.8 % (ref 12.3–15.4)
FERRITIN SERPL-MCNC: 181.7 NG/ML (ref 30–400)
GLOBULIN UR ELPH-MCNC: 3.1 GM/DL
GLUCOSE SERPL-MCNC: 85 MG/DL (ref 65–99)
HCT VFR BLD AUTO: 39.6 % (ref 37.5–51)
HGB BLD-MCNC: 13 G/DL (ref 13–17.7)
IMM GRANULOCYTES # BLD AUTO: 0.21 10*3/MM3 (ref 0–0.05)
IMM GRANULOCYTES NFR BLD AUTO: 1 % (ref 0–0.5)
IRON 24H UR-MRATE: 23 MCG/DL (ref 59–158)
IRON SATN MFR SERPL: 8 % (ref 20–50)
LYMPHOCYTES # BLD AUTO: 2.83 10*3/MM3 (ref 0.7–3.1)
LYMPHOCYTES NFR BLD AUTO: 13.4 % (ref 19.6–45.3)
MCH RBC QN AUTO: 30 PG (ref 26.6–33)
MCHC RBC AUTO-ENTMCNC: 32.8 G/DL (ref 31.5–35.7)
MCV RBC AUTO: 91.5 FL (ref 79–97)
MONOCYTES # BLD AUTO: 2.67 10*3/MM3 (ref 0.1–0.9)
MONOCYTES NFR BLD AUTO: 12.6 % (ref 5–12)
NEUTROPHILS NFR BLD AUTO: 15.27 10*3/MM3 (ref 1.7–7)
NEUTROPHILS NFR BLD AUTO: 72.1 % (ref 42.7–76)
NRBC BLD AUTO-RTO: 0 /100 WBC (ref 0–0.2)
PLATELET # BLD AUTO: 406 10*3/MM3 (ref 140–450)
PMV BLD AUTO: 10.2 FL (ref 6–12)
POTASSIUM SERPL-SCNC: 5.4 MMOL/L (ref 3.5–5.2)
PROT SERPL-MCNC: 7 G/DL (ref 6–8.5)
RBC # BLD AUTO: 4.33 10*6/MM3 (ref 4.14–5.8)
SODIUM SERPL-SCNC: 137 MMOL/L (ref 136–145)
TIBC SERPL-MCNC: 283 MCG/DL (ref 298–536)
TRANSFERRIN SERPL-MCNC: 190 MG/DL (ref 200–360)
WBC NRBC COR # BLD AUTO: 21.17 10*3/MM3 (ref 3.4–10.8)

## 2025-01-02 PROCEDURE — 3074F SYST BP LT 130 MM HG: CPT | Performed by: NURSE PRACTITIONER

## 2025-01-02 PROCEDURE — 36415 COLL VENOUS BLD VENIPUNCTURE: CPT | Performed by: NURSE PRACTITIONER

## 2025-01-02 PROCEDURE — 3078F DIAST BP <80 MM HG: CPT | Performed by: NURSE PRACTITIONER

## 2025-01-02 PROCEDURE — 1126F AMNT PAIN NOTED NONE PRSNT: CPT | Performed by: NURSE PRACTITIONER

## 2025-01-02 PROCEDURE — 80053 COMPREHEN METABOLIC PANEL: CPT | Performed by: NURSE PRACTITIONER

## 2025-01-02 PROCEDURE — 83540 ASSAY OF IRON: CPT | Performed by: NURSE PRACTITIONER

## 2025-01-02 PROCEDURE — 82728 ASSAY OF FERRITIN: CPT | Performed by: NURSE PRACTITIONER

## 2025-01-02 PROCEDURE — 85025 COMPLETE CBC W/AUTO DIFF WBC: CPT | Performed by: NURSE PRACTITIONER

## 2025-01-02 PROCEDURE — 84466 ASSAY OF TRANSFERRIN: CPT | Performed by: NURSE PRACTITIONER

## 2025-01-02 PROCEDURE — 99214 OFFICE O/P EST MOD 30 MIN: CPT | Performed by: NURSE PRACTITIONER

## 2025-01-02 RX ORDER — ALPRAZOLAM 0.5 MG/1
0.5 TABLET, ORALLY DISINTEGRATING ORAL
COMMUNITY

## 2025-01-02 RX ORDER — RAMIPRIL 1.25 MG/1
CAPSULE ORAL
COMMUNITY
Start: 2024-12-23

## 2025-01-02 NOTE — PROGRESS NOTES
DATE OF VISIT: 1/2/2025    REASON FOR VISIT: Followup for iron deficiency anemia     PROBLEM LIST:  1. 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 Thrombocythemia  3. Leukocytosis  4. S/P splenectomy   5. HTN  6.  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  Status post radical cystectomy done by Dr. Osmani Powell at  June 2021  7.  Stage IIIa chronic kidney disease  8.  Type 2 diabetes with neuropathy  9.Steatosis of liver   10.  Sleep apnea    HISTORY OF PRESENT ILLNESS: The patient is a very pleasant 84 y.o. male with past medical history significant for iron deficiency anemia with thrombocythemia diagnosed December 2017. The patient is here today for follow-up    SUBJECTIVE: Latoya is here today by self.  Overall, doing fairly well.  Continues to have mild fatigue.  Continues to follow with  for history of bladder cancer.  Next appointment is in January.  Currently seeing nephrology.      Past History:  Medical History: has a past medical history of Abdominal pain, Acute sinusitis, Allergic (N/A), Anemia, Anxiety, Arthritis, Asthma (N/A), Bladder cancer (04/2021), Cataracts, bilateral, COPD (chronic obstructive pulmonary disease), Coronary artery disease (N/A), Diabetes mellitus, Emphysema of lung, GERD (gastroesophageal reflux disease), Heme positive stool, History of ankle fracture, History of cardiovascular stress test, History of echocardiogram, History of pneumonia, [place] (hard of hearing), Hypertension, Impingement syndrome of left shoulder (04/25/2023), Melanoma, Oxygen dependent, Pneumonia, Presence of urostomy (06/15/2021), Rotator cuff syndrome, Seasonal allergies, Urinary tract infection (Bladder Cancer 6/15/2021), Wears glasses, and Wears partial dentures.   Surgical History: has a past surgical history that includes Lymph node biopsy; Cholecystectomy; Mole removal (2005); Thoracotomy (07/28/2015); Splenectomy, total; [place] hole for subdural hematoma; Liver biopsy;  "Esophagogastroduodenoscopy; [place] tooth extraction; Tonsillectomy; Bronchoscopy (N/A, 04/16/2018); Cardiac catheterization; Brain surgery (1999); Colonoscopy; Colonoscopy (N/A, 08/02/2018); Bronchoscopy; Transurethral resection of bladder tumor (N/A, 04/13/2021); Cataract extraction w/ intraocular lens implant (Left, 06/23/2022); Other surgical history (Right); Cataract extraction w/ intraocular lens implant (Right, 07/14/2022); and Eye surgery (Both Eyes Cateract).   Family History: family history includes Alzheimer's disease in his mother; Anesthesia problems in his mother; Arthritis in his mother; Cancer in his maternal grandmother and another family member; Heart disease in his father; Hyperlipidemia in his father.   Social History: reports that he quit smoking about 25 years ago. His smoking use included cigarettes. He started smoking about 68 years ago. He has a 84.3 pack-year smoking history. He has been exposed to tobacco smoke. He has never used smokeless tobacco. He reports that he does not currently use alcohol. He reports that he does not use drugs.    (Not in a hospital admission)     Allergies: Parabens; Anesthetics, amide; and Mixed ragweed     Review of Systems   Constitutional:  Positive for fatigue.   HENT: Negative.     Respiratory: Negative.     Cardiovascular: Negative.    Gastrointestinal: Negative.    Musculoskeletal: Negative.    Skin: Negative.    Neurological: Negative.    Psychiatric/Behavioral:  The patient is nervous/anxious.        PHYSICAL EXAMINATION:   /58   Pulse 56   Temp 98 °F (36.7 °C)   Resp 16   Ht 175.3 cm (69.02\")   Wt 72.3 kg (159 lb 8 oz)   SpO2 91%   BMI 23.54 kg/m²    Pain Score    01/02/25 0901   PainSc: 0-No pain            ECOG score: 0            ECOG Performance Status: 1 - Symptomatic but completely ambulatory      General Appearance:      alert, cooperative, no apparent distress and appears stated age   Lungs:   Clear to auscultation bilaterally; " respirations regular, even, and unlabored bilaterally   Heart:  Regular rate and rhythm, no murmurs appreciated   Abdomen:   Soft, non-tender, and non-distended                 No visits with results within 2 Week(s) from this visit.   Latest known visit with results is:   Office Visit on 07/05/2024   Component Date Value Ref Range Status    Glucose 07/05/2024 88  65 - 99 mg/dL Final    BUN 07/05/2024 38 (H)  8 - 23 mg/dL Final    Creatinine 07/05/2024 1.36 (H)  0.76 - 1.27 mg/dL Final    EGFR Result 07/05/2024 52.0 (L)  >60.0 mL/min/1.73 Final    Comment: GFR Normal >60  Chronic Kidney Disease <60  Kidney Failure <15  The GFR formula is only valid for adults with stable renal  function between ages 18 and 70.      BUN/Creatinine Ratio 07/05/2024 27.9 (H)  7.0 - 25.0 Final    Sodium 07/05/2024 137  136 - 145 mmol/L Final    Potassium 07/05/2024 5.0  3.5 - 5.2 mmol/L Final    Chloride 07/05/2024 96 (L)  98 - 107 mmol/L Final    Total CO2 07/05/2024 23.7  22.0 - 29.0 mmol/L Final    Calcium 07/05/2024 9.3  8.6 - 10.5 mg/dL Final    Free T4 07/05/2024 1.20  0.92 - 1.68 ng/dL Final    TSH 07/05/2024 1.540  0.270 - 4.200 uIU/mL Final    T3, Total 07/05/2024 82  71 - 180 ng/dL Final    T3, Free 07/05/2024 1.9 (L)  2.0 - 4.4 pg/mL Final    WBC 07/05/2024 Comment   Final    Comment: Monocytosis is present. Monocytosis can be associated with  infectious, autoimmune, drug and/or myeloproliferative  etiologies. Clinical correlation is recommended. Additional  workup is recommended if these findings persist or progress.  Leukocytosis and absolute granulocytosis with slight left  shift.  These findings are most suggestive of an  infectious/inflammatory process or a drug related reaction.  Lymphocytosis with reactive changes.      RBC 07/05/2024 Comment   Final    Comment: Normochromic, normocytic anemia without significant  morphologic abnormality. Diagnostic possibilities include  early iron deficiency, decreased red cell  production due to  drug/medication, chronic disease, neoplasm, and inflammatory  processes. Acute blood loss could also be considered.      Platelets 07/05/2024 Comment   Final    Comment: Thrombocytosis. This can be associated with reactive  processes, which include Inflammation/infection, hemolysis,  and connective tissue disease, bleeding or iron deficiency.  Persistent thrombocytosis may be associated with  myeloproliferative disorder.  Rare giant platelet observed.      Comment 07/05/2024 Comment   Final    If the findings persist, additional work up is recommended.    Pathologist Review 07/05/2024 Comment   Final    Reviewed by: Charanjit Cavazos MD, Pathologist    WBC 07/05/2024 24.0 (H)  3.4 - 10.8 x10E3/uL Final    RBC 07/05/2024 4.39  4.14 - 5.80 x10E6/uL Final    Hemoglobin 07/05/2024 12.7 (L)  13.0 - 17.7 g/dL Final    Hematocrit 07/05/2024 40.1  37.5 - 51.0 % Final    MCV 07/05/2024 91  79 - 97 fL Final    MCH 07/05/2024 28.9  26.6 - 33.0 pg Final    MCHC 07/05/2024 31.7  31.5 - 35.7 g/dL Final    RDW 07/05/2024 14.6  11.6 - 15.4 % Final    Platelets 07/05/2024 492 (H)  150 - 450 x10E3/uL Final    Neutrophil Rel % 07/05/2024 72  Not Estab. % Final    Lymphocyte Rel % 07/05/2024 13  Not Estab. % Final    Monocyte Rel % 07/05/2024 11  Not Estab. % Final    Eosinophil Rel % 07/05/2024 1  Not Estab. % Final    Basophil Rel % 07/05/2024 0  Not Estab. % Final    Neutrophils Absolute 07/05/2024 17.2 (H)  1.4 - 7.0 x10E3/uL Final    Lymphocytes Absolute 07/05/2024 3.2 (H)  0.7 - 3.1 x10E3/uL Final    Monocytes Absolute 07/05/2024 2.6 (H)  0.1 - 0.9 x10E3/uL Final    Eosinophils Absolute 07/05/2024 0.2  0.0 - 0.4 x10E3/uL Final    Basophils Absolute 07/05/2024 0.1  0.0 - 0.2 x10E3/uL Final    Immature Granulocyte Rel % 07/05/2024 3  Not Estab. % Final    Immature Grans Absolute 07/05/2024 0.7 (H)  0.0 - 0.1 x10E3/uL Final    Comment: (An elevated percentage of Immature Granulocytes has not been found  to be clinically  significant as a sole clinical predictor of disease.  Does NOT include bands or blast cells.  Pregnancy associated  physiological leukocytosis may also show increased immature  granulocytes without clinical significance.)  **Effective July 29, 2024 profile [id number] Hematopath Consultation,**    Smear will be made non-orderable. The lab will add a Pathologist    Review to abnormal CBCs that require a Pathologist Review based    on the business rules.          No results found.      ASSESSMENT: The patient is a very pleasant 84 y.o. male  with iron deficiency anemia with thrombocythemia      PLAN:    1.  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  No recent labs.  I will repeat CBC and iron profile today.   B.  I will follow-up on CBC from today and call with any significant abnormalities  C.  Follow-up in 1 year with repeat CBC and iron profiles as long as labs are stable.  D.  If ferritin is below 30 or iron saturation is below 10% we will arrange for another round of IV iron.      2.  Thrombocythemia  A.  I did go over the results from July 5, 2024.  Hemoglobin was slightly low at 12.7 with platelets elevated at 492.  Ferritin and iron profile was not drawn at that time.    3.  Hypertension  A.  He will continue Imdur, Cozaar and Lopressor for his blood pressure.  If it remains elevated he will notify his PCP    4. 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  Status post radical cystectomy done by Dr. Osmani Powell at  June 2021  B.  Keep scheduled follow-up.    FOLLOW UP: 1 year with CBC and iron profile    Betsy Hopper, APRN  1/2/2025

## 2025-01-13 DIAGNOSIS — F41.9 ANXIETY: ICD-10-CM

## 2025-01-13 RX ORDER — SERTRALINE HYDROCHLORIDE 25 MG/1
25 TABLET, FILM COATED ORAL DAILY
Qty: 90 TABLET | Refills: 3 | Status: SHIPPED | OUTPATIENT
Start: 2025-01-13

## 2025-01-22 ENCOUNTER — OFFICE VISIT (OUTPATIENT)
Dept: INTERNAL MEDICINE | Facility: CLINIC | Age: 85
End: 2025-01-22
Payer: MEDICARE

## 2025-01-22 VITALS
WEIGHT: 163 LBS | SYSTOLIC BLOOD PRESSURE: 116 MMHG | DIASTOLIC BLOOD PRESSURE: 60 MMHG | TEMPERATURE: 98.4 F | HEART RATE: 58 BPM | HEIGHT: 69 IN | OXYGEN SATURATION: 98 % | BODY MASS INDEX: 24.14 KG/M2

## 2025-01-22 DIAGNOSIS — E11.40 TYPE 2 DIABETES MELLITUS WITH DIABETIC NEUROPATHY, WITHOUT LONG-TERM CURRENT USE OF INSULIN: ICD-10-CM

## 2025-01-22 DIAGNOSIS — F41.9 ANXIETY: ICD-10-CM

## 2025-01-22 DIAGNOSIS — J44.9 CHRONIC OBSTRUCTIVE PULMONARY DISEASE, UNSPECIFIED COPD TYPE: ICD-10-CM

## 2025-01-22 DIAGNOSIS — I25.10 CORONARY ARTERIOSCLEROSIS IN NATIVE ARTERY: ICD-10-CM

## 2025-01-22 DIAGNOSIS — I10 ESSENTIAL HYPERTENSION: ICD-10-CM

## 2025-01-22 DIAGNOSIS — E87.5 HYPERKALEMIA: Primary | ICD-10-CM

## 2025-01-22 DIAGNOSIS — E78.2 MIXED HYPERLIPIDEMIA: ICD-10-CM

## 2025-01-22 DIAGNOSIS — N28.9 FUNCTION KIDNEY DECREASED: ICD-10-CM

## 2025-01-22 PROCEDURE — 1160F RVW MEDS BY RX/DR IN RCRD: CPT | Performed by: NURSE PRACTITIONER

## 2025-01-22 PROCEDURE — G2211 COMPLEX E/M VISIT ADD ON: HCPCS | Performed by: NURSE PRACTITIONER

## 2025-01-22 PROCEDURE — 3074F SYST BP LT 130 MM HG: CPT | Performed by: NURSE PRACTITIONER

## 2025-01-22 PROCEDURE — 1126F AMNT PAIN NOTED NONE PRSNT: CPT | Performed by: NURSE PRACTITIONER

## 2025-01-22 PROCEDURE — 99214 OFFICE O/P EST MOD 30 MIN: CPT | Performed by: NURSE PRACTITIONER

## 2025-01-22 PROCEDURE — 3078F DIAST BP <80 MM HG: CPT | Performed by: NURSE PRACTITIONER

## 2025-01-22 PROCEDURE — 1159F MED LIST DOCD IN RCRD: CPT | Performed by: NURSE PRACTITIONER

## 2025-01-22 NOTE — PROGRESS NOTES
Office Visit      Patient Name: Jairo Mercedes  : 1940   MRN: [id number]     Chief Complaint:    Chief Complaint   Patient presents with    Diabetes    Hypertension     History of Present Illness  Jairo Mercedes is an 84-year-old male who presents for evaluation of weight loss, blood pressure management, sinus issues, diabetes, and elevated potassium.    HTN, HDL. He has been advised to take ramipril 1.25 mg for blood pressure management, but he has not yet initiated this medication. He monitors his blood pressure at home and occasionally experiences hypotensive episodes upon standing up quickly. Denies chest pain, dyspnea, orthopnea, palpitations, lower extremity edema, confusion, headaches, weakness, visual disturbances.    He sought medical attention at [facility] due to feeling unwell, which was subsequently diagnosed as a sinus infection. He was prescribed a 7-day course of amoxicillin, which improved his condition, although he continues to experience rhinorrhea. He uses a nasal spray for symptom management, which he finds effective. His respiratory function is satisfactory, and he uses supplemental oxygen during driving and at night as per Dr. Barahona's advice. He has observed that his decision-making capacity is compromised when he does not use oxygen while driving. He also uses an inhaler before engaging in physical activity such as golf. He carries portable oxygen with him, which he has used on two occasions when he experienced dyspnea.    He has been informed of a slightly elevated potassium level (5.4) but reports no muscle cramps. He consumes a banana daily and has been advised to reduce this to every other day.    He has had an eye exam in the last year with Dr. Wilkinson. He has been going to Dr. Watson for nerve issues in the ball of his foot and has been using a pressure pad under his foot, which he finds hard to get on. He is going to ask Dr. Watson if they sell an insert with that built  into it. He has been informed of the presence of bacteria in his urine, potentially due to an unsterilized bag. He is scheduled for blood work in the near future.    T2DM.   Taking medications as prescribed. Patient denies foot ulcerations, hyperglycemia, hypoglycemia, nausea, paresthesia of the feet, polydipsia, polyuria, polyphagia, visual disturbances and weight loss.         Subjective      I have reviewed and the following portions of the patient's history were updated as appropriate: past family history, past medical history, past social history, past surgical history and problem list.      Current Outpatient Medications:     acetaminophen (TYLENOL) 500 MG tablet, Take  by mouth Every 6 (Six) Hours As Needed., Disp: , Rfl:     albuterol sulfate  (90 Base) MCG/ACT inhaler, Inhale 2 puffs Every 4 (Four) Hours As Needed for Wheezing or Shortness of Air., Disp: 8 g, Rfl: 2    ALPRAZolam (NIRAVAM) 0.5 MG disintegrating tablet, Take 1 tablet by mouth., Disp: , Rfl:     Altace 1.25 MG capsule, , Disp: , Rfl:     aspirin 81 MG EC tablet, Take 1 tablet by mouth Daily., Disp: , Rfl:     atorvastatin (LIPITOR) 40 MG tablet, TAKE ONE TABLET BY MOUTH EVERY NIGHT AT BEDTIME, Disp: 90 tablet, Rfl: 3    azelastine (ASTELIN) 0.1 % nasal spray, 1-2 sprays each nostril bid, Disp: 3 each, Rfl: 1    benzonatate (TESSALON) 200 MG capsule, Take 1 capsule by mouth 3 (Three) Times a Day As Needed for Cough., Disp: 30 capsule, Rfl: 0    ciclopirox (LOPROX) 1 % shampoo, ciclopirox 1 % shampoo  APPLY TOPICALLY AS DIRECTED THREE TIMES A WEEK, Disp: , Rfl:     clobetasol (TEMOVATE) 0.05 % external solution, APPLY TO THE AFFECTED AREA(S) TOPICALLY AS DIRECTED TWICE DAILY, Disp: , Rfl:     esomeprazole (nexIUM) 40 MG capsule, Take 1 capsule by mouth Daily., Disp: 90 capsule, Rfl: 1    fluocinonide (LIDEX) 0.05 % external solution, APPLY TOPICALLY AS DIRECTED AT BEDTIME TO affected area, Disp: , Rfl:     Fluticasone-Umeclidin-Vilant  "(Trelegy Ellipta) 100-62.5-25 MCG/ACT inhaler, Inhale 1 puff Daily. Rinse mouth out after use, Disp: 180 each, Rfl: 3    isosorbide mononitrate (IMDUR) 30 MG 24 hr tablet, Take 1 tablet by mouth Daily., Disp: 90 tablet, Rfl: 3    losartan (COZAAR) 25 MG tablet, , Disp: , Rfl:     meclizine (ANTIVERT) 25 MG tablet, TAKE ONE TABLET BY MOUTH TWICE DAILY (Patient taking differently: As Needed.), Disp: 180 tablet, Rfl: 3    metoprolol tartrate (LOPRESSOR) 25 MG tablet, TAKE ONE TABLET BY MOUTH TWICE DAILY, Disp: 180 tablet, Rfl: 3    nitroglycerin (NITROSTAT) 0.4 MG SL tablet, Place 1 tablet under the tongue Every 5 (Five) Minutes As Needed for Chest Pain. Don't use, Disp: , Rfl:     Omega-3 Fatty Acids (FISH OIL) 1000 MG capsule capsule, Take 1 capsule by mouth Daily With Breakfast., Disp: , Rfl:     sertraline (ZOLOFT) 25 MG tablet, TAKE ONE TABLET BY MOUTH EVERY DAY, Disp: 90 tablet, Rfl: 3    traZODone (DESYREL) 50 MG tablet, TAKE 1/2 TO 1 TABLET BY MOUTH EVERY NIGHT AS NEEDED, Disp: 90 tablet, Rfl: 3    triamcinolone (KENALOG) 0.025 % ointment, Apply to the affected areas(s) twice daily, Disp: 80 g, Rfl: 1    Allergies   Allergen Reactions    Parabens Rash    Anesthetics, Amide Rash    Mixed Ragweed Other (See Comments)       Objective     Physical Exam:  Vital Signs:   Vitals:    01/22/25 0913   BP: 116/60   Pulse: 58   Temp: 98.4 °F (36.9 °C)   SpO2: 98%   Weight: 73.9 kg (163 lb)   Height: 175.3 cm (69.02\")     Body mass index is 24.06 kg/m².  BMI is within normal parameters. No other follow-up for BMI required.       Physical Exam  Constitutional:       Appearance: He is not ill-appearing.   HENT:      Head: Normocephalic.      Right Ear: External ear normal.      Left Ear: External ear normal.   Eyes:      Conjunctiva/sclera: Conjunctivae normal.      Pupils: Pupils are equal, round, and reactive to light.   Cardiovascular:      Rate and Rhythm: Normal rate and regular rhythm.      Pulses:           Radial " pulses are 2+ on the right side and 2+ on the left side.        Dorsalis pedis pulses are 2+ on the right side and 2+ on the left side.      Heart sounds: Normal heart sounds.   Pulmonary:      Effort: Pulmonary effort is normal.      Breath sounds: Normal breath sounds.   Musculoskeletal:      Cervical back: Normal range of motion and neck supple.   Skin:     General: Skin is warm.      Capillary Refill: Capillary refill takes less than 2 seconds.   Neurological:      Mental Status: He is alert and oriented to person, place, and time.      Coordination: Coordination normal.      Gait: Gait normal.   Psychiatric:         Attention and Perception: Attention normal.         Mood and Affect: Mood and affect normal.         Speech: Speech normal.         Behavior: Behavior normal.             Assessment / Plan      Assessment/Plan:   Diagnoses and all orders for this visit:    1. Hyperkalemia (Primary)  -     Basic Metabolic Panel    2. Anxiety        - Encouraged to take part in daily physical exercise.          - Eat healthy, well balanced diet; avoid sugary foods or beverages        - Limit alcohol intake        - Ensure good night's sleep by creating calm space in bedroom, avoiding screen time 1-2 hours before bed, no caffeine after 5 pm        - Talk to supportive family and friends, as needed        - Consider journaling, other creative way to express feelings, if needed    3. Function kidney decreased        - BMP        - Will continue to monitor.  Avoid NSAIDs. Keep blood pressure and blood glucose well managed. Stay well hydrated with plenty of water.      4. Chronic obstructive pulmonary disease, unspecified COPD type    5. Mixed hyperlipidemia  6. Coronary arteriosclerosis in native artery  7. Essential hypertension        - Follow heart healthy diet.  Keep sodium intake < 1500 mg per day.  Avoid processed & fast foods.          - Exercise as tolerated, with a goal of 30 minutes of moderate exercise most  days.         - Take medications as prescribed.    8. Type 2 diabetes mellitus with diabetic neuropathy, without long-term current use of insulin  -     Hemoglobin A1c        - Follow diabetic diet        - Monitor blood sugars as discussed, to better assess trends and glycemic response to certain food, drink, activities.  Advised fasting blood glucose should be < 130, 2 hours post-prandial should be < 180        - See eye doctor annually or as discussed        - Wear protective foot wear/no bare feet        - Check feet regularly for calluses or ulcers        - Discussed risk of poorly controlled diabetes and long-term complications        - Exercise as tolerated for 30-45 minutes most days of the week. Gradually increase daily exercise if not currently active.        - Take all medications as prescribed           Follow Up:   Return if symptoms worsen or fail to improve.    Patient was given instructions and counseling regarding his condition or for health maintenance advice. Please see specific information pulled into the AVS if appropriate.       [facility] [address] [facility]     Please note that portions of this note may have been completed with a voice recognition program. Efforts were made to edit dictation, but occasionally words are mistranscribed.

## 2025-02-13 LAB
BUN SERPL-MCNC: 31 MG/DL (ref 8–23)
BUN/CREAT SERPL: 25.6 (ref 7–25)
CALCIUM SERPL-MCNC: 9.4 MG/DL (ref 8.6–10.5)
CHLORIDE SERPL-SCNC: 102 MMOL/L (ref 98–107)
CO2 SERPL-SCNC: 23.8 MMOL/L (ref 22–29)
CREAT SERPL-MCNC: 1.21 MG/DL (ref 0.76–1.27)
EGFRCR SERPLBLD CKD-EPI 2021: 59.4 ML/MIN/1.73
GLUCOSE SERPL-MCNC: 79 MG/DL (ref 65–99)
HBA1C MFR BLD: 6 % (ref 4.8–5.6)
POTASSIUM SERPL-SCNC: 5.4 MMOL/L (ref 3.5–5.2)
SODIUM SERPL-SCNC: 138 MMOL/L (ref 136–145)

## 2025-02-17 ENCOUNTER — TELEPHONE (OUTPATIENT)
Dept: INTERNAL MEDICINE | Facility: CLINIC | Age: 85
End: 2025-02-17
Payer: MEDICARE

## 2025-02-20 DIAGNOSIS — L08.1 ERYTHRASMA: ICD-10-CM

## 2025-02-20 RX ORDER — TRIAMCINOLONE ACETONIDE 0.25 MG/G
OINTMENT TOPICAL
Qty: 80 G | Refills: 1 | Status: SHIPPED | OUTPATIENT
Start: 2025-02-20

## 2025-03-06 ENCOUNTER — TELEPHONE (OUTPATIENT)
Dept: INTERNAL MEDICINE | Facility: CLINIC | Age: 85
End: 2025-03-06

## 2025-03-06 NOTE — TELEPHONE ENCOUNTER
Caller: [facility]    Relationship:     Best call back number: [phone]    What orders are you requesting (i.e. lab or imaging):     UROSTOMY SUPPLIES    Additional notes:      [phone]    FAXED UROST[facility] SUPPLY ORDER TODAY

## 2025-03-13 RX ORDER — ISOSORBIDE MONONITRATE 30 MG/1
30 TABLET, EXTENDED RELEASE ORAL DAILY
Qty: 90 TABLET | Refills: 3 | Status: SHIPPED | OUTPATIENT
Start: 2025-03-13

## 2025-03-13 NOTE — TELEPHONE ENCOUNTER
Incoming Refill Request      Medication requested (name and dose): isosorbide mononitrate 30mg     Pharmacy where request should be sent: [facility]     Additional details provided by patient: na    Best call back number: [phone]    Does the patient have less than a 3 day supply:  [x] Yes  [] No    Susanna Rangel Rep  03/13/25, 12:15 EDT

## 2025-03-17 ENCOUNTER — OFFICE VISIT (OUTPATIENT)
Dept: PULMONOLOGY | Facility: CLINIC | Age: 85
End: 2025-03-17
Payer: MEDICARE

## 2025-03-17 VITALS
SYSTOLIC BLOOD PRESSURE: 124 MMHG | BODY MASS INDEX: 23.28 KG/M2 | WEIGHT: 157.2 LBS | OXYGEN SATURATION: 85 % | DIASTOLIC BLOOD PRESSURE: 68 MMHG | RESPIRATION RATE: 18 BRPM | HEIGHT: 69 IN | HEART RATE: 49 BPM

## 2025-03-17 DIAGNOSIS — J44.9 CHRONIC OBSTRUCTIVE PULMONARY DISEASE, UNSPECIFIED COPD TYPE: Primary | ICD-10-CM

## 2025-03-17 DIAGNOSIS — R09.02 HYPOXIA: ICD-10-CM

## 2025-03-17 DIAGNOSIS — R93.89 ABNORMAL CT OF THE CHEST: ICD-10-CM

## 2025-03-17 DIAGNOSIS — J30.9 ALLERGIC RHINITIS, UNSPECIFIED SEASONALITY, UNSPECIFIED TRIGGER: ICD-10-CM

## 2025-03-17 PROCEDURE — 3074F SYST BP LT 130 MM HG: CPT | Performed by: INTERNAL MEDICINE

## 2025-03-17 PROCEDURE — 3078F DIAST BP <80 MM HG: CPT | Performed by: INTERNAL MEDICINE

## 2025-03-17 PROCEDURE — 99214 OFFICE O/P EST MOD 30 MIN: CPT | Performed by: INTERNAL MEDICINE

## 2025-03-17 RX ORDER — ALBUTEROL SULFATE 90 UG/1
2 INHALANT RESPIRATORY (INHALATION) EVERY 4 HOURS PRN
Qty: 18 G | Refills: 2 | Status: SHIPPED | OUTPATIENT
Start: 2025-03-17

## 2025-03-17 RX ORDER — FLUTICASONE FUROATE, UMECLIDINIUM BROMIDE AND VILANTEROL TRIFENATATE 100; 62.5; 25 UG/1; UG/1; UG/1
1 POWDER RESPIRATORY (INHALATION)
Qty: 180 EACH | Refills: 3 | Status: SHIPPED | OUTPATIENT
Start: 2025-03-17

## 2025-03-17 RX ORDER — AZELASTINE 1 MG/ML
SPRAY, METERED NASAL
Qty: 3 EACH | Refills: 1 | Status: SHIPPED | OUTPATIENT
Start: 2025-03-17

## 2025-03-17 NOTE — PROGRESS NOTES
"  Chief Complaint   Patient presents with    Breathing Problem    Follow-up       Subjective   Jairo Mercedes is a 84 y.o. male.   Patient was evaluated today for follow up of shortness of breath, hypoxia and COPD.     Patient says that his symptoms have been stable since the last clinic visit. he reports no recent exacerbations.      Patient is using Trelegy, as prescribed.     Exercise tolerance has also remained stable but limited due to multiple issues.     Quit smoking in 1999    The patient says that he is using oxygen as prescribed and feels that it appears to be helping some of his symptoms.    Patient says that he has been using his nasal sprays on a seasonal basis and describes no significant ongoing issues other than occasional congestion.       The following portions of the patient's history were reviewed and updated as appropriate: allergies, current medications, past family history, past medical history, past social history, and past surgical history.    Review of Systems   HENT:  Negative for sinus pressure, sneezing and sore throat.    Respiratory:  Positive for shortness of breath and wheezing. Negative for cough and chest tightness.    Psychiatric/Behavioral:  Positive for sleep disturbance (some).        Objective   Visit Vitals  /68   Pulse (!) 49   Resp 18   Ht 175.3 cm (69.02\")   Wt 71.3 kg (157 lb 3.2 oz)   SpO2 (!) 85% Comment: on RA at rest   BMI 23.20 kg/m²   99% on 2lpm at rest.     BMI Readings from Last 8 Encounters:   03/17/25 23.20 kg/m²   01/22/25 24.06 kg/m²   01/04/25 23.60 kg/m²   01/02/25 23.54 kg/m²   09/13/24 24.22 kg/m²   07/05/24 23.47 kg/m²   06/19/24 24.06 kg/m²   05/20/24 24.37 kg/m²       Physical Exam  Vitals reviewed.   Constitutional:       Appearance: He is well-developed.   HENT:      Head: Normocephalic and atraumatic.   Eyes:      Extraocular Movements: Extraocular movements intact.   Cardiovascular:      Rate and Rhythm: Normal rate.   Pulmonary:      " Comments: Somewhat hyperresonant to percussion.  Somewhat decreased air entry.  No obvious wheezing noted.   Musculoskeletal:      Cervical back: Neck supple.   Neurological:      Mental Status: He is alert.         Assessment & Plan   Diagnoses and all orders for this visit:    1. Chronic obstructive pulmonary disease, unspecified COPD type (Primary)  -     Fluticasone-Umeclidin-Vilant (Trelegy Ellipta) 100-62.5-25 MCG/ACT inhaler; Inhale 1 puff Daily. Rinse mouth out after use  Dispense: 180 each; Refill: 3  -     albuterol sulfate  (90 Base) MCG/ACT inhaler; Inhale 2 puffs Every 4 (Four) Hours As Needed for Wheezing or Shortness of Air.  Dispense: 18 g; Refill: 2  -     Complete PFT - Pre & Post Bronchodilator; Future    2. Hypoxia    3. Abnormal CT of the chest    4. Allergic rhinitis, unspecified seasonality, unspecified trigger  -     azelastine (ASTELIN) 0.1 % nasal spray; 1-2 sprays each nostril bid  Dispense: 3 each; Refill: 1           Return in about 8 months (around 11/17/2025) for Recheck, PFT F/U, For Blanca Quiroz).    DISCUSSION (if any):  Last CT scan results was reviewed in great detail with the patient.  June 2024.  Chest:   Interval resolution of previously described lingular nodule and decreased size of left lower lobe nodule.     Unchanged right apical spiculated nodule.     No new suspicious pulmonary findings.     Laboratory data was reviewed with him.   Lab Results   Component Value Date    AFPTM 188 07/22/2015     Lab Results   Component Value Date    [id number] MM 07/22/2015     Latest available PFTs were reviewed.  Consistent with MIXED defect noted. Performed in March 2023.    PFTs will be ordered to be done upon follow up.    We have reviewed his pulmonary medications in great detail.    Compliance with medications stressed.     Side effects of prescribed medications discussed with the patient    The patient was advised to continue using oxygen 24/7 and especially with  exercise and at night.    Patient was advised to continue his nasal spray, especially given improvement in symptoms overall.    His latest CT shows resolution of the nodule. No further CTs needed at this time.     Vaccination status addressed.    Up-to-date with influenza vaccinations.     Up-to-date with pneumonia vaccinations.         Dictated utilizing Dragon dictation.    This document was electronically signed by Ranulfo Fagan MD on 03/17/25 at 11:23 EDT

## 2025-03-26 RX ORDER — ISOSORBIDE MONONITRATE 30 MG/1
30 TABLET, EXTENDED RELEASE ORAL DAILY
Qty: 90 TABLET | Refills: 3 | Status: SHIPPED | OUTPATIENT
Start: 2025-03-26

## 2025-05-10 ENCOUNTER — PATIENT MESSAGE (OUTPATIENT)
Dept: INTERNAL MEDICINE | Facility: CLINIC | Age: 85
End: 2025-05-10
Payer: MEDICARE

## 2025-05-10 DIAGNOSIS — R05.1 ACUTE COUGH: ICD-10-CM

## 2025-05-10 DIAGNOSIS — M13.841 ALLERGIC ARTHRITIS OF RIGHT HAND: Primary | ICD-10-CM

## 2025-05-10 DIAGNOSIS — M19.041 ARTHRITIS OF BOTH HANDS: ICD-10-CM

## 2025-05-10 DIAGNOSIS — M19.042 ARTHRITIS OF BOTH HANDS: ICD-10-CM

## 2025-05-10 RX ORDER — BENZONATATE 200 MG/1
200 CAPSULE ORAL 3 TIMES DAILY PRN
Qty: 30 CAPSULE | Refills: 0 | Status: CANCELLED | OUTPATIENT
Start: 2025-05-10

## 2025-05-12 DIAGNOSIS — R05.1 ACUTE COUGH: ICD-10-CM

## 2025-05-12 RX ORDER — BENZONATATE 200 MG/1
200 CAPSULE ORAL 3 TIMES DAILY PRN
Qty: 30 CAPSULE | Refills: 0 | Status: SHIPPED | OUTPATIENT
Start: 2025-05-12

## 2025-05-27 RX ORDER — METOPROLOL TARTRATE 25 MG/1
25 TABLET, FILM COATED ORAL EVERY 12 HOURS SCHEDULED
Qty: 180 TABLET | Refills: 3 | Status: SHIPPED | OUTPATIENT
Start: 2025-05-27 | End: 2025-06-02 | Stop reason: SDUPTHER

## 2025-06-02 RX ORDER — TRAZODONE HYDROCHLORIDE 50 MG/1
TABLET ORAL
Qty: 90 TABLET | Refills: 3 | Status: SHIPPED | OUTPATIENT
Start: 2025-06-02

## 2025-06-02 RX ORDER — METOPROLOL TARTRATE 25 MG/1
25 TABLET, FILM COATED ORAL EVERY 12 HOURS SCHEDULED
Qty: 180 TABLET | Refills: 3 | Status: SHIPPED | OUTPATIENT
Start: 2025-06-02

## 2025-06-13 DIAGNOSIS — K21.9 GASTROESOPHAGEAL REFLUX DISEASE WITHOUT ESOPHAGITIS: ICD-10-CM

## 2025-06-13 RX ORDER — ESOMEPRAZOLE MAGNESIUM 40 MG/1
40 CAPSULE, DELAYED RELEASE ORAL DAILY
Qty: 90 CAPSULE | Refills: 3 | Status: SHIPPED | OUTPATIENT
Start: 2025-06-13

## 2025-07-06 DIAGNOSIS — E78.2 MIXED HYPERLIPIDEMIA: ICD-10-CM

## 2025-07-07 ENCOUNTER — OFFICE VISIT (OUTPATIENT)
Dept: INTERNAL MEDICINE | Facility: CLINIC | Age: 85
End: 2025-07-07
Payer: MEDICARE

## 2025-07-07 VITALS
TEMPERATURE: 98.6 F | SYSTOLIC BLOOD PRESSURE: 108 MMHG | HEIGHT: 69 IN | BODY MASS INDEX: 23.55 KG/M2 | WEIGHT: 159 LBS | OXYGEN SATURATION: 94 % | DIASTOLIC BLOOD PRESSURE: 58 MMHG | HEART RATE: 61 BPM

## 2025-07-07 DIAGNOSIS — N40.0 HYPERPLASIA OF PROSTATE WITHOUT LOWER URINARY TRACT SYMPTOMS (LUTS): ICD-10-CM

## 2025-07-07 DIAGNOSIS — J30.2 SEASONAL ALLERGIC RHINITIS, UNSPECIFIED TRIGGER: ICD-10-CM

## 2025-07-07 DIAGNOSIS — E11.43 TYPE 2 DIABETES MELLITUS WITH DIABETIC AUTONOMIC NEUROPATHY, WITHOUT LONG-TERM CURRENT USE OF INSULIN: ICD-10-CM

## 2025-07-07 DIAGNOSIS — Z51.81 ENCOUNTER FOR MEDICATION MONITORING: ICD-10-CM

## 2025-07-07 DIAGNOSIS — I10 ESSENTIAL HYPERTENSION: ICD-10-CM

## 2025-07-07 DIAGNOSIS — M19.042 ARTHRITIS OF BOTH HANDS: ICD-10-CM

## 2025-07-07 DIAGNOSIS — L08.1 ERYTHRASMA: ICD-10-CM

## 2025-07-07 DIAGNOSIS — H90.6 MIXED CONDUCTIVE AND SENSORINEURAL HEARING LOSS OF BOTH EARS: ICD-10-CM

## 2025-07-07 DIAGNOSIS — I25.10 CORONARY ARTERIOSCLEROSIS IN NATIVE ARTERY: ICD-10-CM

## 2025-07-07 DIAGNOSIS — F41.9 ANXIETY: ICD-10-CM

## 2025-07-07 DIAGNOSIS — K21.9 GASTROESOPHAGEAL REFLUX DISEASE WITHOUT ESOPHAGITIS: ICD-10-CM

## 2025-07-07 DIAGNOSIS — Z12.5 SCREENING FOR PROSTATE CANCER: ICD-10-CM

## 2025-07-07 DIAGNOSIS — J44.9 CHRONIC OBSTRUCTIVE PULMONARY DISEASE, UNSPECIFIED COPD TYPE: ICD-10-CM

## 2025-07-07 DIAGNOSIS — E78.2 MIXED HYPERLIPIDEMIA: ICD-10-CM

## 2025-07-07 DIAGNOSIS — E11.40 TYPE 2 DIABETES MELLITUS WITH DIABETIC NEUROPATHY, WITHOUT LONG-TERM CURRENT USE OF INSULIN: ICD-10-CM

## 2025-07-07 DIAGNOSIS — Z13.0 SCREENING FOR DISORDER OF BLOOD AND BLOOD-FORMING ORGANS: ICD-10-CM

## 2025-07-07 DIAGNOSIS — E87.5 HYPERKALEMIA: ICD-10-CM

## 2025-07-07 DIAGNOSIS — Z00.00 ANNUAL PHYSICAL EXAM: ICD-10-CM

## 2025-07-07 DIAGNOSIS — M19.041 ARTHRITIS OF BOTH HANDS: ICD-10-CM

## 2025-07-07 DIAGNOSIS — Z00.00 ENCOUNTER FOR SUBSEQUENT ANNUAL WELLNESS VISIT (AWV) IN MEDICARE PATIENT: Primary | ICD-10-CM

## 2025-07-07 LAB
EXPIRATION DATE: NORMAL
Lab: NORMAL
POC ALBUMIN, URINE: 80 MG/L
POC CREATININE, URINE: 300 MG/DL
POC URINE ALB/CREA RATIO: NORMAL

## 2025-07-07 RX ORDER — GABAPENTIN 100 MG/1
100 CAPSULE ORAL NIGHTLY
Qty: 90 CAPSULE | Refills: 0 | Status: SHIPPED | OUTPATIENT
Start: 2025-07-07

## 2025-07-07 RX ORDER — ATORVASTATIN CALCIUM 40 MG/1
40 TABLET, FILM COATED ORAL
Qty: 90 TABLET | Refills: 3 | Status: SHIPPED | OUTPATIENT
Start: 2025-07-07

## 2025-07-07 RX ORDER — ATORVASTATIN CALCIUM 40 MG/1
40 TABLET, FILM COATED ORAL
Qty: 90 TABLET | Refills: 3 | Status: SHIPPED | OUTPATIENT
Start: 2025-07-07 | End: 2025-07-07 | Stop reason: SDUPTHER

## 2025-07-07 NOTE — ASSESSMENT & PLAN NOTE
{Hyperlipidemia A/P Block (Optional):[id number]}    Orders:    atorvastatin (LIPITOR) 40 MG tablet; Take 1 tablet by mouth every night at bedtime.
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
## 2025-07-07 NOTE — ASSESSMENT & PLAN NOTE
{Diabetes (Optional):[id number]}    Orders:    Hemoglobin A1c    POC Albumin/Creatinine Ratio Urine

## 2025-07-07 NOTE — PATIENT INSTRUCTIONS
Medicare Wellness  Personal Prevention Plan of Service     Date of Office Visit:    Encounter Provider:  GENTRY Pablo  Place of Service:  [facility]  Patient Name: Jairo Mercedes  :  1940    As part of the Medicare Wellness portion of your visit today, we are providing you with this personalized preventive plan of services (PPPS). This plan is based upon recommendations of the [place] Preventive Services Task Force (USPSTF) and the Advisory Committee on Immunization Practices (ACIP).    This lists the preventive care services that should be considered, and provides dates of when you are due. Items listed as completed are up-to-date and do not require any further intervention.    Health Maintenance   Topic Date Due    DIABETIC FOOT EXAM  Never done    URINE MICROALBUMIN-CREATININE RATIO (uACR)  Never done    DIABETIC EYE EXAM  10/12/2022    LIPID PANEL  2025    ANNUAL WELLNESS VISIT  2025    HEMOGLOBIN A1C  2025    COVID-19 Vaccine (2024- season) 2025 (Originally 2024)    INFLUENZA VACCINE  2026 (Originally 2025)    TDAP/TD VACCINES (2 - Td or Tdap) 2026 (Originally 2023)    COLORECTAL CANCER SCREENING  2028    RSV Vaccine - Adults  Completed    Pneumococcal Vaccine 50+  Completed    ZOSTER VACCINE  Completed    LUNG CANCER SCREENING  Discontinued       No orders of the defined types were placed in this encounter.      No follow-ups on file.

## 2025-07-10 LAB
BASOPHILS # BLD AUTO: 0.05 10*3/MM3 (ref 0–0.2)
BASOPHILS NFR BLD AUTO: 0.4 % (ref 0–1.5)
CHOLEST SERPL-MCNC: 116 MG/DL (ref 0–200)
EOSINOPHIL # BLD AUTO: 0.34 10*3/MM3 (ref 0–0.4)
EOSINOPHIL NFR BLD AUTO: 2.4 % (ref 0.3–6.2)
ERYTHROCYTE [DISTWIDTH] IN BLOOD BY AUTOMATED COUNT: 14.2 % (ref 12.3–15.4)
HBA1C MFR BLD: 5.6 % (ref 4.8–5.6)
HCT VFR BLD AUTO: 36.1 % (ref 37.5–51)
HDLC SERPL-MCNC: 34 MG/DL (ref 40–60)
HGB BLD-MCNC: 12 G/DL (ref 13–17.7)
IMM GRANULOCYTES # BLD AUTO: 0.07 10*3/MM3 (ref 0–0.05)
IMM GRANULOCYTES NFR BLD AUTO: 0.5 % (ref 0–0.5)
LDLC SERPL CALC-MCNC: 59 MG/DL (ref 0–100)
LYMPHOCYTES # BLD AUTO: 3.12 10*3/MM3 (ref 0.7–3.1)
LYMPHOCYTES NFR BLD AUTO: 22.2 % (ref 19.6–45.3)
MCH RBC QN AUTO: 30.7 PG (ref 26.6–33)
MCHC RBC AUTO-ENTMCNC: 33.2 G/DL (ref 31.5–35.7)
MCV RBC AUTO: 92.3 FL (ref 79–97)
MONOCYTES # BLD AUTO: 1.41 10*3/MM3 (ref 0.1–0.9)
MONOCYTES NFR BLD AUTO: 10 % (ref 5–12)
NEUTROPHILS # BLD AUTO: 9.07 10*3/MM3 (ref 1.7–7)
NEUTROPHILS NFR BLD AUTO: 64.5 % (ref 42.7–76)
NRBC BLD AUTO-RTO: 0 /100 WBC (ref 0–0.2)
PLATELET # BLD AUTO: 414 10*3/MM3 (ref 140–450)
PSA SERPL-MCNC: <0.014 NG/ML (ref 0–4)
RBC # BLD AUTO: 3.91 10*6/MM3 (ref 4.14–5.8)
TRIGL SERPL-MCNC: 132 MG/DL (ref 0–150)
VLDLC SERPL CALC-MCNC: 23 MG/DL (ref 5–40)
WBC # BLD AUTO: 14.06 10*3/MM3 (ref 3.4–10.8)

## 2025-07-11 LAB — DRUGS UR: NORMAL

## 2025-07-21 ENCOUNTER — OFFICE VISIT (OUTPATIENT)
Dept: INTERNAL MEDICINE | Facility: CLINIC | Age: 85
End: 2025-07-21
Payer: MEDICARE

## 2025-07-21 VITALS
TEMPERATURE: 98.6 F | DIASTOLIC BLOOD PRESSURE: 60 MMHG | HEART RATE: 60 BPM | OXYGEN SATURATION: 95 % | HEIGHT: 69 IN | BODY MASS INDEX: 23.55 KG/M2 | SYSTOLIC BLOOD PRESSURE: 122 MMHG | WEIGHT: 159 LBS

## 2025-07-21 DIAGNOSIS — L98.9 SKIN LESION: Primary | ICD-10-CM

## 2025-07-21 PROCEDURE — 3074F SYST BP LT 130 MM HG: CPT | Performed by: NURSE PRACTITIONER

## 2025-07-21 PROCEDURE — 1126F AMNT PAIN NOTED NONE PRSNT: CPT | Performed by: NURSE PRACTITIONER

## 2025-07-21 PROCEDURE — 1160F RVW MEDS BY RX/DR IN RCRD: CPT | Performed by: NURSE PRACTITIONER

## 2025-07-21 PROCEDURE — 3078F DIAST BP <80 MM HG: CPT | Performed by: NURSE PRACTITIONER

## 2025-07-21 PROCEDURE — 1159F MED LIST DOCD IN RCRD: CPT | Performed by: NURSE PRACTITIONER

## 2025-07-21 PROCEDURE — 99213 OFFICE O/P EST LOW 20 MIN: CPT | Performed by: NURSE PRACTITIONER
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
## 2025-08-13 ENCOUNTER — DOCUMENTATION (OUTPATIENT)
Dept: PULMONOLOGY | Facility: CLINIC | Age: 85
End: 2025-08-13
Payer: MEDICARE

## (undated) DEVICE — 15 DEG. MICROKNIFE - 3MM: Brand: SHARPOINT

## (undated) DEVICE — DRSNG TELFA PAD NONADH STR 1S 3X8IN

## (undated) DEVICE — MEDI-VAC NON-CONDUCTIVE SUCTION TUBING: Brand: CARDINAL HEALTH

## (undated) DEVICE — SOL IRRIG H2O 1000ML STRL

## (undated) DEVICE — 2000CC GUARDIAN II: Brand: GUARDIAN

## (undated) DEVICE — ENDOGATOR AUXILIARY WATER JET CONNECTOR: Brand: ENDOGATOR

## (undated) DEVICE — SOL IRRG H2O BG 3000ML STRL

## (undated) DEVICE — Device: Brand: MALYUGIN RING SYSTEM 6.25MM

## (undated) DEVICE — HP CONCL INTREPID COAX I/A CRV .3MM

## (undated) DEVICE — GLV SURG SENSICARE W/ALOE PF LF 7.5 STRL

## (undated) DEVICE — KT ORCA VLV SXN AIR/H2O W/SEAL 1P/U STRL

## (undated) DEVICE — Device

## (undated) DEVICE — EYE CARE POST OP KIT: Brand: MEDLINE INDUSTRIES, INC.

## (undated) DEVICE — GLV SURG SENSICARE LT W/ALOE PF LF 7 STRL

## (undated) DEVICE — SLV SCD CALF HEMOFORCE DVT THERP REPROC MD

## (undated) DEVICE — CLEARCUT® HP2 SLIT KNIFE INTREPID MICRO-COAXIAL SYSTEM 2.4 DB: Brand: CLEARCUT®; INTREPID

## (undated) DEVICE — SYR LUERLOK 5CC

## (undated) DEVICE — CONMED SCOPE SAVER BITE BLOCK, 20X27 MM: Brand: SCOPE SAVER

## (undated) DEVICE — GLV SURG SENSICARE W/ALOE PF LF 7 STRL

## (undated) DEVICE — JELLY,LUBE,STERILE,FLIP TOP,TUBE,2-OZ: Brand: MEDLINE

## (undated) DEVICE — PRECISOR BRONCHO BIOPSY ALLIGATOR CUP FORCEPS/CHANNEL-GUARD COATED SHAFT, 1.8 MM X 115 CM: Brand: PRECISOR BRONCHO

## (undated) DEVICE — SYR LUER SLPTP 50ML

## (undated) DEVICE — ADAPT/Y SCPE GATEWAY ADVNTGE

## (undated) DEVICE — CUFF SCD HEMOFORCE SEQ CALF STD MD

## (undated) DEVICE — TRAP,MUCUS SPECIMEN,40CC: Brand: MEDLINE

## (undated) DEVICE — PAD GRND REM POLYHESIVE A/ DISP

## (undated) DEVICE — YANKAUER,BULB TIP, NO VENT: Brand: ARGYLE

## (undated) DEVICE — ST FLD IRR WARM

## (undated) DEVICE — GLV SURG NEOPRN SENSICARE SZ8

## (undated) DEVICE — BRSH CYTO 5F 3MM 180CM STRL GRN

## (undated) DEVICE — CANN IRR/INJ AIR ANT CHAMBER 6MM BEND 27G

## (undated) DEVICE — MARKR UTIL W/RULR W/LBL REGTP STRL

## (undated) DEVICE — FRCP BIOP RADLJAW4 HOT 2.2X240 BX40

## (undated) DEVICE — RICH CYSTO: Brand: MEDLINE INDUSTRIES, INC.

## (undated) DEVICE — ELECTRD LP CUT 24/26F YEL